# Patient Record
Sex: FEMALE | Race: WHITE | Employment: FULL TIME | URBAN - METROPOLITAN AREA
[De-identification: names, ages, dates, MRNs, and addresses within clinical notes are randomized per-mention and may not be internally consistent; named-entity substitution may affect disease eponyms.]

---

## 2017-01-27 ENCOUNTER — HOSPITAL ENCOUNTER (OUTPATIENT)
Dept: CARDIOLOGY | Facility: CLINIC | Age: 52
Discharge: HOME | End: 2017-01-27
Attending: PHYSICIAN ASSISTANT | Admitting: PHYSICIAN ASSISTANT
Payer: COMMERCIAL

## 2017-01-27 DIAGNOSIS — R07.9 CHEST PAIN, UNSPECIFIED TYPE: ICD-10-CM

## 2017-01-27 PROCEDURE — 93325 DOPPLER ECHO COLOR FLOW MAPG: CPT | Mod: 26 | Performed by: INTERNAL MEDICINE

## 2017-01-27 PROCEDURE — 93016 CV STRESS TEST SUPVJ ONLY: CPT | Performed by: INTERNAL MEDICINE

## 2017-01-27 PROCEDURE — 93321 DOPPLER ECHO F-UP/LMTD STD: CPT | Mod: 26 | Performed by: INTERNAL MEDICINE

## 2017-01-27 PROCEDURE — 40000264 ECHO STRESS TEST WITH LUMASON

## 2017-01-27 PROCEDURE — 25500064 ZZH RX 255 OP 636: Performed by: PHYSICIAN ASSISTANT

## 2017-01-27 PROCEDURE — 93350 STRESS TTE ONLY: CPT | Mod: 26 | Performed by: INTERNAL MEDICINE

## 2017-01-27 PROCEDURE — 93018 CV STRESS TEST I&R ONLY: CPT | Performed by: INTERNAL MEDICINE

## 2017-01-27 RX ADMIN — SULFUR HEXAFLUORIDE 5 ML: KIT at 09:45

## 2017-01-30 NOTE — PROGRESS NOTES
Quick Note:    Dear Mallory,    I received the report for your recent stress test, and it looks like it all came back normal, which is good news.    Please contact the clinic if you have additional questions. Thank you.    Sincerely,    Kylie Aleman PA-C    ______

## 2017-04-24 DIAGNOSIS — E78.5 HYPERLIPIDEMIA LDL GOAL <130: ICD-10-CM

## 2017-04-24 NOTE — TELEPHONE ENCOUNTER
Omeprazole 20 MG tablet      Last Written Prescription Date: 3-31-16  Last Fill Quantity: 90,  # refills: 3   [place] [facility] or [facility] prescribing provider: 12-27-16                                                  simvastatin (ZOCOR) 20 MG tablet    Last Written Prescription Date: 3-31-16  Last Fill Quantity: 90, # refills: 3  [place] [facility] or [facility] prescribing provider: 12-27-16       Lab Results   Component Value Date    CHOL 200 03/31/2016     Lab Results   Component Value Date    HDL 48 03/31/2016     Lab Results   Component Value Date     03/31/2016     Lab Results   Component Value Date    TRIG 134 03/31/2016     Lab Results   Component Value Date    CHOLHDLRATIO 3.8 02/27/2015

## 2017-04-25 DIAGNOSIS — G47.09 OTHER INSOMNIA: ICD-10-CM

## 2017-04-25 NOTE — TELEPHONE ENCOUNTER
Reason for Call:  Medication or medication refill:    Do you use a [facility]?  Name of the pharmacy and phone number for the current request:  [facility]    Name of the medication requested: zolpidem (AMBIEN) 5 MG tablet    Other request: pt has two other med refills    Can we leave a detailed message on this number? YES    Phone number patient can be reached at: Home number on file [phone] (home)    Best Time:     Call taken on 4/25/2017 at 9:49 AM by Merna Wilson

## 2017-04-25 NOTE — TELEPHONE ENCOUNTER
Zolpidem      Last Written Prescription Date: 6/8/2016  Last Fill Quantity: 30,  # refills: 5   Last Office Visit with [facility], [facility] or [facility] prescribing provider: 12/27/2017                                         Next 5 appointments (look out 90 days)     Apr 28, 2017 10:00 AM CDT   PHYSICAL with Kylie Aleman PA-C   [facility] [place] ([facility])    [address]  [place] [id number] [phone]

## 2017-04-26 RX ORDER — ZOLPIDEM TARTRATE 5 MG/1
5 TABLET ORAL
Qty: 30 TABLET | Refills: 5 | Status: SHIPPED | OUTPATIENT
Start: 2017-04-26 | End: 2017-11-24

## 2017-04-26 RX ORDER — SIMVASTATIN 20 MG
TABLET ORAL
Qty: 30 TABLET | Refills: 0 | Status: SHIPPED | OUTPATIENT
Start: 2017-04-26 | End: 2017-05-12

## 2017-04-26 NOTE — TELEPHONE ENCOUNTER
Pt calling to check on refills.  She is out of medication and will be in a lot of GI pain if she does not have her omeprazole.  Can we please expedite the refill for her?  She can be reached at [phone] with any questions or concerns.  Vanessa Galeano  Patient

## 2017-04-28 ENCOUNTER — OFFICE VISIT (OUTPATIENT)
Dept: FAMILY MEDICINE | Facility: CLINIC | Age: 52
End: 2017-04-28
Payer: COMMERCIAL

## 2017-04-28 VITALS
DIASTOLIC BLOOD PRESSURE: 70 MMHG | WEIGHT: 228 LBS | BODY MASS INDEX: 31.92 KG/M2 | HEART RATE: 74 BPM | OXYGEN SATURATION: 95 % | HEIGHT: 71 IN | SYSTOLIC BLOOD PRESSURE: 110 MMHG | TEMPERATURE: 97.9 F

## 2017-04-28 DIAGNOSIS — E78.5 HYPERLIPIDEMIA LDL GOAL <130: ICD-10-CM

## 2017-04-28 DIAGNOSIS — Z80.41 FAMILY HISTORY OF MALIGNANT NEOPLASM OF OVARY: ICD-10-CM

## 2017-04-28 DIAGNOSIS — M25.50 MULTIPLE JOINT PAIN: ICD-10-CM

## 2017-04-28 DIAGNOSIS — Z12.31 VISIT FOR SCREENING MAMMOGRAM: ICD-10-CM

## 2017-04-28 DIAGNOSIS — R53.83 OTHER FATIGUE: ICD-10-CM

## 2017-04-28 DIAGNOSIS — Z00.00 ROUTINE HISTORY AND PHYSICAL EXAMINATION OF ADULT: Primary | ICD-10-CM

## 2017-04-28 DIAGNOSIS — E66.9 NON MORBID OBESITY, UNSPECIFIED OBESITY TYPE: ICD-10-CM

## 2017-04-28 DIAGNOSIS — G47.00 INSOMNIA, UNSPECIFIED TYPE: ICD-10-CM

## 2017-04-28 PROBLEM — R87.610 PAPANICOLAOU SMEAR OF CERVIX WITH ATYPICAL SQUAMOUS CELLS OF UNDETERMINED SIGNIFICANCE (ASC-US): Status: ACTIVE | Noted: 2017-04-28

## 2017-04-28 LAB
CANCER AG125 SERPL-ACNC: 9 U/ML (ref 0–30)
CRP SERPL-MCNC: <2.9 MG/L (ref 0–8)
ERYTHROCYTE [DISTWIDTH] IN BLOOD BY AUTOMATED COUNT: 12.6 % (ref 10–15)
ERYTHROCYTE [SEDIMENTATION RATE] IN BLOOD BY WESTERGREN METHOD: 12 MM/H (ref 0–20)
HCT VFR BLD AUTO: 40.6 % (ref 35–47)
HGB BLD-MCNC: 13.6 G/DL (ref 11.7–15.7)
MCH RBC QN AUTO: 30.8 PG (ref 26.5–33)
MCHC RBC AUTO-ENTMCNC: 33.5 G/DL (ref 31.5–36.5)
MCV RBC AUTO: 92 FL (ref 78–100)
PLATELET # BLD AUTO: 181 10E9/L (ref 150–450)
RBC # BLD AUTO: 4.41 10E12/L (ref 3.8–5.2)
WBC # BLD AUTO: 6.1 10E9/L (ref 4–11)

## 2017-04-28 PROCEDURE — 80061 LIPID PANEL: CPT | Performed by: PHYSICIAN ASSISTANT

## 2017-04-28 PROCEDURE — 86200 CCP ANTIBODY: CPT | Performed by: PHYSICIAN ASSISTANT

## 2017-04-28 PROCEDURE — 86431 RHEUMATOID FACTOR QUANT: CPT | Performed by: PHYSICIAN ASSISTANT

## 2017-04-28 PROCEDURE — 85652 RBC SED RATE AUTOMATED: CPT | Performed by: PHYSICIAN ASSISTANT

## 2017-04-28 PROCEDURE — 85027 COMPLETE CBC AUTOMATED: CPT | Performed by: PHYSICIAN ASSISTANT

## 2017-04-28 PROCEDURE — 82550 ASSAY OF CK (CPK): CPT | Performed by: PHYSICIAN ASSISTANT

## 2017-04-28 PROCEDURE — 99396 PREV VISIT EST AGE 40-64: CPT | Performed by: PHYSICIAN ASSISTANT

## 2017-04-28 PROCEDURE — 99213 OFFICE O/P EST LOW 20 MIN: CPT | Mod: 25 | Performed by: PHYSICIAN ASSISTANT

## 2017-04-28 PROCEDURE — 86140 C-REACTIVE PROTEIN: CPT | Performed by: PHYSICIAN ASSISTANT

## 2017-04-28 PROCEDURE — 84443 ASSAY THYROID STIM HORMONE: CPT | Performed by: PHYSICIAN ASSISTANT

## 2017-04-28 PROCEDURE — 86304 IMMUNOASSAY TUMOR CA 125: CPT | Performed by: PHYSICIAN ASSISTANT

## 2017-04-28 PROCEDURE — 86038 ANTINUCLEAR ANTIBODIES: CPT | Performed by: PHYSICIAN ASSISTANT

## 2017-04-28 PROCEDURE — 36415 COLL VENOUS BLD VENIPUNCTURE: CPT | Performed by: PHYSICIAN ASSISTANT

## 2017-04-28 PROCEDURE — 82306 VITAMIN D 25 HYDROXY: CPT | Performed by: PHYSICIAN ASSISTANT

## 2017-04-28 PROCEDURE — 80053 COMPREHEN METABOLIC PANEL: CPT | Performed by: PHYSICIAN ASSISTANT

## 2017-04-28 RX ORDER — MULTIPLE VITAMINS W/ MINERALS TAB 9MG-400MCG
1 TAB ORAL DAILY
COMMUNITY
End: 2021-04-16 | Stop reason: ALTCHOICE

## 2017-04-28 RX ORDER — TRAZODONE HYDROCHLORIDE 50 MG/1
50 TABLET, FILM COATED ORAL
Qty: 30 TABLET | Refills: 1 | Status: SHIPPED | OUTPATIENT
Start: 2017-04-28 | End: 2017-11-22

## 2017-04-28 NOTE — PROGRESS NOTES
SUBJECTIVE:     CC: Mallory Sargent is an 51 year old woman who presents for preventive health visit.     Healthy Habits:    Do you get at least three servings of calcium containing foods daily (dairy, green leafy vegetables, etc.)? yes    Amount of exercise or daily activities, outside of work: 2-3 day(s) per week 45 minutes     Problems taking medications regularly No    Medication side effects: No    Have you had an eye exam in the past two years? yes    Do you see a dentist twice per year? yes    Do you have sleep apnea, excessive snoring or daytime drowsiness? yes - daytime drowsiness     Fatigue--energy level down  Has not scheduled sleep study as advised in the past. She does snore.  has not noticed gasping or apnea episodes.    Pain:  Stiffness when she gets up. Improves once she starts moving.  Hips bother her when she works. Low back pain.  Possible muscle pain  Feels good when she exercises  Getting worse recently over past 6 months  Unsure if she has OA or other type of arthritis. Would like RA labs today    Takes MVI, but no add'l vitamin D    Brother has ankylosing spondylitis  Mom has lupus    Today's PHQ-2 Score:   PHQ-2 ( 1999 [employer]) 4/28/2017 3/31/2016   Q1: Little interest or pleasure in doing things 0 0   Q2: Feeling down, depressed or hopeless 0 0   PHQ-2 Score 0 0       Abuse: Current or Past(Physical, Sexual or Emotional)- No  Do you feel safe in your environment - Yes    Social History   Substance Use Topics     Smoking status: Former Smoker     Quit date: 5/30/1993     Smokeless tobacco: Never Used     Alcohol use Yes      Comment: very rarely      1-2 times per year for alcohol use     Recent Labs   Lab Test  03/31/16   1015  02/27/15   0923  10/31/13   0927   CHOL  200*  195  180   HDL  48*  51  47*   LDL  125*  108  100   TRIG  134  178*  167*   CHOLHDLRATIO   --   3.8  3.9   NHDL  152*   --    --        Reviewed orders with patient.  Reviewed health maintenance and updated  orders accordingly - Yes    Mammo Decision Support:  Patient under age 50, mutual decision reflected in health maintenance.      Pertinent mammograms are reviewed under the imaging tab.  History of abnormal Pap smear: YES - updated in Problem List and Health Maintenance accordingly  ASCUS Pap with negative HPV testing in . Long history of normal Paps    Reviewed and updated as needed this visit by clinical staff         Reviewed and updated as needed this visit by Provider        Past Medical History:   Diagnosis Date     ASCUS favor benign 3/2015    neg HPV   Plan cotest in 3 yrs.     Depressive disorder, not elsewhere classified     lexapro = no effect and sleepy, celexa = slightly better, wellbutrin = severe restlessness.       Esophageal reflux 2006     FAMILY HX-OVARIAN MALIGNANCY 2005    maternal Hx Dx'd age 57,  age 64; pt would like to have her ovaries removed.     GERD (gastroesophageal reflux disease)      Other and unspecified hyperlipidemia 2007     RECURR Depressive disorder -SEVERE 2008      Past Surgical History:   Procedure Laterality Date     C LIGATE FALLOPIAN TUBE       C NONSPECIFIC PROCEDURE      vaginal repair after delivery     C/SECTION, LOW TRANSVERSE      , Low Transverse     CHOLECYSTECTOMY, LAPOROSCOPIC  2010    Cholecystectomy, Laparoscopic     COLONOSCOPY  2/15/2016    Dr. Marc SILVA     HERNIA REPAIR, INCISIONAL  2010    Laparoscopic       ROS:  C: NEGATIVE for fever, chills, change in weight  I: NEGATIVE for worrisome rashes, moles or lesions  E: NEGATIVE for vision changes or irritation  ENT: NEGATIVE for ear, mouth and throat problems  R: NEGATIVE for significant cough or SOB  B: NEGATIVE for masses, tenderness or discharge  CV: NEGATIVE for chest pain, palpitations or peripheral edema  GI: NEGATIVE for nausea, abdominal pain, heartburn, or change in bowel habits  : NEGATIVE for unusual urinary or vaginal symptoms.    MUSCULOSKELETAL:POSITIVE  for muscle/joint pains  N: NEGATIVE for weakness, dizziness or paresthesias  P: NEGATIVE for changes in mood or affect    Patient Active Problem List   Diagnosis     Family history of malignant neoplasm of ovary     Benign shuddering attack     Esophageal reflux     Acute reaction to stress     RECURR Depressive disorder - MOD     Hyperlipidemia LDL goal <130     Obesity     Papanicolaou smear of cervix with atypical squamous cells of undetermined significance (ASC-US)     Past Surgical History:   Procedure Laterality Date     C LIGATE FALLOPIAN TUBE       C NONSPECIFIC PROCEDURE      vaginal repair after delivery     C/SECTION, LOW TRANSVERSE      , Low Transverse     CHOLECYSTECTOMY, LAPOROSCOPIC  2010    Cholecystectomy, Laparoscopic     COLONOSCOPY  2/15/2016    Dr. Marc DIAZ     HERNIA REPAIR, INCISIONAL  2010    Laparoscopic       Social History   Substance Use Topics     Smoking status: Former Smoker     Quit date: 1993     Smokeless tobacco: Never Used     Alcohol use Yes      Comment: very rarely      Family History   Problem Relation Age of Onset     Hypertension Father      Lipids Father      GASTROINTESTINAL DISEASE Father      vazquez's esophagus fr. reflux      CANCER Mother      ovarian     Arthritis Mother      lupus      Depression Paternal Grandmother      problems with schizophrenia     Arthritis Brother      GASTROINTESTINAL DISEASE Brother      reflux      Colon Cancer Cousin          Current Outpatient Prescriptions   Medication Sig Dispense Refill     multivitamin, therapeutic with minerals (MULTI-VITAMIN) TABS tablet Take 1 tablet by mouth daily       traZODone (DESYREL) 50 MG tablet Take 1 tablet (50 mg) by mouth nightly as needed for sleep 30 tablet 1     simvastatin (ZOCOR) 20 MG tablet TAKE ONE TABLET BY MOUTH EVERY DAY IN THE EVENING - NEED FASTING LABS BEFORE ADDITIONAL REFILLS 30 tablet 0     omeprazole (PRILOSEC) 20 MG CR capsule  "TAKE ONE CAPSULE BY MOUTH EVERY DAY 30 TO 60 MINUTES BEFORE A MEAL 30 capsule 0     zolpidem (AMBIEN) 5 MG tablet Take 1 tablet (5 mg) by mouth nightly as needed for sleep 30 tablet 5     aspirin 81 MG tablet Take by mouth daily 30 tablet      No Known Allergies  OBJECTIVE:     /70  Pulse 74  Temp 97.9  F (36.6  C) (Oral)  Ht 5' 10.5\" (1.791 m)  Wt 228 lb (103.4 kg)  SpO2 95%  BMI 32.25 kg/m2  EXAM:  GENERAL: healthy, alert and no distress  EYES: Eyes grossly normal to inspection, PERRL and conjunctivae and sclerae normal  HENT: ear canals and TM's normal, nose and mouth without ulcers or lesions  NECK: no adenopathy, no asymmetry, masses, or scars and thyroid normal to palpation  RESP: lungs clear to auscultation - no rales, rhonchi or wheezes  BREAST: normal without masses, tenderness or nipple discharge and no palpable axillary masses or adenopathy  CV: regular rate and rhythm, normal S1 S2, no S3 or S4, no murmur, click or rub, no peripheral edema and peripheral pulses strong  ABDOMEN: soft, nontender, no hepatosplenomegaly, no masses and bowel sounds normal  MS: no gross musculoskeletal defects noted, no edema  SKIN: no suspicious lesions or rashes  NEURO: Normal strength and tone, mentation intact and speech normal  PSYCH: mentation appears normal, affect normal/bright    ASSESSMENT/PLAN:     1. Routine history and physical examination of adult  Fasting labs today. Had colonoscopy in 2016. Due for repeat in 2021.  - Lipid Profile with reflex to direct LDL  - CBC with platelets  - Comprehensive metabolic panel (BMP + Alb, Alk Phos, ALT, AST, Total. Bili, TP)    2. Visit for screening mammogram  Patient will schedule mammogram.  - MA SCREENING DIGITAL BILAT - Future  (s+30); Future    3. Hyperlipidemia LDL goal <130  Has been on simvastatin for many years. Ran out for two days, but has been back on it for 2-3 days.    4. Other fatigue  Will check additional labs due to fatigue. Discussed may need " "sleep study for further evaluation.  - TSH with free T4 reflex  - Vitamin D Deficiency    5. Multiple joint pain  Check labs today. Worsening in pain over the past 6 months. Encouraged exercise. Discussed that OA is more common than inflammatory arthritis, but does have positive FH of autoimmune disorders  - ESR: Erythrocyte sedimentation rate  - CRP, inflammation  - Rheumatoid factor  - Antinuclear antibody screen by EIA  - Cyclic Citrullinated Peptide Antibody IgG  - CK total    6. Family history of malignant neoplasm of ovary  Does yearly  testing due to FH of ovarian cancer.  -     7. Insomnia, unspecified type  Would like to try trazodone for sleep. Advised against taking every night.  - traZODone (DESYREL) 50 MG tablet; Take 1 tablet (50 mg) by mouth nightly as needed for sleep  Dispense: 30 tablet; Refill: 1    8. Non morbid obesity, unspecified obesity type  Discussed diet and exercise. Has had success in the past with logging food and steps. Encouraged her to restart this.    COUNSELING:   Reviewed preventive health counseling, as reflected in patient instructions       Regular exercise       Healthy diet/nutrition       Osteoporosis Prevention/Bone Health       Colon cancer screening         reports that she quit smoking about 23 years ago. She has never used smokeless tobacco.    Estimated body mass index is 30.7 kg/(m^2) as calculated from the following:    Height as of 3/31/16: 5' 10.5\" (1.791 m).    Weight as of 12/27/16: 217 lb (98.4 kg).   Weight management plan: Discussed healthy diet and exercise guidelines and patient will follow up in 12 months in clinic to re-evaluate.    Counseling Resources:  ATP IV Guidelines  Pooled Cohorts Equation Calculator  Breast Cancer Risk Calculator  FRAX Risk Assessment  ICSI Preventive Guidelines  Dietary Guidelines for Americans, 2010  USDA's MyPlate  ASA Prophylaxis  Lung CA Screening    Kylie Aleman PA-C  [facility] [place]  "

## 2017-04-28 NOTE — NURSING NOTE
"Chief Complaint   Patient presents with     Physical       Initial /70  Pulse 74  Temp 97.9  F (36.6  C) (Oral)  Ht 5' 10.5\" (1.791 m)  Wt 228 lb (103.4 kg)  SpO2 95%  BMI 32.25 kg/m2 Estimated body mass index is 32.25 kg/(m^2) as calculated from the following:    Height as of this encounter: 5' 10.5\" (1.791 m).    Weight as of this encounter: 228 lb (103.4 kg).  Medication Reconciliation: complete   Zoe Jasso Medical Assistant      "

## 2017-04-28 NOTE — MR AVS SNAPSHOT
After Visit Summary   4/28/2017    Mallory Sargent    MRN: [id number]           Patient Information     Date Of Birth          1965        Visit Information        Provider Department      4/28/2017 10:00 AM Kylie Aleman PA-C [facility] [place]        Today's Diagnoses     Routine general medical examination at a health care facility    -  1    Visit for screening mammogram        Hyperlipidemia LDL goal <130        Other fatigue        Multiple joint pain        Family history of malignant neoplasm of ovary        Insomnia, unspecified type          Care Instructions      Preventive Health Recommendations  Female Ages 40 to 49    Yearly exam:     See your health care provider every year in order to  1. Review health changes.   2. Discuss preventive care.    3. Review your medicines if your doctor prescribed any.      Get a Pap test every three years (unless you have an abnormal result and your provider advises testing more often).      If you get Pap tests with HPV test, you only need to test every 5 years, unless you have an abnormal result. You do not need a Pap test if your uterus was removed (hysterectomy) and you have not had cancer.      You should be tested each year for STDs (sexually transmitted diseases), if you're at risk.       Ask your doctor if you should have a mammogram.      Have a colonoscopy (test for colon cancer) if someone in your family has had colon cancer or polyps before age 50.       Have a cholesterol test every 5 years.       Have a diabetes test (fasting glucose) after age 45. If you are at risk for diabetes, you should have this test every 3 years.    Shots: Get a flu shot each year. Get a tetanus shot every 10 years.     Nutrition:     Eat at least 5 servings of fruits and vegetables each day.    Eat whole-grain bread, whole-wheat pasta and brown rice instead of white grains and rice.    Talk to your provider about Calcium and Vitamin D.      Lifestyle    Exercise at least 150 minutes a week (an average of 30 minutes a day, 5 days a week). This will help you control your weight and prevent disease.    Limit alcohol to one drink per day.    No smoking.     Wear sunscreen to prevent skin cancer.    See your dentist every six months for an exam and cleaning.        Follow-ups after your visit        Future tests that were ordered for you today     Open Future Orders        Priority Expected Expires Ordered    MA SCREENING DIGITAL BILAT - Future  (s+30) Routine  4/28/2018 4/28/2017            Who to contact     If you have questions or need follow up information about today's clinic visit or your schedule please contact [facility] directly at [phone].  Normal or non-critical lab and imaging results will be communicated to you by [place] [employer], letter or phone within 4 business days after the clinic has received the results. If you do not hear from us within 7 days, please contact the clinic through [employer] or phone. If you have a critical or abnormal lab result, we will notify you by phone as soon as possible.  Submit refill requests through [employer] or call your pharmacy and they will forward the refill request to us. Please allow 3 business days for your refill to be completed.          Additional Information About Your Visit        [employer] Information     [employer] gives you secure access to your electronic health record. If you see a primary care provider, you can also send messages to your care team and make appointments. If you have questions, please call your primary care clinic.  If you do not have a primary care provider, please call [phone] and they will assist you.        Care EveryWhere ID     This is your Care EveryWhere ID. This could be used by other organizations to access your [facility] medical records  [id number]        Your Vitals Were     Pulse Temperature Height Pulse Oximetry BMI (Body Mass Index)       74 97.9  F  "(36.6  C) (Oral) 5' 10.5\" (1.791 m) 95% 32.25 kg/m2        Blood Pressure from Last 3 Encounters:   04/28/17 110/70   12/27/16 110/64   03/31/16 128/78    Weight from Last 3 Encounters:   04/28/17 228 lb (103.4 kg)   12/27/16 217 lb (98.4 kg)   03/31/16 239 lb (108.4 kg)              We Performed the Following     Antinuclear antibody screen by EIA          CBC with platelets     CK total     Comprehensive metabolic panel (BMP + Alb, Alk Phos, ALT, AST, Total. Bili, TP)     CRP, inflammation     Cyclic Citrullinated Peptide Antibody IgG     ESR: Erythrocyte sedimentation rate     Lipid Profile with reflex to direct LDL     Rheumatoid factor     TSH with free T4 reflex     Vitamin D Deficiency          Today's Medication Changes          These changes are accurate as of: 4/28/17 10:47 AM.  If you have any questions, ask your nurse or doctor.               Start taking these medicines.        Dose/Directions    traZODone 50 MG tablet   Commonly known as:  DESYREL   Used for:  Insomnia, unspecified type   Started by:  Kylie Aleman PA-C        Dose:  50 mg   Take 1 tablet (50 mg) by mouth nightly as needed for sleep   Quantity:  30 tablet   Refills:  1            Where to get your medicines      These medications were sent to [facility] [place] [id number] [facility] [address]     Phone:  [phone]     traZODone 50 MG tablet                Primary Care Provider Office Phone # Fax #    Karen Weiler, [phone]       [facility] [address] [facility] [address]        Thank you!     Thank you for choosing [facility]  for your care. Our goal is always to provide you with excellent care. Hearing back from our patients is one way we can continue to improve our services. Please take a few minutes to complete the written survey that you may receive in the mail after your visit with us. Thank you!           "   Your Updated Medication List - Protect others around you: Learn how to safely use, store and throw away your medicines at www.disposemymeds.org.          This list is accurate as of: 4/28/17 10:47 AM.  Always use your most recent med list.                   Brand Name Dispense Instructions for use    aspirin 81 MG tablet     30 tablet    Take by mouth daily       Multi-vitamin Tabs tablet      Take 1 tablet by mouth daily       omeprazole 20 MG CR capsule    priLOSEC    30 capsule    TAKE ONE CAPSULE BY MOUTH EVERY DAY 30 TO 60 MINUTES BEFORE A MEAL       simvastatin 20 MG tablet    ZOCOR    30 tablet    TAKE ONE TABLET BY MOUTH EVERY DAY IN THE EVENING - NEED FASTING LABS BEFORE ADDITIONAL REFILLS       traZODone 50 MG tablet    DESYREL    30 tablet    Take 1 tablet (50 mg) by mouth nightly as needed for sleep       zolpidem 5 MG tablet    AMBIEN    30 tablet    Take 1 tablet (5 mg) by mouth nightly as needed for sleep

## 2017-04-29 LAB
ALBUMIN SERPL-MCNC: 4 G/DL (ref 3.4–5)
ALP SERPL-CCNC: 75 U/L (ref 40–150)
ALT SERPL W P-5'-P-CCNC: 37 U/L (ref 0–50)
ANION GAP SERPL CALCULATED.3IONS-SCNC: 12 MMOL/L (ref 3–14)
AST SERPL W P-5'-P-CCNC: 20 U/L (ref 0–45)
BILIRUB SERPL-MCNC: 0.4 MG/DL (ref 0.2–1.3)
BUN SERPL-MCNC: 14 MG/DL (ref 7–30)
CALCIUM SERPL-MCNC: 9.3 MG/DL (ref 8.5–10.1)
CHLORIDE SERPL-SCNC: 106 MMOL/L (ref 94–109)
CHOLEST SERPL-MCNC: 235 MG/DL
CK SERPL-CCNC: 139 U/L (ref 30–225)
CO2 SERPL-SCNC: 23 MMOL/L (ref 20–32)
CREAT SERPL-MCNC: 0.87 MG/DL (ref 0.52–1.04)
DEPRECATED CALCIDIOL+CALCIFEROL SERPL-MC: 27 UG/L (ref 20–75)
GFR SERPL CREATININE-BSD FRML MDRD: 69 ML/MIN/1.7M2
GLUCOSE SERPL-MCNC: 97 MG/DL (ref 70–99)
HDLC SERPL-MCNC: 55 MG/DL
LDLC SERPL CALC-MCNC: 143 MG/DL
NONHDLC SERPL-MCNC: 180 MG/DL
POTASSIUM SERPL-SCNC: 4.1 MMOL/L (ref 3.4–5.3)
PROT SERPL-MCNC: 7.5 G/DL (ref 6.8–8.8)
SODIUM SERPL-SCNC: 141 MMOL/L (ref 133–144)
TRIGL SERPL-MCNC: 186 MG/DL
TSH SERPL DL<=0.005 MIU/L-ACNC: 1.86 MU/L (ref 0.4–4)

## 2017-05-01 LAB — ANA SER QL IA: NORMAL

## 2017-05-02 LAB
CCP AB SER IA-ACNC: 1 U/ML
RHEUMATOID FACT SER NEPH-ACNC: <20 IU/ML (ref 0–20)

## 2017-05-12 DIAGNOSIS — E78.5 HYPERLIPIDEMIA LDL GOAL <130: ICD-10-CM

## 2017-05-12 RX ORDER — SIMVASTATIN 40 MG
40 TABLET ORAL AT BEDTIME
Qty: 90 TABLET | Refills: 1 | Status: SHIPPED | OUTPATIENT
Start: 2017-05-12 | End: 2017-11-24

## 2017-05-12 NOTE — PROGRESS NOTES
Deashelton Tejada,    -Liver and gallbladder tests are normal. (ALT,AST, Alk phos, bilirubin), kidney function is normal (Cr, GFR), Sodium is normal, Potassium is normal, Calcium is normal, Glucose is normal (diabetes screening test).   -Your LDL and total cholesterol came back elevated, and are a little higher than I would like them to be. I would like to increase your simvastatin dose to 40mg daily. I will send a new prescription to your pharmacy ([facility]). We should recheck your cholesterol in about 6 months to make sure the new dose is working well.  -TSH (thyroid stimulating hormone) level is normal which indicates normal thyroid function.  -Normal red blood cell (hgb) levels, normal white blood cell count and normal platelet levels.  -Vitamin D level is normal, 4196-5807 IU daily in diet or supplements is recommended.   -All of the rheumatoid/autoimmune testing came back normal    If you have further questions about the interpretation of your labs, labtestsonline.org is a good website to check out for further information.    Please contact the clinic if you have additional questions.  Thank you.    Sincerely,    Kylie Aleman PA-C

## 2017-05-22 DIAGNOSIS — E78.5 HYPERLIPIDEMIA LDL GOAL <130: ICD-10-CM

## 2017-05-22 NOTE — TELEPHONE ENCOUNTER
omeprazole (PRILOSEC) 20 MG CR capsule      Last Written Prescription Date: 4/26/2017  Last Fill Quantity: 30 capsule,  # refills: 0   Last Office Visit with [facility], [facility] or [facility] prescribing provider: 4/28/2017

## 2017-05-25 ENCOUNTER — TELEPHONE (OUTPATIENT)
Dept: FAMILY MEDICINE | Facility: CLINIC | Age: 52
End: 2017-05-25

## 2017-05-25 DIAGNOSIS — N64.4 BREAST PAIN: Primary | ICD-10-CM

## 2017-05-25 NOTE — TELEPHONE ENCOUNTER
Name of caller: Mallory  Relationship of Patient: Self    Reason for Call: Patient called because she wanted to schedule a mammo but is having a lot of tenderness in her breasts and was told that she needs to speak with a nurse or her Doctor first.     Best phone number to reach pt at is: [phone]  Ok to leave a message with medical info? Yes    Pharmacy preferred (if calling for a refill): [facility]-Patient [facility]

## 2017-05-25 NOTE — TELEPHONE ENCOUNTER
Called # below     Left a non detailed VM     Mallory Nugent RN, BSN  [facility]

## 2017-05-25 NOTE — TELEPHONE ENCOUNTER
Pt calling     Stated the tenderness started - about 3 weeks ago  On left breast   Denies: fevers chills sweats, no discharge, left arm pain, abdominal pain, redness, swelling, trauma, lumps    Please advise     Thank you     Mallory Nugent RN, BSN  [facility]

## 2017-05-26 NOTE — TELEPHONE ENCOUNTER
Please call patient and let her know, because she is having pain, we need to order a diagnostic mammogram. They may need to do additional views or an US. I placed the orders and the hospital should contact her to schedule it      Karen Weiler, MD

## 2017-05-30 NOTE — TELEPHONE ENCOUNTER
Called # below     Advised pt on the information below     Patient stated an understanding and agreed with plan.    Mallory Nugent RN, BSN  [facility]

## 2017-06-06 ENCOUNTER — HOSPITAL ENCOUNTER (OUTPATIENT)
Dept: MAMMOGRAPHY | Facility: CLINIC | Age: 52
Discharge: HOME | End: 2017-06-06
Attending: FAMILY MEDICINE | Admitting: FAMILY MEDICINE
Payer: COMMERCIAL

## 2017-06-06 DIAGNOSIS — N64.4 BREAST PAIN: ICD-10-CM

## 2017-06-06 PROCEDURE — G0204 DX MAMMO INCL CAD BI: HCPCS

## 2017-06-06 NOTE — LETTER
[facility]  [address], [address]  [facility] [address]                                                                                                            Mallory [place] [address]      June 6, 2017    Dear Mallory:    Thank you for your recent visit.  We are pleased to inform you that the results of your recent breast imaging show no evidence of malignancy (cancer).    If you are experiencing any breast problems such as a lump or localized pain we request that you discuss this with your health care provider if you haven t already done so, as additional testing may be necessary.    As you know, early detection of cancer is very important. Although mammography is the most accurate method for early detection, not all cancers are found through mammography. A thorough examination includes a combination of mammography, physical examination and breast self-examination. Currently the American College of Radiology, Society of Breast Imaging and American College of Obstetricians and Gynecologists recommend an annual mammogram for all women beginning at the age of 40.    A report of your breast imaging results was sent to: Karen Weiler    Your breast imaging will become part of your medical file here at [facility] for at least 10 years. You are responsible for informing any new health care provider or breast imaging facility of the date and location of this examination.    We appreciate the opportunity to participate in your health care.    Sincerely,    Kristofer Roman MD  Interpreting Radiologist  [facility]

## 2017-06-06 NOTE — PROGRESS NOTES
Dear Mallory,    Your recent mammogram was normal. Annual mammograms are recommended, so you will be due again in June 2018.  You may receive a separate result letter in the mail from the imaging center.    Please contact the clinic if you have additional questions.  Thank you.    Sincerely,    Kylie Aleman PA-C

## 2017-06-30 ENCOUNTER — OFFICE VISIT (OUTPATIENT)
Dept: FAMILY MEDICINE | Facility: CLINIC | Age: 52
End: 2017-06-30
Payer: COMMERCIAL

## 2017-06-30 VITALS
OXYGEN SATURATION: 98 % | HEART RATE: 71 BPM | DIASTOLIC BLOOD PRESSURE: 66 MMHG | TEMPERATURE: 98.5 F | BODY MASS INDEX: 31.92 KG/M2 | HEIGHT: 71 IN | SYSTOLIC BLOOD PRESSURE: 104 MMHG | WEIGHT: 228 LBS

## 2017-06-30 DIAGNOSIS — Q65.89 HIP DYSPLASIA: ICD-10-CM

## 2017-06-30 DIAGNOSIS — M79.652 PAIN IN BOTH THIGHS: ICD-10-CM

## 2017-06-30 DIAGNOSIS — R06.83 SNORING: Primary | ICD-10-CM

## 2017-06-30 DIAGNOSIS — M79.651 PAIN IN BOTH THIGHS: ICD-10-CM

## 2017-06-30 DIAGNOSIS — Z80.41 FAMILY HISTORY OF MALIGNANT NEOPLASM OF OVARY: ICD-10-CM

## 2017-06-30 PROCEDURE — 99213 OFFICE O/P EST LOW 20 MIN: CPT | Performed by: PHYSICIAN ASSISTANT

## 2017-06-30 ASSESSMENT — ANXIETY QUESTIONNAIRES
7. FEELING AFRAID AS IF SOMETHING AWFUL MIGHT HAPPEN: NOT AT ALL
2. NOT BEING ABLE TO STOP OR CONTROL WORRYING: NOT AT ALL
5. BEING SO RESTLESS THAT IT IS HARD TO SIT STILL: NOT AT ALL
1. FEELING NERVOUS, ANXIOUS, OR ON EDGE: NOT AT ALL
IF YOU CHECKED OFF ANY PROBLEMS ON THIS QUESTIONNAIRE, HOW DIFFICULT HAVE THESE PROBLEMS MADE IT FOR YOU TO DO YOUR WORK, TAKE CARE OF THINGS AT HOME, OR GET ALONG WITH OTHER PEOPLE: NOT DIFFICULT AT ALL
6. BECOMING EASILY ANNOYED OR IRRITABLE: NOT AT ALL
3. WORRYING TOO MUCH ABOUT DIFFERENT THINGS: NOT AT ALL
GAD7 TOTAL SCORE: 0

## 2017-06-30 ASSESSMENT — PATIENT HEALTH QUESTIONNAIRE - PHQ9: 5. POOR APPETITE OR OVEREATING: NOT AT ALL

## 2017-06-30 NOTE — NURSING NOTE
"Chief Complaint   Patient presents with     Sleep Problem       Initial /66  Pulse 71  Temp 98.5  F (36.9  C) (Oral)  Ht 5' 10.5\" (1.791 m)  Wt 228 lb (103.4 kg)  LMP 06/09/2015  SpO2 98%  BMI 32.25 kg/m2 Estimated body mass index is 32.25 kg/(m^2) as calculated from the following:    Height as of this encounter: 5' 10.5\" (1.791 m).    Weight as of this encounter: 228 lb (103.4 kg).  Medication Reconciliation: complete   Zoe Jasso Certified Medical Assistant      "

## 2017-06-30 NOTE — PROGRESS NOTES
SUBJECTIVE:                                                    Mallory Sargent is a 51 year old female who presents to clinic today for the following health issues:    Specialty referrals:    Patient would like a new referral for a sleep study.  Was given a referral in the past, but it has .  We discussed her sleep issues at her recent physical. Does snore, has daytime drowsiness, and has trouble falling asleep. Takes trazodone for sleep. States she will wake up 4 hours after falling asleep and has a hard time going back to sleep.  Is concerned about sleep apnea  Does wake herself up at night from snoring.    Genetic testing:  Patient has positive FH of ovarian cancer  Has been interested in genetic testing for the past few years  Mother  of ovarian cancer. Was diagnosed at age 57 and  at age 62  Maternal great-aunt also had ovarian cancer  Denies breast cancer history in the family    Positive FH of colon cancer in her mom's cousin. Patient keeps up to date with her colonoscopies    Also states she is probably due for a total body skin check. Wants to make sure she doesn't need a referral for that    Reports bilateral hip and thigh pain. Had rheumatologic labs done at her physical in April, which were normal.  Does report history of bilateral hip dysplasia. Was told in the past that she will eventually need hip replacements.  Wondering if the thigh pain is related to her hips.  Has seen ortho in the past.    Problem list and histories reviewed & adjusted, as indicated.  Additional history: as documented    Patient Active Problem List   Diagnosis     Family history of malignant neoplasm of ovary     Benign shuddering attack     Esophageal reflux     Acute reaction to stress     RECURR Depressive disorder - MOD     Hyperlipidemia LDL goal <130     Obesity     Papanicolaou smear of cervix with atypical squamous cells of undetermined significance (ASC-US)     Past Surgical History:   Procedure  Laterality Date     C LIGATE FALLOPIAN TUBE       C NONSPECIFIC PROCEDURE      vaginal repair after delivery     C/SECTION, LOW TRANSVERSE      , Low Transverse     CHOLECYSTECTOMY, LAPOROSCOPIC  2010    Cholecystectomy, Laparoscopic     COLONOSCOPY  2/15/2016    Dr. Marc SILVA     HERNIA REPAIR, INCISIONAL  2010    Laparoscopic       Social History   Substance Use Topics     Smoking status: Former Smoker     Quit date: 1993     Smokeless tobacco: Never Used     Alcohol use Yes      Comment: very rarely      Family History   Problem Relation Age of Onset     Hypertension Father      Lipids Father      GASTROINTESTINAL DISEASE Father      vazquez's esophagus fr. reflux      CANCER Mother      ovarian     Arthritis Mother      lupus      Depression Paternal Grandmother      problems with schizophrenia     Arthritis Brother      GASTROINTESTINAL DISEASE Brother      reflux      Colon Cancer Cousin      Ovarian Cancer Other      Maternal great aunt         Current Outpatient Prescriptions   Medication Sig Dispense Refill     omeprazole (PRILOSEC) 20 MG CR capsule TAKE 1 CAPSULE BY MOUTH DAILY, 30 TO 60 MINUTES BEFORE A MEAL. 90 capsule 3     simvastatin (ZOCOR) 40 MG tablet Take 1 tablet (40 mg) by mouth At Bedtime 90 tablet 1     multivitamin, therapeutic with minerals (MULTI-VITAMIN) TABS tablet Take 1 tablet by mouth daily       traZODone (DESYREL) 50 MG tablet Take 1 tablet (50 mg) by mouth nightly as needed for sleep 30 tablet 1     zolpidem (AMBIEN) 5 MG tablet Take 1 tablet (5 mg) by mouth nightly as needed for sleep 30 tablet 5     aspirin 81 MG tablet Take by mouth daily 30 tablet      No Known Allergies    Reviewed and updated as needed this visit by clinical staff       Reviewed and updated as needed this visit by Provider         ROS:  Constitutional, HEENT, cardiovascular, pulmonary, gi and gu systems are negative, except as otherwise noted.    OBJECTIVE:     /66  Pulse 71   "Temp 98.5  F (36.9  C) (Oral)  Ht 5' 10.5\" (1.791 m)  Wt 228 lb (103.4 kg)  LMP 06/09/2015  SpO2 98%  BMI 32.25 kg/m2  Body mass index is 32.25 kg/(m^2).  GENERAL: healthy, alert and no distress  RESP: lungs clear to auscultation - no rales, rhonchi or wheezes  CV: regular rate and rhythm, normal S1 S2, no S3 or S4, no murmur, click or rub, no peripheral edema and peripheral pulses strong  MS: no gross musculoskeletal defects noted, no edema  PSYCH: mentation appears normal, affect normal/bright    Diagnostic Test Results:  No results found for this or any previous visit (from the past 24 hour(s)).    ASSESSMENT/PLAN:     1. Snoring  Patient would like to have a sleep study to evaluate for sleep apnea. May have additional sleep disorder, as she has a very hard time falling asleep  - SLEEP EVALUATION & MANAGEMENT REFERRAL - ADULT; Future    2. Family history of malignant neoplasm of ovary  Patient interested in BRCA testing due to positive FH of ovarian cancer.   - CANCER RISK MGMT/CANCER GENETIC COUNSELING REFERRAL    3. Pain in both thighs  Discussed that thigh pain can be related to hip issues. With known history of bilateral hip dysplasia and resulting OA, recommend recheck with orthopedics. Has not had injections in 2+ years. May also require reimaging. Rheumatologic labs WNL in April.    4. Hip dysplasia  See above.    See Patient Instructions    I performed a history and physical examination in addition to that performed by the student. The documentation above reflects my personal history and physical findings.    Kylie Aleman PA-C  [facility]  "

## 2017-06-30 NOTE — MR AVS SNAPSHOT
After Visit Summary   6/30/2017    Mallory Sargent    MRN: [id number]           Patient Information     Date Of Birth          1965        Visit Information        Provider Department      6/30/2017 10:40 AM Kylie Aleman PA-C [facility]        Today's Diagnoses     Snoring    -  1    Family history of malignant neoplasm of ovary        Need for prophylactic vaccination with tetanus-diphtheria (TD)           Follow-ups after your visit        Additional Services     CANCER RISK MGMT/CANCER GENETIC COUNSELING REFERRAL       Your provider has referred you to the Cancer Risk Management Program - Cancer Genetic Counseling.    Reason for Referral: Family history of ovarian cancer    We have a sent a notice to a staff member of the Cancer Risk Management Program to give you a call to assist with scheduling your appointment.  You may also call  [phone] ([phone]) to initiate scheduling.    Please be aware that coverage of these services is subject to the terms and limitations of your health insurance plan.  Call member services at your health plan with any benefit or coverage questions.      Please bring the completed family history sheet to your appointment in addition to any available outside medical records documenting your cancer diagnosis.            SLEEP EVALUATION & MANAGEMENT REFERRAL - ADULT       Please be aware that coverage of these services is subject to the terms and limitations of your health insurance plan.  Call member services at your health plan with any benefit or coverage questions.      Please bring the following to your appointment:    >>   List of current medications   >>   This referral request   >>   Any documents/labs given to you for this referral    [facility] [phone] (Age 18 and up)                  Future tests that were ordered for you today     Open Future Orders        Priority Expected Expires  "Ordered    SLEEP EVALUATION & MANAGEMENT REFERRAL - ADULT Routine  6/30/2018 6/30/2017            Who to contact     If you have questions or need follow up information about today's clinic visit or your schedule please contact [facility] directly at [phone].  Normal or non-critical lab and imaging results will be communicated to you by MyChart, letter or phone within 4 business days after the clinic has received the results. If you do not hear from us within 7 days, please contact the clinic through enosiXhart or phone. If you have a critical or abnormal lab result, we will notify you by phone as soon as possible.  Submit refill requests through [employer] or call your pharmacy and they will forward the refill request to us. Please allow 3 business days for your refill to be completed.          Additional Information About Your Visit        enosiXhart Information     [employer] gives you secure access to your electronic health record. If you see a primary care provider, you can also send messages to your care team and make appointments. If you have questions, please call your primary care clinic.  If you do not have a primary care provider, please call [phone] and they will assist you.        Care EveryWhere ID     This is your Care EveryWhere ID. This could be used by other organizations to access your [place] medical records  [id number]        Your Vitals Were     Pulse Temperature Height Last Period Pulse Oximetry BMI (Body Mass Index)    71 98.5  F (36.9  C) (Oral) 5' 10.5\" (1.791 m) 06/09/2015 98% 32.25 kg/m2       Blood Pressure from Last 3 Encounters:   06/30/17 104/66   04/28/17 110/70   12/27/16 110/64    Weight from Last 3 Encounters:   06/30/17 228 lb (103.4 kg)   04/28/17 228 lb (103.4 kg)   12/27/16 217 lb (98.4 kg)              We Performed the Following     CANCER RISK MGMT/CANCER GENETIC COUNSELING REFERRAL        Primary Care Provider Office Phone # Fax #    Karen Weiler, MD " [phone]       [facility] [address]  [facility] [address]        Equal Access to Services     BRIANNE SALAS : Hadgarcia nidia leal leah Somicki, warussda luqadaha, qacharlieta kahubertda micheline, pascual navarretebirdie becka. So [facility] [phone].    ATENCIÓN: Si habla español, tiene a suresh disposición servicios gratuitos de asistencia lingüística. Llame al [phone].    We comply with applicable federal civil rights laws and [place] laws. We do not discriminate on the basis of race, color, national origin, age, disability sex, sexual orientation or gender identity.            Thank you!     Thank you for choosing [facility]  for your care. Our goal is always to provide you with excellent care. Hearing back from our patients is one way we can continue to improve our services. Please take a few minutes to complete the written survey that you may receive in the mail after your visit with us. Thank you!             Your Updated Medication List - Protect others around you: Learn how to safely use, store and throw away your medicines at www.disposemymeds.org.          This list is accurate as of: 6/30/17 11:16 AM.  Always use your most recent med list.                   Brand Name Dispense Instructions for use Diagnosis    aspirin 81 MG tablet     30 tablet    Take by mouth daily        Multi-vitamin Tabs tablet      Take 1 tablet by mouth daily        omeprazole 20 MG CR capsule    priLOSEC    90 capsule    TAKE 1 CAPSULE BY MOUTH DAILY, 30 TO 60 MINUTES BEFORE A MEAL.    Hyperlipidemia LDL goal <130       simvastatin 40 MG tablet    ZOCOR    90 tablet    Take 1 tablet (40 mg) by mouth At Bedtime    Hyperlipidemia LDL goal <130       traZODone 50 MG tablet    DESYREL    30 tablet    Take 1 tablet (50 mg) by mouth nightly as needed for sleep    Insomnia, unspecified type       zolpidem 5 MG tablet    AMBIEN    30 tablet    Take 1 tablet (5 mg) by mouth nightly as needed  for sleep    Other insomnia

## 2017-07-01 ASSESSMENT — ANXIETY QUESTIONNAIRES: GAD7 TOTAL SCORE: 0

## 2017-07-01 ASSESSMENT — PATIENT HEALTH QUESTIONNAIRE - PHQ9: SUM OF ALL RESPONSES TO PHQ QUESTIONS 1-9: 5

## 2017-08-04 ENCOUNTER — OFFICE VISIT (OUTPATIENT)
Dept: SLEEP MEDICINE | Facility: CLINIC | Age: 52
End: 2017-08-04
Attending: PHYSICIAN ASSISTANT
Payer: COMMERCIAL

## 2017-08-04 VITALS
DIASTOLIC BLOOD PRESSURE: 72 MMHG | RESPIRATION RATE: 12 BRPM | OXYGEN SATURATION: 97 % | HEART RATE: 63 BPM | BODY MASS INDEX: 30.38 KG/M2 | WEIGHT: 217 LBS | SYSTOLIC BLOOD PRESSURE: 109 MMHG | HEIGHT: 71 IN

## 2017-08-04 DIAGNOSIS — F51.04 PSYCHOPHYSIOLOGICAL INSOMNIA: ICD-10-CM

## 2017-08-04 DIAGNOSIS — E66.09 NON MORBID OBESITY DUE TO EXCESS CALORIES: ICD-10-CM

## 2017-08-04 DIAGNOSIS — R53.83 OTHER FATIGUE: ICD-10-CM

## 2017-08-04 DIAGNOSIS — R06.83 SNORING: ICD-10-CM

## 2017-08-04 DIAGNOSIS — G47.19 EXCESSIVE DAYTIME SLEEPINESS: Primary | ICD-10-CM

## 2017-08-04 PROCEDURE — 99205 OFFICE O/P NEW HI 60 MIN: CPT | Performed by: FAMILY MEDICINE

## 2017-08-04 RX ORDER — ZOLPIDEM TARTRATE 5 MG/1
TABLET ORAL
Qty: 1 TABLET | Refills: 0 | Status: SHIPPED | OUTPATIENT
Start: 2017-08-04 | End: 2017-11-22

## 2017-08-04 NOTE — NURSING NOTE
"Chief Complaint   Patient presents with     Consult     snoring, tired every day, no trouble falling asleep, does have problem staying asleep, and can be up for hours, if she doesn't take medication,        Initial /72  Pulse 63  Resp 12  Ht 1.791 m (5' 10.51\")  Wt 98.4 kg (217 lb)  LMP 06/09/2015  SpO2 97%  BMI 30.69 kg/m2 Estimated body mass index is 30.69 kg/(m^2) as calculated from the following:    Height as of this encounter: 1.791 m (5' 10.51\").    Weight as of this encounter: 98.4 kg (217 lb).  Medication Reconciliation: complete     Neck circumference 37 cm, 14.5 inches  ESS 7    Martine Myers CNA, Sleep Clinic-Specialist  "

## 2017-08-04 NOTE — PROGRESS NOTES
[facility]  Outpatient Sleep Medicine Consultation  August 4, 2017        Name: Mallory Sargent MRN# [id number]   Age: 51 year old YOB: 1965       Date of Consultation: August 4, 2017  Consultation is requested by: Kylie Aleman PA-C  [facility]  [address] [place] [address]  Primary care provider: Weiler, Karen    Patient is accompanied by: Patient presents alone today       Reason for Sleep Consult:     Mallory Sargent is a 51 year old female patient that presents here for an initial evaluation of snoring and tiredness.         Assessment and Plan:     Summary Sleep Diagnoses:    (1) EDS  (2) Snoring  (3) Fatigue  (4) Obesity  (5) Insomnia    Summary Recommendations:    (1) EDS; (2) Snoring; (3) Fatigue; and (4) Obesity: This patient has sxs, hx, and physical findings that suggest the diagnosis of a sleep disordered breathing. However, she has a low pre-test probability of JESSIE. Given that she has a low pre-test probability and she also experiences comorbid insomnia, this patient is not a good candidate for a portable HST sleep study. As such, this patient will undergo an in-lab split-night PSG sleep study. Although this patient reports some morning cephalgia, based on the patient's history and physical examination, I still have a low suspicion of hypoventilation and, therefore, no TCM monitoring or ABGs would be necessary at this point (if sustained hypoxemia is noted, this would be further evaluated). Today, the nature and pathophysiology of JESSIE were discussed. The different treatment options for JESSIE were also reviewed and explained today. The patient was given zolpidem 5 mg to use only during the night of the study and only if needed (medication side effects and possible complications discussed). Lifestyle recommendations including healthy dietary and exercising habits were discussed. Pt will follow up with me after completing the in-lab PSG sleep  study.    (5) Insomnia: This patient is using multiple medications for sleep consolidation. She describes an insomnia that it is consistent with psychophysiologic insomnia. At this point, we discussed some concepts of CBTI with sleep hygiene, relaxation technique, sleep consolidation, and stimulus control. The patient will start implementing this recommendations. If during the follow up visit after completing the PSG sleep study the patient continues to experience sxs, sleep diary and sleep psychology referral may be considered. Today, we also discussed the role of pharmacological agents in insomnia. Possible complications and side effects were discussed as well.    Coding:  (G47.19) Excessive daytime sleepiness  (primary encounter diagnosis)  (R06.83) Snoring  (R53.83) Other fatigue  (E66.09) Non morbid obesity due to excess calories  (F51.04) Psychophysiological insomnia    Counseling included a comprehensive review of diagnostic and therapeutic strategies as well as risks of inadequate therapy.    Educational materials provided in instructions. The patient was instructed to avoid driving or operating any heavy machinery when experiencing drowsiness.    All questions and concerns were addressed today. Pt agrees and understands the assessment and plan.         History of Present Illness:     Mallory Sargent is a 51 year old  RHD female pt with history of MDD, dyslipidemia, GERD, and obesity presents for an initial evaluation today due EDS and loud snoring. Pt explains that she snores nightly. Pt reports unrestorative sleep with some mild sleep inertia. She also reports some morning cephalgia (this happens 4 to 6 times a month). The headaches are localized in the frontal area and described as an achy discomfort. The headaches resolve within one hour from waking up. Some drowsiness when driving reported (no near accidents reported). Pt endorses xerostomia. Pt denies any inadvertent naps. Pt denies any  gasping / choking for air as well as any witnessed. Pt denies any nocturia, GERD sxs, CP, SOB, positional dyspnea, peripheral edema, N/V, fever, cough, or any other sxs or concerns with negative ROS.    Pt uses zolpidem, melatonin, trazodone, and diphenhydramine to help her sleep through the night. She does not use these medications together during the course of one night, but the alternates the medications throughout the week. The patient explains that when she takes a medication, she does not wake up throughout the night and she sleeps well. If she does not take any medication, she wakes throughout the night to urinate and then she is unable to fall back asleep. She cannot fall asleep due to racing thoughts. Pt explains that she thinks about work and other responsibilities. Again, this does not happen when patient uses any pharmacological agent.     PREVIOUS IN- LAB or HOME SLEEP STUDIES: None Reported    SLEEP-WAKE SCHEDULE:     Mallory Sargent      -Describes herself as a night person; prefers to go to sleep at 11:00 PM and wakes up at 7:00 AM.      -Naps 1-2 times per week for 10-20 minutes, feels refreshed after naps; takes no inadvertent naps.      -ON WEEKDAYS, goes to sleep at 8:00 PM during the week; awakens 7:00 AM with an alarm; falls asleep in 5 minutes; denies difficulty falling asleep.      -ON WEEKENDS, goes to sleep at 9:00 PM and wakes up at 8:00 AM with an alarm; falls asleep in 5 minutes.        -Awakens 2 - 3 times a night for 10 minutes before falling back to sleep; awakens to go to the bathroom.      BEDTIME ACTIVITIES AND SHIFT WORK:    Mallory Sargent     -Bedtime Activities and Other Sleeping Information: Pt lives with , daughter, and son. Sleeps with  on gretchen size bed. The bedroom is dark and cool. Pt reads and eats in bed. She also watches TV in bed and uses her cell phone / computer in bed. Pt sleeps on stomach and sides.     -Occupation: RN. Pt works day  shifts.    -Working Hours: 9 AM to 530 PM     SCALES        SLEEP APNEA: [employer]ang score: 2 / 8   INSOMNIA:  Insomnia severity score: 11        SLEEPINESS: [place] sleepiness scale (ESS):  7 / 24    Drowsy driving / near accidents: Denies any near accidents, but drowsiness reported when driving.    Consequences: Some drowsiness and EDS    SLEEP COMPLAINTS:  Cardio-respiratory     -Snoring: Significant snoring reported    -Dyspnea: Pt denies having any witnessed apneas or dyspnea   -Morning headaches or confusion: Some morning cephalgia reported   -Coexisting Lung disease: Denies diagnosed or known lung disease at this time     -Coexisting Heart disease: Denies diagnosed or known cardiovascular disease at this time     -Does patient have a bed partner: Patient sleeps with spouse   -Has bed partner been sleeping separately because of snoring:  No            RLS Screen:      -When you try to relax in the evening or sleep at night, do you ever have unpleasant, restless feelings in your legs that can be relieved by walking or movement? None Reported     -Periodic limb movement: None Reported    Narcolepsy:     - Denies sudden urges of sleep attacks   - Denies cataplexy   - Denies sleep paralysis    - Denies hallucinations     Sleep Behaviors:   - Denies leg symptoms/movements   - Denies motor restlessness   - Denies night terrors   - Admits bruxism (not using a dental guard)   - Denies automatic behaviors    Other Subjective Complaints:   - Denies anxiety or rumination    - Denies pain and discomfort at  night   - Denies waking up with heart pounding or racing   - Denies GERD or aspiration         Parasomnia:    -NREM - Denies recurrent persistent confusional arousal, night eating, sleep walking or sleep terrors.      -REM  - Denies dream enactment or injuries.     -Driving Accident or Near Accidents: Some drowsiness reported, but no near accidents         Medications:     Current Outpatient Prescriptions   Medication Sig      omeprazole (PRILOSEC) 20 MG CR capsule TAKE 1 CAPSULE BY MOUTH DAILY, 30 TO 60 MINUTES BEFORE A MEAL.     simvastatin (ZOCOR) 40 MG tablet Take 1 tablet (40 mg) by mouth At Bedtime     multivitamin, therapeutic with minerals (MULTI-VITAMIN) TABS tablet Take 1 tablet by mouth daily     traZODone (DESYREL) 50 MG tablet Take 1 tablet (50 mg) by mouth nightly as needed for sleep     zolpidem (AMBIEN) 5 MG tablet Take 1 tablet (5 mg) by mouth nightly as needed for sleep     aspirin 81 MG tablet Take by mouth daily     No current facility-administered medications for this visit.       No Known Allergies         Past Medical History:     Denies needing any 02 supplement at night.    Past Medical History:   Diagnosis Date     ASCUS favor benign 3/2015    neg HPV   Plan cotest in 3 yrs.     Depressive disorder, not elsewhere classified     lexapro = no effect and sleepy, celexa = slightly better, wellbutrin = severe restlessness.       Esophageal reflux 2006     FAMILY HX-OVARIAN MALIGNANCY 2005    maternal Hx Dx'd age 57,  age 64; pt would like to have her ovaries removed.     GERD (gastroesophageal reflux disease)      Other and unspecified hyperlipidemia 2007     RECURR Depressive disorder -SEVERE 2008           Past Surgical History:    Denies previous upper airway surgery.     Past Surgical History:   Procedure Laterality Date     C LIGATE FALLOPIAN TUBE       C NONSPECIFIC PROCEDURE      vaginal repair after delivery     C/SECTION, LOW TRANSVERSE      , Low Transverse     CHOLECYSTECTOMY, LAPOROSCOPIC  2010    Cholecystectomy, Laparoscopic     COLONOSCOPY  2/15/2016    Dr. Marc SILVA     HERNIA REPAIR, INCISIONAL  2010    Laparoscopic            Social History:     Social History   Substance Use Topics     Smoking status: Former Smoker     Quit date: 1993     Smokeless tobacco: Never Used     Alcohol use Yes      Comment: very rarely      Chemical History:      Tobacco: Never smoked     Uses 1 cups/day of coffee. Last caffeine intake is usually before 8 AM.    Supplements for wakefulness: Patient does not use any supplements to stay awake    EtOH: 1 to 2 drinks a week  Recreational Drugs: Patient denies using any recreational drugs     Psych Hx:   PHQ2: Negative   -Little Interest or pleasure in doing things? 0 - not at all   -Feeling down, depressed, or hopeless? 0 - not at all    Current dangers to self or others: No. Pt denies any SI / HI, hallucinations, or delusions         Family History:     Family History   Problem Relation Age of Onset     Hypertension Father      Lipids Father      GASTROINTESTINAL DISEASE Father      vaqzuez's esophagus fr. reflux      CANCER Mother      ovarian     Arthritis Mother      lupus      Depression Paternal Grandmother      problems with schizophrenia     Arthritis Brother      GASTROINTESTINAL DISEASE Brother      reflux      Colon Cancer Cousin      Ovarian Cancer Other      Maternal great aunt        Sleep Family Hx:       RLS- Aunt   JESSIE - None reported  Insomnia - None reported  Parasomnia - None reported         Review of Systems:     A complete 10 point review of systems was negative other than HPI or as commented below:     CONSTITUTIONAL: NEGATIVE for weight gain/loss, fever, chills, sweats or night sweats, drug allergies.  EYES: NEGATIVE for changes in vision, blind spots, double vision.  ENT: NEGATIVE for ear pain, sore throat, sinus pain, post-nasal drip, runny nose, bloody nose  CARDIAC: NEGATIVE for fast heartbeats or fluttering in chest, chest pain or pressure, breathlessness when lying flat, swollen legs or swollen feet.  NEUROLOGIC: NEGATIVE headaches, weakness or numbness in the arms or legs.  DERMATOLOGIC: NEGATIVE for rashes, new moles or change in mole(s)  PULMONARY: NEGATIVE SOB at rest, SOB with activity, dry cough, productive cough, coughing up blood, wheezing or whistling when breathing.    GASTROINTESTINAL:  "NEGATIVE for nausea or vomitting, loose or watery stools, fat or grease in stools, constipation, abdominal pain, bowel movements black in color or blood noted.  GENITOURINARY: NEGATIVE for pain during urination, blood in urine, urinating more frequently than usual, irregular menstrual periods.  MUSCULOSKELETAL: NEGATIVE for muscle pain, bone or joint pain, swollen joints.  ENDOCRINE: NEGATIVE for increased thirst or urination, diabetes.  LYMPHATIC: NEGATIVE for swollen lymph nodes, lumps or bumps in the breasts or nipple discharge.         Physical Examination:   /72  Pulse 63  Resp 12  Ht 1.791 m (5' 10.51\")  Wt 98.4 kg (217 lb)  LMP 06/09/2015  SpO2 97%  BMI 30.69 kg/m2     VS: Reviewed and normal.  General: Alert, oriented, not in distress. Dressed casually; Good eye contact; Comfortably sitting in a chair; in no apparent distress  HEENT: Normocephalic and atraumatic; NL TM x 2; pupils are isocoric and equally responsive to the light. PERRLA. EOMI. Normal fundoscopic examination; Nasal turbinates are normal with a normal septal alignment;  Mallampati score: Grade I; Tonsillar hypertrophy: 1  hidden by pillars; Pharynx with no erythema or exudates.  NECK: neck supple; symmetrical; no lymphadenopathy; no thyromegaly, bruit, JVD noted. Neck circumference of 14.5 inches (37 cm).  Lungs: both hemithoraces are symmetrical, normal to palpation, no dullness to percussion, auscultation of lungs revealed normal breath sounds with no expirium prolongation, wheezing, rhonci and crackles.  CVS: Normal S1 and S2 heart sounds with no extra heart sounds. No murmur, rubs, or clicks. Normal peripheral pulses throughout with no obvious peripheral edema.  Abdomen: Bowel sounds present. Abdomen is soft, non-tender, and non-distended. No organomegaly, ascitis, or obvious masses noted. Negative CVA tenderness.  Extremities/musculoskeletal: no peripheral edema, deformity, cyanosis, clubbing  Neurology: awake, alert, and " oriented x 3. No obvious gross motor / sensorial deficits with normal strength in all extremities at 5/5 and normal sensation throughout. Cranial nerves are grossly intact with normal II to XII CN functions. Negative Romberg's test with normal gait. DTR are symmetric and normal at 2+/4.  Integumentary: no obvious skin rash.  Psychiatry: Mood and affect are appropriate. Euthymic with affect congruent with full range and intensity. No SI/HI with adequate insight and judgement.          Data: All pertinent previous laboratory data reviewed     No results found for: PH, PHARTERIAL, PO2, [id number], SAT, PCO2, HCO3, BASEEXCESS, BENNIE, BEB  Lab Results   Component Value Date    TSH 1.86 04/28/2017    TSH 1.69 03/31/2016     Lab Results   Component Value Date    GLC 97 04/28/2017     (H) 03/31/2016     Lab Results   Component Value Date    HGB 13.6 04/28/2017    HGB 13.7 03/31/2016     Lab Results   Component Value Date    BUN 14 04/28/2017    BUN 14 03/31/2016    CR 0.87 04/28/2017    CR 0.84 03/31/2016     Echocardiology:    -Stress echocardiogram obtained on 01/27/2017 showed normal stress echocardiogram with no evidence of stress-induced ischemia. The baseline showed a normal left ventricular function with a visual EF estimated at 55 - 60%. No obvious significant valvular abnormalities noted.    Chest x-ray:    -Chest x-ray films obtained on 02/16/2015 showed normal findings.     -Chest x-ray films obtained on 02/27/2014 showed normal findings.    PFT: None Available    Laboratory Studies:   Component Value Flag Ref Range Units Status Collected Lab   WBC 6.1  4.0 - 11.0 10e9/L Final 04/28/2017 10:50 AM SV   RBC Count 4.41  3.8 - 5.2 10e12/L Final 04/28/2017 10:50 AM SV   Hemoglobin 13.6  11.7 - 15.7 g/dL Final 04/28/2017 10:50 AM SV   Hematocrit 40.6  35.0 - 47.0 % Final 04/28/2017 10:50 AM SV   MCV 92  78 - 100 fl Final 04/28/2017 10:50 AM SV   MCH 30.8  26.5 - 33.0 pg Final 04/28/2017 10:50 AM SV   MCHC 33.5   31.5 - 36.5 g/dL Final 04/28/2017 10:50 AM SV   RDW 12.6  10.0 - 15.0 % Final 04/28/2017 10:50 AM SV   Platelet Count 181  150 - 450 10e9/L Final 04/28/2017 10:50 AM SV     Component Value Flag Ref Range Units Status Collected Lab   Sodium 141  133 - 144 mmol/L Final 04/28/2017 10:50 AM 65   Potassium 4.1  3.4 - 5.3 mmol/L Final 04/28/2017 10:50 AM 65   Chloride 106  94 - 109 mmol/L Final 04/28/2017 10:50 AM 65   Carbon Dioxide 23  20 - 32 mmol/L Final 04/28/2017 10:50 AM 65   Anion Gap 12  3 - 14 mmol/L Final 04/28/2017 10:50 AM 65   Glucose 97  70 - 99 mg/dL Final 04/28/2017 10:50 AM 65   Comment:   Fasting specimen   Urea Nitrogen 14  7 - 30 mg/dL Final 04/28/2017 10:50 AM 65   Creatinine 0.87  0.52 - 1.04 mg/dL Final 04/28/2017 10:50 AM 65   GFR Estimate 69  >60 mL/min/1.7m2 Final 04/28/2017 10:50 AM 65   Comment:   Non  GFR Calc   GFR Estimate If Black 83  >60 mL/min/1.7m2 Final 04/28/2017 10:50 AM 65   Comment:   African American GFR Calc   Calcium 9.3  8.5 - 10.1 mg/dL Final 04/28/2017 10:50 AM 65   Bilirubin Total 0.4  0.2 - 1.3 mg/dL Final 04/28/2017 10:50 AM 65   Albumin 4.0  3.4 - 5.0 g/dL Final 04/28/2017 10:50 AM 65   Protein Total 7.5  6.8 - 8.8 g/dL Final 04/28/2017 10:50 AM 65   Alkaline Phosphatase 75  40 - 150 U/L Final 04/28/2017 10:50 AM 65   ALT 37  0 - 50 U/L Final 04/28/2017 10:50 AM 65   AST 20  0 - 45 U/L Final 04/28/2017 10:50 AM 65     Component Value Flag Ref Range Units Status Collected Lab   TSH 1.86  0.40 - 4.00 mU/L Final 04/28/2017 10:50 AM 65     Component Value Flag Ref Range Units Status Collected Lab   Vitamin D Deficiency screening 27  20 - 75 ug/L Final 04/28/2017 10:50 AM 51     Component Value Flag Ref Range Units Status Collected Lab    9  0 - 30 U/mL Final 04/28/2017 10:50 AM 51     Component Value Flag Ref Range Units Status Collected Lab   Sed Rate 12  0 - 20 mm/h Final 04/28/2017 10:50 AM SV     Component Value Flag Ref Range Units Status Collected  Lab   CRP Inflammation <2.9  0.0 - 8.0 mg/L Final 04/28/2017 10:50 AM 51     Component Value Flag Ref Range Units Status Collected Lab   Rheumatoid Factor <20  <20 IU/mL Final 04/28/2017 10:50 AM 51     Component Value Flag Ref Range Units Status Collected Lab   Cyclic Citrullinated Peptide Antibody, IgG 1  <7 U/mL Final 04/28/2017 10:50 AM 51     Component Value Flag Ref Range Units Status Collected Lab   CK Total 139  30 - 225 U/L Final 04/28/2017 10:50 AM 65     Jorge Amaro MD, MPH  Clinical Sleep Medicine    [facility] [address], [place] [address]   [phone] Clinic    Total time spent with patient: 60 min. Over >50% of the time was spent for face to face counseling, education, and evaluation.

## 2017-08-04 NOTE — PATIENT INSTRUCTIONS
Your blood pressure was checked while you were in clinic today.  Please read the guidelines below about what these numbers mean and what you should do about them.  Your systolic blood pressure is the top number.  This is the pressure when the heart is pumping.  Your diastolic blood pressure is the bottom number.  This is the pressure in between beats.  If your systolic blood pressure is less than 120 and your diastolic blood pressure is less than 80, then your blood pressure is normal. There is nothing more that you need to do about it  If your systolic blood pressure is 120-139 or your diastolic blood pressure is 80-89, your blood pressure may be higher than it should be.  You should have your blood pressure re-checked within a year by a primary care provider.  If your systolic blood pressure is 140 or greater or your diastolic blood pressure is 90 or greater, you may have high blood pressure.  High blood pressure is treatable, but if left untreated over time it can put you at risk for heart attack, stroke, or kidney failure.  You should have your blood pressure re-checked by a primary care provider within the next four weeks.  Your BMI is Body mass index is 30.69 kg/(m^2).  Weight management is a personal decision.  If you are interested in exploring weight loss strategies, the following discussion covers the approaches that may be successful. Body mass index (BMI) is one way to tell whether you are at a healthy weight, overweight, or obese. It measures your weight in relation to your height.  A BMI of 18.5 to 24.9 is in the healthy range. A person with a BMI of 25 to 29.9 is considered overweight, and someone with a BMI of 30 or greater is considered obese. More than two-thirds of American adults are considered overweight or obese.  Being overweight or obese increases the risk for further weight gain. Excess weight may lead to heart disease and diabetes.  Creating and following plans for healthy eating and  physical activity may help you improve your health.  Weight control is part of healthy lifestyle and includes exercise, emotional health, and healthy eating habits. Careful eating habits lifelong are the mainstay of weight control. Though there are significant health benefits from weight loss, long-term weight loss with diet alone may be very difficult to achieve- studies show long-term success with dietary management in less than 10% of people. Attaining a healthy weight may be especially difficult to achieve in those with severe obesity. In some cases, medications, devices and surgical management might be considered.  What can you do?  If you are overweight or obese and are interested in methods for weight loss, you should discuss this with your provider.     Consider reducing daily calorie intake by 500 calories.     Keep a food journal.     Avoiding skipping meals, consider cutting portions instead.    Diet combined with exercise helps maintain muscle while optimizing fat loss. Strength training is particularly important for building and maintaining muscle mass. Exercise helps reduce stress, increase energy, and improves fitness. Increasing exercise without diet control, however, may not burn enough calories to loose weight.       Start walking three days a week 10-20 minutes at a time    Work towards walking thirty minutes five days a week     Eventually, increase the speed of your walking for 1-2 minutes at time    In addition, we recommend that you review healthy lifestyles and methods for weight loss available through the National Institutes of Health patient information sites:  http://win.niddk.nih.gov/publications/index.htm    And look into health and wellness programs that may be available through your health insurance provider, employer, local community center, or blayne club.        MY TREATMENT INFORMATION FOR SLEEP APNEA -  Mallory Sargent    DOCTOR: Jorge Amaro MD, MPH  SLEEP CENTER : [place]  "[facility]         If I haven't had a sleep study yet, what can I expect?  A personal story from Octavio  https://www.youtube.com/watch?v=AxPLmlRpnCs      Am I having a home sleep study?  Here is a video in case you get home and want to make sure you have done it correctly  https://www.[employer].com/watch?v=EFQ8Z3iXpk8&feature=youtu.be      Frequently asked questions:  1. What is Obstructive Sleep Apnea (JESSIE)? JESSIE is the most common type of sleep apnea. Apnea literally means, \"without breath.\" It is characterized by repetitive pauses in breathing, despite continued effort to breathe, and is usually associated with a reduction in blood oxygen saturation. Apneas can last 10 to over 60 seconds. It is caused by narrowing or collapse of the upper airway as muscles relax during sleep. Severity of sleep apnea is determined by frequency of breathing events and their effect on your sleep and oxygen levels determined during sleep testing.     2. What are the consequences of JESSIE? Symptoms include: daytime sleepiness- possibly increasing the risk of falling asleep while driving, unrefreshing/restless sleep, snoring, insomnia, waking frequently to urinate, waking with heartburn or reflux, reduced concentration and memory, and morning headaches. Other health consequences may include development of high blood pressure and other cardiovascular disease in persons who are susceptible. Untreated JESSIE  can contribute to heart disease, stroke and diabetes.     3. What are the treatment options? In most situations, sleep apnea is a lifelong disease that must be managed with daily therapy. Medications are not effective for sleep apnea and surgery is generally not performed until other therapies have been tried. Therapy is usually tailored to the individual patient based on many factors including your wishes as well as severity of sleep apnea and severity of obesity. Continuous Positive Airway (CPAP) is the most reliable treatment. " An oral device to hold your jaw forward is usually the next most reliable option. Other options include postioning devices (to keep you off your back), weight loss, and surgery including a tongue pacing device. There is more detail about some of these options below.            1. CPAP-  WHAT DOES IT DO AND HOW CAN I LEARN TO WEAR IT?                               BEFORE I START, CAN I WATCH A MOVIE TO GET A PLAN ON HOW TO USE CPAP?  https://www.[employer].com/watch?w=m8T85iy215Y      Continuous positive airway pressure, or CPAP, is the most effective treatment for obstructive sleep apnea. It works by blowing room air, through a mask, to hold your throat open. A decision to use CPAP is a major step forward in the pursuit of a healthier life. The successful use of CPAP will help you breathe easier, sleep better and live healthier. You can choose CPAP equipment from any durable medical equipment provider that meets your needs.  Using CPAP can be a positive experience if you keep these ching points in mind:  1. Commitment  CPAP is not a quick fix for your problem. It involves a long-term commitment to improve your sleep and your health.    2. Communication  Stay in close communication with both your sleep doctor and your CPAP supplier. Ask lots of questions and seek help when you need it.    3. Consistency  Use CPAP all night, every night and for every nap. You will receive the maximum health benefits from CPAP when you use it every time that you sleep. This will also make it easier for your body to adjust to the treatment.    4. Correction  The first machine and mask that you try may not be the best ones for you. Work with your sleep doctor and your CPAP supplier to make corrections to your equipment selection. Ask about trying a different type of machine or mask if you have ongoing problems. Make sure that your mask is a good fit and learn to use your equipment properly.    5. Challenge  Tell a family member or close  "friend to ask you each morning if you used your CPAP the previous night. Have someone to challenge you to give it your best effort.    6. Connection   Your adjustment to CPAP will be easier if you are able to connect with others who use the same treatment. Ask your sleep doctor if there is a support group in your area for people who have sleep apnea, or look for one on the Internet.  7. Comfort   Increase your level of comfort by using a saline spray, decongestant or heated humidifier if CPAP irritates your nose, mouth or throat. Use your unit's \"ramp\" setting to slowly get used to the air pressure level. There may be soft pads you can buy that will fit over your mask straps. Look on www.CPAP.com for accessories that can help make CPAP use more comfortable.  8. Cleaning   Clean your mask, tubing and headgear on a regular basis. Put this time in your schedule so that you don't forget to do it. Check and replace the filters for your CPAP unit and humidifier.    9. Completion   Although you are never finished with CPAP therapy, you should reward yourself by celebrating the completion of your first month of treatment. Expect this first month to be your hardest period of adjustment. It will involve some trial and error as you find the machine, mask and pressure settings that are right for you.    10. Continuation  After your first month of treatment, continue to make a daily commitment to use your CPAP all night, every night and for every nap.    CPAP-Tips to starting with success:  Begin using your CPAP for short periods of time during the day while you watch TV or read.    Use CPAP every night and for every nap. Using it less often reduces the health benefits and makes it harder for your body to get used to it.    Make small adjustments to your mask, tubing, straps and headgear until you get the right fit. Tightening the mask may actually worsen the leak.  If it leaves significant marks on your face or irritates the " bridge of your nose, it may not be the best mask for you.  Speak with the person who supplied the mask and consider trying other masks. Insurances will allow you to try different masks during the first month of starting CPAP.  Insurance also covers a new mask, hose and filter about every 6 months.    Use a saline nasal spray to ease mild nasal congestion. Neti-Pot or saline nasal rinses may also help. Nasal gel sprays can help reduce nasal dryness.  Biotene mouthwash can be helpful to protect your teeth if you experience frequent dry mouth.  Dry mouth may be a sign of air escaping out of your mouth or out of the mask in the case of a full face mask.  Speak with your provider if you expect that is the case.     Take a nasal decongestant to relieve more severe nasal or sinus congestion.  Do not use Afrin (oxymetazoline) nasal spray more than 3 days in a row.  Speak with your sleep doctor if your nasal congestion is chronic.    Use a heated humidifier that fits your CPAP model to enhance your breathing comfort. Adjust the heat setting up if you get a dry nose or throat, down if you get condensation in the hose or mask.  Position the CPAP lower than you so that any condensation in the hose drains back into the machine rather than towards the mask.    Try a system that uses nasal pillows if traditional masks give you problems.    Clean your mask, tubing and headgear once a week. Make sure the equipment dries fully.    Regularly check and replace the filters for your CPAP unit and humidifier.    Work closely with your sleep provider and your CPAP supplier to make sure that you have the machine, mask and air pressure setting that works best for you. It is better to stop using it and call your provider to solve problems than to lay awake all night frustrated with the device.      BESIDES CPAP, WHAT OTHER THERAPIES ARE THERE?        Positioning Device  Positioning devices are generally used when sleep apnea is mild and only  occurs on your back.This example shows a pillow that straps around the waist. It may be appropriate for those whose sleep study shows milder sleep apnea that occurs primarily when lying flat on one's back. Preliminary studies have shown benefit but effectiveness at home may need to be verified by a home sleep test. These devices are generally not covered by medical insurance.                        Oral Appliance  What is oral appliance therapy?  An oral appliance is a small acrylic device that fits over the upper and lower teeth or tongue (similar to an orthodontic retainer or a mouth guard). This device slightly advances the lower jaw or tongue, which moves the base of the tongue forward, opens the airway, improves breathing and can effectively treat snoring and obstructive sleep apnea sleep apnea. The appliance is fabricated and customized by a qualified dentist with experience in treating snoring and sleep apnea. Oral appliances are usually well tolerated and have relatively high compliance by patients1, 2, 3.  When is an oral appliance indicated?  Oral appliance therapy is recommended as a first-line treatment for patients with primary snoring, mild sleep apnea, and for patients with moderate sleep apnea who prefer appliance therapy to use of CPAP4, 5. Severity of sleep apnea is determined by sleep testing and is based on the number of respiratory events per hour of sleep.   How successful is oral appliance therapy?  The success rate of oral appliance therapy in patients with mild sleep apnea is 75-80% while in patients with moderate sleep apnea it is 50-70%. The chance of success in patients with severe sleep apnea is 40-50%. The research also shows that oral appliances have a beneficial effect on the cardiovascular health of JESSIE patients at the same magnitude as CPAP therapy7.  Oral appliances should be a second-line treatment in cases of severe sleep apnea, but if not completely successful then a combination  therapy utilizing CPAP plus oral appliance therapy may be effective. Oral appliances tend to be effective in a broad range of patients although studies show that the patients who have the highest success are females, younger patients, those with milder disease, and less severe obesity. 3, 6.   The chances of success are lower in patients who have more severe EJSSIE, are older, and those who are morbidly obese.     Example of an oral appliance   Finding a dentist that practices dental sleep medicine  Specific training is available through the American Academy of Dental Sleep Medicine for dentists interested in working in the field of sleep. To find a dentist who is educated in the field of sleep and the use of oral appliances, near you, visit the Web site of the American Academy of Dental Sleep Medicine; also see   http://www.accpstorage.org/newOrganization/patients/oralAppliances.pdf  To search for a dentist certified in these practices:  Http://aadsm.org/FindADentist.aspx?1  1. Milana, et al. Objectively measured vs self-reported compliance during oral appliance therapy for sleep-disordered breathing. Chest 2013; 144(5): 4221-0864.  2. Gerri, et al. Objective measurement of compliance during oral appliance therapy for sleep-disordered breathing. Thorax 2013; 68(1): 91-96.  3. Ivon et al. Mandibular advancement devices in 620 men and women with JESSIE and snoring: tolerability and predictors of treatment success. Chest 2004; 125: 3711-7045.  4. Summers, et al. Oral appliances for snoring and JESSIE: a review. Sleep 2006; 29: 244-262.  5. Bentley et al. Oral appliance treatment for JESSIE: an update. J Clin Sleep Med 2014; 10(2): 215-227.  6. Oswaldekema, et al. Predictors of OSAH treatment outcome. J Dent Res 2007; 86: 7769-9592.      Weight Loss:    Weight management is a personal decision.  If you are interested in exploring weight loss strategies, the following discussion covers the impact on weight loss on  sleep apnea and the approaches that may be successful.    Weight loss decreases severity of sleep apnea in most people with obesity. For those with mild obesity who have developed snoring with weight gain, even 15-30 pound weight loss can improve and occasionally eliminate sleep apnea.  Structured and life-long dietary and health habits are necessary to lose weight and keep healthier weight levels.     Though there may be significant health benefits from weight loss, long-term weight loss is very difficult to achieve- studies show success with dietary management in less than 10% of people. In addition, substantial weight loss may require years of dietary control and may be difficult if patients have severe obesity. In these cases, surgical management may be considered.  Finally, older individuals who have tolerated obesity without health complications may be less likely to benefit from weight loss strategies.    Your BMI is Body mass index is 30.69 kg/(m^2).  Body mass index (BMI) is one way to tell whether you are at a healthy weight, overweight, or obese. It measures your weight in relation to your height.  A BMI of 18.5 to 24.9 is in the healthy range. A person with a BMI of 25 to 29.9 is considered overweight, and someone with a BMI of 30 or greater is considered obese. More than two-thirds of American adults are considered overweight or obese.  Being overweight or obese increases the risk for further weight gain. Excess weight may lead to heart disease and diabetes.  Creating and following plans for healthy eating and physical activity may help you improve your health.  Weight control is part of healthy lifestyle and includes exercise, emotional health, and healthy eating habits. Careful eating habits lifelong are the mainstay of weight control. Though there are significant health benefits from weight loss, long-term weight loss with diet alone may be very difficult to achieve- studies show long-term success  with dietary management in less than 10% of people. Attaining a healthy weight may be especially difficult to achieve in those with severe obesity. In some cases, medications, devices and surgical management might be considered.  What can you do?  If you are overweight or obese and are interested in methods for weight loss, you should discuss this with your provider.     Consider reducing daily calorie intake by 500 calories.     Keep a food journal.     Avoiding skipping meals, consider cutting portions instead.    Diet combined with exercise helps maintain muscle while optimizing fat loss. Strength training is particularly important for building and maintaining muscle mass. Exercise helps reduce stress, increase energy, and improves fitness. Increasing exercise without diet control, however, may not burn enough calories to loose weight.       Start walking three days a week 10-20 minutes at a time    Work towards walking thirty minutes five days a week     Eventually, increase the speed of your walking for 1-2 minutes at time    In addition, we recommend that you review healthy lifestyles and methods for weight loss available through the National Institutes of Health patient information sites:  http://win.niddk.nih.gov/publications/index.htm    And look into health and wellness programs that may be available through your health insurance provider, employer, local community center, or blayne club.    Surgery:    Upper Airway Surgery for JESSIE  Surgery for JESSIE is a second-line treatment option in the management of sleep apnea.  Surgery should be considered for patients who are having a difficult time tolerating CPAP.    Surgery for JESSIE is directed at areas that are responsible for narrowing or complete obstruction of the airway during sleep.  There are a wide range of procedures available to enlarge and/or stabilize the airway to prevent blockage of breathing in the three major areas where it can occur: the palate,  tongue, and nasal regions.  Successful surgical treatment depends on the accurate identification of the factors responsible for obstructive sleep apnea in each person.  A personalized approach is required because there is no single treatment that works well for everyone.  Because of anatomic variation, consultation with an examination by a sleep surgeon is a critical first step in determining what surgical options are best for each patient.  In some cases, examination during sedation may be recommended in order to guide the selection of procedures.  Patients will be counseled about risks and benefits as well as the typical recovery course after surgery. Surgery is typically not a cure for a person s JESSIE.  However, surgery will often significantly improve one s JESSIE severity (termed  success rate ).  Even in the absence of a cure, surgery will decrease the cardiovascular risk associated with OSA7; improve overall quality of life8 (sleepiness, functionality, sleep quality, etc).          Palate Procedures:  Patients with JESSIE often have narrowing of their airway in the region of their tonsils and uvula.  The goals of palate procedures are to widen the airway in this region as well as to help the tissues resist collapse.  Modern palate procedure techniques focus on tissue conservation and soft tissue rearrangement, rather than tissue removal.  Often the uvula is preserved in this procedure. Residual sleep apnea is common in patient after pharyngoplasty with an average reduction in sleep apnea events of 33%2.      Tongue Procedures:  While patients are awake, the muscles that surround the throat are active and keep this region open for breathing. These muscles relax during sleep, allowing the tongue and other structures to collapse and block breathing.  There are several different tongue procedures available.  Selection of a tongue base procedure depends on characteristics seen on physical exam.  Generally, procedures are  aimed at removing bulky tissues in this area or preventing the back of the tongue from falling back during sleep.  Success rates for tongue surgery range from 50-62%3.    Hypoglossal Nerve Stimulation:  Hypoglossal nerve stimulation has recently received approval from the [place] Food and Drug Administration for the treatment of obstructive sleep apnea.  This is based on research showing that the system was safe and effective in treating sleep apnea6.  Results showed that the median AHI score decreased 68%, from 29.3 to 9.0. This therapy uses an implant system that senses breathing patterns and delivers mild stimulation to airway muscles, which keeps the airway open during sleep.  The system consists of three fully implanted components: a small generator (similar in size to a pacemaker), a breathing sensor, and a stimulation lead.  Using a small handheld remote, a patient turns the therapy on before bed and off upon awakening.    Candidates for this device must be greater than 22 years of age, have moderate to severe JESSIE (AHI between 20-65), BMI less than 32, have tried CPAP/oral appliance without success, and have appropriate upper airway anatomy (determined by a sleep endoscopy performed by Dr. Aguilera).    Hypoglossal Nerve Stimulation Pathway:    The sleep surgeon s office will work with the patient through the insurance prior-authorization process (including communications and appeals).    Nasal Procedures:  Nasal obstruction can interfere with nasal breathing during the day and night.  Studies have shown that relief of nasal obstruction can improve the ability of some patients to tolerate positive airway pressure therapy for obstructive sleep apnea1.  Treatment options include medications such as nasal saline, topical corticosteroid and antihistamine sprays, and oral medications such as antihistamines or decongestants. Non-surgical treatments can include external nasal dilators for selected patients. If  these are not successful by themselves, surgery can improve the nasal airway either alone or in combination with these other options.    Combination Procedures:  Combination of surgical procedures and other treatments may be recommended, particularly if patients have more than one area of narrowing or persistent positional disease.  The success rate of combination surgery ranges from 66-80%2,3.      1. Prisca PITTS. The Role of the Nose in Snoring and Obstructive Sleep Apnoea: An Update.  Eur Arch Otorhinolaryngol. 2011; 268: 1365-73.  2.  John SM; Nils JA; Melinda JR; Pallanch JF; Hope MB; Soham SG; Veto GONZALEZ. Surgical modifications of the upper airway for obstructive sleep apnea in adults: a systematic review and meta-analysis. SLEEP 2010;33(10):5301-4501. Gerardo KESSLER. Hypopharyngeal surgery in obstructive sleep apnea: an evidence-based medicine review.  Arch Otolaryngol Head Neck Surg. 2006 Feb;132(2):206-13.  3. Bam YH1, Britt Y, Michael SHLOMO. The efficacy of anatomically based multilevel surgery for obstructive sleep apnea. Otolaryngol Head Neck Surg. 2003 Oct;129(4):327-35.  4. Gerardo KESSLER, Goldberg A. Hypopharyngeal Surgery in Obstructive Sleep Apnea: An Evidence-Based Medicine Review. Arch Otolaryngol Head Neck Surg. 2006 Feb;132(2):206-13.  5. Day PJ et al. Upper-Airway Stimulation for Obstructive Sleep Apnea.  N Engl J Med. 2014 Jan 9;370(2):139-49.  6. Kamla Y et al. Increased Incidence of Cardiovascular Disease in Middle-aged Men with Obstructive Sleep Apnea. Am J Respir Crit Care Med; 2002 166: 159-165  7. Ahumada EM et al. Studying Life Effects and Effectiveness of Palatopharyngoplasty (SLEEP) study: Subjective Outcomes of Isolated Uvulopalatopharyngoplasty. Otolaryngol Head Neck Surg. 2011; 144: 623-631.  [facility]    Good sleeping habits are a key part of treatment. If needed, and only in very select cases, some medications may help you sleep better at first. Making healthy  lifestyle changes and learning to relax can improve your overall sleep in the long run. As many long term changes and treatments, treating insomnia takes commitment and hard work, but trust that your efforts will pay off.  We are recommending treatment of insomnia based on your history of sleep problems and/or use of medications for sleeping.  There are many treatment options available and we want to work with you to find the right treatment for you.  The following recommendations can be helpful for some people.        Sleep Hygiene     Sometimes insomnia can be made worse by our own habits or situation.  You should consider making some of the following changes to help improve your sleep:       If there is excessive noise in your house / neighborhood use a fan or similar device to make a quiet background noise that can drown out other noises.    If your room is too bright early in the morning, hang a blanket or extra curtain over the windows to keep the light out or use a sleeping mask to cover your eyes.    If your room is too warm during the night, set the temperature in the house down a few degrees for the night.    Spend a little time before bedtime trying to relax.  Do not work right up until bedtime.  Take 30-60 minutes to relax and unwind before bedtime with a book or watching TV.    Do not drink anything with alcohol or caffeine in them for at least 4 to 5 hours before bedtime.    Do not smoke right before bedtime or during the night.    No strenuous exercise for 2-3 hours before bedtime.      In addition to the abovementioned recommendations, cognitive behavioral treatment for insomnia (CBTI) is a very effective technique that involves changing your behaviors and thoughts associated with sleep. In fact, CBTI is the most effective treatment for long-term insomnia. This treatment modality tries to address the underlying causes of your sleep problems, including your habits and how you think about  sleep.    People that are motivated to make changes in their sleep pattern are likely to benefit from internet based cognitive behavioral treatment for insomnia. Some internet CBTI programs include but are not limited to www.[employer] or www.[employer].  There is a fee for using these programs that is similar to actually seeing an insomnia expert. By entering the code  Concrete  it will allow us to know if you find these services useful.        Online Programs for CBTI       www.[employer] (pronounced shut eye) - There is a fee for this program.    www.sleepIO.com (pronounced sleep ee oh) - There is a fee for this program. Enter the code  Concrete  if you decide to enroll in this program.       In addition to the internet programs provided, self-help strategies for treatment of insomnia can be found also in books.  Several treatment books for insomnia are listed on our patient treatment resources.  Some of these include the following books:       Suggested Resources - Insomnia Treatment Books       Overcoming Insomnia  by Tapan Humphries and Samra Devlin (2008)     No More Sleepless Nights  by Kristofer Kim and Valencia Alcantar (1996)     Say Lupe to Insomnia  by Darnell Mcdonnell (2009)     The Insomnia Workbook  by Mallory Stark and Jacoby Flynn (2009)     The Insomnia Answer  by Gucci Brewer and Estevan Delong (2006)     These recommendations are a first step in treating insomnia.  For many people these recommendations can be helpful while for others they are just the start. There are many treatments available for insomnia and our goal is to keep re-evaluating your progress and try other options if these first steps are not successful.  It has been demonstrated that, in the long run, CBTI modalities are more effective than any medication for insomnia. As such, if the above the recommendations do not help, you may benefit from scheduling an appointment with an insomnia expert (we can assess this during  follow up visits).      Healthy Lifestyle  As it was mentioned, your lifestyle directly affects your health and your sleep patterns. Here are some healthy habits that you should consider:    Keep a regular sleep schedule, always going to bed and getting up at the same time each day.    Exercise regularly. It may help you reduce stress. However, remember to avoid strenuous exercise for two to four hours before bedtime.    Limit, or if possible, avoid all together naps.    Use your bed only for sleep and sex. This means, no reading, watching TV, using tablets or phones, or doing any other activities in bed.    Do not spend too much time in bed trying to fall asleep. If you cannot fall asleep within 20 to 30 minutes, get up and do something relaxing until you become tired and drowsy again. Do not expose yourself to bright lights or perform complex activities (e.g., no house cleaning, watching TV, or working on homework).    Avoid or limit caffeine and nicotine. They can keep you awake at night. Also avoid alcohol. It may help you fall asleep at first, but your sleep will not be restful.      Before Bedtime  In addition to keeping some healthy habits, it is important to know what do to before going to sleep. As such, to also sleep better every night, try these tips:    Have a bedtime routine to let your body and mind know when it is time to sleep.    Going to bed should be relaxing so try to do only relaxing things around bedtime. Sleep will come sooner.    If your worries do not let you sleep, write them down in a diary. Then close it and go to bed.    As previously mentioned, make sure the room is not too hot or too cold. If it is not dark enough, an eye mask may help. If it is noisy, try using earplugs.      Learn to Relax  In addition to poor habits, stress, anxiety, and body tension may play a large role in your inability to sleep through the night. In fact, these factors may keep you awake at night. To unwind  before bedtime, try reading a book, meditation, or even yoga. Also, try the following:    Deep breathing: Sit or lie back in a chair. Take a slow, deep breath. Hold it for 5 counts. Then breathe out slowly through your mouth. Keep doing this until you feel relaxed.    Imagery: Think of the last fun trip you took. In your mind, walk through the trip from start to finish. Put as much detail into the memory as you can remember. It will also help you relax.      Suggested Resources    Insomnia Treatment Books      Overcoming Insomnia  by Tapan Humphries and Samra eDvlin (2008)     No More Sleepless Nights  by Kristofer Kim and Valencia Alcantar (1996)     Say Lupe to Insomnia  by Darnell Mcdonnell (2009)     The Insomnia Workbook  by Mallory Stark and Jacoby Flynn (2009)     The Insomnia Answer  by Gucci Brewer and Estevan Delong (2006)    Stress Management and Relaxation Books    The Relaxation and Stress Reduction Workbook by Annie Zaman, Odette Edmonds and Tahir De Los Santos (2008)    Stress Management Workbook: Techniques and Self-Assessment Procedures by Kylie Andrews and Oren Romano (1997)    A Mindfulness-Based Stress Reduction Workbook by Enoch Barajas and Michelle Galarza (2010)    The Complete Stress Management Workbook by Jordan Jimenes, Jose Benoit and Simeon Valderrama (1996)    Assert Yourself by Ivelisse Cadena and Onofre Cadena (1977)    Relaxation Resources for Computer Download   These websites offer resources to help you relax. This list is for information only. [facility] is not responsible for the quality of services or the actions of any person or organization.    Progressive Muscle Relaxation (PMR):     http://www.[employer].com/progressive-muscle-relaxation-exercise.html     http://studentsupport.[facility]/counseling/resources/self-help/relaxation-and-stress-management/     Deep Breathing  Exercises:    http://www.[employer].Waps.cn/breathing-awareness.html     Meditation:     www.[employer]Waps.cn    www.[employer]guided[employer] Onlinemeditation-site.com (you may have to pay for some of these resources)    Guided Imagery:    http://www.[employer].Waps.cn/guided-imagery-scripts.html     http://[employer]/library/miebsyctgl-ikqqmp-istlxfo/     Counseling / Behavioral Health    [facility]    Visit [email or web address] or call [phone] to find a clinic close to you.      This is not a prescription and these resources are optional. You must pay for any costs when using these resources. Please ask your insurance carrier if you can be reimbursed for these resources. If so, you are responsible for sending the needed details to your insurance carrier. These resources may also be tax deductible as medical expenses. Check with your .     These programs and publications are not affiliated in any way with [facility] [place].    Stress, tension, anxiety and depression can be big problems that contribute to insomnia.  If you can t relax your mind and body, then you won t be able to sleep.  You seem to have a high level of stress in your life and would likely benefit from professional stress reduction / anxiety management strategies and should contact [facility] at [phone] to schedule an appointment.       If you are experiencing extreme anxiety or distress due to insomnia symptoms and feel that you need immediate assistance, please contact the [facility] at [phone].    Please, meet with the nurse and schedule sleep study and follow up visit. Bring sleeping aid for the night of the study and use it only if necessary.    Thank you so much!    Sincerely,    Jorge Amaro MD, MPH  Clinical Sleep Medicine

## 2017-08-04 NOTE — MR AVS SNAPSHOT
After Visit Summary   8/4/2017    Mallory Sargent    MRN: [id number]           Patient Information     Date Of Birth          1965        Visit Information        Provider Department      8/4/2017 10:00 AM Jorge Amaro MD [facility]        Today's Diagnoses     Excessive daytime sleepiness    -  1    Snoring        Other fatigue        Non morbid obesity due to excess calories        Psychophysiological insomnia          Care Instructions    Your blood pressure was checked while you were in clinic today.  Please read the guidelines below about what these numbers mean and what you should do about them.  Your systolic blood pressure is the top number.  This is the pressure when the heart is pumping.  Your diastolic blood pressure is the bottom number.  This is the pressure in between beats.  If your systolic blood pressure is less than 120 and your diastolic blood pressure is less than 80, then your blood pressure is normal. There is nothing more that you need to do about it  If your systolic blood pressure is 120-139 or your diastolic blood pressure is 80-89, your blood pressure may be higher than it should be.  You should have your blood pressure re-checked within a year by a primary care provider.  If your systolic blood pressure is 140 or greater or your diastolic blood pressure is 90 or greater, you may have high blood pressure.  High blood pressure is treatable, but if left untreated over time it can put you at risk for heart attack, stroke, or kidney failure.  You should have your blood pressure re-checked by a primary care provider within the next four weeks.  Your BMI is Body mass index is 30.69 kg/(m^2).  Weight management is a personal decision.  If you are interested in exploring weight loss strategies, the following discussion covers the approaches that may be successful. Body mass index (BMI) is one way to tell whether you are at a healthy weight,  overweight, or obese. It measures your weight in relation to your height.  A BMI of 18.5 to 24.9 is in the healthy range. A person with a BMI of 25 to 29.9 is considered overweight, and someone with a BMI of 30 or greater is considered obese. More than two-thirds of American adults are considered overweight or obese.  Being overweight or obese increases the risk for further weight gain. Excess weight may lead to heart disease and diabetes.  Creating and following plans for healthy eating and physical activity may help you improve your health.  Weight control is part of healthy lifestyle and includes exercise, emotional health, and healthy eating habits. Careful eating habits lifelong are the mainstay of weight control. Though there are significant health benefits from weight loss, long-term weight loss with diet alone may be very difficult to achieve- studies show long-term success with dietary management in less than 10% of people. Attaining a healthy weight may be especially difficult to achieve in those with severe obesity. In some cases, medications, devices and surgical management might be considered.  What can you do?  If you are overweight or obese and are interested in methods for weight loss, you should discuss this with your provider.     Consider reducing daily calorie intake by 500 calories.     Keep a food journal.     Avoiding skipping meals, consider cutting portions instead.    Diet combined with exercise helps maintain muscle while optimizing fat loss. Strength training is particularly important for building and maintaining muscle mass. Exercise helps reduce stress, increase energy, and improves fitness. Increasing exercise without diet control, however, may not burn enough calories to loose weight.       Start walking three days a week 10-20 minutes at a time    Work towards walking thirty minutes five days a week     Eventually, increase the speed of your walking for 1-2 minutes at time    In  "addition, we recommend that you review healthy lifestyles and methods for weight loss available through the National Institutes of Health patient information sites:  http://win.niddk.nih.gov/publications/index.htm    And look into health and wellness programs that may be available through your health insurance provider, employer, local community center, or [employer] club.        MY TREATMENT INFORMATION FOR SLEEP APNEA -  Mallory Sargent    DOCTOR: Jorge Amaro MD, MPH  SLEEP CENTER : [facility]         If I haven't had a sleep study yet, what can I expect?  A personal story from Octavio  https://www.FRS.com/watch?v=AxPLmlRpnCs      Am I having a home sleep study?  Here is a video in case you get home and want to make sure you have done it correctly  https://www.FRS.com/watch?v=SOL8B1gNfk4&feature=youtu.be      Frequently asked questions:  1. What is Obstructive Sleep Apnea (JESSIE)? JESSIE is the most common type of sleep apnea. Apnea literally means, \"without breath.\" It is characterized by repetitive pauses in breathing, despite continued effort to breathe, and is usually associated with a reduction in blood oxygen saturation. Apneas can last 10 to over 60 seconds. It is caused by narrowing or collapse of the upper airway as muscles relax during sleep. Severity of sleep apnea is determined by frequency of breathing events and their effect on your sleep and oxygen levels determined during sleep testing.     2. What are the consequences of JESSIE? Symptoms include: daytime sleepiness- possibly increasing the risk of falling asleep while driving, unrefreshing/restless sleep, snoring, insomnia, waking frequently to urinate, waking with heartburn or reflux, reduced concentration and memory, and morning headaches. Other health consequences may include development of high blood pressure and other cardiovascular disease in persons who are susceptible. Untreated JESSIE  can contribute to heart disease, " stroke and diabetes.     3. What are the treatment options? In most situations, sleep apnea is a lifelong disease that must be managed with daily therapy. Medications are not effective for sleep apnea and surgery is generally not performed until other therapies have been tried. Therapy is usually tailored to the individual patient based on many factors including your wishes as well as severity of sleep apnea and severity of obesity. Continuous Positive Airway (CPAP) is the most reliable treatment. An oral device to hold your jaw forward is usually the next most reliable option. Other options include postioning devices (to keep you off your back), weight loss, and surgery including a tongue pacing device. There is more detail about some of these options below.            1. CPAP-  WHAT DOES IT DO AND HOW CAN I LEARN TO WEAR IT?                               BEFORE I START, CAN I WATCH A MOVIE TO GET A PLAN ON HOW TO USE CPAP?  https://www.[employer].com/watch?x=n0Q26io529S      Continuous positive airway pressure, or CPAP, is the most effective treatment for obstructive sleep apnea. It works by blowing room air, through a mask, to hold your throat open. A decision to use CPAP is a major step forward in the pursuit of a healthier life. The successful use of CPAP will help you breathe easier, sleep better and live healthier. You can choose CPAP equipment from any durable medical equipment provider that meets your needs.  Using CPAP can be a positive experience if you keep these ching points in mind:  1. Commitment  CPAP is not a quick fix for your problem. It involves a long-term commitment to improve your sleep and your health.    2. Communication  Stay in close communication with both your sleep doctor and your CPAP supplier. Ask lots of questions and seek help when you need it.    3. Consistency  Use CPAP all night, every night and for every nap. You will receive the maximum health benefits from CPAP when you use it  "every time that you sleep. This will also make it easier for your body to adjust to the treatment.    4. Correction  The first machine and mask that you try may not be the best ones for you. Work with your sleep doctor and your CPAP supplier to make corrections to your equipment selection. Ask about trying a different type of machine or mask if you have ongoing problems. Make sure that your mask is a good fit and learn to use your equipment properly.    5. Challenge  Tell a family member or close friend to ask you each morning if you used your CPAP the previous night. Have someone to challenge you to give it your best effort.    6. Connection   Your adjustment to CPAP will be easier if you are able to connect with others who use the same treatment. Ask your sleep doctor if there is a support group in your area for people who have sleep apnea, or look for one on the Internet.  7. Comfort   Increase your level of comfort by using a saline spray, decongestant or heated humidifier if CPAP irritates your nose, mouth or throat. Use your unit's \"ramp\" setting to slowly get used to the air pressure level. There may be soft pads you can buy that will fit over your mask straps. Look on www.CPAP.com for accessories that can help make CPAP use more comfortable.  8. Cleaning   Clean your mask, tubing and headgear on a regular basis. Put this time in your schedule so that you don't forget to do it. Check and replace the filters for your CPAP unit and humidifier.    9. Completion   Although you are never finished with CPAP therapy, you should reward yourself by celebrating the completion of your first month of treatment. Expect this first month to be your hardest period of adjustment. It will involve some trial and error as you find the machine, mask and pressure settings that are right for you.    10. Continuation  After your first month of treatment, continue to make a daily commitment to use your CPAP all night, every night and " for every nap.    CPAP-Tips to starting with success:  Begin using your CPAP for short periods of time during the day while you watch TV or read.    Use CPAP every night and for every nap. Using it less often reduces the health benefits and makes it harder for your body to get used to it.    Make small adjustments to your mask, tubing, straps and headgear until you get the right fit. Tightening the mask may actually worsen the leak.  If it leaves significant marks on your face or irritates the bridge of your nose, it may not be the best mask for you.  Speak with the person who supplied the mask and consider trying other masks. Insurances will allow you to try different masks during the first month of starting CPAP.  Insurance also covers a new mask, hose and filter about every 6 months.    Use a saline nasal spray to ease mild nasal congestion. Neti-Pot or saline nasal rinses may also help. Nasal gel sprays can help reduce nasal dryness.  Biotene mouthwash can be helpful to protect your teeth if you experience frequent dry mouth.  Dry mouth may be a sign of air escaping out of your mouth or out of the mask in the case of a full face mask.  Speak with your provider if you expect that is the case.     Take a nasal decongestant to relieve more severe nasal or sinus congestion.  Do not use Afrin (oxymetazoline) nasal spray more than 3 days in a row.  Speak with your sleep doctor if your nasal congestion is chronic.    Use a heated humidifier that fits your CPAP model to enhance your breathing comfort. Adjust the heat setting up if you get a dry nose or throat, down if you get condensation in the hose or mask.  Position the CPAP lower than you so that any condensation in the hose drains back into the machine rather than towards the mask.    Try a system that uses nasal pillows if traditional masks give you problems.    Clean your mask, tubing and headgear once a week. Make sure the equipment dries fully.    Regularly  check and replace the filters for your CPAP unit and humidifier.    Work closely with your sleep provider and your CPAP supplier to make sure that you have the machine, mask and air pressure setting that works best for you. It is better to stop using it and call your provider to solve problems than to lay awake all night frustrated with the device.      BESIDES CPAP, WHAT OTHER THERAPIES ARE THERE?        Positioning Device  Positioning devices are generally used when sleep apnea is mild and only occurs on your back.This example shows a pillow that straps around the waist. It may be appropriate for those whose sleep study shows milder sleep apnea that occurs primarily when lying flat on one's back. Preliminary studies have shown benefit but effectiveness at home may need to be verified by a home sleep test. These devices are generally not covered by medical insurance.                        Oral Appliance  What is oral appliance therapy?  An oral appliance is a small acrylic device that fits over the upper and lower teeth or tongue (similar to an orthodontic retainer or a mouth guard). This device slightly advances the lower jaw or tongue, which moves the base of the tongue forward, opens the airway, improves breathing and can effectively treat snoring and obstructive sleep apnea sleep apnea. The appliance is fabricated and customized by a qualified dentist with experience in treating snoring and sleep apnea. Oral appliances are usually well tolerated and have relatively high compliance by patients1, 2, 3.  When is an oral appliance indicated?  Oral appliance therapy is recommended as a first-line treatment for patients with primary snoring, mild sleep apnea, and for patients with moderate sleep apnea who prefer appliance therapy to use of CPAP4, 5. Severity of sleep apnea is determined by sleep testing and is based on the number of respiratory events per hour of sleep.   How successful is oral appliance  therapy?  The success rate of oral appliance therapy in patients with mild sleep apnea is 75-80% while in patients with moderate sleep apnea it is 50-70%. The chance of success in patients with severe sleep apnea is 40-50%. The research also shows that oral appliances have a beneficial effect on the cardiovascular health of JESSIE patients at the same magnitude as CPAP therapy7.  Oral appliances should be a second-line treatment in cases of severe sleep apnea, but if not completely successful then a combination therapy utilizing CPAP plus oral appliance therapy may be effective. Oral appliances tend to be effective in a broad range of patients although studies show that the patients who have the highest success are females, younger patients, those with milder disease, and less severe obesity. 3, 6.   The chances of success are lower in patients who have more severe JESSIE, are older, and those who are morbidly obese.     Example of an oral appliance   Finding a dentist that practices dental sleep medicine  Specific training is available through the American Academy of Dental Sleep Medicine for dentists interested in working in the field of sleep. To find a dentist who is educated in the field of sleep and the use of oral appliances, near you, visit the Web site of the American Academy of Dental Sleep Medicine; also see   http://www.accpstorage.org/newOrganization/patients/oralAppliances.pdf  To search for a dentist certified in these practices:  Http://aadsm.org/FindADentist.aspx?1  1. Milana, et al. Objectively measured vs self-reported compliance during oral appliance therapy for sleep-disordered breathing. Chest 2013; 144(5): 7681-8662.  2. Gerri et al. Objective measurement of compliance during oral appliance therapy for sleep-disordered breathing. Thorax 2013; 68(1): 91-96.  3. Ivon et al. Mandibular advancement devices in 620 men and women with JESSIE and snoring: tolerability and predictors of  treatment success. Chest 2004; 125: 2761-6033.  4. Kristopher et al. Oral appliances for snoring and JESSIE: a review. Sleep 2006; 29: 244-262.  5. Bentley et al. Oral appliance treatment for JESSIE: an update. J Clin Sleep Med 2014; 10(2): 215-227.  6. Jo et al. Predictors of OSAH treatment outcome. J Dent Res 2007; 86: 0314-6657.      Weight Loss:    Weight management is a personal decision.  If you are interested in exploring weight loss strategies, the following discussion covers the impact on weight loss on sleep apnea and the approaches that may be successful.    Weight loss decreases severity of sleep apnea in most people with obesity. For those with mild obesity who have developed snoring with weight gain, even 15-30 pound weight loss can improve and occasionally eliminate sleep apnea.  Structured and life-long dietary and health habits are necessary to lose weight and keep healthier weight levels.     Though there may be significant health benefits from weight loss, long-term weight loss is very difficult to achieve- studies show success with dietary management in less than 10% of people. In addition, substantial weight loss may require years of dietary control and may be difficult if patients have severe obesity. In these cases, surgical management may be considered.  Finally, older individuals who have tolerated obesity without health complications may be less likely to benefit from weight loss strategies.    Your BMI is Body mass index is 30.69 kg/(m^2).  Body mass index (BMI) is one way to tell whether you are at a healthy weight, overweight, or obese. It measures your weight in relation to your height.  A BMI of 18.5 to 24.9 is in the healthy range. A person with a BMI of 25 to 29.9 is considered overweight, and someone with a BMI of 30 or greater is considered obese. More than two-thirds of American adults are considered overweight or obese.  Being overweight or obese increases the risk for further  weight gain. Excess weight may lead to heart disease and diabetes.  Creating and following plans for healthy eating and physical activity may help you improve your health.  Weight control is part of healthy lifestyle and includes exercise, emotional health, and healthy eating habits. Careful eating habits lifelong are the mainstay of weight control. Though there are significant health benefits from weight loss, long-term weight loss with diet alone may be very difficult to achieve- studies show long-term success with dietary management in less than 10% of people. Attaining a healthy weight may be especially difficult to achieve in those with severe obesity. In some cases, medications, devices and surgical management might be considered.  What can you do?  If you are overweight or obese and are interested in methods for weight loss, you should discuss this with your provider.     Consider reducing daily calorie intake by 500 calories.     Keep a food journal.     Avoiding skipping meals, consider cutting portions instead.    Diet combined with exercise helps maintain muscle while optimizing fat loss. Strength training is particularly important for building and maintaining muscle mass. Exercise helps reduce stress, increase energy, and improves fitness. Increasing exercise without diet control, however, may not burn enough calories to loose weight.       Start walking three days a week 10-20 minutes at a time    Work towards walking thirty minutes five days a week     Eventually, increase the speed of your walking for 1-2 minutes at time    In addition, we recommend that you review healthy lifestyles and methods for weight loss available through the National Institutes of Health patient information sites:  http://win.niddk.nih.gov/publications/index.htm    And look into health and wellness programs that may be available through your health insurance provider, employer, local community center, or [place]  club.    Surgery:    Upper Airway Surgery for JESSIE  Surgery for JESSIE is a second-line treatment option in the management of sleep apnea.  Surgery should be considered for patients who are having a difficult time tolerating CPAP.    Surgery for JESSIE is directed at areas that are responsible for narrowing or complete obstruction of the airway during sleep.  There are a wide range of procedures available to enlarge and/or stabilize the airway to prevent blockage of breathing in the three major areas where it can occur: the palate, tongue, and nasal regions.  Successful surgical treatment depends on the accurate identification of the factors responsible for obstructive sleep apnea in each person.  A personalized approach is required because there is no single treatment that works well for everyone.  Because of anatomic variation, consultation with an examination by a sleep surgeon is a critical first step in determining what surgical options are best for each patient.  In some cases, examination during sedation may be recommended in order to guide the selection of procedures.  Patients will be counseled about risks and benefits as well as the typical recovery course after surgery. Surgery is typically not a cure for a person s JESSIE.  However, surgery will often significantly improve one s JESSIE severity (termed  success rate ).  Even in the absence of a cure, surgery will decrease the cardiovascular risk associated with OSA7; improve overall quality of life8 (sleepiness, functionality, sleep quality, etc).          Palate Procedures:  Patients with JESSIE often have narrowing of their airway in the region of their tonsils and uvula.  The goals of palate procedures are to widen the airway in this region as well as to help the tissues resist collapse.  Modern palate procedure techniques focus on tissue conservation and soft tissue rearrangement, rather than tissue removal.  Often the uvula is preserved in this procedure. Residual  sleep apnea is common in patient after pharyngoplasty with an average reduction in sleep apnea events of 33%2.      Tongue Procedures:  While patients are awake, the muscles that surround the throat are active and keep this region open for breathing. These muscles relax during sleep, allowing the tongue and other structures to collapse and block breathing.  There are several different tongue procedures available.  Selection of a tongue base procedure depends on characteristics seen on physical exam.  Generally, procedures are aimed at removing bulky tissues in this area or preventing the back of the tongue from falling back during sleep.  Success rates for tongue surgery range from 50-62%3.    Hypoglossal Nerve Stimulation:  Hypoglossal nerve stimulation has recently received approval from the [place] Food and Drug Administration for the treatment of obstructive sleep apnea.  This is based on research showing that the system was safe and effective in treating sleep apnea6.  Results showed that the median AHI score decreased 68%, from 29.3 to 9.0. This therapy uses an implant system that senses breathing patterns and delivers mild stimulation to airway muscles, which keeps the airway open during sleep.  The system consists of three fully implanted components: a small generator (similar in size to a pacemaker), a breathing sensor, and a stimulation lead.  Using a small handheld remote, a patient turns the therapy on before bed and off upon awakening.    Candidates for this device must be greater than 22 years of age, have moderate to severe JESSIE (AHI between 20-65), BMI less than 32, have tried CPAP/oral appliance without success, and have appropriate upper airway anatomy (determined by a sleep endoscopy performed by Dr. Aguilera).    Hypoglossal Nerve Stimulation Pathway:    The sleep surgeon s office will work with the patient through the insurance prior-authorization process (including communications and appeals).     Nasal Procedures:  Nasal obstruction can interfere with nasal breathing during the day and night.  Studies have shown that relief of nasal obstruction can improve the ability of some patients to tolerate positive airway pressure therapy for obstructive sleep apnea1.  Treatment options include medications such as nasal saline, topical corticosteroid and antihistamine sprays, and oral medications such as antihistamines or decongestants. Non-surgical treatments can include external nasal dilators for selected patients. If these are not successful by themselves, surgery can improve the nasal airway either alone or in combination with these other options.    Combination Procedures:  Combination of surgical procedures and other treatments may be recommended, particularly if patients have more than one area of narrowing or persistent positional disease.  The success rate of combination surgery ranges from 66-80%2,3.      1. Prisca PITTS. The Role of the Nose in Snoring and Obstructive Sleep Apnoea: An Update.  Eur Arch Otorhinolaryngol. 2011; 268: 1365-73.  2.  John SM; Nils JA; Melinda JR; Pallanch JF; Hope MB; Soham SG; Veto GONZALEZ. Surgical modifications of the upper airway for obstructive sleep apnea in adults: a systematic review and meta-analysis. SLEEP 2010;33(10):1207-8962. Jacquelinerichi KESSLER. Hypopharyngeal surgery in obstructive sleep apnea: an evidence-based medicine review.  Arch Otolaryngol Head Neck Surg. 2006 Feb;132(2):206-13.  3. Bam YH1, Britt Y, Michael SHLOMO. The efficacy of anatomically based multilevel surgery for obstructive sleep apnea. Otolaryngol Head Neck Surg. 2003 Oct;129(4):327-35.  4. Gerardo KESSLER, Goldberg A. Hypopharyngeal Surgery in Obstructive Sleep Apnea: An Evidence-Based Medicine Review. Arch Otolaryngol Head Neck Surg. 2006 Feb;132(2):206-13.  5. Day BULLOCK et al. Upper-Airway Stimulation for Obstructive Sleep Apnea.  N Engl J Med. 2014 Jan 9;370(2):139-49.  6. Kamla ADAMS et al. Increased  Incidence of Cardiovascular Disease in Middle-aged Men with Obstructive Sleep Apnea. Am J Respir Crit Care Med; 2002 166: 159-165  7. Zita RIDDLE et al. Studying Life Effects and Effectiveness of Palatopharyngoplasty (SLEEP) study: Subjective Outcomes of Isolated Uvulopalatopharyngoplasty. Otolaryngol Head Neck Surg. 2011; 144: 623-631.  [place] [facility]    Good sleeping habits are a key part of treatment. If needed, and only in very select cases, some medications may help you sleep better at first. Making healthy lifestyle changes and learning to relax can improve your overall sleep in the long run. As many long term changes and treatments, treating insomnia takes commitment and hard work, but trust that your efforts will pay off.  We are recommending treatment of insomnia based on your history of sleep problems and/or use of medications for sleeping.  There are many treatment options available and we want to work with you to find the right treatment for you.  The following recommendations can be helpful for some people.        Sleep Hygiene     Sometimes insomnia can be made worse by our own habits or situation.  You should consider making some of the following changes to help improve your sleep:       If there is excessive noise in your house / neighborhood use a fan or similar device to make a quiet background noise that can drown out other noises.    If your room is too bright early in the morning, hang a blanket or extra curtain over the windows to keep the light out or use a sleeping mask to cover your eyes.    If your room is too warm during the night, set the temperature in the house down a few degrees for the night.    Spend a little time before bedtime trying to relax.  Do not work right up until bedtime.  Take 30-60 minutes to relax and unwind before bedtime with a book or watching TV.    Do not drink anything with alcohol or caffeine in them for at least 4 to 5 hours before bedtime.    Do not  smoke right before bedtime or during the night.    No strenuous exercise for 2-3 hours before bedtime.      In addition to the abovementioned recommendations, cognitive behavioral treatment for insomnia (CBTI) is a very effective technique that involves changing your behaviors and thoughts associated with sleep. In fact, CBTI is the most effective treatment for long-term insomnia. This treatment modality tries to address the underlying causes of your sleep problems, including your habits and how you think about sleep.    People that are motivated to make changes in their sleep pattern are likely to benefit from internet based cognitive behavioral treatment for insomnia. Some internet CBTI programs include but are not limited to www[employer] Technologies or www.ViajaNet.  There is a fee for using these programs that is similar to actually seeing an insomnia expert. By entering the code  [place]  it will allow us to know if you find these services useful.        Online Programs for CBTI       wwwPol[employer] (pronounced shut eye) - There is a fee for this program.    www.sleepIO.com (pronounced sleep ee oh) - There is a fee for this program. Enter the code  [place]  if you decide to enroll in this program.       In addition to the internet programs provided, self-help strategies for treatment of insomnia can be found also in books.  Several treatment books for insomnia are listed on our patient treatment resources.  Some of these include the following books:       Suggested Resources - Insomnia Treatment Books       Overcoming Insomnia  by Tapan Humphries and Samra Devlin (2008)     No More Sleepless Nights  by Kristofer Kim and Valencia Alcantar (1996)     Say Lupe to Insomnia  by Darnell Mcdonnell (2009)     The Insomnia Workbook  by Mallory Stark and Jacoby Flynn (2009)     The Insomnia Answer  by Gucci Brewer and Estevan Delong (2006)     These recommendations are a first step in treating insomnia.  For many people  these recommendations can be helpful while for others they are just the start. There are many treatments available for insomnia and our goal is to keep re-evaluating your progress and try other options if these first steps are not successful.  It has been demonstrated that, in the long run, CBTI modalities are more effective than any medication for insomnia. As such, if the above the recommendations do not help, you may benefit from scheduling an appointment with an insomnia expert (we can assess this during follow up visits).      Healthy Lifestyle  As it was mentioned, your lifestyle directly affects your health and your sleep patterns. Here are some healthy habits that you should consider:    Keep a regular sleep schedule, always going to bed and getting up at the same time each day.    Exercise regularly. It may help you reduce stress. However, remember to avoid strenuous exercise for two to four hours before bedtime.    Limit, or if possible, avoid all together naps.    Use your bed only for sleep and sex. This means, no reading, watching TV, using tablets or phones, or doing any other activities in bed.    Do not spend too much time in bed trying to fall asleep. If you cannot fall asleep within 20 to 30 minutes, get up and do something relaxing until you become tired and drowsy again. Do not expose yourself to bright lights or perform complex activities (e.g., no house cleaning, watching TV, or working on homework).    Avoid or limit caffeine and nicotine. They can keep you awake at night. Also avoid alcohol. It may help you fall asleep at first, but your sleep will not be restful.      Before Bedtime  In addition to keeping some healthy habits, it is important to know what do to before going to sleep. As such, to also sleep better every night, try these tips:    Have a bedtime routine to let your body and mind know when it is time to sleep.    Going to bed should be relaxing so try to do only relaxing things  around bedtime. Sleep will come sooner.    If your worries do not let you sleep, write them down in a diary. Then close it and go to bed.    As previously mentioned, make sure the room is not too hot or too cold. If it is not dark enough, an eye mask may help. If it is noisy, try using earplugs.      Learn to Relax  In addition to poor habits, stress, anxiety, and body tension may play a large role in your inability to sleep through the night. In fact, these factors may keep you awake at night. To unwind before bedtime, try reading a book, meditation, or even yoga. Also, try the following:    Deep breathing: Sit or lie back in a chair. Take a slow, deep breath. Hold it for 5 counts. Then breathe out slowly through your mouth. Keep doing this until you feel relaxed.    Imagery: Think of the last fun trip you took. In your mind, walk through the trip from start to finish. Put as much detail into the memory as you can remember. It will also help you relax.      Suggested Resources    Insomnia Treatment Books      Overcoming Insomnia  by Tapan Humphries and Samra Devlin (2008)     No More Sleepless Nights  by Kristofer Kim and Valencia Alcantar (1996)     Say Lupe to Insomnia  by Darnell Mcdonnell (2009)     The Insomnia Workbook  by Mallory Stark and Jacoby Flynn (2009)     The Insomnia Answer  by Gucci Brewer and Estevan Delong (2006)    Stress Management and Relaxation Books    The Relaxation and Stress Reduction Workbook by Annie Zaman, Odetet Edmonds and Tahir De Los Santos (2008)    Stress Management Workbook: Techniques and Self-Assessment Procedures by Kylie Andrews and Oren Romano (1997)    A Mindfulness-Based Stress Reduction Workbook by Enoch Barajas and Michelle Galarza (2010)    The Complete Stress Management Workbook by Jordan Jimenes, Jose Benoit and Simeon Valderrama (1996)    Assert Yourself by Ivelisse Cadena and Onofre Cadena (1977)    Relaxation Resources for [employer] Beat  Download   These websites offer resources to help you relax. This list is for information only. [place] is not responsible for the quality of services or the actions of any person or organization.    Progressive Muscle Relaxation (PMR):     http://www.Bondsy/progressive-muscle-relaxation-exercise.html     http://studentsupport.[facility]/counseling/resources/self-help/relaxation-and-stress-management/     Deep Breathing Exercises:    http://www.Bondsy/breathing-awareness.html     Meditation:     wwwOn The Spot Systems    www.AppSpotrguidedBabyListmeditation-site.[employer] (you may have to pay for some of these resources)    Guided Imagery:    http://www.Bondsy/guided-imagery-scripts.html     http://F[place]abee/library/eomjcaougn-mujylq-lcsuann/     Counseling / Behavioral Health    [facility]    Visit [email or web address] or call [phone] to find a clinic close to you.      This is not a prescription and these resources are optional. You must pay for any costs when using these resources. Please ask your insurance carrier if you can be reimbursed for these resources. If so, you are responsible for sending the needed details to your insurance carrier. These resources may also be tax deductible as medical expenses. Check with your .     These programs and publications are not affiliated in any way with [place].    Stress, tension, anxiety and depression can be big problems that contribute to insomnia.  If you can t relax your mind and body, then you won t be able to sleep.  You seem to have a high level of stress in your life and would likely benefit from professional stress reduction / anxiety management strategies and should contact [facility] at [phone] to schedule an appointment.       If you are experiencing extreme anxiety or distress due to insomnia symptoms and feel that you need immediate assistance,  please contact the [facility] at [phone].    Please, meet with the nurse and schedule sleep study and follow up visit. Bring sleeping aid for the night of the study and use it only if necessary.    Thank you so much!    Sincerely,    Jorge Amaro MD, MPH  Clinical Sleep Medicine              Follow-ups after your visit        Your next 10 appointments already scheduled     Sep 14, 2017 11:15 AM CDT   New Visit with Taryn Echeverria GC   Cancer Risk Management Program ([facility])    [facility]  [address]  [facility] [id number] [phone]              Future tests that were ordered for you today     Open Future Orders        Priority Expected Expires Ordered    Comprehensive Sleep Study Routine  1/31/2018 8/4/2017            Who to contact     If you have questions or need follow up information about today's clinic visit or your schedule please contact [facility] directly at [phone].  Normal or non-critical lab and imaging results will be communicated to you by [employer], letter or phone within 4 business days after the clinic has received the results. If you do not hear from us within 7 days, please contact the clinic through [employer] or phone. If you have a critical or abnormal lab result, we will notify you by phone as soon as possible.  Submit refill requests through [employer] or call your pharmacy and they will forward the refill request to us. Please allow 3 business days for your refill to be completed.          Additional Information About Your Visit        [employer] Information     [employer] gives you secure access to your electronic health record. If you see a primary care provider, you can also send messages to your care team and make appointments. If you have questions, please call your primary care clinic.  If you do not have a primary care provider, please call [phone] and they will assist  "you.        Care EveryWhere ID     This is your Care EveryWhere ID. This could be used by other organizations to access your [employer] medical records  [id number]        Your Vitals Were     Pulse Respirations Height Last Period Pulse Oximetry BMI (Body Mass Index)    63 12 1.791 m (5' 10.51\") 06/09/2015 97% 30.69 kg/m2       Blood Pressure from Last 3 Encounters:   08/04/17 109/72   06/30/17 104/66   04/28/17 110/70    Weight from Last 3 Encounters:   08/04/17 98.4 kg (217 lb)   06/30/17 103.4 kg (228 lb)   04/28/17 103.4 kg (228 lb)              We Performed the Following     SLEEP EVALUATION & MANAGEMENT REFERRAL - ADULT          Today's Medication Changes          These changes are accurate as of: 8/4/17 10:57 AM.  If you have any questions, ask your nurse or doctor.               These medicines have changed or have updated prescriptions.        Dose/Directions    * zolpidem 5 MG tablet   Commonly known as:  AMBIEN   This may have changed:  Another medication with the same name was added. Make sure you understand how and when to take each.   Used for:  Other insomnia        Dose:  5 mg   Take 1 tablet (5 mg) by mouth nightly as needed for sleep   Quantity:  30 tablet   Refills:  5       * zolpidem 5 MG tablet   Commonly known as:  AMBIEN   This may have changed:  You were already taking a medication with the same name, and this prescription was added. Make sure you understand how and when to take each.   Used for:  Snoring, Psychophysiological insomnia, Non morbid obesity due to excess calories, Other fatigue, Excessive daytime sleepiness        Take tablet by mouth 15 minutes prior to sleep, for Sleep Study   Quantity:  1 tablet   Refills:  0       * Notice:  This list has 2 medication(s) that are the same as other medications prescribed for you. Read the directions carefully, and ask your doctor or other care provider to review them with you.         Where to get your medicines      Some of these will need a " paper prescription and others can be bought over the counter.  Ask your nurse if you have questions.     Bring a paper prescription for each of these medications     zolpidem 5 MG tablet                Primary Care Provider Office Phone # Fax #    Karen Weiler, [phone]       [facility] [address]  [facility] [address]        Equal Access to Services     [facility] MARITZA : Hadii aad ku hadasho Soomaali, waaxda luqadaha, qaybta kaalmada adeegyada, waxay idiin hayaan adeeg kharash la'aan . So [facility] [phone].    ATENCIÓN: Si habla español, tiene a suresh disposición servicios gratuitos de asistencia lingüística. Llame al [phone].    We comply with applicable federal civil rights laws and [place] laws. We do not discriminate on the basis of race, color, national origin, age, disability sex, sexual orientation or gender identity.            Thank you!     Thank you for choosing [facility]  for your care. Our goal is always to provide you with excellent care. Hearing back from our patients is one way we can continue to improve our services. Please take a few minutes to complete the written survey that you may receive in the mail after your visit with us. Thank you!             Your Updated Medication List - Protect others around you: Learn how to safely use, store and throw away your medicines at www.disposemymeds.org.          This list is accurate as of: 8/4/17 10:57 AM.  Always use your most recent med list.                   Brand Name Dispense Instructions for use Diagnosis    aspirin 81 MG tablet     30 tablet    Take by mouth daily        Multi-vitamin Tabs tablet      Take 1 tablet by mouth daily        omeprazole 20 MG CR capsule    priLOSEC    90 capsule    TAKE 1 CAPSULE BY MOUTH DAILY, 30 TO 60 MINUTES BEFORE A MEAL.    Hyperlipidemia LDL goal <130       simvastatin 40 MG tablet    ZOCOR    90 tablet    Take 1 tablet (40 mg) by mouth At Bedtime     Hyperlipidemia LDL goal <130       traZODone 50 MG tablet    DESYREL    30 tablet    Take 1 tablet (50 mg) by mouth nightly as needed for sleep    Insomnia, unspecified type       * zolpidem 5 MG tablet    AMBIEN    30 tablet    Take 1 tablet (5 mg) by mouth nightly as needed for sleep    Other insomnia       * zolpidem 5 MG tablet    AMBIEN    1 tablet    Take tablet by mouth 15 minutes prior to sleep, for Sleep Study    Snoring, Psychophysiological insomnia, Non morbid obesity due to excess calories, Other fatigue, Excessive daytime sleepiness       * Notice:  This list has 2 medication(s) that are the same as other medications prescribed for you. Read the directions carefully, and ask your doctor or other care provider to review them with you.

## 2017-09-14 ENCOUNTER — ONCOLOGY VISIT (OUTPATIENT)
Dept: ONCOLOGY | Facility: CLINIC | Age: 52
End: 2017-09-14
Attending: GENETIC COUNSELOR, MS
Payer: COMMERCIAL

## 2017-09-14 DIAGNOSIS — Z80.41 FAMILY HISTORY OF MALIGNANT NEOPLASM OF OVARY: Primary | ICD-10-CM

## 2017-09-14 LAB — MISCELLANEOUS TEST: NORMAL

## 2017-09-14 PROCEDURE — 96040 ZZH GENETIC COUNSELING, EACH 30 MINUTES: CPT | Performed by: GENETIC COUNSELOR, MS

## 2017-09-14 NOTE — PROGRESS NOTES
Medical Assistant Note:  Mallory Sargent presents today for labs.    Patient seen by provider today: Yes   present during visit today: Not Applicable.    Concerns: No Concerns.    Procedure:  Lab draw site: RAC, Needle type: BF, Gauge: 21.    Post Assessment:  Labs drawn without difficulty: Yes.    Discharge Plan:  Departure Mode: Ambulatory.    Face to Face Time: 5 minutes.    Ingrid Wooten MA

## 2017-09-14 NOTE — PATIENT INSTRUCTIONS
Assessing Cancer Risk  Only about 5-10% of cancers are thought to be due to an inherited cancer susceptibility gene.    These families often have:    Several people with the same or related types of cancer    Cancers diagnosed at a young age (before age 50)    Individuals with more than one primary cancer    Multiple generations of the family affected with cancer    Some people may be candidates for genetic testing of more than one gene.  For these families, genetic testing using a cancer panel may be offered.  These panels can test many genes at once known to increase the risk for gynecologic (and other) cancers:  BRCA1, BRCA2, BRIP1 MLH1, MSH2, MSH6, PMS2, EPCAM, PTEN, PALB2, RAD51C, RAD51D, and TP53. The purpose of this handout is to serve as a brief summary of the gynecologic cancer risk genes that have published clinical management guidelines for individuals who are found to carry a mutation.    ______________________________________________________________________________  Hereditary Breast and Ovarian Cancer Syndrome   (BRCA1 and BRCA2)  A single mutation in one of the copies of BRCA1 or BRCA2 increases the risk for breast and ovarian cancer, among others.  The risk for pancreatic cancer and melanoma may also be slightly increased in some families.  The chart below shows the chance that someone with a BRCA mutation would develop cancer in his or her lifetime1,2,3,4.        A person s ethnic background is also important to consider, as individuals of Ashkenazi Hindu ancestry have a higher chance of having a BRCA gene mutation.  There are three BRCA mutations that occur more frequently in this population.    Bruleson Syndrome   (MLH1, MSH2, MSH6, PMS2, and EPCAM)  Currently five genes are known to cause Burleson Syndrome: MLH1, MSH2, MSH6, PMS2, and EPCAM.  A single mutation in one of the Burleson Syndrome genes increases the risk for colon, endometrial, ovarian, and stomach cancers.  Other cancers that occur  less commonly in Burleson Syndrome include urinary tract, skin, and brain cancers.  The chart below shows the chance that a person with Burleson syndrome would develop cancer in his or her lifetime7.      *Cancer risk varies depending on Burleson syndrome gene found    Cowden Syndrome   (PTEN)  Cowden syndrome is a hereditary condition that increases the risk for breast, thyroid, endometrial, and kidney cancer.  Cowden syndrome is caused by a mutation in the PTEN gene.  A single mutation in one of the copies of PTEN causes Cowden syndrome and increases cancer risk.  The chart below shows the chance that someone with a PTEN mutation would develop cancer in their lifetime5,6.  Other benign features seen in some individuals with Cowden syndrome include benign skin lesions (facial papules, keratoses, lipomas), learning disability, autism, thyroid nodules, colon polyps, and larger head size.      *One recent study found breast cancer risk to be increased to 85%  Li-Fraumeni Syndrome   (TP53)  Li-Fraumeni Syndrome (LFS) is a cancer predisposition syndrome caused by a mutation in the TP53 gene. A single mutation in one of the copies of TP53 increases the risk for multiple cancers. Individuals with LFS are at an increased risk for developing cancer at a young age. The general lifetime risk for development of cancer is 50% by age 30 and 90% by age 60.     Core Cancers: Sarcomas, Breast, Brain, Lung, Leukemias/Lymphomas, Adrenocortical carcinomas  Other Cancers: Gastrointestinal, Thyroid, Skin, Genitourinary    Additional Genes  PALB2  Mutations in PALB2 have been shown to increase the risk of breast cancer up to 33-58% in some families; where individuals fall within this risk range is dependent upon family history. PALB2 mutations have also been associated with increased risk for pancreatic cancer, although this risk has not been quantified yet.  Individuals who inherit two PALB2 mutations--one from their mother and one from their  father--have a condition called Fanconi Anemia.  This rare autosomal recessive condition is associated with short stature, developmental delay, bone marrow failure, and increased risk for childhood cancers.    BRIP1, RAD51C and RAD51D  Mutations in BRIP1, RAD51C, and RAD51D have been shown to increase the risk of ovarian cancer as well as female breast cancer.    ______________________________________________________________________________    Inheritance  All of the cancer syndromes reviewed above are inherited in an autosomal dominant pattern.  This means that if a parent has a mutation, each of his or her children will have a 50% chance of inheriting that same mutation.  Therefore, each child--male or female--would have a 50% chance of being at increased risk for developing cancer.      Image obtained from Genetics Home Reference, 2013     Mutations in some genes can occur de floyd, which means that a person s mutation occurred for the first time in them and was not inherited from a parent.  Now that they have the mutation, however, it can be passed on to future generations.    Genetic Testing  Genetic testing involves a blood test and will look for any harmful mutations that are associated with increased cancer risk.  If possible, it is recommended that the person(s) who has had cancer be tested before other family members.  That person will give us the most useful information about whether or not a specific gene is associated with the cancer in the family.    Results  There are three possible results of genetic testing:    Positive--a harmful mutation was identified in one or more of the genes    Negative--no mutation was identified in any of the 9 genes on this panel    Variant of unknown significance--a variation in one of the genes was identified, but it is unclear how this impacts cancer risk in the family    Advantages and Disadvantages   There are advantages and disadvantages to genetic  testing.  Advantages    May clarify your cancer risk    Can help you make medical decisions    May explain the cancers in your family    May give useful information to your family members (if you share your results)    Disadvantages    Possible negative emotional impact of learning about inherited cancer risk    Uncertainty in interpreting a negative test result in some situations    Possible genetic discrimination concerns (see below)    Genetic Information Nondiscrimination Act (VINCENZO)  [place] is a federal law that protects individuals from health insurance or employment discrimination based on a genetic test result alone.  Although rare, there are currently no legal discrimination protections in terms of life insurance, long term care, or disability insurances.  Visit the [employer] [place] website to learn more.    Reducing Cancer Risk  Each of the genes listed within this handout have nationally recognized cancer screening guidelines that would be recommended for individuals who test positive.  In addition to increased cancer screening, surgeries may be offered or recommended to reduce cancer risk.  Recommendations are based upon an individual s genetic test result as well as their personal and family history of cancer.    Questions to Think About Regarding Genetic Testing:    What effect will the test result have on me and my relationship with my family members if I have an inherited gene mutation?  If I don t have a gene mutation?    Should I share my test results, and how will my family react to this news, which may also affect them?    Are my children ready to learn new information that may one day affect their own health?        Hereditary Cancer Resources    FORCE: Facing Our Risk of Cancer Empowered facingourrisk.org   Bright Pink bebrightpink.org   Li-Fraumeni Syndrome Association lfsassociation.org   PTEN World PTENworld.com   Collaborative Group of the [place] on Inherited  Colorectal Cancer (CGA) [facility].com http://[email or web address]   Cancer Care cancercare.org   American Cancer Society (ACS) cancer.org   National Cancer [place] (NCI) cancer.gov       Cancer Risk Management Program [phone]-CANCER ([phone])  ? Gina Akhtar, MS, [facility]  [phone]  ? Candice Young, MS, [facility] [place]  [phone]  ? Marlin Barrett, MS, [facility] [place]  [phone]  ? Taryn Echeverria, MS, [facility] [place]  [phone]   ? Conner Rodriguez, MS, [facility] [place]  [phone]    References  1. Elza A, Linh PDP, Juan Carlos S, Tatiana MALIK, Johann JE, Sreekanth JL, Clint N, Steph H, Qamar O, Obed A, Amber B, Lynnette P, Mansachi S, Jhoan DM, Royal N, Swati E, Veronica H, Carlo E, Pura J, Kiara J, Lashanda B, Taylor H, Thorlacius S, Eerola H, Nevkatelinna H, Dana K, Brian OP. Average risks of breast and ovarian cancer associated with BRCA1 or BRCA2 mutations detected in case series unselected for family history: a combined analysis of 222 studies. Am J Hum [place]. 2003;72:1117-30.  2. Jay N, Sybil M, Hamm G.  BRCA1 and BRCA2 Hereditary Breast and Ovarian Cancer. Gene Reviews online. 2013.  3. Frank YC, Karolina S, Tyler G, Wong S. Breast cancer risk among male BRCA1 and BRCA2 mutation carriers. J Natl Cancer Inst. 2007;99:1811-4.  4. Russel DG, Mamadou I, Shawn J, Rema E, Falguni ER, Bernice F. Risk of breast cancer in male BRCA2 carriers. J Med [place]. 2010;47:710-1.  5. Bart MH, Jadyn J, Kate J, Buddy LA, Ruth MS, Eng C. Lifetime cancer risks in individuals with germline PTEN mutations. Clin Cancer Res. 2012;18:400-7.  6. Jo-Ann ALVARES. Cowden Syndrome: A Critical Review of the Clinical Literature. J [place] . 2009:18:13-27.  7. National Comprehensive Cancer Network. Clinical practice guidelines in oncology, colorectal cancer screening. Available online (registration required). 2015.  8. Elza DAWN et al. Breast-Cancer Risk in Families with Mutations in PALB2. NEJM. 2014; 371(6):497-506.

## 2017-09-14 NOTE — MR AVS SNAPSHOT
After Visit Summary   9/14/2017    Mallory Sargent    MRN: [id number]           Patient Information     Date Of Birth          1965        Visit Information        Provider Department      9/14/2017 11:15 AM [facility]aryn Eche[facility] Cancer Risk Management Program        Today's Diagnoses     Family history of malignant neoplasm of ovary    -  1      Care Instructions          Assessing Cancer Risk  Only about 5-10% of cancers are thought to be due to an inherited cancer susceptibility gene.    These families often have:    Several people with the same or related types of cancer    Cancers diagnosed at a young age (before age 50)    Individuals with more than one primary cancer    Multiple generations of the family affected with cancer    Some people may be candidates for genetic testing of more than one gene.  For these families, genetic testing using a cancer panel may be offered.  These panels can test many genes at once known to increase the risk for gynecologic (and other) cancers:  BRCA1, BRCA2, BRIP1 MLH1, MSH2, MSH6, PMS2, EPCAM, PTEN, PALB2, RAD51C, RAD51D, and TP53. The purpose of this handout is to serve as a brief summary of the gynecologic cancer risk genes that have published clinical management guidelines for individuals who are found to carry a mutation.    ______________________________________________________________________________  Hereditary Breast and Ovarian Cancer Syndrome   (BRCA1 and BRCA2)  A single mutation in one of the copies of BRCA1 or BRCA2 increases the risk for breast and ovarian cancer, among others.  The risk for pancreatic cancer and melanoma may also be slightly increased in some families.  The chart below shows the chance that someone with a BRCA mutation would develop cancer in his or her lifetime1,2,3,4.        A person s ethnic background is also important to consider, as individuals of Ashkenazi Uats[place] ancestry have a higher chance of having a BRCA gene  mutation.  There are three BRCA mutations that occur more frequently in this population.    Burleson Syndrome   (MLH1, MSH2, MSH6, PMS2, and EPCAM)  Currently five genes are known to cause Burleson Syndrome: MLH1, MSH2, MSH6, PMS2, and EPCAM.  A single mutation in one of the Burleson Syndrome genes increases the risk for colon, endometrial, ovarian, and stomach cancers.  Other cancers that occur less commonly in Burleson Syndrome include urinary tract, skin, and brain cancers.  The chart below shows the chance that a person with Ubrleson syndrome would develop cancer in his or her lifetime7.      *Cancer risk varies depending on Burleson syndrome gene found    Cowden Syndrome   (PTEN)  Cowden syndrome is a hereditary condition that increases the risk for breast, thyroid, endometrial, and kidney cancer.  Cowden syndrome is caused by a mutation in the PTEN gene.  A single mutation in one of the copies of PTEN causes Cowden syndrome and increases cancer risk.  The chart below shows the chance that someone with a PTEN mutation would develop cancer in their lifetime5,6.  Other benign features seen in some individuals with Cowden syndrome include benign skin lesions (facial papules, keratoses, lipomas), learning disability, autism, thyroid nodules, colon polyps, and larger head size.      *One recent study found breast cancer risk to be increased to 85%  Li-Fraumeni Syndrome   (TP53)  Li-Fraumeni Syndrome (LFS) is a cancer predisposition syndrome caused by a mutation in the TP53 gene. A single mutation in one of the copies of TP53 increases the risk for multiple cancers. Individuals with LFS are at an increased risk for developing cancer at a young age. The general lifetime risk for development of cancer is 50% by age 30 and 90% by age 60.     Core Cancers: Sarcomas, Breast, Brain, Lung, Leukemias/Lymphomas, Adrenocortical carcinomas  Other Cancers: Gastrointestinal, Thyroid, Skin, Genitourinary    Additional Genes  PALB2  Mutations in  PALB2 have been shown to increase the risk of breast cancer up to 33-58% in some families; where individuals fall within this risk range is dependent upon family history. PALB2 mutations have also been associated with increased risk for pancreatic cancer, although this risk has not been quantified yet.  Individuals who inherit two PALB2 mutations--one from their mother and one from their father--have a condition called Fanconi Anemia.  This rare autosomal recessive condition is associated with short stature, developmental delay, bone marrow failure, and increased risk for childhood cancers.    BRIP1, RAD51C and RAD51D  Mutations in BRIP1, RAD51C, and RAD51D have been shown to increase the risk of ovarian cancer as well as female breast cancer.    ______________________________________________________________________________    Inheritance  All of the cancer syndromes reviewed above are inherited in an autosomal dominant pattern.  This means that if a parent has a mutation, each of his or her children will have a 50% chance of inheriting that same mutation.  Therefore, each child--male or female--would have a 50% chance of being at increased risk for developing cancer.      Image obtained from Genetics Home Reference, 2013     Mutations in some genes can occur de floyd, which means that a person s mutation occurred for the first time in them and was not inherited from a parent.  Now that they have the mutation, however, it can be passed on to future generations.    Genetic Testing  Genetic testing involves a blood test and will look for any harmful mutations that are associated with increased cancer risk.  If possible, it is recommended that the person(s) who has had cancer be tested before other family members.  That person will give us the most useful information about whether or not a specific gene is associated with the cancer in the family.    Results  There are three possible results of genetic  testing:    Positive--a harmful mutation was identified in one or more of the genes    Negative--no mutation was identified in any of the 9 genes on this panel    Variant of unknown significance--a variation in one of the genes was identified, but it is unclear how this impacts cancer risk in the family    Advantages and Disadvantages   There are advantages and disadvantages to genetic testing.  Advantages    May clarify your cancer risk    Can help you make medical decisions    May explain the cancers in your family    May give useful information to your family members (if you share your results)    Disadvantages    Possible negative emotional impact of learning about inherited cancer risk    Uncertainty in interpreting a negative test result in some situations    Possible genetic discrimination concerns (see below)    Genetic Information Nondiscrimination Act (VINCENZO)  [place] is a federal law that protects individuals from health insurance or employment discrimination based on a genetic test result alone.  Although rare, there are currently no legal discrimination protections in terms of life insurance, long term care, or disability insurances.  Visit the [employer] [place] website to learn more.    Reducing Cancer Risk  Each of the genes listed within this handout have nationally recognized cancer screening guidelines that would be recommended for individuals who test positive.  In addition to increased cancer screening, surgeries may be offered or recommended to reduce cancer risk.  Recommendations are based upon an individual s genetic test result as well as their personal and family history of cancer.    Questions to Think About Regarding Genetic Testing:    What effect will the test result have on me and my relationship with my family members if I have an inherited gene mutation?  If I don t have a gene mutation?    Should I share my test results, and how will my family react to this news,  which may also affect them?    Are my children ready to learn new information that may one day affect their own health?        Hereditary Cancer Resources    FORCE: Facing Our Risk of Cancer Empowered facingourrisk.org   Bright Pink bebrightpink.org   Li-Fraumeni Syndrome Association lfsassociation.org   PTEN World PTENworld.com   Collaborative Group of the Americas on Inherited Colorectal Cancer (CGA) cgaicc.MiArch http://www.facingourrisk.org/   Cancer Care cancercare.org   American Cancer Society (ACS) cancer.org   National Cancer [place] (NCI) cancer.gov       Cancer Risk Management Program [phone] ([phone])  ? Gina Akhtar, MS, [facility]  [phone]  ? Candice Hannah, MS, [facility]  [phone]  ? Marlin Barrett, MS, [facility]  [phone]  ? Taryn Ecehverria, MS, [facility]  [phone]   ? Conner Rodriguez, MS, [facility]  [phone]    References  1. Elza AVILA, Linh PDP, Juan Carlos S, Tatiana MALIK, Johann JE, Sreekanth JL, Clint N, Steph H, Qamar O, Obed A, Fazalini B, Lynnette P, Mansachi S, Jhoan DM, Royal N, Swati E, Veronica H, Carlo E, Pura J, Gronharper J, Lashanda B, Tulinius H, Thorlacius S, Eerola H, Erikalinna H, Dana K, Brian OP. Average risks of breast and ovarian cancer associated with BRCA1 or BRCA2 mutations detected in case series unselected for family history: a combined analysis of 222 studies. Am J Hum Farzana. 2003;72:1117-30.  2. Jay N, Sybil M, Noris G.  BRCA1 and BRCA2 Hereditary Breast and Ovarian Cancer. Gene Reviews online. 2013.  3. Frank YC, Karolina S, Tyler G, Wong S. Breast cancer risk among male BRCA1 and BRCA2 mutation carriers. J Natl Cancer Inst. 2007;99:1811-4.  4. Russel BERGMAN, Mamadou I, Shawn J, Rema E, Falguni QUINN, Bernice F. Risk of breast cancer in male BRCA2 carriers. J Med Farzana. 2010;47:710-1.  5. Bart NELSON, Jadyn J, Kate J, Buddy LA, Ruth ERICKSON, Gary C. Lifetime cancer risks in individuals with germline PTEN mutations. Clin Cancer Res. 2012;18:400-7.  6. Jo-Ann ALVARES.  Cowden Syndrome: A Critical Review of the Clinical Literature. J Farzana . 2009:18:13-27.  7. National Comprehensive Cancer Network. Clinical practice guidelines in oncology, colorectal cancer screening. Available online (registration required). 2015.  8. Elza DAWN et al. Breast-Cancer Risk in Families with Mutations in PALB2. NEJM. 2014; 371(6):497-506.                Follow-ups after your visit        Your next 10 appointments already scheduled     Oct 05, 2017  8:30 PM CDT   PSG Split with BED 1 SH SLEEP   [facility])    [address] [address] [phone]              Who to contact     If you have questions or need follow up information about today's clinic visit or your schedule please contact CANCER RISK MANAGEMENT PROGRAM directly at [phone].  Normal or non-critical lab and imaging results will be communicated to you by [employer], letter or phone within 4 business days after the clinic has received the results. If you do not hear from us within 7 days, please contact the clinic through [employer] or phone. If you have a critical or abnormal lab result, we will notify you by phone as soon as possible.  Submit refill requests through [facility] or call your pharmacy and they will forward the refill request to us. Please allow 3 business days for your refill to be completed.          Additional Information About Your Visit        [employer] Information     [facility] gives you secure access to your electronic health record. If you see a primary care provider, you can also send messages to your care team and make appointments. If you have questions, please call your primary care clinic.  If you do not have a primary care provider, please call [phone] and they will assist you.        Care EveryWhere ID     This is your Care EveryWhere ID. This could be used by other organizations to access your [facility]  records  [id number]        Your Vitals Were     Last Period                   06/09/2015            Blood Pressure from Last 3 Encounters:   08/04/17 109/72   06/30/17 104/66   04/28/17 110/70    Weight from Last 3 Encounters:   08/04/17 98.4 kg (217 lb)   06/30/17 103.4 kg (228 lb)   04/28/17 103.4 kg (228 lb)              We Performed the Following     [facility], [facility]: Laboratory Miscellaneous Order        Primary Care Provider Office Phone # Fax #    Karen Weiler, [phone] [address] [facility] [address]        Equal Access to Services     [facility] : Hadii nidia Young, waaxda luwilliamadaha, qaybta kaalmada ademadelynda, pascual jovel. So [facility] [phone].    ATENCIÓN: Si habla español, tiene a suresh disposición servicios gratuitos de asistencia lingüística. Llame al [phone].    We comply with applicable federal civil rights laws and [place] laws. We do not discriminate on the basis of race, color, national origin, age, disability sex, sexual orientation or gender identity.            Thank you!     Thank you for choosing CANCER RISK MANAGEMENT PROGRAM  for your care. Our goal is always to provide you with excellent care. Hearing back from our patients is one way we can continue to improve our services. Please take a few minutes to complete the written survey that you may receive in the mail after your visit with us. Thank you!             Your Updated Medication List - Protect others around you: Learn how to safely use, store and throw away your medicines at www.disposemymeds.org.          This list is accurate as of: 9/14/17 12:15 PM.  Always use your most recent med list.                   Brand Name Dispense Instructions for use Diagnosis    aspirin 81 MG tablet     30 tablet    Take by mouth daily        Multi-vitamin Tabs tablet      Take 1 tablet by mouth daily        omeprazole 20 MG CR capsule    priLOSEC    90 capsule    TAKE  1 CAPSULE BY MOUTH DAILY, 30 TO 60 MINUTES BEFORE A MEAL.    Hyperlipidemia LDL goal <130       simvastatin 40 MG tablet    ZOCOR    90 tablet    Take 1 tablet (40 mg) by mouth At Bedtime    Hyperlipidemia LDL goal <130       traZODone 50 MG tablet    DESYREL    30 tablet    Take 1 tablet (50 mg) by mouth nightly as needed for sleep    Insomnia, unspecified type       * zolpidem 5 MG tablet    AMBIEN    30 tablet    Take 1 tablet (5 mg) by mouth nightly as needed for sleep    Other insomnia       * zolpidem 5 MG tablet    AMBIEN    1 tablet    Take tablet by mouth 15 minutes prior to sleep, for Sleep Study    Snoring, Psychophysiological insomnia, Non morbid obesity due to excess calories, Other fatigue, Excessive daytime sleepiness       * Notice:  This list has 2 medication(s) that are the same as other medications prescribed for you. Read the directions carefully, and ask your doctor or other care provider to review them with you.

## 2017-09-14 NOTE — LETTER
Cancer Risk Management  Program Locations    [facility]  Mailing Address  Cancer Risk Management Program  [facility]  [address]  [place] [address]    New patient appointments  [phone]  September 15, 2017    Mallory Sargent  [address] [address]      Dear Mallory,    It was a pleasure meeting with you at the [facility] on 9/14/2017.  Here is a copy of the progress note from your recent genetic counseling visit to the Cancer Risk Management Program.  If you have any additional questions, please feel free to call.    Referring Provider: Kylie Aleman PA-C    Presenting Information:   I met with Mallory Sargent today for genetic counseling at the Cancer Risk Management Program at the [facility] to discuss her family history of ovarian cancer.  She is here today to review this history, cancer screening recommendations, and available genetic testing options.    Personal History:  Mallory is a 51 year old female.  She does not have any personal history of cancer.      She had her first menstrual period at age 14, her first child at age 32, and went through menopause at age 47.  Mallory has her ovaries, fallopian tubes and uterus in place, and she has had annual CA-125 screening and previous transvaginal ultrasounds for ovarian cancer screening.  She reports a history of low dose birth control for two years.      Her most recent OB-GYN exam and Pap smear proximately two years ago were normal.  She has annual clinical breast exams and mammograms, and her most recent mammogram in June of 2017 was normal.  Her most recent colonoscopy in February of 2016 identified one 5mm sessile serrated adenoma of the ascending colon and one 8mm sessile serrated adenoma of the  transverse colon; follow up was recommended in 5 years.  She plans to follow up with a dermatologist annually, and does not have a history of skin biopsies.  She does not regularly do any other cancer screening at this time.  Mallory reported a brief past history of tobacco use, and reported no concerns about alcohol use or environmental exposures.    Family History: (Please see scanned pedigree for detailed family history information)    Mallory's mother was diagnosed with ovarian cancer at age 57 and passed away at 62.  She did not complete genetic testing,    One maternal great aunt was diagnosed with ovarian cancer in her 70's and has passed away (her maternal grandmother's sister).      Mallory's maternal aunts underwent prophylactic bilateral salpingo-oophorectomy in their 50's due to their family history of ovarian cancer.      Mallory's father has a history of several colon polyps and has colonoscopy screening every 5 years.  He is 75, and does not have a history of cancer.     Mallory's paternal family history is not significant for any reported cancer.     Her maternal ethnicity is [place]. Her paternal ethnicity is [place] Island[place].  There is no known Ashkenazi Mosque ancestry on either side of her family.     Discussion:    Mallory's family history of ovarian cancer is suggestive of a possible cancer susceptibility gene in the family. Given her mother's diagnosis of ovarian cancer, we discussed Burleson syndrome and Hereditary Breast and Ovarian Cancer syndrome as the two most likely hereditary explanations.    We reviewed the features of sporadic, familial, and hereditary cancers. Based on her family history, Mallory meets current National Comprehensive Cancer Network (NCCN) criteria for genetic testing of BRCA1 and BRCA2.    We discussed the natural history and genetics of Hereditary Breast and Ovarian cancer syndrome due to BRCA1 and BRCA2 gene mutations. We also discussed the  natural history and genetics of Burleson syndrome due to mismatch repair gene mutations (MLH1, MSH2, MSH6, PMS2, EPCAM). We discussed that there are additional genes that could cause increased risk of ovarian cancer.  As many of these genes present with overlapping features in a family, it would be reasonable for Mallory to consider panel genetic testing to analyze multiple genes at once.     A detailed handout regarding hereditary gynecologic cancer risk genes and the information we discussed was provided to Mallory at the end of our appointment today and can be found in the after visit summary.  Topics included: inheritance pattern, cancer risks, cancer screening recommendations, and also risks, benefits and limitations of testing.    We reviewed genetic testing options for hereditary gynecologic cancers: actionable high/moderate risk panel testing (GYNplus, 13 genes) and expanded high and moderate risk panel testing (OvaNext, 25 genes).  Mallory expressed an interest in learning as much information as possible from the testing. She opted for the OvaNext panel.  Genetic testing is available for 25 genes associated with hereditary gynecologic cancers: OvaNext (MIYA, BARD1, BRCA1, BRCA2, BRIP1, CDH1, CHEK2, DICER1, EPCAM, MLH1, MRE11A, MSH2, MSH6, MUTYH, NBN, NF1, PALB2, PMS2, PTEN, RAD50, RAD51C, RAD51D, SMARCA4, STK11, and TP53).  We discussed that some of the genes in the OvaNext panel are associated with specific hereditary cancer syndromes and have published management guidelines: Hereditary Breast and Ovarian Cancer syndrome (BRCA1, BRCA2), Burleson syndrome (MLH1, MSH2, MSH6, PMS2, EPCAM), Hereditary Diffuse Gastric Cancer (CDH1), Cowden syndrome (PTEN), Li Fraumeni syndrome (TP53), Peutz-Jeghers syndrome (STK11), MUTYH Associated Polyposis syndrome (MUTYH), and Neurofibromatosis type 1 (NF1).    Risk-reducing salpingo-oophorectomy can be considered in women with mutations in BRIP1, RAD51C, or RAD51D.  Breast  cancer risk management guidelines are available for MIYA, CHEK2, PALB2, NF1, and NBN.  The remaining genes (BARD1, DICER1, MRE11A, RAD50, and SMARCA4) are associated with increased cancer risk and may allow us to make medical recommendations when mutations are identified.    Consent was obtained and genetic testing for OvaNext was sent to [facility]. Turn around time: approximately 4-5 weeks.    Mallory was provided with an Ovanext handout, which lists the included genes and associated cancer risks, from [facility].    Medical Management: The information from genetic testing may determine additional cancer screening recommendations (i.e. mammogram and breast MRI, more frequent colonoscopies, annual dermatologic exams, etc.) as well as options for risk reducing surgeries (i.e. bilateral mastectomy, surgery to remove the ovaries and/or uterus, etc.) for Mallory.  These recommendations will be discussed in detail once genetic testing is completed.     Plan:  1) Today Mallory elected to proceed with the OvaNext panel offered through [facility].  2) This information should be available in 4-5 weeks.  3) Mallory will return to clinic to discuss the results    Taryn Echeverria MS, [facility]  Certified Genetic Counselor  [phone]

## 2017-09-14 NOTE — PROGRESS NOTES
9/14/2017    Referring Provider: Kylie Aleman PA-C    Presenting Information:   I met with Mallory Sargent today for genetic counseling at the Cancer Risk Management Program at the [facility] to discuss her family history of ovarian cancer.  She is here today to review this history, cancer screening recommendations, and available genetic testing options.    Personal History:  Mallory is a 51 year old female.  She does not have any personal history of cancer.      She had her first menstrual period at age 14, her first child at age 32, and went through menopause at age 47.  Mallory has her ovaries, fallopian tubes and uterus in place, and she has had annual CA-125 screening and previous transvaginal ultrasounds for ovarian cancer screening.  She reports a history of low dose birth control for two years.      Her most recent OB-GYN exam and Pap smear proximately two years ago were normal.  She has annual clinical breast exams and mammograms, and her most recent mammogram in June of 2017 was normal.  Her most recent colonoscopy in February of 2016 identified one 5mm sessile serrated adenoma of the ascending colon and one 8mm sessile serrated adenoma of the transverse colon; follow up was recommended in 5 years.  She plans to follow up with a dermatologist annually, and does not have a history of skin biopsies.  She does not regularly do any other cancer screening at this time.  Mallory reported a brief past history of tobacco use, and reported no concerns about alcohol use or environmental exposures.    Family History: (Please see scanned pedigree for detailed family history information)    Mallory's mother was diagnosed with ovarian cancer at age 57 and passed away at 62.  She did not complete genetic testing,    One maternal great aunt was diagnosed with ovarian cancer in her 70's and has passed away (her maternal grandmother's sister).      Mallory's maternal aunts underwent bilateral  salpingo-oophorectomy in their 50's due to their family history of ovarian cancer.      Mallroy's father has a history of several colon polyps and has colonoscopy screening every 5 years.  He is 75, and does not have a history of cancer.     Mallory's paternal family history is not significant for any reported cancer.     Her maternal ethnicity is [place]. Her paternal ethnicity is [place].  There is no known Ashkenazi Pentecostal ancestry on either side of her family.     Discussion:    Mallory's family history of ovarian cancer is suggestive of a possible cancer susceptibility gene in the family. Given her mother's diagnosis of ovarian cancer, we discussed Burleson syndrome and Hereditary Breast and Ovarian Cancer syndrome as the two most likely hereditary explanations.    We reviewed the features of sporadic, familial, and hereditary cancers. Based on her family history, Mallory meets current National Comprehensive Cancer Network (NCCN) criteria for genetic testing of BRCA1 and BRCA2.    We discussed the natural history and genetics of Hereditary Breast and Ovarian cancer syndrome due to BRCA1 and BRCA2 gene mutations. We also discussed the natural history and genetics of Burleson syndrome due to mismatch repair gene mutations (MLH1, MSH2, MSH6, PMS2, EPCAM). We discussed that there are additional genes that could cause increased risk of ovarian cancer.  As many of these genes present with overlapping features in a family, it would be reasonable for Mallory to consider panel genetic testing to analyze multiple genes at once.     A detailed handout regarding hereditary gynecologic cancer risk genes and the information we discussed was provided to Mallory at the end of our appointment today and can be found in the after visit summary.  Topics included: inheritance pattern, cancer risks, cancer screening recommendations, and also risks, benefits and limitations of testing.    We reviewed genetic  testing options for hereditary gynecologic cancers: actionable high/moderate risk panel testing (GYNplus, 13 genes) and expanded high and moderate risk panel testing (OvaNext, 25 genes).  Mallory expressed an interest in learning as much information as possible from the testing. She opted for the OvaNext panel.  Genetic testing is available for 25 genes associated with hereditary gynecologic cancers: OvaNext (MIYA, BARD1, BRCA1, BRCA2, BRIP1, CDH1, CHEK2, DICER1, EPCAM, MLH1, MRE11A, MSH2, MSH6, MUTYH, NBN, NF1, PALB2, PMS2, PTEN, RAD50, RAD51C, RAD51D, SMARCA4, STK11, and TP53).  We discussed that some of the genes in the OvaNext panel are associated with specific hereditary cancer syndromes and have published management guidelines: Hereditary Breast and Ovarian Cancer syndrome (BRCA1, BRCA2), Burleson syndrome (MLH1, MSH2, MSH6, PMS2, EPCAM), Hereditary Diffuse Gastric Cancer (CDH1), Cowden syndrome (PTEN), Li Fraumeni syndrome (TP53), Peutz-Jeghers syndrome (STK11), MUTYH Associated Polyposis syndrome (MUTYH), and Neurofibromatosis type 1 (NF1).    Risk-reducing salpingo-oophorectomy can be considered in women with mutations in BRIP1, RAD51C, or RAD51D.  Breast cancer risk management guidelines are available for MIYA, CHEK2, PALB2, NF1, and NBN.  The remaining genes (BARD1, DICER1, MRE11A, RAD50, and SMARCA4) are associated with increased cancer risk and may allow us to make medical recommendations when mutations are identified.    Consent was obtained and genetic testing for OvaNext was sent to [facility]. Turn around time: approximately 4-5 weeks.    Mallory was provided with an Ovanext handout, which lists the included genes and associated cancer risks, from [facility].    Medical Management: The information from genetic testing may determine additional cancer screening recommendations (i.e. mammogram and breast MRI, more frequent colonoscopies, annual dermatologic exams, etc.) as well as  options for risk reducing surgeries (i.e. bilateral mastectomy, surgery to remove the ovaries and/or uterus, etc.) for Mallory.  These recommendations will be discussed in detail once genetic testing is completed.     Plan:  1) Today Mallory elected to proceed with the OvaNext panel offered through [facility].  2) This information should be available in 4-5 weeks.  3) Mallory will return to clinic to discuss the results    Face to face time: 45 minutes    Taryn Echeverria MS, [facility]  Certified Genetic Counselor  [phone]

## 2017-09-28 LAB — LAB SCANNED RESULT: NORMAL

## 2017-10-05 ENCOUNTER — THERAPY VISIT (OUTPATIENT)
Dept: SLEEP MEDICINE | Facility: CLINIC | Age: 52
End: 2017-10-05
Payer: COMMERCIAL

## 2017-10-05 DIAGNOSIS — R06.83 SNORING: ICD-10-CM

## 2017-10-05 DIAGNOSIS — E66.09 NON MORBID OBESITY DUE TO EXCESS CALORIES: ICD-10-CM

## 2017-10-05 DIAGNOSIS — G47.19 EXCESSIVE DAYTIME SLEEPINESS: ICD-10-CM

## 2017-10-05 DIAGNOSIS — F51.04 PSYCHOPHYSIOLOGICAL INSOMNIA: ICD-10-CM

## 2017-10-05 DIAGNOSIS — R53.83 OTHER FATIGUE: ICD-10-CM

## 2017-10-05 PROCEDURE — 95810 POLYSOM 6/> YRS 4/> PARAM: CPT | Performed by: FAMILY MEDICINE

## 2017-10-05 NOTE — MR AVS SNAPSHOT
After Visit Summary   10/5/2017    Mallory Sargent    MRN: [id number]           Patient Information     Date Of Birth          1965        Visit Information        Provider Department      10/5/2017 8:30 PM BED 1 SH [facility]        Today's Diagnoses     Snoring        Psychophysiological insomnia        Non morbid obesity due to excess calories        Other fatigue        Excessive daytime sleepiness           Follow-ups after your visit        Your next 10 appointments already scheduled     Oct 26, 2017 10:15 AM CDT   Return Visit with Taryn Echeverria GC   Cancer Risk Management Program ([facility])    [facility]  [address]  [facility] [address] [phone]              Who to contact     If you have questions or need follow up information about today's clinic visit or your schedule please contact [facility] directly at [phone].  Normal or non-critical lab and imaging results will be communicated to you by TrelliSofthart, letter or phone within 4 business days after the clinic has received the results. If you do not hear from us within 7 days, please contact the clinic through TrelliSofthart or phone. If you have a critical or abnormal lab result, we will notify you by phone as soon as possible.  Submit refill requests through [employer] or call your pharmacy and they will forward the refill request to us. Please allow 3 business days for your refill to be completed.          Additional Information About Your Visit        MyChart Information     [employer] gives you secure access to your electronic health record. If you see a primary care provider, you can also send messages to your care team and make appointments. If you have questions, please call your primary care clinic.  If you do not have a primary care provider, please call [phone] and they will assist you.        Care EveryWhere ID     This is your Care  EveryWhere ID. This could be used by other organizations to access your [facility] medical records  [id number]        Your Vitals Were     Last Period                   06/09/2015            Blood Pressure from Last 3 Encounters:   08/04/17 109/72   06/30/17 104/66   04/28/17 110/70    Weight from Last 3 Encounters:   08/04/17 98.4 kg (217 lb)   06/30/17 103.4 kg (228 lb)   04/28/17 103.4 kg (228 lb)              We Performed the Following     Comprehensive Sleep Study        Primary Care Provider Office Phone # Fax #    Karen Weiler, [phone] [address]  [facility] [address]        Equal Access to Services     BRIANNE SALAS : Hadgarcia Young, warussda aldo, qaybta kaalmada robiyalino, pascual garcia . So [facility] [phone].    ATENCIÓN: Si habla español, tiene a suresh disposición servicios gratuitos de asistencia lingüística. Llame al [phone].    We comply with applicable federal civil rights laws and [place] laws. We do not discriminate on the basis of race, color, national origin, age, disability, sex, sexual orientation, or gender identity.            Thank you!     Thank you for choosing [facility]  for your care. Our goal is always to provide you with excellent care. Hearing back from our patients is one way we can continue to improve our services. Please take a few minutes to complete the written survey that you may receive in the mail after your visit with us. Thank you!             Your Updated Medication List - Protect others around you: Learn how to safely use, store and throw away your medicines at www.disposemymeds.org.          This list is accurate as of: 10/5/17 11:59 PM.  Always use your most recent med list.                   Brand Name Dispense Instructions for use Diagnosis    aspirin 81 MG tablet     30 tablet    Take by mouth daily        Multi-vitamin Tabs tablet      Take 1 tablet by mouth daily         omeprazole 20 MG CR capsule    priLOSEC    90 capsule    TAKE 1 CAPSULE BY MOUTH DAILY, 30 TO 60 MINUTES BEFORE A MEAL.    Hyperlipidemia LDL goal <130       simvastatin 40 MG tablet    ZOCOR    90 tablet    Take 1 tablet (40 mg) by mouth At Bedtime    Hyperlipidemia LDL goal <130       traZODone 50 MG tablet    DESYREL    30 tablet    Take 1 tablet (50 mg) by mouth nightly as needed for sleep    Insomnia, unspecified type       * zolpidem 5 MG tablet    AMBIEN    30 tablet    Take 1 tablet (5 mg) by mouth nightly as needed for sleep    Other insomnia       * zolpidem 5 MG tablet    AMBIEN    1 tablet    Take tablet by mouth 15 minutes prior to sleep, for Sleep Study    Snoring, Psychophysiological insomnia, Non morbid obesity due to excess calories, Other fatigue, Excessive daytime sleepiness       * Notice:  This list has 2 medication(s) that are the same as other medications prescribed for you. Read the directions carefully, and ask your doctor or other care provider to review them with you.

## 2017-10-08 NOTE — PROCEDURES
" SLEEP STUDY INTERPRETATION  POLYSOMNOGRAPHY REPORT      Patient: Mallory Sargent  YOB: 1965  Study Date: 10/5/2017  MRN: [id number]  Referring Provider: TOMA Aleman Kate  Ordering Provider: Jorge Amaro MD, MPH    Indications for Polysomnography: The patient is a 51 year old female who is 5' 10\" and weighs 217.0 lbs. Her BMI is 30.7, [place] sleepiness scale 7.0 and neck size is 37.0. Relevant medical history includes depressive disorder, dyslipidemia, gastroesophageal reflux disease, and obesity. A diagnostic polysomnogram (PSG) was performed to evaluate for obstructive sleep apnea and/or sleep associated hypoventilation/hypoxemia.    Polysomnogram Data: A full night polysomnogram (PSG) recorded the standard physiologic parameters including EEG, EOG, EMG, ECG, nasal and oral airflow. Respiratory parameters of chest and abdominal movements were recorded with respiratory inductance plethysmography. Oxygen saturation was recorded by pulse oximetry.      Sleep Architecture: Somewhat preserved sleep architecture with some sleep fragmentation and mildly decreased sleep efficiency. REM sleep, including REM supine, captured.  The total recording time of the polysomnogram was 418.3 minutes. The total sleep time was 355.0 minutes. Sleep latency was normal at 7.2 minutes with the use of a sleep aid (zolpidem 10 mg). REM latency was 94.0 minutes.  Arousal index was increased at 31.1 arousals per hour. Sleep efficiency was mildly decreased at 84.9%. Wake after sleep onset was 53.0 minutes. The patient spent 2.7% of total sleep time in Stage N1, 80.7% in Stage N2, 1.5% in Stages N3, and 15.1% in REM. Time in REM supine was 17.0 minutes.    Respiration: Mild EJSSIE without sleep associated hypoxemia / hypoventilation. Mild to moderate, intermittent snoring noted.    Events - The polysomnogram revealed a presence of 10 obstructive, 0 central, and 0 mixed apneas resulting in an apnea index of 1.7 events per " hour. There were 54 hypopneas resulting in a hypopnea index of 9.1 events per hour. The combined apnea/hypopnea index was 10.8 events per hour. The REM AHI was 15.7 events per hour. The supine AHI was 23.1 events per hour. The RERA index was 4.1 events per hour. The RDI was 14.9 events per hour.    Snoring - was reported as intermittent and mild to moderate.    Respiratory rate and pattern - was notable for normal respiratory rate and pattern.    Sustained Sleep Associated Hypoventilation - Transcutaneous carbon dioxide monitoring was not used, however significant hypoventilation was not suggested by oximetry.    Sleep Associated Hypoxemia - (Greater than 5 minutes O2 sat below 89%) was not present. Baseline oxygen saturation was 94.2%. Lowest oxygen saturation was 87.6%.  Time spent less than or equal to 88% was 0.1 minutes. Time spent less than or equal to 89% was 1.0 minutes.    Movement Activity: No abnormal periodic limb movements, REM EMG activity, nocturnal behaviors, or bruxism noted.    Periodic Limb Activity - There were 4 PLMs during the entire study. The PLM index was 0.7 movements per hour. The PLM Arousal Index was 0.5 per hour.    REM EMG Activity - Excessive transient / sustained muscle activity was not present.    Nocturnal Behavior - Abnormal sleep related behaviors were not noted during / arising out of NREM / REM sleep.    Bruxism - None apparent.    Cardiac Summary: Normal sinus rhythm with some PVCs.  The average pulse rate was 59.0 bpm.  The minimum pulse rate was 43.9 bpm while the maximum pulse rate was 95.1 bpm. The rhythm is normal sinus. Arrhythmias were not noted. Some premature wide QRS complex beats suggestive of premature ventricular contractions (PVCs) were noted.    Assessment:     The PSG sleep study demonstrated mild JESSIE without sleep associated hypoxemia / hypoventilation noted. Mild to moderate, intermittent snoring was present throughout the sleep study.    The sleep study  showed somewhat preserved sleep architecture with some sleep fragmentation and mildly decreased sleep efficiency. REM sleep, including REM supine, was captured during the study.    There were no abnormal periodic limb movements, REM EMG activity, nocturnal behaviors, or bruxism noted during the PSG.    Lastly, normal sinus rhythm with some PVCs was observed throughout the sleep study.    Recommendations:    Based on the presence of mild obstructive sleep apnea and excessive daytime sleepiness, treatment could be empirically initiated with an auto-titrating PAP therapy with a range of 5 - 15 cmH2O. Recommend clinical follow up with sleep management team.    Patient may be a candidate for dental appliance through referral to Sleep Dentistry for the treatment of mild obstructive sleep apnea and/or socially disruptive snoring.    If devices are not acceptable or effective, patient may benefit from evaluation of possible surgical options.  If she is interested, would recommend referral to specialized ENT-Sleep provider.    Advise regarding the risks of drowsy driving.    Suggest optimizing sleep schedule and avoiding sleep deprivation.    Weight management (if BMI > 30).    Cardiac Comments: Normal Sinus Rhythm  Diagnostic Codes:     Obstructive Sleep Apnea G47.33     Diagnostic Study  Sleep Study 10/5/2017 - (217.0 lbs) AHI 10.8, RDI 14.9, Supine AHI 23.1, REM AHI 15.7, Low O2 87.6%, Time Spent ?88% 0.1 minutes / Time Spent ?89% 1.0 minutes.        _____________________________________   Jorge Amaro MD, MPH 10/08/2017             Range(%) Time in range (min) Time in range (%) Time in or below range (min) Time in or below range (%)   0.0 - 89.0 1.0 0.2% 1.0 0.2%   0.0 - 88.0 0.1 0.0% 0.1 0.0%      10.8 23.1 15.7

## 2017-10-26 ENCOUNTER — ONCOLOGY VISIT (OUTPATIENT)
Dept: ONCOLOGY | Facility: CLINIC | Age: 52
End: 2017-10-26
Attending: PHYSICIAN ASSISTANT
Payer: COMMERCIAL

## 2017-10-26 DIAGNOSIS — Z80.41 FAMILY HISTORY OF MALIGNANT NEOPLASM OF OVARY: Primary | ICD-10-CM

## 2017-10-26 PROCEDURE — 40000072 ZZH STATISTIC GENETIC COUNSELING, < 16 MIN: Performed by: GENETIC COUNSELOR, MS

## 2017-10-26 NOTE — LETTER
"    10/26/2017         RE: Mallory Sargent  [address] [facility] [address]        Dear Colleague,    Thank you for referring your patient, Mallory Sargent, to the CANCER RISK MANAGEMENT PROGRAM. Please see a copy of my visit note below.    10/26/2017    Referring Provider: Kylie Aleman PA-C     Presenting Information:  Mallory Sargent returned to the Cancer Risk Management Program at the [facility] to discuss her genetic testing results. Her blood was drawn on 9/14/2017.  OvaNext testing was ordered from [facility]. This testing was done because of her family history of ovarian cancer.    Genetic Testing Result: NEGATIVE  Mallory is negative for mutations in the  MIYA, BARD1, BRCA1, BRCA2, BRIP1, CDH1, CHEK2, DICER1, EPCAM, MLH1, MRE11A, MSH2, MSH6, MUTYH, NBN, NF1, PALB2, PMS2, PTEN, RAD50, RAD51C, RAD51D, SMARCA4, STK11, and TP53  genes.    No mutations were found in any of the 25 genes analyzed.  This test involved sequencing and deletion/duplication analysis of all genes with the exception of EPCAM (deletions/duplications only).    Testing did not detect an identifiable mutation associated with  Hereditary Breast and Ovarian Cancer syndrome (BRCA1, BRCA2), Burleson syndrome (MLH1, MSH2, MSH6, PMS2, EPCAM), Hereditary Diffuse Gastric Cancer (CDH1), Cowden syndrome (PTEN), Li Fraumeni syndrome (TP53), Peutz-Jeghers syndrome (STK11), MUTYH Associated Polyposis (MAP), or Neurofibromatosis type 1 (NF1).      A copy of the test report can be found in the Laboratory tab, dated 9/14/2017, and named \"SEND OUTS MISC TEST\". The report is scanned in as a linked document.    Interpretation:  We discussed several different interpretations of this negative test result.    1. One explanation may be that there is a different gene or combination of genes and environment that are associated with the family history of ovarian cancer.    2. It is possible that her mother did have a mutation in one of these " genes and Mallory did not inherit it.  3. There is also a small possibility that there is a mutation in one of these genes, and we could not find it with our current testing methods.       Screening:  Based on this negative test result, it is important for Mallory and her relatives to refer back to the family history for appropriate cancer screening.      Mallory and other close female relatives remain at slightly increased risk for ovarian cancer due to her mother's history.  We discussed available ovarian cancer screening (pelvic exams, CA-125 blood tests, and transvaginal ultrasounds) as well as the significant limitations of this screening.  As such, this screening is not typically recommended.  That being said, women in her mother's family should discuss this screening and the signs and symptoms of ovarian cancer with their primary OB/GYN provider, as they may have individualized recommendations.    Other population cancer screening, such as recommendations by the American Cancer Society, are appropriate for Mallory and her family at this time.  These screening recommendations may change if there are changes to Mallory's personal and/or family history.  Final screening recommendations should be made by each individual's managing physician.      Inheritance:  We reviewed autosomal dominant inheritance and the fact that mutations in these genes do not skip generations. We discussed that Mallory did not pass on an identifiable mutation in these genes to her children based on this test result.       Summary:  We do not have an explanation for Mallory's family history of ovarian cancer.  Because of that, it is important that she continue with cancer screening based on her personal and family history as discussed above.    Genetic testing is rapidly advancing, and new cancer susceptibility genes will most likely be identified in the future.  Therefore, I encouraged Mallory to contact me annually or if  there are changes in her personal or family history.  This may change how we assess her cancer risk, screening, and the testing we would offer.    Plan:  1.  I provided Mallory with a copy of her test results today, and a handout summarizing negative genetic test results (see after visit summary).  2. She plans to follow-up with her care provider.  3. She should contact me annually, or sooner if her family history changes.    If Mallory has any further questions, I encouraged her to contact me at [phone].    Time spent face to face: 15 minutes.    Taryn Echeverria MS, [facility]  Certified Genetic Counselor  [phone]                Again, thank you for allowing me to participate in the care of your patient.        Sincerely,        Taryn Echeverria, GC

## 2017-10-26 NOTE — LETTER
Cancer Risk Management  Program Locations    [facility] Cancer [facility] Cancer [facility] Cancer [facility]  Mailing Address  Cancer Risk Management Program  [facility]  [address]  [place] [address]    New patient appointments  [phone]  October 31, 2017    Mallory Sargent  [address] [address]      Dear Mallory,    It was a pleasure meeting with you again at the [facility].  Here is a copy of the progress note from your recent genetic counseling visit to the Cancer Risk Management Program on 10/26/2017.  If you have any additional questions, please feel free to call.    Referring Provider: Kylie Aleman PA-C     Presenting Information:  Mallory Sargent returned to the Cancer Risk Management Program at the [facility] to discuss her genetic testing results. Her blood was drawn on 9/14/2017.  OvaNext testing was ordered from [facility]. This testing was done because of her family history of ovarian cancer.    Genetic Testing Result: NEGATIVE  Mallory is negative for mutations in the  MIYA, BARD1, BRCA1, BRCA2, BRIP1, CDH1, CHEK2, DICER1, EPCAM, MLH1, MRE11A, MSH2, MSH6, MUTYH, NBN, NF1, PALB2, PMS2, PTEN, RAD50, RAD51C, RAD51D, SMARCA4, STK11, and TP53  genes.    No mutations were found in any of the 25 genes analyzed.  This test involved sequencing and deletion/duplication analysis of all genes with the exception of EPCAM (deletions/duplications only).    Testing did not detect an identifiable mutation associated with  Hereditary Breast and Ovarian Cancer syndrome (BRCA1, BRCA2), Burleson syndrome (MLH1, MSH2, MSH6, PMS2, EPCAM), Hereditary Diffuse Gastric Cancer (CDH1), Cowden syndrome (PTEN), Li Fraumeni syndrome (TP53), Peutz-Jeghers syndrome (STK11), MUTYH Associated Polyposis (MAP), or Neurofibromatosis  type 1 (NF1).        Interpretation:  We discussed several different interpretations of this negative test result.    1. One explanation may be that there is a different gene or combination of genes and environment that are associated with the family history of ovarian cancer.    2. It is possible that her mother did have a mutation in one of these genes and Mallory did not inherit it.  3. There is also a small possibility that there is a mutation in one of these genes, and we could not find it with our current testing methods.       Screening:  Based on this negative test result, it is important for Mallory and her relatives to refer back to the family history for appropriate cancer screening.      Mallory and other close female relatives remain at slightly increased risk for ovarian cancer due to her mother's history.  We discussed available ovarian cancer screening (pelvic exams, CA-125 blood tests, and transvaginal ultrasounds) as well as the significant limitations of this screening.  As such, this screening is not typically recommended.  That being said, women in her mother's family should discuss this screening and the signs and symptoms of ovarian cancer with their primary OB/GYN provider, as they may have individualized recommendations.    Other population cancer screening, such as recommendations by the American Cancer Society, are appropriate for Mallory and her family at this time.  These screening recommendations may change if there are changes to Mallory's personal and/or family history.  Final screening recommendations should be made by each individual's managing physician.      Inheritance:  We reviewed autosomal dominant inheritance and the fact that mutations in these genes do not skip generations. We discussed that Mallory did not pass on an identifiable mutation in these genes to her children based on this test result.       Summary:  We do not have an explanation for Mallory's  family history of ovarian cancer.  Because of that, it is important that she continue with cancer screening based on her personal and family history as discussed above.    Genetic testing is rapidly advancing, and new cancer susceptibility genes will most likely be identified in the future.  Therefore, I encouraged Mallory to contact me annually or if there are changes in her personal or family history.  This may change how we assess her cancer risk, screening, and the testing we would offer.    Plan:  1.  I provided Mallory with a copy of her test results today, and a handout summarizing negative genetic test results.  2. She plans to follow-up with her care provider.  3. She should contact me annually, or sooner if her family history changes.    If Mallory has any further questions, I encouraged her to contact me at [phone].    Taryn Echeverria MS, [facility]  Certified Genetic Counselor  [phone]

## 2017-10-26 NOTE — PATIENT INSTRUCTIONS
Negative Genetic Test Result    Genetic Testing  You had a blood test that looked at the genetic information in one or more genes associated with increased cancer risk.  The testing looked for any harmful changes that would stop this particular gene from working like it should. If an individual does not have any harmful changes or variants of unknown significance found from their blood test, their genetic test result is reported as negative.       Results  The genetic test did not identify any pathogenic (harmful) changes in the genes that were tested. There are several possible explanations for a negative test result. Without knowing the gene mutation in your family, the cause of the cancer in you or your relatives is still unknown. Your genetic counselor can help interpret the result for you and your relatives. In this case, there are several reasons that may explain the negative test result:    There may be a gene mutation in the family that you did not inherit.     You may have a gene mutation in a different gene that was not included in the test, or has not yet been discovered.     The cancers in you or your family may be due to a combination of genetic factors and environment (multifactorial/familial).    The cancers in you or your family may be sporadic/random cancers.    There is very small chance that a mutation was not found by current testing methods.  As testing technology evolves over time, it may still be possible to identify a mutation in a gene that was not found on this test.    It is important to note which genes were included in your test. A list of these genes can be found on your test result.    Screening Recommendations  Due to this negative test result, cancer screening recommendations should be based on your personal and family history. This may include increased cancer screening for you and/or your family members. Your genetic counselor and health care provider can help make  appropriate recommendations.      Please call us if you have any questions or concerns.     Cancer Risk Management Program  [phone] ([phone])  ? Gina Akhtar, MS, [facility]  [phone]  ? Candice Young, MS, [facility]  [phone]  ? Marlin Barrett, MS, [facility]  [phone]  ? Taryn Echeverria, MS, [facility]  [phone]  ? Conner Rodriguez, MS, [facility]  [phone]

## 2017-10-26 NOTE — PROGRESS NOTES
"10/26/2017    Referring Provider: Kylie Aleman PA-C     Presenting Information:  Mallory Sargent returned to the Cancer Risk Management Program at the [facility] to discuss her genetic testing results. Her blood was drawn on 9/14/2017.  OvaNext testing was ordered from [facility]. This testing was done because of her family history of ovarian cancer.    Genetic Testing Result: NEGATIVE  Mallory is negative for mutations in the  MIYA, BARD1, BRCA1, BRCA2, BRIP1, CDH1, CHEK2, DICER1, EPCAM, MLH1, MRE11A, MSH2, MSH6, MUTYH, NBN, NF1, PALB2, PMS2, PTEN, RAD50, RAD51C, RAD51D, SMARCA4, STK11, and TP53  genes.    No mutations were found in any of the 25 genes analyzed.  This test involved sequencing and deletion/duplication analysis of all genes with the exception of EPCAM (deletions/duplications only).    Testing did not detect an identifiable mutation associated with  Hereditary Breast and Ovarian Cancer syndrome (BRCA1, BRCA2), Burleson syndrome (MLH1, MSH2, MSH6, PMS2, EPCAM), Hereditary Diffuse Gastric Cancer (CDH1), Cowden syndrome (PTEN), Li Fraumeni syndrome (TP53), Peutz-Jeghers syndrome (STK11), MUTYH Associated Polyposis (MAP), or Neurofibromatosis type 1 (NF1).      A copy of the test report can be found in the Laboratory tab, dated 9/14/2017, and named \"[facility]\". The report is scanned in as a linked document.    Interpretation:  We discussed several different interpretations of this negative test result.    1. One explanation may be that there is a different gene or combination of genes and environment that are associated with the family history of ovarian cancer.    2. It is possible that her mother did have a mutation in one of these genes and Mallory did not inherit it.  3. There is also a small possibility that there is a mutation in one of these genes, and we could not find it with our current testing methods.       Screening:  Based on this negative test result, it is important " for Mallory and her relatives to refer back to the family history for appropriate cancer screening.      Mallory and other close female relatives remain at slightly increased risk for ovarian cancer due to her mother's history.  We discussed available ovarian cancer screening (pelvic exams, CA-125 blood tests, and transvaginal ultrasounds) as well as the significant limitations of this screening.  As such, this screening is not typically recommended.  That being said, women in her mother's family should discuss this screening and the signs and symptoms of ovarian cancer with their primary OB/GYN provider, as they may have individualized recommendations.    Other population cancer screening, such as recommendations by the American Cancer Society, are appropriate for Mallory and her family at this time.  These screening recommendations may change if there are changes to Mallory's personal and/or family history.  Final screening recommendations should be made by each individual's managing physician.      Inheritance:  We reviewed autosomal dominant inheritance and the fact that mutations in these genes do not skip generations. We discussed that Mallory did not pass on an identifiable mutation in these genes to her children based on this test result.       Summary:  We do not have an explanation for Mallory's family history of ovarian cancer.  Because of that, it is important that she continue with cancer screening based on her personal and family history as discussed above.    Genetic testing is rapidly advancing, and new cancer susceptibility genes will most likely be identified in the future.  Therefore, I encouraged Mallory to contact me annually or if there are changes in her personal or family history.  This may change how we assess her cancer risk, screening, and the testing we would offer.    Plan:  1.  I provided Mallory with a copy of her test results today, and a handout summarizing negative  genetic test results (see after visit summary).  2. She plans to follow-up with her care provider.  3. She should contact me annually, or sooner if her family history changes.    If Mallory has any further questions, I encouraged her to contact me at [phone].    Time spent face to face: 15 minutes.    Taryn Echeverria MS, [facility]  Certified Genetic Counselor  [phone]

## 2017-10-31 ENCOUNTER — OFFICE VISIT (OUTPATIENT)
Dept: SLEEP MEDICINE | Facility: CLINIC | Age: 52
End: 2017-10-31
Payer: COMMERCIAL

## 2017-10-31 VITALS
RESPIRATION RATE: 12 BRPM | SYSTOLIC BLOOD PRESSURE: 102 MMHG | HEART RATE: 68 BPM | OXYGEN SATURATION: 97 % | HEIGHT: 71 IN | BODY MASS INDEX: 30.38 KG/M2 | DIASTOLIC BLOOD PRESSURE: 79 MMHG | WEIGHT: 217 LBS

## 2017-10-31 DIAGNOSIS — E66.09 CLASS 1 OBESITY DUE TO EXCESS CALORIES WITHOUT SERIOUS COMORBIDITY WITH BODY MASS INDEX (BMI) OF 30.0 TO 30.9 IN ADULT: ICD-10-CM

## 2017-10-31 DIAGNOSIS — R53.83 OTHER FATIGUE: ICD-10-CM

## 2017-10-31 DIAGNOSIS — G47.33 OSA (OBSTRUCTIVE SLEEP APNEA): Primary | ICD-10-CM

## 2017-10-31 DIAGNOSIS — G47.19 EXCESSIVE DAYTIME SLEEPINESS: ICD-10-CM

## 2017-10-31 DIAGNOSIS — R06.83 SNORING: ICD-10-CM

## 2017-10-31 DIAGNOSIS — F51.04 PSYCHOPHYSIOLOGICAL INSOMNIA: ICD-10-CM

## 2017-10-31 DIAGNOSIS — E66.811 CLASS 1 OBESITY DUE TO EXCESS CALORIES WITHOUT SERIOUS COMORBIDITY WITH BODY MASS INDEX (BMI) OF 30.0 TO 30.9 IN ADULT: ICD-10-CM

## 2017-10-31 PROCEDURE — 99215 OFFICE O/P EST HI 40 MIN: CPT | Performed by: FAMILY MEDICINE

## 2017-10-31 NOTE — MR AVS SNAPSHOT
After Visit Summary   10/31/2017    Mallory Sargent    MRN: [id number]           Patient Information     Date Of Birth          1965        Visit Information        Provider Department      10/31/2017 9:00 AM Jorge Amaro MD [facility]        Today's Diagnoses     JESSIE (obstructive sleep apnea)    -  1    Excessive daytime sleepiness        Snoring        Other fatigue        Psychophysiological insomnia        Class 1 obesity due to excess calories without serious comorbidity with body mass index (BMI) of 30.0 to 30.9 in adult          Care Instructions              Your BMI is Body mass index is 30.7 kg/(m^2).  Weight management is a personal decision.  If you are interested in exploring weight loss strategies, the following discussion covers the approaches that may be successful. Body mass index (BMI) is one way to tell whether you are at a healthy weight, overweight, or obese. It measures your weight in relation to your height.  A BMI of 18.5 to 24.9 is in the healthy range. A person with a BMI of 25 to 29.9 is considered overweight, and someone with a BMI of 30 or greater is considered obese. More than two-thirds of American adults are considered overweight or obese.  Being overweight or obese increases the risk for further weight gain. Excess weight may lead to heart disease and diabetes.  Creating and following plans for healthy eating and physical activity may help you improve your health.  Weight control is part of healthy lifestyle and includes exercise, emotional health, and healthy eating habits. Careful eating habits lifelong are the mainstay of weight control. Though there are significant health benefits from weight loss, long-term weight loss with diet alone may be very difficult to achieve- studies show long-term success with dietary management in less than 10% of people. Attaining a healthy weight may be especially difficult to achieve in those with  severe obesity. In some cases, medications, devices and surgical management might be considered.  What can you do?  If you are overweight or obese and are interested in methods for weight loss, you should discuss this with your provider.     Consider reducing daily calorie intake by 500 calories.     Keep a food journal.     Avoiding skipping meals, consider cutting portions instead.    Diet combined with exercise helps maintain muscle while optimizing fat loss. Strength training is particularly important for building and maintaining muscle mass. Exercise helps reduce stress, increase energy, and improves fitness. Increasing exercise without diet control, however, may not burn enough calories to loose weight.       Start walking three days a week 10-20 minutes at a time    Work towards walking thirty minutes five days a week     Eventually, increase the speed of your walking for 1-2 minutes at time    In addition, we recommend that you review healthy lifestyles and methods for weight loss available through the National Institutes of Health patient information sites:  http://win.niddk.nih.gov/publications/index.htm    And look into health and wellness programs that may be available through your health insurance provider, employer, local community center, or blayne club.    Weight management plan: Patient was referred to their PCP to discuss a diet and exercise plan.           Your blood pressure was checked while you were in clinic today.  Please read the guidelines below about what these numbers mean and what you should do about them.  Your systolic blood pressure is the top number.  This is the pressure when the heart is pumping.  Your diastolic blood pressure is the bottom number.  This is the pressure in between beats.  If your systolic blood pressure is less than 120 and your diastolic blood pressure is less than 80, then your blood pressure is normal. There is nothing more that you need to do about it  If your  "systolic blood pressure is 120-139 or your diastolic blood pressure is 80-89, your blood pressure may be higher than it should be.  You should have your blood pressure re-checked within a year by a primary care provider.  If your systolic blood pressure is 140 or greater or your diastolic blood pressure is 90 or greater, you may have high blood pressure.  High blood pressure is treatable, but if left untreated over time it can put you at risk for heart attack, stroke, or kidney failure.  You should have your blood pressure re-checked by a primary care provider within the next four weeks.      MY TREATMENT INFORMATION FOR SLEEP APNEA -  Mallory Sargent    DOCTOR: Jorge Amaro MD, MPH  SLEEP CENTER : [facility]         If I haven't had a sleep study yet, what can I expect?  A personal story from Octavio  https://www.Adbrain.com/watch?v=AxPLmlRpnCs      Am I having a home sleep study?  Here is a video in case you get home and want to make sure you have done it correctly  https://www.Adbrain.com/watch?v=ZKT2C9yKrc3&feature=youtu.be      Frequently asked questions:  1. What is Obstructive Sleep Apnea (JESSIE)? JESSIE is the most common type of sleep apnea. Apnea literally means, \"without breath.\" It is characterized by repetitive pauses in breathing, despite continued effort to breathe, and is usually associated with a reduction in blood oxygen saturation. Apneas can last 10 to over 60 seconds. It is caused by narrowing or collapse of the upper airway as muscles relax during sleep. Severity of sleep apnea is determined by frequency of breathing events and their effect on your sleep and oxygen levels determined during sleep testing.     2. What are the consequences of JESSIE? Symptoms include: daytime sleepiness- possibly increasing the risk of falling asleep while driving, unrefreshing/restless sleep, snoring, insomnia, waking frequently to urinate, waking with heartburn or reflux, reduced concentration " and memory, and morning headaches. Other health consequences may include development of high blood pressure and other cardiovascular disease in persons who are susceptible. Untreated JESSIE  can contribute to heart disease, stroke and diabetes.     3. What are the treatment options? In most situations, sleep apnea is a lifelong disease that must be managed with daily therapy. Medications are not effective for sleep apnea and surgery is generally not performed until other therapies have been tried. Therapy is usually tailored to the individual patient based on many factors including your wishes as well as severity of sleep apnea and severity of obesity. Continuous Positive Airway (CPAP) is the most reliable treatment. An oral device to hold your jaw forward is usually the next most reliable option. Other options include postioning devices (to keep you off your back), weight loss, and surgery including a tongue pacing device. There is more detail about some of these options below.            1. CPAP-  WHAT DOES IT DO AND HOW CAN I LEARN TO WEAR IT?                               BEFORE I START, CAN I WATCH A MOVIE TO GET A PLAN ON HOW TO USE CPAP?  https://www.Scour Prevention.com/watch?w=j1D49nk511I      Continuous positive airway pressure, or CPAP, is the most effective treatment for obstructive sleep apnea. It works by blowing room air, through a mask, to hold your throat open. A decision to use CPAP is a major step forward in the pursuit of a healthier life. The successful use of CPAP will help you breathe easier, sleep better and live healthier. You can choose CPAP equipment from any durable medical equipment provider that meets your needs.  Using CPAP can be a positive experience if you keep these ching points in mind:  1. Commitment  CPAP is not a quick fix for your problem. It involves a long-term commitment to improve your sleep and your health.    2. Communication  Stay in close communication with both your sleep doctor  "and your CPAP supplier. Ask lots of questions and seek help when you need it.    3. Consistency  Use CPAP all night, every night and for every nap. You will receive the maximum health benefits from CPAP when you use it every time that you sleep. This will also make it easier for your body to adjust to the treatment.    4. Correction  The first machine and mask that you try may not be the best ones for you. Work with your sleep doctor and your CPAP supplier to make corrections to your equipment selection. Ask about trying a different type of machine or mask if you have ongoing problems. Make sure that your mask is a good fit and learn to use your equipment properly.    5. Challenge  Tell a family member or close friend to ask you each morning if you used your CPAP the previous night. Have someone to challenge you to give it your best effort.    6. Connection   Your adjustment to CPAP will be easier if you are able to connect with others who use the same treatment. Ask your sleep doctor if there is a support group in your area for people who have sleep apnea, or look for one on the Internet.  7. Comfort   Increase your level of comfort by using a saline spray, decongestant or heated humidifier if CPAP irritates your nose, mouth or throat. Use your unit's \"ramp\" setting to slowly get used to the air pressure level. There may be soft pads you can buy that will fit over your mask straps. Look on www.CPAP.com for accessories that can help make CPAP use more comfortable.  8. Cleaning   Clean your mask, tubing and headgear on a regular basis. Put this time in your schedule so that you don't forget to do it. Check and replace the filters for your CPAP unit and humidifier.    9. Completion   Although you are never finished with CPAP therapy, you should reward yourself by celebrating the completion of your first month of treatment. Expect this first month to be your hardest period of adjustment. It will involve some trial and " error as you find the machine, mask and pressure settings that are right for you.    10. Continuation  After your first month of treatment, continue to make a daily commitment to use your CPAP all night, every night and for every nap.    CPAP-Tips to starting with success:  Begin using your CPAP for short periods of time during the day while you watch TV or read.    Use CPAP every night and for every nap. Using it less often reduces the health benefits and makes it harder for your body to get used to it.    Make small adjustments to your mask, tubing, straps and headgear until you get the right fit. Tightening the mask may actually worsen the leak.  If it leaves significant marks on your face or irritates the bridge of your nose, it may not be the best mask for you.  Speak with the person who supplied the mask and consider trying other masks. Insurances will allow you to try different masks during the first month of starting CPAP.  Insurance also covers a new mask, hose and filter about every 6 months.    Use a saline nasal spray to ease mild nasal congestion. Neti-Pot or saline nasal rinses may also help. Nasal gel sprays can help reduce nasal dryness.  Biotene mouthwash can be helpful to protect your teeth if you experience frequent dry mouth.  Dry mouth may be a sign of air escaping out of your mouth or out of the mask in the case of a full face mask.  Speak with your provider if you expect that is the case.     Take a nasal decongestant to relieve more severe nasal or sinus congestion.  Do not use Afrin (oxymetazoline) nasal spray more than 3 days in a row.  Speak with your sleep doctor if your nasal congestion is chronic.    Use a heated humidifier that fits your CPAP model to enhance your breathing comfort. Adjust the heat setting up if you get a dry nose or throat, down if you get condensation in the hose or mask.  Position the CPAP lower than you so that any condensation in the hose drains back into the  machine rather than towards the mask.    Try a system that uses nasal pillows if traditional masks give you problems.    Clean your mask, tubing and headgear once a week. Make sure the equipment dries fully.    Regularly check and replace the filters for your CPAP unit and humidifier.    Work closely with your sleep provider and your CPAP supplier to make sure that you have the machine, mask and air pressure setting that works best for you. It is better to stop using it and call your provider to solve problems than to lay awake all night frustrated with the device.      BESIDES CPAP, WHAT OTHER THERAPIES ARE THERE?        Positioning Device  Positioning devices are generally used when sleep apnea is mild and only occurs on your back.This example shows a pillow that straps around the waist. It may be appropriate for those whose sleep study shows milder sleep apnea that occurs primarily when lying flat on one's back. Preliminary studies have shown benefit but effectiveness at home may need to be verified by a home sleep test. These devices are generally not covered by medical insurance.                        Oral Appliance  What is oral appliance therapy?  An oral appliance is a small acrylic device that fits over the upper and lower teeth or tongue (similar to an orthodontic retainer or a mouth guard). This device slightly advances the lower jaw or tongue, which moves the base of the tongue forward, opens the airway, improves breathing and can effectively treat snoring and obstructive sleep apnea sleep apnea. The appliance is fabricated and customized by a qualified dentist with experience in treating snoring and sleep apnea. Oral appliances are usually well tolerated and have relatively high compliance by patients1, 2, 3.  When is an oral appliance indicated?  Oral appliance therapy is recommended as a first-line treatment for patients with primary snoring, mild sleep apnea, and for patients with moderate sleep  apnea who prefer appliance therapy to use of CPAP4, 5. Severity of sleep apnea is determined by sleep testing and is based on the number of respiratory events per hour of sleep.   How successful is oral appliance therapy?  The success rate of oral appliance therapy in patients with mild sleep apnea is 75-80% while in patients with moderate sleep apnea it is 50-70%. The chance of success in patients with severe sleep apnea is 40-50%. The research also shows that oral appliances have a beneficial effect on the cardiovascular health of JESSIE patients at the same magnitude as CPAP therapy7.  Oral appliances should be a second-line treatment in cases of severe sleep apnea, but if not completely successful then a combination therapy utilizing CPAP plus oral appliance therapy may be effective. Oral appliances tend to be effective in a broad range of patients although studies show that the patients who have the highest success are females, younger patients, those with milder disease, and less severe obesity. 3, 6.   The chances of success are lower in patients who have more severe JESSIE, are older, and those who are morbidly obese.     Example of an oral appliance   Finding a dentist that practices dental sleep medicine  Specific training is available through the American Academy of Dental Sleep Medicine for dentists interested in working in the field of sleep. To find a dentist who is educated in the field of sleep and the use of oral appliances, near you, visit the Web site of the American Academy of Dental Sleep Medicine; also see   http://www.accpstorage.org/newOrganization/patients/oralAppliances.pdf  To search for a dentist certified in these practices:  Http://aadsm.org/FindADentist.aspx?1  1. Milana et al. Objectively measured vs self-reported compliance during oral appliance therapy for sleep-disordered breathing. Chest 2013; 144(5): 1234-7666.  2. Gerri et al. Objective measurement of compliance during  oral appliance therapy for sleep-disordered breathing. Thorax 2013; 68(1): 91-96.  3. Ivon, et al. Mandibular advancement devices in 620 men and women with JESSIE and snoring: tolerability and predictors of treatment success. Chest 2004; 125: 8009-6934.  4. Kristopher et al. Oral appliances for snoring and JESSIE: a review. Sleep 2006; 29: 244-262.  5. Bentley et al. Oral appliance treatment for JESSIE: an update. J Clin Sleep Med 2014; 10(2): 215-227.  6. Jo et al. Predictors of OSAH treatment outcome. J Dent Res 2007; 86: 7317-5374.      Weight Loss:    Weight management is a personal decision.  If you are interested in exploring weight loss strategies, the following discussion covers the impact on weight loss on sleep apnea and the approaches that may be successful.    Weight loss decreases severity of sleep apnea in most people with obesity. For those with mild obesity who have developed snoring with weight gain, even 15-30 pound weight loss can improve and occasionally eliminate sleep apnea.  Structured and life-long dietary and health habits are necessary to lose weight and keep healthier weight levels.     Though there may be significant health benefits from weight loss, long-term weight loss is very difficult to achieve- studies show success with dietary management in less than 10% of people. In addition, substantial weight loss may require years of dietary control and may be difficult if patients have severe obesity. In these cases, surgical management may be considered.  Finally, older individuals who have tolerated obesity without health complications may be less likely to benefit from weight loss strategies.    Your BMI is Body mass index is 30.7 kg/(m^2).  Body mass index (BMI) is one way to tell whether you are at a healthy weight, overweight, or obese. It measures your weight in relation to your height.  A BMI of 18.5 to 24.9 is in the healthy range. A person with a BMI of 25 to 29.9 is considered  overweight, and someone with a BMI of 30 or greater is considered obese. More than two-thirds of American adults are considered overweight or obese.  Being overweight or obese increases the risk for further weight gain. Excess weight may lead to heart disease and diabetes.  Creating and following plans for healthy eating and physical activity may help you improve your health.  Weight control is part of healthy lifestyle and includes exercise, emotional health, and healthy eating habits. Careful eating habits lifelong are the mainstay of weight control. Though there are significant health benefits from weight loss, long-term weight loss with diet alone may be very difficult to achieve- studies show long-term success with dietary management in less than 10% of people. Attaining a healthy weight may be especially difficult to achieve in those with severe obesity. In some cases, medications, devices and surgical management might be considered.  What can you do?  If you are overweight or obese and are interested in methods for weight loss, you should discuss this with your provider.     Consider reducing daily calorie intake by 500 calories.     Keep a food journal.     Avoiding skipping meals, consider cutting portions instead.    Diet combined with exercise helps maintain muscle while optimizing fat loss. Strength training is particularly important for building and maintaining muscle mass. Exercise helps reduce stress, increase energy, and improves fitness. Increasing exercise without diet control, however, may not burn enough calories to loose weight.       Start walking three days a week 10-20 minutes at a time    Work towards walking thirty minutes five days a week     Eventually, increase the speed of your walking for 1-2 minutes at time    In addition, we recommend that you review healthy lifestyles and methods for weight loss available through the National Institutes of Health patient information  sites:  http://win.niddk.nih.gov/publications/index.htm    And look into health and wellness programs that may be available through your health insurance provider, employer, local community center, or blayne club.    Surgery:    Upper Airway Surgery for JESSIE  Surgery for JESSIE is a second-line treatment option in the management of sleep apnea.  Surgery should be considered for patients who are having a difficult time tolerating CPAP.    Surgery for JESSIE is directed at areas that are responsible for narrowing or complete obstruction of the airway during sleep.  There are a wide range of procedures available to enlarge and/or stabilize the airway to prevent blockage of breathing in the three major areas where it can occur: the palate, tongue, and nasal regions.  Successful surgical treatment depends on the accurate identification of the factors responsible for obstructive sleep apnea in each person.  A personalized approach is required because there is no single treatment that works well for everyone.  Because of anatomic variation, consultation with an examination by a sleep surgeon is a critical first step in determining what surgical options are best for each patient.  In some cases, examination during sedation may be recommended in order to guide the selection of procedures.  Patients will be counseled about risks and benefits as well as the typical recovery course after surgery. Surgery is typically not a cure for a person s JESSIE.  However, surgery will often significantly improve one s JESSIE severity (termed  success rate ).  Even in the absence of a cure, surgery will decrease the cardiovascular risk associated with OSA7; improve overall quality of life8 (sleepiness, functionality, sleep quality, etc).          Palate Procedures:  Patients with JESSIE often have narrowing of their airway in the region of their tonsils and uvula.  The goals of palate procedures are to widen the airway in this region as well as to help the  tissues resist collapse.  Modern palate procedure techniques focus on tissue conservation and soft tissue rearrangement, rather than tissue removal.  Often the uvula is preserved in this procedure. Residual sleep apnea is common in patient after pharyngoplasty with an average reduction in sleep apnea events of 33%2.      Tongue Procedures:  While patients are awake, the muscles that surround the throat are active and keep this region open for breathing. These muscles relax during sleep, allowing the tongue and other structures to collapse and block breathing.  There are several different tongue procedures available.  Selection of a tongue base procedure depends on characteristics seen on physical exam.  Generally, procedures are aimed at removing bulky tissues in this area or preventing the back of the tongue from falling back during sleep.  Success rates for tongue surgery range from 50-62%3.    Hypoglossal Nerve Stimulation:  Hypoglossal nerve stimulation has recently received approval from the [place] Food and Drug Administration for the treatment of obstructive sleep apnea.  This is based on research showing that the system was safe and effective in treating sleep apnea6.  Results showed that the median AHI score decreased 68%, from 29.3 to 9.0. This therapy uses an implant system that senses breathing patterns and delivers mild stimulation to airway muscles, which keeps the airway open during sleep.  The system consists of three fully implanted components: a small generator (similar in size to a pacemaker), a breathing sensor, and a stimulation lead.  Using a small handheld remote, a patient turns the therapy on before bed and off upon awakening.    Candidates for this device must be greater than 22 years of age, have moderate to severe JESSIE (AHI between 20-65), BMI less than 32, have tried CPAP/oral appliance without success, and have appropriate upper airway anatomy (determined by a sleep endoscopy  performed by Dr. Aguilera).    Hypoglossal Nerve Stimulation Pathway:    The sleep surgeon s office will work with the patient through the insurance prior-authorization process (including communications and appeals).    Nasal Procedures:  Nasal obstruction can interfere with nasal breathing during the day and night.  Studies have shown that relief of nasal obstruction can improve the ability of some patients to tolerate positive airway pressure therapy for obstructive sleep apnea1.  Treatment options include medications such as nasal saline, topical corticosteroid and antihistamine sprays, and oral medications such as antihistamines or decongestants. Non-surgical treatments can include external nasal dilators for selected patients. If these are not successful by themselves, surgery can improve the nasal airway either alone or in combination with these other options.    Combination Procedures:  Combination of surgical procedures and other treatments may be recommended, particularly if patients have more than one area of narrowing or persistent positional disease.  The success rate of combination surgery ranges from 66-80%2,3.      1. Prisca PITTS. The Role of the Nose in Snoring and Obstructive Sleep Apnoea: An Update.  Eur Arch Otorhinolaryngol. 2011; 268: 1365-73.  2.  John SM; Nils JA; Melinda JR; Pallanch JF; Hope MB; Soham SG; Veto GONZALEZ. Surgical modifications of the upper airway for obstructive sleep apnea in adults: a systematic review and meta-analysis. SLEEP 2010;33(10):5891-6967. Gerardo KESSLER. Hypopharyngeal surgery in obstructive sleep apnea: an evidence-based medicine review.  Arch Otolaryngol Head Neck Surg. 2006 Feb;132(2):206-13.  3. Bam YH1, Britt Y, Michael SHLOMO. The efficacy of anatomically based multilevel surgery for obstructive sleep apnea. Otolaryngol Head Neck Surg. 2003 Oct;129(4):327-35.  4. Gerardo KESSLER, Goldberg A. Hypopharyngeal Surgery in Obstructive Sleep Apnea: An Evidence-Based Medicine  Review. Arch Otolaryngol Head Neck Surg. 2006 Feb;132(2):206-13.  5. Day PJ et al. Upper-Airway Stimulation for Obstructive Sleep Apnea.  N Engl J Med. 2014 Jan 9;370(2):139-49.  6. Kamla Y et al. Increased Incidence of Cardiovascular Disease in Middle-aged Men with Obstructive Sleep Apnea. Am J Respir Crit Care Med; 2002 166: 159-165  7. Ahumada EM et al. Studying Life Effects and Effectiveness of Palatopharyngoplasty (SLEEP) study: Subjective Outcomes of Isolated Uvulopalatopharyngoplasty. Otolaryngol Head Neck Surg. 2011; 144: 623-631.  [place] [facility]    Good sleeping habits are a key part of treatment. If needed, and only in very select cases, some medications may help you sleep better at first. Making healthy lifestyle changes and learning to relax can improve your overall sleep in the long run. As many long term changes and treatments, treating insomnia takes commitment and hard work, but trust that your efforts will pay off.  We are recommending treatment of insomnia based on your history of sleep problems and/or use of medications for sleeping.  There are many treatment options available and we want to work with you to find the right treatment for you.  The following recommendations can be helpful for some people.        Sleep Hygiene     Sometimes insomnia can be made worse by our own habits or situation.  You should consider making some of the following changes to help improve your sleep:       If there is excessive noise in your house / neighborhood use a fan or similar device to make a quiet background noise that can drown out other noises.    If your room is too bright early in the morning, hang a blanket or extra curtain over the windows to keep the light out or use a sleeping mask to cover your eyes.    If your room is too warm during the night, set the temperature in the house down a few degrees for the night.    Spend a little time before bedtime trying to relax.  Do not work right up  until bedtime.  Take 30-60 minutes to relax and unwind before bedtime with a book or watching TV.    Do not drink anything with alcohol or caffeine in them for at least 4 to 5 hours before bedtime.    Do not smoke right before bedtime or during the night.    No strenuous exercise for 2-3 hours before bedtime.      In addition to the abovementioned recommendations, cognitive behavioral treatment for insomnia (CBTI) is a very effective technique that involves changing your behaviors and thoughts associated with sleep. In fact, CBTI is the most effective treatment for long-term insomnia. This treatment modality tries to address the underlying causes of your sleep problems, including your habits and how you think about sleep.    People that are motivated to make changes in their sleep pattern are likely to benefit from internet based cognitive behavioral treatment for insomnia. Some internet CBTI programs include but are not limited to www.NCLC or www.[facility].M[employer].  There is a fee for using these programs that is similar to actually seeing an insomnia expert. By entering the code  [place]  it will allow us to know if you find these services useful.        Online Programs for CBTI       www.NCLC (pronounced shut eye) - There is a fee for this program.    www.sleepIO.com (pronounced sleep ee oh) - There is a fee for this program. Enter the code  Galt  if you decide to enroll in this program.       In addition to the internet programs provided, self-help strategies for treatment of insomnia can be found also in books.  Several treatment books for insomnia are listed on our patient treatment resources.  Some of these include the following books:       Suggested Resources - Insomnia Treatment Books       Overcoming Insomnia  by Tapan Humphries and Samra Devlin (2008)     No More Sleepless Nights  by Kristofer Kim and Valencia Alcantar (1996)     Say Lupe to Insomnia  by Darnell Mcdonnell (2009)     The Insomnia  Workbook  by Mallory Stark and Jacoby Flynn (2009)     The Insomnia Answer  by Gucci Brewer and Estevan Delong (2006)     These recommendations are a first step in treating insomnia.  For many people these recommendations can be helpful while for others they are just the start. There are many treatments available for insomnia and our goal is to keep re-evaluating your progress and try other options if these first steps are not successful.  It has been demonstrated that, in the long run, CBTI modalities are more effective than any medication for insomnia. As such, if the above the recommendations do not help, you may benefit from scheduling an appointment with an insomnia expert (we can assess this during follow up visits).      Healthy Lifestyle  As it was mentioned, your lifestyle directly affects your health and your sleep patterns. Here are some healthy habits that you should consider:    Keep a regular sleep schedule, always going to bed and getting up at the same time each day.    Exercise regularly. It may help you reduce stress. However, remember to avoid strenuous exercise for two to four hours before bedtime.    Limit, or if possible, avoid all together naps.    Use your bed only for sleep and sex. This means, no reading, watching TV, using tablets or phones, or doing any other activities in bed.    Do not spend too much time in bed trying to fall asleep. If you cannot fall asleep within 20 to 30 minutes, get up and do something relaxing until you become tired and drowsy again. Do not expose yourself to bright lights or perform complex activities (e.g., no house cleaning, watching TV, or working on homework).    Avoid or limit caffeine and nicotine. They can keep you awake at night. Also avoid alcohol. It may help you fall asleep at first, but your sleep will not be restful.      Before Bedtime  In addition to keeping some healthy habits, it is important to know what do to before going to sleep. As  such, to also sleep better every night, try these tips:    Have a bedtime routine to let your body and mind know when it is time to sleep.    Going to bed should be relaxing so try to do only relaxing things around bedtime. Sleep will come sooner.    If your worries do not let you sleep, write them down in a diary. Then close it and go to bed.    As previously mentioned, make sure the room is not too hot or too cold. If it is not dark enough, an eye mask may help. If it is noisy, try using earplugs.      Learn to Relax  In addition to poor habits, stress, anxiety, and body tension may play a large role in your inability to sleep through the night. In fact, these factors may keep you awake at night. To unwind before bedtime, try reading a book, meditation, or even yoga. Also, try the following:    Deep breathing: Sit or lie back in a chair. Take a slow, deep breath. Hold it for 5 counts. Then breathe out slowly through your mouth. Keep doing this until you feel relaxed.    Imagery: Think of the last fun trip you took. In your mind, walk through the trip from start to finish. Put as much detail into the memory as you can remember. It will also help you relax.      Suggested Resources    Insomnia Treatment Books      Overcoming Insomnia  by Tapan Humphries and Samra Devlin (2008)     No More Sleepless Nights  by Kristofer Kim and Valencia Alcantar (1996)     Say Lupe to Insomnia  by Darnell Mcdonnell (2009)     The Insomnia Workbook  by Mallory Stark and Jacoby Flynn (2009)     The Insomnia Answer  by Gucci Brewer and Estevan Delong (2006)    Stress Management and Relaxation Books    The Relaxation and Stress Reduction Workbook by Annie Zaman, Odette Edmonds and Tahir De Los Santos (2008)    Stress Management Workbook: Techniques and Self-Assessment Procedures by Kylie Andrews and Oren Romano (1997)    A Mindfulness-Based Stress Reduction Workbook by Enoch Barajas and Michelle Galarza  (2010)    The Complete Stress Management Workbook by Jordan Jimenes, Jose Benoit and Simeon Valderrama (1996)    Assert Yourself by Ivelisse Cadena and Onofre Cadena (1977)    Relaxation Resources for Computer Download   These websites offer resources to help you relax. This list is for information only. [facility] is not responsible for the quality of services or the actions of any person or organization.    Progressive Muscle Relaxation (PMR):     http://www.Applifier/progressive-muscle-relaxation-exercise.html     http://studentsupport.[facility]/counseling/resources/self-help/relaxation-and-stress-management/     Deep Breathing Exercises:    http://www.Applifier/breathing-awareness.html     Meditation:     wwwRenovar    www.DinglepharbguidedJasper WirelessmeditationJasper Wirelesssite.[facility] (you may have to pay for some of these resources)    Guided Imagery:    http://www.Applifier/guided-imagery-scripts.html     http://[place]att/library/dxmyhbhhkr-nncemr-idjgwgd/     Counseling / Behavioral Health    [facility]    Visit www.[place].org or call [phone] to find a clinic close to you.      This is not a prescription and these resources are optional. You must pay for any costs when using these resources. Please ask your insurance carrier if you can be reimbursed for these resources. If so, you are responsible for sending the needed details to your insurance carrier. These resources may also be tax deductible as medical expenses. Check with your .     These programs and publications are not affiliated in any way with [facility].    Stress, tension, anxiety and depression can be big problems that contribute to insomnia.  If you can t relax your mind and body, then you won t be able to sleep.  You seem to have a high level of stress in your life and would likely benefit from professional stress reduction / anxiety management strategies and  should contact [facility] at [phone] to schedule an appointment.       If you are experiencing extreme anxiety or distress due to insomnia symptoms and feel that you need immediate assistance, please contact the [facility] at [phone].    Please, call sleep psychologist for appointment. Call sleep dentist for also an appointment. Give us a call when you are ready for a assessment test with dental device in place. Let us know how your insomnia is doing so we can determine the best test option for you.    Thank you so much!    Sincerely,    Jorge Amaro MD, MPH  Clinical Sleep Medicine              Follow-ups after your visit        Additional Services     SLEEP DENTAL REFERRAL       Dental appliance resources recommended by [facility]     Below is a list of dental appliance resources recommended by [facility]   If you wish to choose your own dental sleep dentist, you may identify a provider close to you: [email or web address]    [facility]   Sleep Medicine [facility]   Lane Adhikari DDS, [address]  [place] [address]   Appointments: [phone]  Fax: [phone]   Email: [email or web address]   Dental and Oral Surgery Clinic   Reji Pederson, INDIRA Joel DDS   [address]   [place] [address]   Appointments: [phone]   Website: [facility].org/clinics/oms/[facility]_[email or web address]_193    Snoring and Sleep Apnea   Dental Treatment Center   WENDY Valles DDS  [address]   [place] [address]   Appointments: [phone]   Fax: [phone]   Email: [email or web address]  Website: [employer]     [place] [facility]   Office 1   Jorge Anderson DDS - [[facility]]  [address]   [facility]  [place] [address]  Appointments: [phone]  Fax: [phone]  Website: [employer]    [facility]   Office 2  Jorge Anderson DDS - [DME Medicare]  [address]  [place] [address]  Appointments: [phone]  Fax: [phone]  Website: [employer]    [facility]  Earnest Mahmood DDS  [address] [place] [address]  Appointments: [phone]    [facility]   [place] Office   Al Joel DDS   [facility]   [address]   [place] [address]   Appointments: [phone]   Fax: [phone]   Website: [employer]     [facility]   [place] Office   Kai Muhammad DDS, MS - [DME Medicare]  [facility]   [address]   [place] [address]   Appointments: [phone]   Fax: [phone]   Website: [employer]     [facility]  Albert Guillen DMD, MSD - [DME Medicare]  [address]  [place] [address]  Appointments [phone]  Fax: [phone]  Website: [facility]    [facility]   [place] Office   Marjorie Jo DDS, PhD, MS   [facility]   [address]   [place] [address]   Appointments: [phone]   Fax: [phone]   Website: [employer]     [facility]   [place] Office   Marjorie Jo DDS, PhD, MS   [facility]   [address]   [place] [address]   Appointments: [phone]   Fax: [phone]   Website: [facility]     [facility]  Georgia Jaeger DDS  [facility]  [address] [place] [address]  Appointments: [phone]   Fax: [phone]    If you wish to choose your own dental sleep dentist, you may identify a provider close to you:  [email or web address]      AHI: 10.8 PSG DATE: 10/05/2017     Return to clinic in 5 months for Home Sleep Test to confirm adequacy of Treatment.    Other information regarding this referral: Mandibular repositioning appliance for JESSIE. If your insurance asks for a CPT code, it is .    Please be aware that coverage of these services is subject to the terms and limitations of your health insurance plan.  Call member services at your health plan with any benefit or coverage questions.      Please bring the following to your appointment:    >>   List of current medications   >>   This referral request   >>   Any documents/labs given to you for this referral            SLEEP PSYCHOLOGY REFERRAL       Referral Urgency: Next available    Dr. Davidson Carrero is at the following clinics on the following days:  [address]        Thursday and Friday (PLEASE CALL [phone] to schedule an appointment).  [facility] [address] [place], [place]       Wednesdays   (PLEASE CALL [phone] to schedule an appointment).     Please be aware that coverage of these services is subject to the terms and limitations of your health insurance plan. Call member services at your health plan with any benefit or coverage questions.     Please bring the following to your appointment:  >> List of current medications   >> This referral request   >> Any documents/labs given to you for this referral                  Who to contact     If you have questions or need follow up information about today's clinic visit or your schedule please contact [facility] directly at [phone].  Normal or non-critical lab and imaging results will be communicated to you by MyChart, letter or phone within 4 business days after the clinic has received the results. If you do not hear from us within 7 days, please contact the clinic through MyChart or phone. If  "you have a critical or abnormal lab result, we will notify you by phone as soon as possible.  Submit refill requests through [facility] or call your pharmacy and they will forward the refill request to us. Please allow 3 business days for your refill to be completed.          Additional Information About Your Visit        "[employer]"hart Information     [employer] gives you secure access to your electronic health record. If you see a primary care provider, you can also send messages to your care team and make appointments. If you have questions, please call your primary care clinic.  If you do not have a primary care provider, please call [phone] and they will assist you.        Care EveryWhere ID     This is your Care EveryWhere ID. This could be used by other organizations to access your [facility] medical records  [id number]        Your Vitals Were     Pulse Respirations Height Last Period Pulse Oximetry BMI (Body Mass Index)    68 12 1.791 m (5' 10.5\") 06/09/2015 97% 30.7 kg/m2       Blood Pressure from Last 3 Encounters:   10/31/17 102/79   08/04/17 109/72   06/30/17 104/66    Weight from Last 3 Encounters:   10/31/17 98.4 kg (217 lb)   08/04/17 98.4 kg (217 lb)   06/30/17 103.4 kg (228 lb)              We Performed the Following     SLEEP DENTAL REFERRAL     SLEEP PSYCHOLOGY REFERRAL        Primary Care Provider Office Phone # Fax #    Karen Weiler, [phone] [address]  [place] [address]        Equal Access to Services     [facility] Center[facility] AH: Hadii aad ku hadasho Soomaali, waaxda luqadaha, qaybta kaalmada kimegyada, pascual garcia . So [facility] [phone].    ATENCIÓN: Si habla español, tiene a suresh disposición servicios gratuitos de asistencia lingüística. Llame al [phone].    We comply with applicable federal civil rights laws and [place] laws. We do not discriminate on the basis of race, color, national origin, age, disability, sex, sexual orientation, or gender " identity.            Thank you!     Thank you for choosing [facility]  for your care. Our goal is always to provide you with excellent care. Hearing back from our patients is one way we can continue to improve our services. Please take a few minutes to complete the written survey that you may receive in the mail after your visit with us. Thank you!             Your Updated Medication List - Protect others around you: Learn how to safely use, store and throw away your medicines at www.disposemymeds.org.          This list is accurate as of: 10/31/17  9:46 AM.  Always use your most recent med list.                   Brand Name Dispense Instructions for use Diagnosis    aspirin 81 MG tablet     30 tablet    Take by mouth daily        Multi-vitamin Tabs tablet      Take 1 tablet by mouth daily        omeprazole 20 MG CR capsule    priLOSEC    90 capsule    TAKE 1 CAPSULE BY MOUTH DAILY, 30 TO 60 MINUTES BEFORE A MEAL.    Hyperlipidemia LDL goal <130       simvastatin 40 MG tablet    ZOCOR    90 tablet    Take 1 tablet (40 mg) by mouth At Bedtime    Hyperlipidemia LDL goal <130       traZODone 50 MG tablet    DESYREL    30 tablet    Take 1 tablet (50 mg) by mouth nightly as needed for sleep    Insomnia, unspecified type       * zolpidem 5 MG tablet    AMBIEN    30 tablet    Take 1 tablet (5 mg) by mouth nightly as needed for sleep    Other insomnia       * zolpidem 5 MG tablet    AMBIEN    1 tablet    Take tablet by mouth 15 minutes prior to sleep, for Sleep Study    Snoring, Psychophysiological insomnia, Non morbid obesity due to excess calories, Other fatigue, Excessive daytime sleepiness       * Notice:  This list has 2 medication(s) that are the same as other medications prescribed for you. Read the directions carefully, and ask your doctor or other care provider to review them with you.

## 2017-10-31 NOTE — PROGRESS NOTES
[facility]  Outpatient Sleep Medicine Follow Up Visit  October 31, 2017     Name: Mallory Sargent MRN# [id number]   Age: 51 year old YOB: 1965       Date of Follow Up Visit: October 31, 2017  Consultation is requested by: Kylie Aleman PA-C  [facility]  [address] [place] [address]  Primary care provider: Weiler, Karen     Patient is accompanied by: Patient presents alone today.        Reason for Sleep Consult:      Mallory Sargent is a 51 year old female patient that presents here for a follow up evaluation after completing a PSG sleep study.          Assessment and Plan:      Summary Sleep Diagnoses:     (1) Mild JESSIE  (2) EDS  (3) Snoring  (4) Fatigue  (5) Obesity  (6) Insomnia     Summary Recommendations:     (1) Mild JESSIE; (2) EDS; (3) Snoring; (4) Fatigue; and (5) Obesity: This patient had sxs, hx, and physical findings that suggested the diagnosis of a sleep disordered breathing. However, she had a low pre-test probability of JESSIE. Given that she had a low pre-test probability and she also experienced comorbid insomnia, this patient was not a good candidate for a portable HST sleep study. As such, this patient underwent an in-lab split-night PSG sleep study. Although this patient reported some morning cephalgia, based on the patient's history and physical examination, I still had a low suspicion of hypoventilation and, therefore, no TCM monitoring or ABGs was necessary at this point. The PSG sleep study showed mild JESSIE without any sleep associated hypoxemia. No other abnormalities were noted during the PSG sleep study. Today, the study results were explained and discussed. The nature and pathophysiology of JESSIE were discussed. The different treatment options for JESSIE were also reviewed and explained today. After much discussion, the patient opted to start treatment with a MAD device for the mild JESSIE (although the mild to non-association with negative health  outcomes was explained). Lifestyle recommendations including healthy dietary and exercising habits were discussed. The patient will call us after obtaining the MAD device and a portable HST or in-lab PSG sleep study will be obtained with the MAD device in place (if insomnia is better, portable HST may be done instead of PSG). Pt will follow up with me after completing the in-lab PSG or portable HST sleep study with MAD device in place.     (6) Insomnia: This patient is using multiple medications for sleep consolidation. She describes an insomnia that it is consistent with psychophysiologic insomnia. Again today, we discussed some concepts of CBTI with sleep hygiene, relaxation technique, sleep consolidation, and stimulus control. The patient continue implementing these recommendations. Once again today, we also discussed the role of pharmacological agents in insomnia. Possible complications and side effects were discussed as well. The patient was referred today to sleep psychologist for CBTI. The patient was instructed to complete sleep logs and provide these to the sleep psychologist.     Coding:  (G47.33) JESSIE (obstructive sleep apnea)  (primary encounter diagnosis)  (G47.19) Excessive daytime sleepiness  (R06.83) Snoring  (R53.83) Other fatigue  (F51.04) Psychophysiological insomnia  (E66.09,  Z68.30) Class 1 obesity due to excess calories without serious comorbidity with body mass index (BMI) of 30.0      Counseling included a comprehensive review of diagnostic and therapeutic strategies as well as risks of inadequate therapy.     Educational materials provided in instructions. The patient was instructed to avoid driving or operating any heavy machinery when experiencing drowsiness.     All questions and concerns were addressed today. Pt agrees and understands the assessment and plan.          History of Present Illness:      Mallory Sargent is a 51 year old  RHD female pt with history of MDD,  dyslipidemia, GERD, and obesity presents for a follow up evaluation today after completing an in-lab PSG sleep study due EDS and loud snoring. During the initial visit, the patient explained that she snores nightly. Pt reported non-restorative sleep with some mild sleep inertia. She also endorsed some morning cephalgia (this happens 4 to 6 times a month). The headaches are localized in the frontal area and described as an achy discomfort. The headaches resolve within one hour from waking up. Some drowsiness when driving reported (no near accidents reported). Pt endorsed xerostomia. Pt denied any inadvertent naps. Pt denied any gasping / choking for air as well as any witnessed. Pt denies any nocturia, GERD sxs, CP, SOB, positional dyspnea, peripheral edema, N/V, fever, cough, or any other sxs or concerns with negative ROS.     During this first visit, the patient explained also that she used zolpidem, melatonin, trazodone, and diphenhydramine to help her sleep through the night. Pt stated that she does not use these medications together during the course of one night, but she alternates the medications throughout the week. The patient explained that when she takes a medication, she does not wake up throughout the night and she sleeps well. If she does not take any medication, she wakes throughout the night to urinate and then she is unable to fall back asleep. She cannot fall asleep due to racing and intrusive thoughts. Pt explained that she thinks about work and other responsibilities. Again, this does not happen when patient uses any pharmacological agent.    The in-lab PSG sleep study demonstrated mild JESSIE without sleep associated hypoxemia. The patient used zolpidem 10 mg during the night of the study with a total sleep time of almost 6 hours. The sleep architecture with somewhat preserved. The study showed no abnormal PLMs, movements, or behaviors. No other abnormalities were noted during the study.    Today,  the patient reports that since the last visit, she has been trying to stop using the sleeping aid. However, she keeps waking up throughout the nights and she is unable to fall back asleep due to intrusive and racing thoughts. The patient explains that she would like to treat this and, ideally, sleep without the use of any sleeping aid.      PREVIOUS IN- LAB or HOME SLEEP STUDIES: None Reported     SLEEP-WAKE SCHEDULE:      Mallory Sargent                           -Describes herself as a night person; prefers to go to sleep at 11:00 PM and wakes up at 7:00 AM.                           -Naps 1-2 times per week for 10-20 minutes, feels refreshed after naps; takes no inadvertent naps.                           -ON WEEKDAYS, goes to sleep at 8:00 PM during the week; awakens 7:00 AM with an alarm; falls asleep in 5 minutes; denies difficulty falling asleep.                           -ON WEEKENDS, goes to sleep at 9:00 PM and wakes up at 8:00 AM with an alarm; falls asleep in 5 minutes.                             -Awakens 2 - 3 times a night for 10 minutes before falling back to sleep; awakens to go to the bathroom.       BEDTIME ACTIVITIES AND SHIFT WORK:     Mallory Sargent                          -Bedtime Activities and Other Sleeping Information: Pt lives with , daughter, and son. Sleeps with  on gretchen size bed. The bedroom is dark and cool. Pt reads and eats in bed. She also watches TV in bed and uses her cell phone / computer in bed. Pt sleeps on stomach and sides.                          -Occupation: RN. Pt works day shifts.                                            -Working Hours: 9 AM to 530 PM      SCALES                       SLEEP APNEA: Stopbang score: 2 / 8                       INSOMNIA:  Insomnia severity score: 11                       SLEEPINESS: Peridot sleepiness scale (ESS):  7 / 24 (initial score)                                            Drowsy driving / near accidents: Denies  any near accidents, but drowsiness reported when driving.                                            Consequences: Some drowsiness and EDS     SLEEP COMPLAINTS:  Cardio-respiratory                         -Snoring: Significant snoring reported                        -Dyspnea: Pt denies having any witnessed apneas or dyspnea                       -Morning headaches or confusion: Some morning cephalgia reported                       -Coexisting Lung disease: Denies diagnosed or known lung disease at this time                                                         -Coexisting Heart disease: Denies diagnosed or known cardiovascular disease at this time                                                       -Does patient have a bed partner: Patient sleeps with spouse                       -Has bed partner been sleeping separately because of snoring:  No      RLS Screen:                           -When you try to relax in the evening or sleep at night, do you ever have unpleasant, restless feelings in your legs that can be relieved by walking or movement? None Reported                          -Periodic limb movement: None Reported     Narcolepsy:                    - Denies sudden urges of sleep attacks                  - Denies cataplexy                  - Denies sleep paralysis                   - Denies hallucinations      Sleep Behaviors:                  - Denies leg symptoms/movements                  - Denies motor restlessness                  - Denies night terrors                  - Admits bruxism (not using a dental guard)                  - Denies automatic behaviors     Other Subjective Complaints:                  - Denies anxiety or rumination                   - Denies pain and discomfort at night                  - Denies waking up with heart pounding or racing                  - Denies GERD or aspiration      Parasomnia:                        -NREM - Denies recurrent persistent confusional arousal,  night eating, sleep walking or sleep terrors.                           -REM  - Denies dream enactment or injuries.      -Driving Accident or Near Accidents: Some drowsiness reported, but no near accidents          Medications:      Current Outpatient Prescriptions   Medication     zolpidem (AMBIEN) 5 MG tablet     omeprazole (PRILOSEC) 20 MG CR capsule     simvastatin (ZOCOR) 40 MG tablet     multivitamin, therapeutic with minerals (MULTI-VITAMIN) TABS tablet     traZODone (DESYREL) 50 MG tablet     zolpidem (AMBIEN) 5 MG tablet     aspirin 81 MG tablet     No current facility-administered medications for this visit.      No Known Allergies          Past Medical History:      Denies needing any 02 supplement at night.      Past Medical History         Past Medical History:   Diagnosis Date     ASCUS favor benign 3/2015     neg HPV   Plan cotest in 3 yrs.     Depressive disorder, not elsewhere classified       lexapro = no effect and sleepy, celexa = slightly better, wellbutrin = severe restlessness.       Esophageal reflux 2006     FAMILY HX-OVARIAN MALIGNANCY 2005     maternal Hx Dx'd age 57,  age 64; pt would like to have her ovaries removed.     GERD (gastroesophageal reflux disease)       Other and unspecified hyperlipidemia 2007     RECURR Depressive disorder -SEVERE 2008              Past Surgical History:    Denies previous upper airway surgery.       Past Surgical History          Past Surgical History:   Procedure Laterality Date     C LIGATE FALLOPIAN TUBE         C NONSPECIFIC PROCEDURE         vaginal repair after delivery     C/SECTION, LOW TRANSVERSE         , Low Transverse     CHOLECYSTECTOMY, LAPOROSCOPIC   2010     Cholecystectomy, Laparoscopic     COLONOSCOPY   2/15/2016     Dr. Marc SILVA     HERNIA REPAIR, INCISIONAL   2010     Laparoscopic                 Social History:              Social History   Substance Use Topics     Smoking status:  Former Smoker       Quit date: 5/30/1993     Smokeless tobacco: Never Used     Alcohol use Yes          Comment: very rarely       Chemical History:              Tobacco: Never smoked              Uses 1 cups/day of coffee. Last caffeine intake is usually before 8 AM.              Supplements for wakefulness: Patient does not use any supplements to stay awake              EtOH: 1 to 2 drinks a week  Recreational Drugs: Patient denies using any recreational drugs      Psych Hx:   PHQ2: Negative                       -Little Interest or pleasure in doing things? 0 - not at all                       -Feeling down, depressed, or hopeless? 0 - not at all     Current dangers to self or others: No. Pt denies any SI / HI, hallucinations, or delusions          Family History:       Family History           Family History   Problem Relation Age of Onset     Hypertension Father       Lipids Father       GASTROINTESTINAL DISEASE Father         vazquez's esophagus fr. reflux      CANCER Mother         ovarian     Arthritis Mother         lupus      Depression Paternal Grandmother         problems with schizophrenia     Arthritis Brother       GASTROINTESTINAL DISEASE Brother         reflux      Colon Cancer Cousin       Ovarian Cancer Other         Maternal great aunt            Sleep Family Hx:        RLS - Aunt                       JESSIE - None reported  Insomnia - None reported  Parasomnia - None reported          Review of Systems:      A complete 10 point review of systems was negative other than HPI or as commented below:      CONSTITUTIONAL: NEGATIVE for weight gain/loss, fever, chills, sweats or night sweats, drug allergies.  EYES: NEGATIVE for changes in vision, blind spots, double vision.  ENT: NEGATIVE for ear pain, sore throat, sinus pain, post-nasal drip, runny nose, bloody nose  CARDIAC: NEGATIVE for fast heartbeats or fluttering in chest, chest pain or pressure, breathlessness when lying flat, swollen legs or  "swollen feet.  NEUROLOGIC: NEGATIVE headaches, weakness or numbness in the arms or legs.  DERMATOLOGIC: NEGATIVE for rashes, new moles or change in mole(s)  PULMONARY: NEGATIVE SOB at rest, SOB with activity, dry cough, productive cough, coughing up blood, wheezing or whistling when breathing.    GASTROINTESTINAL: NEGATIVE for nausea or vomitting, loose or watery stools, fat or grease in stools, constipation, abdominal pain, bowel movements black in color or blood noted.  GENITOURINARY: NEGATIVE for pain during urination, blood in urine, urinating more frequently than usual, irregular menstrual periods.  MUSCULOSKELETAL: NEGATIVE for muscle pain, bone or joint pain, swollen joints.  ENDOCRINE: NEGATIVE for increased thirst or urination, diabetes.  LYMPHATIC: NEGATIVE for swollen lymph nodes, lumps or bumps in the breasts or nipple discharge.          Physical Examination:   /79  Pulse 68  Resp 12  Ht 1.791 m (5' 10.5\")  Wt 98.4 kg (217 lb)  LMP 06/09/2015  SpO2 97%  BMI 30.7 kg/m2    VS: Reviewed and normal.  General: Alert, oriented, not in distress. Dressed casually; Good eye contact; Comfortably sitting in a chair; in no apparent distress  Lungs: both hemithoraces are symmetrical, normal to palpation, no dullness to percussion, auscultation of lungs revealed normal breath sounds with no expirium prolongation, wheezing, rhonci and crackles.  CVS: Normal S1 and S2 heart sounds with no extra heart sounds. No murmur, rubs, or clicks. Normal peripheral pulses throughout with no obvious peripheral edema.  Psychiatry: Mood and affect are appropriate. Euthymic with affect congruent with full range and intensity. No SI/HI with adequate insight and judgement.           Data: All pertinent previous laboratory data reviewed      No results found for: PH, PHARTERIAL, PO2, [id number], SAT, PCO2, HCO3, BASEEXCESS, BENNIE, BEB        Lab Results   Component Value Date     TSH 1.86 04/28/2017     TSH 1.69 03/31/2016    "         Lab Results   Component Value Date     GLC 97 04/28/2017      (H) 03/31/2016            Lab Results   Component Value Date     HGB 13.6 04/28/2017     HGB 13.7 03/31/2016      Lab Results   Component Value Date     BUN 14 04/28/2017     BUN 14 03/31/2016     CR 0.87 04/28/2017     CR 0.84 03/31/2016      Echocardiology:                        -Stress echocardiogram obtained on 01/27/2017 showed normal stress echocardiogram with no evidence of stress-induced ischemia. The baseline showed a normal left ventricular function with a visual EF estimated at 55 - 60%. No obvious significant valvular abnormalities noted.     Chest x-ray:                        -Chest x-ray films obtained on 02/16/2015 showed normal findings.                          -Chest x-ray films obtained on 02/27/2014 showed normal findings.     PFT: None Available     Laboratory Studies:   Component Value Flag Ref Range Units Status Collected Lab   WBC 6.1   4.0 - 11.0 10e9/L Final 04/28/2017 10:50 AM SV   RBC Count 4.41   3.8 - 5.2 10e12/L Final 04/28/2017 10:50 AM SV   Hemoglobin 13.6   11.7 - 15.7 g/dL Final 04/28/2017 10:50 AM SV   Hematocrit 40.6   35.0 - 47.0 % Final 04/28/2017 10:50 AM SV   MCV 92   78 - 100 fl Final 04/28/2017 10:50 AM SV   MCH 30.8   26.5 - 33.0 pg Final 04/28/2017 10:50 AM SV   MCHC 33.5   31.5 - 36.5 g/dL Final 04/28/2017 10:50 AM SV   RDW 12.6   10.0 - 15.0 % Final 04/28/2017 10:50 AM SV   Platelet Count 181   150 - 450 10e9/L Final 04/28/2017 10:50 AM SV      Component Value Flag Ref Range Units Status Collected Lab   Sodium 141   133 - 144 mmol/L Final 04/28/2017 10:50 AM 65   Potassium 4.1   3.4 - 5.3 mmol/L Final 04/28/2017 10:50 AM 65   Chloride 106   94 - 109 mmol/L Final 04/28/2017 10:50 AM 65   Carbon Dioxide 23   20 - 32 mmol/L Final 04/28/2017 10:50 AM 65   Anion Gap 12   3 - 14 mmol/L Final 04/28/2017 10:50 AM 65   Glucose 97   70 - 99 mg/dL Final 04/28/2017 10:50 AM 65   Comment:   Fasting  specimen   Urea Nitrogen 14   7 - 30 mg/dL Final 04/28/2017 10:50 AM 65   Creatinine 0.87   0.52 - 1.04 mg/dL Final 04/28/2017 10:50 AM 65   GFR Estimate 69   >60 mL/min/1.7m2 Final 04/28/2017 10:50 AM 65   Comment:   Non  GFR Calc   GFR Estimate If Black 83   >60 mL/min/1.7m2 Final 04/28/2017 10:50 AM 65   Comment:   African American GFR Calc   Calcium 9.3   8.5 - 10.1 mg/dL Final 04/28/2017 10:50 AM 65   Bilirubin Total 0.4   0.2 - 1.3 mg/dL Final 04/28/2017 10:50 AM 65   Albumin 4.0   3.4 - 5.0 g/dL Final 04/28/2017 10:50 AM 65   Protein Total 7.5   6.8 - 8.8 g/dL Final 04/28/2017 10:50 AM 65   Alkaline Phosphatase 75   40 - 150 U/L Final 04/28/2017 10:50 AM 65   ALT 37   0 - 50 U/L Final 04/28/2017 10:50 AM 65   AST 20   0 - 45 U/L Final 04/28/2017 10:50 AM 65      Component Value Flag Ref Range Units Status Collected Lab   TSH 1.86   0.40 - 4.00 mU/L Final 04/28/2017 10:50 AM 65      Component Value Flag Ref Range Units Status Collected Lab   Vitamin D Deficiency screening 27   20 - 75 ug/L Final 04/28/2017 10:50 AM 51      Component Value Flag Ref Range Units Status Collected Lab    9   0 - 30 U/mL Final 04/28/2017 10:50 AM 51      Component Value Flag Ref Range Units Status Collected Lab   Sed Rate 12   0 - 20 mm/h Final 04/28/2017 10:50 AM SV      Component Value Flag Ref Range Units Status Collected Lab   CRP Inflammation <2.9   0.0 - 8.0 mg/L Final 04/28/2017 10:50 AM 51      Component Value Flag Ref Range Units Status Collected Lab   Rheumatoid Factor <20   <20 IU/mL Final 04/28/2017 10:50 AM 51      Component Value Flag Ref Range Units Status Collected Lab   Cyclic Citrullinated Peptide Antibody, IgG 1   <7 U/mL Final 04/28/2017 10:50 AM 51      Component Value Flag Ref Range Units Status Collected Lab   CK Total 139   30 - 225 U/L Final 04/28/2017 10:50 AM 65      Jorge Amaro MD, MPH  Clinical Sleep Medicine     [facility] [address],  [place] [address]   [phone] Clinic     Total time spent with patient: 40 min. Over >50% of the time was spent for face to face counseling, education, and evaluation.

## 2017-10-31 NOTE — NURSING NOTE
"Chief Complaint   Patient presents with     RECHECK     f/u Pst 10/6       Initial /79  Pulse 68  Resp 12  Ht 1.791 m (5' 10.5\")  Wt 98.4 kg (217 lb)  LMP 06/09/2015  SpO2 97%  BMI 30.7 kg/m2 Estimated body mass index is 30.7 kg/(m^2) as calculated from the following:    Height as of this encounter: 1.791 m (5' 10.5\").    Weight as of this encounter: 98.4 kg (217 lb).  Medication Reconciliation: complete         Lien Crenshaw LPN/NONA  "

## 2017-10-31 NOTE — PATIENT INSTRUCTIONS
Your BMI is Body mass index is 30.7 kg/(m^2).  Weight management is a personal decision.  If you are interested in exploring weight loss strategies, the following discussion covers the approaches that may be successful. Body mass index (BMI) is one way to tell whether you are at a healthy weight, overweight, or obese. It measures your weight in relation to your height.  A BMI of 18.5 to 24.9 is in the healthy range. A person with a BMI of 25 to 29.9 is considered overweight, and someone with a BMI of 30 or greater is considered obese. More than two-thirds of [place] adults are considered overweight or obese.  Being overweight or obese increases the risk for further weight gain. Excess weight may lead to heart disease and diabetes.  Creating and following plans for healthy eating and physical activity may help you improve your health.  Weight control is part of healthy lifestyle and includes exercise, emotional health, and healthy eating habits. Careful eating habits lifelong are the mainstay of weight control. Though there are significant health benefits from weight loss, long-term weight loss with diet alone may be very difficult to achieve- studies show long-term success with dietary management in less than 10% of people. Attaining a healthy weight may be especially difficult to achieve in those with severe obesity. In some cases, medications, devices and surgical management might be considered.  What can you do?  If you are overweight or obese and are interested in methods for weight loss, you should discuss this with your provider.     Consider reducing daily calorie intake by 500 calories.     Keep a food journal.     Avoiding skipping meals, consider cutting portions instead.    Diet combined with exercise helps maintain muscle while optimizing fat loss. Strength training is particularly important for building and maintaining muscle mass. Exercise helps reduce stress, increase energy, and improves  fitness. Increasing exercise without diet control, however, may not burn enough calories to loose weight.       Start walking three days a week 10-20 minutes at a time    Work towards walking thirty minutes five days a week     Eventually, increase the speed of your walking for 1-2 minutes at time    In addition, we recommend that you review healthy lifestyles and methods for weight loss available through the National Institutes of Health patient information sites:  http://win.niddk.nih.gov/publications/index.htm    And look into health and wellness programs that may be available through your health insurance provider, employer, local community center, or blayne club.    Weight management plan: Patient was referred to their PCP to discuss a diet and exercise plan.           Your blood pressure was checked while you were in clinic today.  Please read the guidelines below about what these numbers mean and what you should do about them.  Your systolic blood pressure is the top number.  This is the pressure when the heart is pumping.  Your diastolic blood pressure is the bottom number.  This is the pressure in between beats.  If your systolic blood pressure is less than 120 and your diastolic blood pressure is less than 80, then your blood pressure is normal. There is nothing more that you need to do about it  If your systolic blood pressure is 120-139 or your diastolic blood pressure is 80-89, your blood pressure may be higher than it should be.  You should have your blood pressure re-checked within a year by a primary care provider.  If your systolic blood pressure is 140 or greater or your diastolic blood pressure is 90 or greater, you may have high blood pressure.  High blood pressure is treatable, but if left untreated over time it can put you at risk for heart attack, stroke, or kidney failure.  You should have your blood pressure re-checked by a primary care provider within the next four weeks.      MY TREATMENT  "INFORMATION FOR SLEEP APNEA -  Mallory Sargent    DOCTOR: Jorge Amaro MD, MPH  SLEEP CENTER : [facility]         If I haven't had a sleep study yet, what can I expect?  A personal story from Octavio  https://www.XL Video.com/watch?v=AxPLmlRpnCs      Am I having a home sleep study?  Here is a video in case you get home and want to make sure you have done it correctly  https://www.GirlsAskGuys.comube.com/watch?v=AOO9A6nLlb5&feature=youtu.be      Frequently asked questions:  1. What is Obstructive Sleep Apnea (JESSIE)? JESSIE is the most common type of sleep apnea. Apnea literally means, \"without breath.\" It is characterized by repetitive pauses in breathing, despite continued effort to breathe, and is usually associated with a reduction in blood oxygen saturation. Apneas can last 10 to over 60 seconds. It is caused by narrowing or collapse of the upper airway as muscles relax during sleep. Severity of sleep apnea is determined by frequency of breathing events and their effect on your sleep and oxygen levels determined during sleep testing.     2. What are the consequences of JESSIE? Symptoms include: daytime sleepiness- possibly increasing the risk of falling asleep while driving, unrefreshing/restless sleep, snoring, insomnia, waking frequently to urinate, waking with heartburn or reflux, reduced concentration and memory, and morning headaches. Other health consequences may include development of high blood pressure and other cardiovascular disease in persons who are susceptible. Untreated JESSIE  can contribute to heart disease, stroke and diabetes.     3. What are the treatment options? In most situations, sleep apnea is a lifelong disease that must be managed with daily therapy. Medications are not effective for sleep apnea and surgery is generally not performed until other therapies have been tried. Therapy is usually tailored to the individual patient based on many factors including your wishes as well as " severity of sleep apnea and severity of obesity. Continuous Positive Airway (CPAP) is the most reliable treatment. An oral device to hold your jaw forward is usually the next most reliable option. Other options include postioning devices (to keep you off your back), weight loss, and surgery including a tongue pacing device. There is more detail about some of these options below.            1. CPAP-  WHAT DOES IT DO AND HOW CAN I LEARN TO WEAR IT?                               BEFORE I START, CAN I WATCH A MOVIE TO GET A PLAN ON HOW TO USE CPAP?  https://www.Arctrieval.com/watch?h=c7Y37wp741Q      Continuous positive airway pressure, or CPAP, is the most effective treatment for obstructive sleep apnea. It works by blowing room air, through a mask, to hold your throat open. A decision to use CPAP is a major step forward in the pursuit of a healthier life. The successful use of CPAP will help you breathe easier, sleep better and live healthier. You can choose CPAP equipment from any durable medical equipment provider that meets your needs.  Using CPAP can be a positive experience if you keep these ching points in mind:  1. Commitment  CPAP is not a quick fix for your problem. It involves a long-term commitment to improve your sleep and your health.    2. Communication  Stay in close communication with both your sleep doctor and your CPAP supplier. Ask lots of questions and seek help when you need it.    3. Consistency  Use CPAP all night, every night and for every nap. You will receive the maximum health benefits from CPAP when you use it every time that you sleep. This will also make it easier for your body to adjust to the treatment.    4. Correction  The first machine and mask that you try may not be the best ones for you. Work with your sleep doctor and your CPAP supplier to make corrections to your equipment selection. Ask about trying a different type of machine or mask if you have ongoing problems. Make sure that  "your mask is a good fit and learn to use your equipment properly.    5. Challenge  Tell a family member or close friend to ask you each morning if you used your CPAP the previous night. Have someone to challenge you to give it your best effort.    6. Connection   Your adjustment to CPAP will be easier if you are able to connect with others who use the same treatment. Ask your sleep doctor if there is a support group in your area for people who have sleep apnea, or look for one on the Internet.  7. Comfort   Increase your level of comfort by using a saline spray, decongestant or heated humidifier if CPAP irritates your nose, mouth or throat. Use your unit's \"ramp\" setting to slowly get used to the air pressure level. There may be soft pads you can buy that will fit over your mask straps. Look on www.CPAP.com for accessories that can help make CPAP use more comfortable.  8. Cleaning   Clean your mask, tubing and headgear on a regular basis. Put this time in your schedule so that you don't forget to do it. Check and replace the filters for your CPAP unit and humidifier.    9. Completion   Although you are never finished with CPAP therapy, you should reward yourself by celebrating the completion of your first month of treatment. Expect this first month to be your hardest period of adjustment. It will involve some trial and error as you find the machine, mask and pressure settings that are right for you.    10. Continuation  After your first month of treatment, continue to make a daily commitment to use your CPAP all night, every night and for every nap.    CPAP-Tips to starting with success:  Begin using your CPAP for short periods of time during the day while you watch TV or read.    Use CPAP every night and for every nap. Using it less often reduces the health benefits and makes it harder for your body to get used to it.    Make small adjustments to your mask, tubing, straps and headgear until you get the right fit. " Tightening the mask may actually worsen the leak.  If it leaves significant marks on your face or irritates the bridge of your nose, it may not be the best mask for you.  Speak with the person who supplied the mask and consider trying other masks. Insurances will allow you to try different masks during the first month of starting CPAP.  Insurance also covers a new mask, hose and filter about every 6 months.    Use a saline nasal spray to ease mild nasal congestion. Neti-Pot or saline nasal rinses may also help. Nasal gel sprays can help reduce nasal dryness.  Biotene mouthwash can be helpful to protect your teeth if you experience frequent dry mouth.  Dry mouth may be a sign of air escaping out of your mouth or out of the mask in the case of a full face mask.  Speak with your provider if you expect that is the case.     Take a nasal decongestant to relieve more severe nasal or sinus congestion.  Do not use Afrin (oxymetazoline) nasal spray more than 3 days in a row.  Speak with your sleep doctor if your nasal congestion is chronic.    Use a heated humidifier that fits your CPAP model to enhance your breathing comfort. Adjust the heat setting up if you get a dry nose or throat, down if you get condensation in the hose or mask.  Position the CPAP lower than you so that any condensation in the hose drains back into the machine rather than towards the mask.    Try a system that uses nasal pillows if traditional masks give you problems.    Clean your mask, tubing and headgear once a week. Make sure the equipment dries fully.    Regularly check and replace the filters for your CPAP unit and humidifier.    Work closely with your sleep provider and your CPAP supplier to make sure that you have the machine, mask and air pressure setting that works best for you. It is better to stop using it and call your provider to solve problems than to lay awake all night frustrated with the device.      BESIDES CPAP, WHAT OTHER THERAPIES  ARE THERE?        Positioning Device  Positioning devices are generally used when sleep apnea is mild and only occurs on your back.This example shows a pillow that straps around the waist. It may be appropriate for those whose sleep study shows milder sleep apnea that occurs primarily when lying flat on one's back. Preliminary studies have shown benefit but effectiveness at home may need to be verified by a home sleep test. These devices are generally not covered by medical insurance.                        Oral Appliance  What is oral appliance therapy?  An oral appliance is a small acrylic device that fits over the upper and lower teeth or tongue (similar to an orthodontic retainer or a mouth guard). This device slightly advances the lower jaw or tongue, which moves the base of the tongue forward, opens the airway, improves breathing and can effectively treat snoring and obstructive sleep apnea sleep apnea. The appliance is fabricated and customized by a qualified dentist with experience in treating snoring and sleep apnea. Oral appliances are usually well tolerated and have relatively high compliance by patients1, 2, 3.  When is an oral appliance indicated?  Oral appliance therapy is recommended as a first-line treatment for patients with primary snoring, mild sleep apnea, and for patients with moderate sleep apnea who prefer appliance therapy to use of CPAP4, 5. Severity of sleep apnea is determined by sleep testing and is based on the number of respiratory events per hour of sleep.   How successful is oral appliance therapy?  The success rate of oral appliance therapy in patients with mild sleep apnea is 75-80% while in patients with moderate sleep apnea it is 50-70%. The chance of success in patients with severe sleep apnea is 40-50%. The research also shows that oral appliances have a beneficial effect on the cardiovascular health of JESSIE patients at the same magnitude as CPAP therapy7.  Oral appliances should  be a second-line treatment in cases of severe sleep apnea, but if not completely successful then a combination therapy utilizing CPAP plus oral appliance therapy may be effective. Oral appliances tend to be effective in a broad range of patients although studies show that the patients who have the highest success are females, younger patients, those with milder disease, and less severe obesity. 3, 6.   The chances of success are lower in patients who have more severe JESSIE, are older, and those who are morbidly obese.     Example of an oral appliance   Finding a dentist that practices dental sleep medicine  Specific training is available through the American Academy of Dental Sleep Medicine for dentists interested in working in the field of sleep. To find a dentist who is educated in the field of sleep and the use of oral appliances, near you, visit the Web site of the American Academy of Dental Sleep Medicine; also see   http://www.accpstorage.org/newOrganization/patients/oralAppliances.pdf  To search for a dentist certified in these practices:  Http://aadsm.org/FindADentist.aspx?1  1. Milana et al. Objectively measured vs self-reported compliance during oral appliance therapy for sleep-disordered breathing. Chest 2013; 144(5): 4780-0336.  2. Gerri, et al. Objective measurement of compliance during oral appliance therapy for sleep-disordered breathing. Thorax 2013; 68(1): 91-96.  3. Ivon et al. Mandibular advancement devices in 620 men and women with JESSIE and snoring: tolerability and predictors of treatment success. Chest 2004; 125: 3482-6622.  4. Kristopher, et al. Oral appliances for snoring and JESSIE: a review. Sleep 2006; 29: 244-262.  5. Bentley et al. Oral appliance treatment for JESSIE: an update. J Clin Sleep Med 2014; 10(2): 215-227.  6. Jo et al. Predictors of OSAH treatment outcome. J Dent Res 2007; 86: 5901-2932.      Weight Loss:    Weight management is a personal decision.  If you are  interested in exploring weight loss strategies, the following discussion covers the impact on weight loss on sleep apnea and the approaches that may be successful.    Weight loss decreases severity of sleep apnea in most people with obesity. For those with mild obesity who have developed snoring with weight gain, even 15-30 pound weight loss can improve and occasionally eliminate sleep apnea.  Structured and life-long dietary and health habits are necessary to lose weight and keep healthier weight levels.     Though there may be significant health benefits from weight loss, long-term weight loss is very difficult to achieve- studies show success with dietary management in less than 10% of people. In addition, substantial weight loss may require years of dietary control and may be difficult if patients have severe obesity. In these cases, surgical management may be considered.  Finally, older individuals who have tolerated obesity without health complications may be less likely to benefit from weight loss strategies.    Your BMI is Body mass index is 30.7 kg/(m^2).  Body mass index (BMI) is one way to tell whether you are at a healthy weight, overweight, or obese. It measures your weight in relation to your height.  A BMI of 18.5 to 24.9 is in the healthy range. A person with a BMI of 25 to 29.9 is considered overweight, and someone with a BMI of 30 or greater is considered obese. More than two-thirds of American adults are considered overweight or obese.  Being overweight or obese increases the risk for further weight gain. Excess weight may lead to heart disease and diabetes.  Creating and following plans for healthy eating and physical activity may help you improve your health.  Weight control is part of healthy lifestyle and includes exercise, emotional health, and healthy eating habits. Careful eating habits lifelong are the mainstay of weight control. Though there are significant health benefits from weight  loss, long-term weight loss with diet alone may be very difficult to achieve- studies show long-term success with dietary management in less than 10% of people. Attaining a healthy weight may be especially difficult to achieve in those with severe obesity. In some cases, medications, devices and surgical management might be considered.  What can you do?  If you are overweight or obese and are interested in methods for weight loss, you should discuss this with your provider.     Consider reducing daily calorie intake by 500 calories.     Keep a food journal.     Avoiding skipping meals, consider cutting portions instead.    Diet combined with exercise helps maintain muscle while optimizing fat loss. Strength training is particularly important for building and maintaining muscle mass. Exercise helps reduce stress, increase energy, and improves fitness. Increasing exercise without diet control, however, may not burn enough calories to loose weight.       Start walking three days a week 10-20 minutes at a time    Work towards walking thirty minutes five days a week     Eventually, increase the speed of your walking for 1-2 minutes at time    In addition, we recommend that you review healthy lifestyles and methods for weight loss available through the National Institutes of Health patient information sites:  http://win.niddk.nih.gov/publications/index.htm    And look into health and wellness programs that may be available through your health insurance provider, employer, local community center, or bl[employer]ne club.    Surgery:    Upper Airway Surgery for JESSIE  Surgery for JESSIE is a second-line treatment option in the management of sleep apnea.  Surgery should be considered for patients who are having a difficult time tolerating CPAP.    Surgery for JESSIE is directed at areas that are responsible for narrowing or complete obstruction of the airway during sleep.  There are a wide range of procedures available to enlarge and/or  stabilize the airway to prevent blockage of breathing in the three major areas where it can occur: the palate, tongue, and nasal regions.  Successful surgical treatment depends on the accurate identification of the factors responsible for obstructive sleep apnea in each person.  A personalized approach is required because there is no single treatment that works well for everyone.  Because of anatomic variation, consultation with an examination by a sleep surgeon is a critical first step in determining what surgical options are best for each patient.  In some cases, examination during sedation may be recommended in order to guide the selection of procedures.  Patients will be counseled about risks and benefits as well as the typical recovery course after surgery. Surgery is typically not a cure for a person s JESSIE.  However, surgery will often significantly improve one s JESSIE severity (termed  success rate ).  Even in the absence of a cure, surgery will decrease the cardiovascular risk associated with OSA7; improve overall quality of life8 (sleepiness, functionality, sleep quality, etc).          Palate Procedures:  Patients with JESSIE often have narrowing of their airway in the region of their tonsils and uvula.  The goals of palate procedures are to widen the airway in this region as well as to help the tissues resist collapse.  Modern palate procedure techniques focus on tissue conservation and soft tissue rearrangement, rather than tissue removal.  Often the uvula is preserved in this procedure. Residual sleep apnea is common in patient after pharyngoplasty with an average reduction in sleep apnea events of 33%2.      Tongue Procedures:  While patients are awake, the muscles that surround the throat are active and keep this region open for breathing. These muscles relax during sleep, allowing the tongue and other structures to collapse and block breathing.  There are several different tongue procedures available.   Selection of a tongue base procedure depends on characteristics seen on physical exam.  Generally, procedures are aimed at removing bulky tissues in this area or preventing the back of the tongue from falling back during sleep.  Success rates for tongue surgery range from 50-62%3.    Hypoglossal Nerve Stimulation:  Hypoglossal nerve stimulation has recently received approval from the [place] Food and Drug Administration for the treatment of obstructive sleep apnea.  This is based on research showing that the system was safe and effective in treating sleep apnea6.  Results showed that the median AHI score decreased 68%, from 29.3 to 9.0. This therapy uses an implant system that senses breathing patterns and delivers mild stimulation to airway muscles, which keeps the airway open during sleep.  The system consists of three fully implanted components: a small generator (similar in size to a pacemaker), a breathing sensor, and a stimulation lead.  Using a small handheld remote, a patient turns the therapy on before bed and off upon awakening.    Candidates for this device must be greater than 22 years of age, have moderate to severe JESSIE (AHI between 20-65), BMI less than 32, have tried CPAP/oral appliance without success, and have appropriate upper airway anatomy (determined by a sleep endoscopy performed by Dr. Aguilera).    Hypoglossal Nerve Stimulation Pathway:    The sleep surgeon s office will work with the patient through the insurance prior-authorization process (including communications and appeals).    Nasal Procedures:  Nasal obstruction can interfere with nasal breathing during the day and night.  Studies have shown that relief of nasal obstruction can improve the ability of some patients to tolerate positive airway pressure therapy for obstructive sleep apnea1.  Treatment options include medications such as nasal saline, topical corticosteroid and antihistamine sprays, and oral medications such as  antihistamines or decongestants. Non-surgical treatments can include external nasal dilators for selected patients. If these are not successful by themselves, surgery can improve the nasal airway either alone or in combination with these other options.    Combination Procedures:  Combination of surgical procedures and other treatments may be recommended, particularly if patients have more than one area of narrowing or persistent positional disease.  The success rate of combination surgery ranges from 66-80%2,3.      1. Prisca PITTS. The Role of the Nose in Snoring and Obstructive Sleep Apnoea: An Update.  Eur Arch Otorhinolaryngol. 2011; 268: 1365-73.  2.  John SM; Nils JA; Melinda JR; Pallanch JF; Hope MB; Soham SG; Veto GONZALEZ. Surgical modifications of the upper airway for obstructive sleep apnea in adults: a systematic review and meta-analysis. SLEEP 2010;33(10):5017-0170. Gerardo KESSLER. Hypopharyngeal surgery in obstructive sleep apnea: an evidence-based medicine review.  Arch Otolaryngol Head Neck Surg. 2006 Feb;132(2):206-13.  3. Bam ADAMSH1, Britt Y, Michael SHLOMO. The efficacy of anatomically based multilevel surgery for obstructive sleep apnea. Otolaryngol Head Neck Surg. 2003 Oct;129(4):327-35.  4. Gerardo KESSLER, Goldberg A. Hypopharyngeal Surgery in Obstructive Sleep Apnea: An Evidence-Based Medicine Review. Arch Otolaryngol Head Neck Surg. 2006 Feb;132(2):206-13.  5. Day BULLOCK et al. Upper-Airway Stimulation for Obstructive Sleep Apnea.  N Engl J Med. 2014 Jan 9;370(2):139-49.  6. Kamla Y et al. Increased Incidence of Cardiovascular Disease in Middle-aged Men with Obstructive Sleep Apnea. Am J Respir Crit Care Med; 2002 166: 159-165  7. Zita RIDDLE et al. Studying Life Effects and Effectiveness of Palatopharyngoplasty (SLEEP) study: Subjective Outcomes of Isolated Uvulopalatopharyngoplasty. Otolaryngol Head Neck Surg. 2011; 144: 623-631.  [place] [facility]    Good sleeping habits are a ching part of  treatment. If needed, and only in very select cases, some medications may help you sleep better at first. Making healthy lifestyle changes and learning to relax can improve your overall sleep in the long run. As many long term changes and treatments, treating insomnia takes commitment and hard work, but trust that your efforts will pay off.  We are recommending treatment of insomnia based on your history of sleep problems and/or use of medications for sleeping.  There are many treatment options available and we want to work with you to find the right treatment for you.  The following recommendations can be helpful for some people.        Sleep Hygiene     Sometimes insomnia can be made worse by our own habits or situation.  You should consider making some of the following changes to help improve your sleep:       If there is excessive noise in your house / neighborhood use a fan or similar device to make a quiet background noise that can drown out other noises.    If your room is too bright early in the morning, hang a blanket or extra curtain over the windows to keep the light out or use a sleeping mask to cover your eyes.    If your room is too warm during the night, set the temperature in the house down a few degrees for the night.    Spend a little time before bedtime trying to relax.  Do not work right up until bedtime.  Take 30-60 minutes to relax and unwind before bedtime with a book or watching TV.    Do not drink anything with alcohol or caffeine in them for at least 4 to 5 hours before bedtime.    Do not smoke right before bedtime or during the night.    No strenuous exercise for 2-3 hours before bedtime.      In addition to the abovementioned recommendations, cognitive behavioral treatment for insomnia (CBTI) is a very effective technique that involves changing your behaviors and thoughts associated with sleep. In fact, CBTI is the most effective treatment for long-term insomnia. This treatment modality  tries to address the underlying causes of your sleep problems, including your habits and how you think about sleep.    People that are motivated to make changes in their sleep pattern are likely to benefit from internet based cognitive behavioral treatment for insomnia. Some internet CBTI programs include but are not limited to www.charity: water or wwwLegalReach.  There is a fee for using these programs that is similar to actually seeing an insomnia expert. By entering the code  [employer]  it will allow us to know if you find these services useful.        Online Programs for CBTI       www.charity: water (pronounced shut eye) - There is a fee for this program.    www.sleepIO.com (pronounced sleep ee oh) - There is a fee for this program. Enter the code  [employer]  if you decide to enroll in this program.       In addition to the internet programs provided, self-help strategies for treatment of insomnia can be found also in books.  Several treatment books for insomnia are listed on our patient treatment resources.  Some of these include the following books:       Suggested Resources - Insomnia Treatment Books       Overcoming Insomnia  by Tapan Humphries and Samra Devlin (2008)     No More Sleepless Nights  by Kristofer Kim and Valencia Alcantar (1996)     Say Lupe to Insomnia  by Darnell Mcdonnell (2009)     The Insomnia Workbook  by Mallory Stark and Jacoby Flynn (2009)     The Insomnia Answer  by Gucci Brewer and Estevan Delong (2006)     These recommendations are a first step in treating insomnia.  For many people these recommendations can be helpful while for others they are just the start. There are many treatments available for insomnia and our goal is to keep re-evaluating your progress and try other options if these first steps are not successful.  It has been demonstrated that, in the long run, CBTI modalities are more effective than any medication for insomnia. As such, if the above the recommendations do  not help, you may benefit from scheduling an appointment with an insomnia expert (we can assess this during follow up visits).      Healthy Lifestyle  As it was mentioned, your lifestyle directly affects your health and your sleep patterns. Here are some healthy habits that you should consider:    Keep a regular sleep schedule, always going to bed and getting up at the same time each day.    Exercise regularly. It may help you reduce stress. However, remember to avoid strenuous exercise for two to four hours before bedtime.    Limit, or if possible, avoid all together naps.    Use your bed only for sleep and sex. This means, no reading, watching TV, using tablets or phones, or doing any other activities in bed.    Do not spend too much time in bed trying to fall asleep. If you cannot fall asleep within 20 to 30 minutes, get up and do something relaxing until you become tired and drowsy again. Do not expose yourself to bright lights or perform complex activities (e.g., no house cleaning, watching TV, or working on homework).    Avoid or limit caffeine and nicotine. They can keep you awake at night. Also avoid alcohol. It may help you fall asleep at first, but your sleep will not be restful.      Before Bedtime  In addition to keeping some healthy habits, it is important to know what do to before going to sleep. As such, to also sleep better every night, try these tips:    Have a bedtime routine to let your body and mind know when it is time to sleep.    Going to bed should be relaxing so try to do only relaxing things around bedtime. Sleep will come sooner.    If your worries do not let you sleep, write them down in a diary. Then close it and go to bed.    As previously mentioned, make sure the room is not too hot or too cold. If it is not dark enough, an eye mask may help. If it is noisy, try using earplugs.      Learn to Relax  In addition to poor habits, stress, anxiety, and body tension may play a large role in  your inability to sleep through the night. In fact, these factors may keep you awake at night. To unwind before bedtime, try reading a book, meditation, or even yoga. Also, try the following:    Deep breathing: Sit or lie back in a chair. Take a slow, deep breath. Hold it for 5 counts. Then breathe out slowly through your mouth. Keep doing this until you feel relaxed.    Imagery: Think of the last fun trip you took. In your mind, walk through the trip from start to finish. Put as much detail into the memory as you can remember. It will also help you relax.      Suggested Resources    Insomnia Treatment Books      Overcoming Insomnia  by Tapan Humphries and Samra Devlin (2008)     No More Sleepless Nights  by Kristofer Kim and Valencia Alcantar (1996)     Say Lupe to Insomnia  by Darnell Mcdonnell (2009)     The Insomnia Workbook  by Mallory Stark and Jacoby Flynn (2009)     The Insomnia Answer  by Gucci Brewer and Estevan Delong (2006)    Stress Management and Relaxation Books    The Relaxation and Stress Reduction Workbook by Annie Zaman, Odette Edmonds and Tahir De Los Santos (2008)    Stress Management Workbook: Techniques and Self-Assessment Procedures by Kylie Andrews and Oren Romano (1997)    A Mindfulness-Based Stress Reduction Workbook by Enoch Barajas and Michelle Galarza (2010)    The Complete Stress Management Workbook by Jose De La Torre and Simeon Valderrama (1996)    Assert Yourself by Ivelisse Cadena and Onofre Cadena (1977)    Relaxation Resources for Computer Download   These websites offer resources to help you relax. This list is for information only. [facility] is not responsible for the quality of services or the actions of any person or organization.    Progressive Muscle Relaxation (PMR):     http://www.innerSovio.com/progressive-muscle-relaxation-exercise.html      http://studentsupport.[facility]/counseling/resources/self-help/relaxation-and-stress-management/     Deep Breathing Exercises:    http://www.Sift/breathing-awareness.html     Meditation:     www.fragrantheart.[employer]OVE [employer]    www.Domain SurgicalguidedUnited Travel TechnologiesmeditationUnited Travel Technologiessite.ABOVE Solutions (you may have to pay for some of these resources)    Guided Imagery:    http://www.Sift/guided-imagery-scripts.html     http://Habitissimo/library/ipnvzmpsii-wbzrzt-wjoamsm/     Counseling / Behavioral Health    [facility]    Visit [email or web address] or call [phone] to find a clinic close to you.      This is not a prescription and these resources are optional. You must pay for any costs when using these resources. Please ask your insurance carrier if you can be reimbursed for these resources. If so, you are responsible for sending the needed details to your insurance carrier. These resources may also be tax deductible as medical expenses. Check with your .     These programs and publications are not affiliated in any way with [facility].    Stress, tension, anxiety and depression can be big problems that contribute to insomnia.  If you can t relax your mind and body, then you won t be able to sleep.  You seem to have a high level of stress in your life and would likely benefit from professional stress reduction / anxiety management strategies and should contact [facility] at [phone] to schedule an appointment.       If you are experiencing extreme anxiety or distress due to insomnia symptoms and feel that you need immediate assistance, please contact the [facility] at [phone].    Please, call sleep psychologist for appointment. Call sleep dentist for also an appointment. Give us a call when you are ready for a assessment test with dental device in place. Let us know how your insomnia is doing so we can determine the best  test option for you.    Thank you so much!    Sincerely,    Jorge Amaro MD, MPH  Clinical Sleep Medicine

## 2017-11-22 ENCOUNTER — OFFICE VISIT (OUTPATIENT)
Dept: FAMILY MEDICINE | Facility: CLINIC | Age: 52
End: 2017-11-22
Payer: COMMERCIAL

## 2017-11-22 DIAGNOSIS — L21.9 SEBORRHEIC DERMATITIS: ICD-10-CM

## 2017-11-22 DIAGNOSIS — Z80.8 FAMILY HISTORY OF BASAL CELL CARCINOMA: ICD-10-CM

## 2017-11-22 DIAGNOSIS — L81.2 EPHELIDES: ICD-10-CM

## 2017-11-22 DIAGNOSIS — Z12.83 SKIN CANCER SCREENING: Primary | ICD-10-CM

## 2017-11-22 PROCEDURE — 99214 OFFICE O/P EST MOD 30 MIN: CPT | Performed by: FAMILY MEDICINE

## 2017-11-22 RX ORDER — BETAMETHASONE DIPROPIONATE 0.5 MG/ML
LOTION, AUGMENTED TOPICAL 2 TIMES DAILY
Qty: 60 ML | Refills: 1 | Status: SHIPPED | OUTPATIENT
Start: 2017-11-22 | End: 2019-01-02

## 2017-11-22 NOTE — LETTER
11/22/2017         RE: Mallory Sargent  [address]  [place] [address]        Dear Colleague,    Thank you for referring your patient, Mallory Sargent, to the [facility] - PRIMARY CARE SKIN. Please see a copy of my visit note below.    [facility]IMARY [facility]ARE [facility]    CC : skin cancer screening (full-body)  SUBJECTIVE:                                                    Mallory Sargent is a 52 year old female who presents to clinic today for a full-body skin exam because of her family history of skin cancer. No bothersome lesions noticed by the patient or other skin concerns.    Issue Two : Scalp irritation has persisted. She has been prescribed betamethasone dipropionate 0.05% lotion and ketoconazole 2% shampoo previously for control of symptoms. Symptoms have recurred since she ran out of medication. She has been using OTC T/Sal shampoo, but symptoms are not controlled at this time.    Issue Three : She also requests consultation of facial skin treatments for skin aging.    Personal history of skin cancer : NO.  Family history of skin cancer : YES - basal cell carcinoma in maternal grandmother.    Sun Exposure History  Previous history of significant sun exposure:  Blistering sunburns : YES - when younger  Tanning beds : YES.  Sunscreen Use : YES.    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    Refer to electronic medical record (EMR) for past medical history and medications.    INTEGUMENTARY/SKIN: NEGATIVE for worrisome rashes, moles or lesions  ROS : 14 point review of systems was negative except the symptoms listed above in the HPI.    This document serves as a record of the services and decisions personally performed and made by Yuridia Gray MD. It was created on her behalf by Jose Dumas, a trained medical scribe.  The creation of this document is based on the scribe's personal observations and the provider's statements to the medical scribe.  Jose Dumas,  November 22, 2017 8:32 AM      OBJECTIVE:                                                    GENERAL: healthy, alert and no distress  SKIN: Lloyd Skin Type - I.  This patient was examined from the top of the head to the bottom of the feet  including scalp, face, neck, back, chest, breasts, buttocks, both arms, both legs, both hands, both feet, all 10 fingers and all 10 toes. The dermatoscope was used to help evaluate pigmented lesions.  Skin Pertinent Findings:  Arms, upper chest, upper back : multiple small, brown macule(s), consistent with ephelides.    Arms, legs, back : scattered 1 mm - 2 mm in size, brown macules most consistent with benign (melanocytic nevi).    Significant Findings:  Right parietal frontal scalp : scaly erythematous plaque with minimal scaling.    Elbows, knees : no eruption.    Diagnostic Test Results:  none           ASSESSMENT:                                                      Encounter Diagnoses   Name Primary?     Skin cancer screening Yes     Family history of basal cell carcinoma      Seborrheic dermatitis      Ephelides          PLAN:                                                    Patient Instructions   FUTURE APPOINTMENTS  Follow up in 1 year(s) for a full-body skin cancer screening.    SHAMPOO INSTRUCTIONS  Consider alternating use of shampoos with different active ingredients every 4 week(s).   Coal Tar shampoos : Neutrogena T/Gel, Denorex, DHS Tar, Ionil-T, Tegrin, X-Seb T, Zetar  Zinc Pyrithione shampoos : Head & Shoulders, Denorex Advance Formula, DHS Zinc, Zincon  Salicylic Acid shampoos : Neutrogena T/Sal, DHS Sal, Ionil, P&S, Sebulex, X-Seb  Selenium Sulfide shampoos : Selsun Blue shampoo  Sulfur shampoos : Sebulex    TOPICAL STEROID INSTRUCTIONS  Augmented betamethasone dipropionate 0.05% lotion.  1. Apply a few drops and rub into the affected areas on the scalp, two times per day for 1 week  2. After 1 week, decrease to once daily application for 1 more  "week.  3. After the 2 weeks, decrease to use only as needed.    Not to be used on face or groin.    After initial treatment, topical steroid may be used as needed for flare-ups.    Topical steroid use is for short-term treatment only. If after initial treatment, you are using this for prolonged periods of time, return to clinic for re-evaluation of treatment.    SUN PROTECTION INSTRUCTIONS  Sun damage can lead to skin cancer and premature aging of the skin.      The best way to protect from sun damage to your skin is to avoid the sun during peak hours (10 am - 2 pm) even on overcast days.      Use UPF sun-protective clothing, which while more expensive initially provides longer lasting coverage without having to worry about remembering to re-apply.  1. Wear a wide-brimmed hat and sunglasses.   2. Wear sun-protective clothing.  [employer] and other [employer] make sun protective clothing that are stylish, comfortable and cool. [employer] and other [employer] make UV arm sleeves suitable for golfing, gardening and other activities.      Sunscreen instructions:  1. Use sunscreens with Zinc Oxide, Titanium Dioxide or Avobenzone to protect from UVA rays.  2. Use SPF 30-50+ to protect from UVB rays.  3. Re-apply every 2 hours even if water resistant.  4. Apply on your face every day even when cloudy and even in the winter. UVA \"aging rays\" penetrate window glass and are just as strong in the winter as in the summer.    Product Recommendations:    Good examples include: Blue Lizard, EltaMD, Solbar    Good daily moisturizers with SPF: Vanicream, CeraVe.    For sensitive skin, consider : SkinMedica Essential Defense Mineral Shield Broad Spectrum SPF 35      Never use tanning beds. Tanning beds are associated with much higher risks of skin cancer.    All tanning damages the skin. Aim for ivory skin year round and you will have less trouble with your skin in years to come. There is no merit in getting \"a base tan\" " before a warm weather vacation, as any tanning indicates your body's response to sun damage.    Stop smoking. Smokers have higher rates of skin cancer and also have premature skin wrinkling.    FYI  You should use about 3 tablespoons of sunscreen to protect your whole body. Thus a typical eight ounce bottle of sunscreen should last 4 applications. Remember, that the SPF rating is compromised if you don t apply enough. Most people only apply 1/2 - 1/3 of the amount they need. Also don t forget areas such as your ears, feet, upper back and harder to reach places. Keep in mind that these amounts should be increased for larger body sizes.    Sunscreens with titanium dioxide and/or zinc oxide in the active ingredients are physical blockers as opposed to chemical blockers. Chemical-free sunscreens should not irritate the skin.    Spray-on sunscreens may be used for touch-up application only, not as a base layer. Also, use with caution around small children due to inhalation risk.    Avoid retinyl palmitate products.    Avoid combination products that include both sunscreen and insect repellant, as sunscreen should be applied every 2 hours, but insect repellant should not be applied as frequently.    SPF means sun protection factor, which is just the degree to which the sunscreen can protect against UVB rays. There is no rating system for UVA rays. SPF is calculated as the time skin will burn when sunscreen is applied vs. skin without sunscreen.    Water resistant sunscreens should be re-applied every 1-2 hours.    For more information:  http://www.skincancer.org/prevention/sun-protection/sunscreen/sunscreens-safe-and-effective      The patient was counseled about sunscreens and sun avoidance. The patient was counseled to check the skin regularly and report any lesion that is new, changing, itching, scabbing, bleeding or otherwise bothersome. The patient was discharged ambulatory and in stable condition.      PROCEDURES:                                                     None.    TT : 25 minutes.  CT : 15 minutes.      The information in this document, created by the medical scribe for me, accurately reflects the services I personally performed and the decisions made by me. I have reviewed and approved this document for accuracy prior to leaving the patient care area.  Yuridia Gray MD November 22, 2017 8:32 AM  [facility] - PRIMARY CARE SKIN    Again, thank you for allowing me to participate in the care of your patient.        Sincerely,        Reanna Gray MD

## 2017-11-22 NOTE — PATIENT INSTRUCTIONS
FUTURE APPOINTMENTS  Follow up in 1 year(s) for a full-body skin cancer screening.  Follow up at your convenience for cosmetic BOTOX or dermal filler injection.    SHAMPOO INSTRUCTIONS  Consider alternating use of shampoos with different active ingredients every 4 week(s).   Coal Tar shampoos : Neutrogena T/Gel, Denorex, DHS Tar, Ionil-T, Tegrin, X-Seb T, Zetar  Zinc Pyrithione shampoos : Head & Shoulders, Denorex Advance Formula, DHS Zinc, Zincon  Salicylic Acid shampoos : Neutrogena T/Sal, DHS Sal, Ionil, P&S, Sebulex, X-Seb  Selenium Sulfide shampoos : Selsun Blue shampoo  Sulfur shampoos : Sebulex    TOPICAL STEROID INSTRUCTIONS  Augmented betamethasone dipropionate 0.05% lotion.  1. Apply a few drops and rub into the affected areas on the scalp, two times per day for 1 week  2. After 1 week, decrease to once daily application for 1 more week.  3. After the 2 weeks, decrease to use only as needed.    Not to be used on face or groin.    After initial treatment, topical steroid may be used as needed for flare-ups.    Topical steroid use is for short-term treatment only. If after initial treatment, you are using this for prolonged periods of time, return to clinic for re-evaluation of treatment.    SUN PROTECTION INSTRUCTIONS  Sun damage can lead to skin cancer and premature aging of the skin.      The best way to protect from sun damage to your skin is to avoid the sun during peak hours (10 am - 2 pm) even on overcast days.      Use UPF sun-protective clothing, which while more expensive initially provides longer lasting coverage without having to worry about remembering to re-apply.  1. Wear a wide-brimmed hat and sunglasses.   2. Wear sun-protective clothing.  [employer] and other [employer] make sun protective clothing that are stylish, comfortable and cool. [employer] and other [employer] make UV arm sleeves suitable for golfing, gardening and other activities.      Sunscreen instructions:  1. Use  "sunscreens with Zinc Oxide, Titanium Dioxide or Avobenzone to protect from UVA rays.  2. Use SPF 30-50+ to protect from UVB rays.  3. Re-apply every 2 hours even if water resistant.  4. Apply on your face every day even when cloudy and even in the winter. UVA \"aging rays\" penetrate window glass and are just as strong in the winter as in the summer.    Product Recommendations:    Good examples include: Blue Lizard, EltaMD, Solbar    Good daily moisturizers with SPF: Vanicream, CeraVe.    For sensitive skin, consider : SkinMedica Essential Defense Mineral Shield Broad Spectrum SPF 35      Never use tanning beds. Tanning beds are associated with much higher risks of skin cancer.    All tanning damages the skin. Aim for ivory skin year round and you will have less trouble with your skin in years to come. There is no merit in getting \"a base tan\" before a warm weather vacation, as any tanning indicates your body's response to sun damage.    Stop smoking. Smokers have higher rates of skin cancer and also have premature skin wrinkling.    FYI  You should use about 3 tablespoons of sunscreen to protect your whole body. Thus a typical eight ounce bottle of sunscreen should last 4 applications. Remember, that the SPF rating is compromised if you don t apply enough. Most people only apply 1/2 - 1/3 of the amount they need. Also don t forget areas such as your ears, feet, upper back and harder to reach places. Keep in mind that these amounts should be increased for larger body sizes.    Sunscreens with titanium dioxide and/or zinc oxide in the active ingredients are physical blockers as opposed to chemical blockers. Chemical-free sunscreens should not irritate the skin.    Spray-on sunscreens may be used for touch-up application only, not as a base layer. Also, use with caution around small children due to inhalation risk.    Avoid retinyl palmitate products.    Avoid combination products that include both sunscreen and insect " repellant, as sunscreen should be applied every 2 hours, but insect repellant should not be applied as frequently.    SPF means sun protection factor, which is just the degree to which the sunscreen can protect against UVB rays. There is no rating system for UVA rays. SPF is calculated as the time skin will burn when sunscreen is applied vs. skin without sunscreen.    Water resistant sunscreens should be re-applied every 1-2 hours.    For more information:  http://www.skincancer.org/prevention/sun-protection/sunscreen/sunscreens-safe-and-effective

## 2017-11-22 NOTE — PROGRESS NOTES
[facility] - PRIMARY CARE SKIN    CC : skin cancer screening (full-body)  SUBJECTIVE:                                                    Mallory Sargent is a 52 year old female who presents to clinic today for a full-body skin exam because of her family history of skin cancer. No bothersome lesions noticed by the patient or other skin concerns.    Issue Two : Scalp irritation has persisted. She has been prescribed betamethasone dipropionate 0.05% lotion and ketoconazole 2% shampoo previously for control of symptoms. Symptoms have recurred since she ran out of medication. She has been using OTC T/Sal shampoo, but symptoms are not controlled at this time.    Issue Three : She also requests consultation of facial skin treatments for skin aging.    Personal history of skin cancer : NO.  Family history of skin cancer : YES - basal cell carcinoma in maternal grandmother.    Sun Exposure History  Previous history of significant sun exposure:  Blistering sunburns : YES - when younger  Tanning beds : YES.  Sunscreen Use : YES.    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    Refer to electronic medical record (EMR) for past medical history and medications.    INTEGUMENTARY/SKIN: NEGATIVE for worrisome rashes, moles or lesions  ROS : 14 point review of systems was negative except the symptoms listed above in the HPI.    This document serves as a record of the services and decisions personally performed and made by Yuridia Gray MD. It was created on her behalf by Jose Dumas, a trained medical scribe.  The creation of this document is based on the scribe's personal observations and the provider's statements to the medical scribe.  Jose Dumas, November 22, 2017 8:32 AM      OBJECTIVE:                                                    GENERAL: healthy, alert and no distress  SKIN: Lloyd Skin Type - I.  This patient was examined from the top of the head to the bottom of the feet  including scalp,  face, neck, back, chest, breasts, buttocks, both arms, both legs, both hands, both feet, all 10 fingers and all 10 toes. The dermatoscope was used to help evaluate pigmented lesions.  Skin Pertinent Findings:  Arms, upper chest, upper back : multiple small, brown macule(s), consistent with ephelides.    Arms, legs, back : scattered 1 mm - 2 mm in size, brown macules most consistent with benign (melanocytic nevi).    Significant Findings:  Right parietal frontal scalp : scaly erythematous plaque with minimal scaling.    Elbows, knees : no eruption.    Diagnostic Test Results:  none           ASSESSMENT:                                                      Encounter Diagnoses   Name Primary?     Skin cancer screening Yes     Family history of basal cell carcinoma      Seborrheic dermatitis      Ephelides          PLAN:                                                    Patient Instructions   FUTURE APPOINTMENTS  Follow up in 1 year(s) for a full-body skin cancer screening.    SHAMPOO INSTRUCTIONS  Consider alternating use of shampoos with different active ingredients every 4 week(s).   Coal Tar shampoos : Neutrogena T/Gel, Denorex, DHS Tar, Ionil-T, Tegrin, X-Seb T, Zetar  Zinc Pyrithione shampoos : Head & Shoulders, Denorex Advance Formula, DHS Zinc, Zincon  Salicylic Acid shampoos : Neutrogena T/Sal, DHS Sal, Ionil, P&S, Sebulex, X-Seb  Selenium Sulfide shampoos : Selsun Blue shampoo  Sulfur shampoos : Sebulex    TOPICAL STEROID INSTRUCTIONS  Augmented betamethasone dipropionate 0.05% lotion.  1. Apply a few drops and rub into the affected areas on the scalp, two times per day for 1 week  2. After 1 week, decrease to once daily application for 1 more week.  3. After the 2 weeks, decrease to use only as needed.    Not to be used on face or groin.    After initial treatment, topical steroid may be used as needed for flare-ups.    Topical steroid use is for short-term treatment only. If after initial treatment, you are  "using this for prolonged periods of time, return to clinic for re-evaluation of treatment.    SUN PROTECTION INSTRUCTIONS  Sun damage can lead to skin cancer and premature aging of the skin.      The best way to protect from sun damage to your skin is to avoid the sun during peak hours (10 am - 2 pm) even on overcast days.      Use UPF sun-protective clothing, which while more expensive initially provides longer lasting coverage without having to worry about remembering to re-apply.  1. Wear a wide-brimmed hat and sunglasses.   2. Wear sun-protective clothing.  [employer] and other [employer] make sun protective clothing that are stylish, comfortable and cool. [employer] and other [employer] make UV arm sleeves suitable for golfing, gardening and other activities.      Sunscreen instructions:  1. Use sunscreens with Zinc Oxide, Titanium Dioxide or Avobenzone to protect from UVA rays.  2. Use SPF 30-50+ to protect from UVB rays.  3. Re-apply every 2 hours even if water resistant.  4. Apply on your face every day even when cloudy and even in the winter. UVA \"aging rays\" penetrate window glass and are just as strong in the winter as in the summer.    Product Recommendations:    Good examples include: Blue Lizard, EltaMD, Solbar    Good daily moisturizers with SPF: Vanicream, CeraVe.    For sensitive skin, consider : SkinMedica Essential Defense Mineral Shield Broad Spectrum SPF 35      Never use tanning beds. Tanning beds are associated with much higher risks of skin cancer.    All tanning damages the skin. Aim for ivory skin year round and you will have less trouble with your skin in years to come. There is no merit in getting \"a base tan\" before a warm weather vacation, as any tanning indicates your body's response to sun damage.    Stop smoking. Smokers have higher rates of skin cancer and also have premature skin wrinkling.    FYI  You should use about 3 tablespoons of sunscreen to protect your whole " body. Thus a typical eight ounce bottle of sunscreen should last 4 applications. Remember, that the SPF rating is compromised if you don t apply enough. Most people only apply 1/2 - 1/3 of the amount they need. Also don t forget areas such as your ears, feet, upper back and harder to reach places. Keep in mind that these amounts should be increased for larger body sizes.    Sunscreens with titanium dioxide and/or zinc oxide in the active ingredients are physical blockers as opposed to chemical blockers. Chemical-free sunscreens should not irritate the skin.    Spray-on sunscreens may be used for touch-up application only, not as a base layer. Also, use with caution around small children due to inhalation risk.    Avoid retinyl palmitate products.    Avoid combination products that include both sunscreen and insect repellant, as sunscreen should be applied every 2 hours, but insect repellant should not be applied as frequently.    SPF means sun protection factor, which is just the degree to which the sunscreen can protect against UVB rays. There is no rating system for UVA rays. SPF is calculated as the time skin will burn when sunscreen is applied vs. skin without sunscreen.    Water resistant sunscreens should be re-applied every 1-2 hours.    For more information:  http://www.skincancer.org/prevention/sun-protection/sunscreen/sunscreens-safe-and-effective      The patient was counseled about sunscreens and sun avoidance. The patient was counseled to check the skin regularly and report any lesion that is new, changing, itching, scabbing, bleeding or otherwise bothersome. The patient was discharged ambulatory and in stable condition.      PROCEDURES:                                                    None.    TT : 25 minutes.  CT : 15 minutes.      The information in this document, created by the medical scribe for me, accurately reflects the services I personally performed and the decisions made by me. I have  reviewed and approved this document for accuracy prior to leaving the patient care area.  Yuridia Gray MD November 22, 2017 8:32 AM  [facility] - PRIMARY CARE SKIN

## 2017-11-24 DIAGNOSIS — G47.09 OTHER INSOMNIA: ICD-10-CM

## 2017-11-24 DIAGNOSIS — E78.5 HYPERLIPIDEMIA LDL GOAL <130: ICD-10-CM

## 2017-11-24 NOTE — TELEPHONE ENCOUNTER
zolpidem (AMBIEN) 5 MG tablet      Last Written Prescription Date:  4/26/2017  Last Fill Quantity: 30 tablet,   # refills: 5  Last Office Visit: 6/30/2017  Future Office visit:    Next 5 appointments (look out 90 days)     Jan 03, 2018 10:00 AM CST   Office Visit with Reanna Gray MD   [facility] - Primary Care Skin ([facility] Care Skin )    [address] [place] [id number]   [phone]                   Routing refill request to provider for review/approval because:  Drug not on the [facility], [facility] or  Health refill protocol or controlled substance

## 2017-11-24 NOTE — TELEPHONE ENCOUNTER
Requested Prescriptions   Pending Prescriptions Disp Refills     simvastatin (ZOCOR) 40 MG tablet  Last Written Prescription Date:  5/12/2017  Last Fill Quantity: 90 tablet,  # refills: 1   Last Office Visit with [facility], P or [facility] prescribing provider:  4/28/2017   Future Office Visit:    Next 5 appointments (look out 90 days)     Jan 03, 2018 10:00 AM CST   Office Visit with Reanna Gray MD   [facility] - Primary Care Skin ([facility] Care Skin )    [address] [place] [address]  [address] [place] [id number]   [phone]                  90 tablet 1     Sig: Take 1 tablet (40 mg) by mouth At Bedtime    Statins Protocol Passed    11/24/2017 10:07 AM       Passed - LDL on file in past 12 months    Recent Labs   Lab Test  04/28/17   1050   LDL  143*            Passed - No abnormal creatine kinase in past 12 months    No lab results found.         Passed - Recent or future visit with authorizing provider    Patient had office visit in the last year or has a visit in the next 30 days with authorizing provider.  See chart review.              Passed - Patient is age 18 or older       Passed - No active pregnancy on record       Passed - No positive pregnancy test in past 12 months

## 2017-11-27 RX ORDER — ZOLPIDEM TARTRATE 5 MG/1
5 TABLET ORAL
Qty: 30 TABLET | Refills: 0 | Status: SHIPPED | OUTPATIENT
Start: 2017-11-27 | End: 2017-11-30

## 2017-11-27 NOTE — TELEPHONE ENCOUNTER
The requested prescription(s) has/have been approved and has/have been printed and signed. This was forwarded to the patient care pool to fax to the patient's preferred pharmacy.  Please call patient to establish new primary care to discuss her chronic insomnia.  I believe there are better ways to manage this besides taking Ambien as frequently as Mallory appears to be taking it.     [facility] [place], [place] - [address]  [id number] [place] [facility] [place], [place] - [address] [facility]  [id number] [place] [facility] [place], [place] - [address] [place] [address] PHARMACY [place], [place] - [address] [place], [place] - [address] [facility]    Oren Doherty Jr

## 2017-11-28 DIAGNOSIS — G47.09 OTHER INSOMNIA: ICD-10-CM

## 2017-11-28 RX ORDER — SIMVASTATIN 40 MG
40 TABLET ORAL AT BEDTIME
Qty: 30 TABLET | Refills: 0 | Status: SHIPPED | OUTPATIENT
Start: 2017-11-28 | End: 2018-12-05

## 2017-11-28 RX ORDER — ZOLPIDEM TARTRATE 5 MG/1
5 TABLET ORAL
Qty: 30 TABLET | Refills: 0 | Status: CANCELLED | OUTPATIENT
Start: 2017-11-28

## 2017-11-28 NOTE — TELEPHONE ENCOUNTER
Medication is being filled for 1 time refill only due to:  Patient needs labs due for repeat lipid panel.   Nevaeh Springer RN, BSN  [facility]

## 2017-11-30 RX ORDER — ZOLPIDEM TARTRATE 5 MG/1
5 TABLET ORAL
Qty: 30 TABLET | Refills: 0 | Status: SHIPPED | OUTPATIENT
Start: 2017-11-30 | End: 2020-09-22

## 2017-11-30 NOTE — TELEPHONE ENCOUNTER
Rx was refilled on 11/27/17.  Nevaeh Springer, RN, BSN  [facility]

## 2017-11-30 NOTE — TELEPHONE ENCOUNTER
Rx faxed to  in [place].  Called pt at [phone] and left non detailed message to call back.  See below.  Needs to est care.  Vanessa Galeano

## 2017-11-30 NOTE — TELEPHONE ENCOUNTER
Called [facility] pharmacy as I do not have hard copy rx.  They do not have an rx on file for this.  Dr. Zhu, can you print rx?  I will call pt once done to make an appt.  Vanessa Galeano

## 2018-01-03 ENCOUNTER — OFFICE VISIT (OUTPATIENT)
Dept: FAMILY MEDICINE | Facility: CLINIC | Age: 53
End: 2018-01-03
Payer: COMMERCIAL

## 2018-01-03 VITALS
OXYGEN SATURATION: 95 % | DIASTOLIC BLOOD PRESSURE: 68 MMHG | SYSTOLIC BLOOD PRESSURE: 110 MMHG | WEIGHT: 223 LBS | HEIGHT: 71 IN | TEMPERATURE: 98.5 F | BODY MASS INDEX: 31.22 KG/M2 | HEART RATE: 101 BPM

## 2018-01-03 DIAGNOSIS — M79.10 MUSCLE ACHE: Primary | ICD-10-CM

## 2018-01-03 DIAGNOSIS — E78.5 HYPERLIPIDEMIA LDL GOAL <130: ICD-10-CM

## 2018-01-03 PROCEDURE — 99214 OFFICE O/P EST MOD 30 MIN: CPT | Performed by: PHYSICIAN ASSISTANT

## 2018-01-03 NOTE — MR AVS SNAPSHOT
After Visit Summary   1/3/2018    Mallory Sargent    MRN: [id number]           Patient Information     Date Of Birth          1965        Visit Information        Provider Department      1/3/2018 11:40 AM Blanka Padilla PA-C [facility] [place]        Today's Diagnoses     Muscle ache - bilateral thighs and lower legs    -  1    Hyperlipidemia LDL goal <130          Care Instructions    Unclear if muscle aches are from statin.  Given you report worsening since dose adjustment ok to stop temporarily and see if this resolves.  Will check CK in the meantime.  Follow-up for routine health physical - come fasting and can repeat baseline lipid level and consider resuming statin therapy if indicated at that time.    Electronically Signed By: Blanka Padilla PA-C            Follow-ups after your visit        Who to contact     If you have questions or need follow up information about today's clinic visit or your schedule please contact [facility] [place] directly at [phone].  Normal or non-critical lab and imaging results will be communicated to you by ZenPayrollhart, letter or phone within 4 business days after the clinic has received the results. If you do not hear from us within 7 days, please contact the clinic through [employer] or phone. If you have a critical or abnormal lab result, we will notify you by phone as soon as possible.  Submit refill requests through [employer] or call your pharmacy and they will forward the refill request to us. Please allow 3 business days for your refill to be completed.          Additional Information About Your Visit        ZenPayrollhart Information     [employer] gives you secure access to your electronic health record. If you see a primary care provider, you can also send messages to your care team and make appointments. If you have questions, please call your primary care clinic.  If you do not have a primary care provider, please call [phone]  "and they will assist you.        Care EveryWhere ID     This is your Care EveryWhere ID. This could be used by other organizations to access your [facility] medical records  [id number]        Your Vitals Were     Pulse Temperature Height Last Period Pulse Oximetry BMI (Body Mass Index)    101 98.5  F (36.9  C) (Oral) 5' 10.5\" (1.791 m) 06/09/2015 95% 31.54 kg/m2       Blood Pressure from Last 3 Encounters:   01/03/18 110/68   10/31/17 102/79   08/04/17 109/72    Weight from Last 3 Encounters:   01/03/18 223 lb (101.2 kg)   10/31/17 217 lb (98.4 kg)   08/04/17 217 lb (98.4 kg)              We Performed the Following     CK total     Comprehensive metabolic panel     CRP, inflammation     ESR: Erythrocyte sedimentation rate        Primary Care Provider Office Phone # Fax #    Blanka Padilla PA-C [phone]       [address]  [place] [address]        Equal Access to Services     [facility]: Hadii aad ku hadasho Soomaali, waaxda luqadaha, qaybta kaalmada adeegyada, waxay idiin hayshazia garcia . So [facility] [phone].    ATENCIÓN: Si habla español, tiene a suresh disposición servicios gratuitos de asistencia lingüística. Llame al [phone].    We comply with applicable federal civil rights laws and [place] laws. We do not discriminate on the basis of race, color, national origin, age, disability, sex, sexual orientation, or gender identity.            Thank you!     Thank you for choosing [facility]  for your care. Our goal is always to provide you with excellent care. Hearing back from our patients is one way we can continue to improve our services. Please take a few minutes to complete the written survey that you may receive in the mail after your visit with us. Thank you!             Your Updated Medication List - Protect others around you: Learn how to safely use, store and throw away your medicines at www.disposemymeds.org.          This list is accurate as " of: 1/3/18 12:39 PM.  Always use your most recent med list.                   Brand Name Dispense Instructions for use Diagnosis    aspirin 81 MG tablet     30 tablet    Take by mouth daily        betamethasone (augmented) 0.05 % lotion    DIPROLENE    60 mL    Apply topically 2 times daily    Seborrheic dermatitis       Multi-vitamin Tabs tablet      Take 1 tablet by mouth daily        omeprazole 20 MG CR capsule    priLOSEC    90 capsule    TAKE 1 CAPSULE BY MOUTH DAILY, 30 TO 60 MINUTES BEFORE A MEAL.    Hyperlipidemia LDL goal <130       simvastatin 40 MG tablet    ZOCOR    30 tablet    Take 1 tablet (40 mg) by mouth At Bedtime    Hyperlipidemia LDL goal <130       zolpidem 5 MG tablet    AMBIEN    30 tablet    Take 1 tablet (5 mg) by mouth nightly as needed for sleep    Other insomnia

## 2018-01-03 NOTE — PROGRESS NOTES
"  SUBJECTIVE:   Mallory Sargent is a 52 year old female who presents to clinic today for the following health issues:      New Patient/Transfer of Care    Hyperlipidemia Follow-Up; tolerates simvastatin well. Did increase dose in April.  Does mention that she has noticed increased pain in bilateral thighs.  Does have bad hips and hx of congenital hip dysplasia so could be due to this.  Has seen orthopedic surgeon and was told hips appear fine without degeneration so just \"deals with it\" and usually attributes pain to this.  Feels this pain is different though in that it's more an \"achying type pain\" and rubs her anterior thighs.  Has been taking more tylenol or ibuprofen and doesn't want to be taking more of this than she has to. Takes this 4x per week even if she isn't active.  EPIC review does show work-up back in April for joint pains, but pt feels this is different type of pain.  Believes this symptom was more stiffness like has to take a few steps in order to be able to walk, but this is just very achy thighs and anterior shins.  Worse at night - rates as 7/10.  No urine discoloration or issues. Has some urinary frequency, but this is unchanged from her baseline.    The 10-year ASCVD risk score (Eboni DC Jr, et al., 2013) is: 1.1%    Values used to calculate the score:      Age: 52 years      Sex: Female      Is Non- : No      Diabetic: No      Tobacco smoker: No      Systolic Blood Pressure: 110 mmHg      Is BP treated: No      HDL Cholesterol: 55 mg/dL      Total Cholesterol: 235 mg/dL    Component      Latest Ref Rng & Units 4/28/2017   Sodium      133 - 144 mmol/L 141   Potassium      3.4 - 5.3 mmol/L 4.1   Chloride      94 - 109 mmol/L 106   Carbon Dioxide      20 - 32 mmol/L 23   Anion Gap      3 - 14 mmol/L 12   Glucose      70 - 99 mg/dL 97   Urea Nitrogen      7 - 30 mg/dL 14   Creatinine      0.52 - 1.04 mg/dL 0.87   GFR Estimate      >60 mL/min/1.7m2 69   GFR Estimate If " Black      >60 mL/min/1.7m2 83   Calcium      8.5 - 10.1 mg/dL 9.3   Bilirubin Total      0.2 - 1.3 mg/dL 0.4   Albumin      3.4 - 5.0 g/dL 4.0   Protein Total      6.8 - 8.8 g/dL 7.5   Alkaline Phosphatase      40 - 150 U/L 75   ALT      0 - 50 U/L 37   AST      0 - 45 U/L 20   WBC      4.0 - 11.0 10e9/L 6.1   RBC Count      3.8 - 5.2 10e12/L 4.41   Hemoglobin      11.7 - 15.7 g/dL 13.6   Hematocrit      35.0 - 47.0 % 40.6   MCV      78 - 100 fl 92   MCH      26.5 - 33.0 pg 30.8   MCHC      31.5 - 36.5 g/dL 33.5   RDW      10.0 - 15.0 % 12.6   Platelet Count      150 - 450 10e9/L 181   Cholesterol      <200 mg/dL 235 (H)   Triglycerides      <150 mg/dL 186 (H)   HDL Cholesterol      >49 mg/dL 55   LDL Cholesterol Calculated      <100 mg/dL 143 (H)   Non HDL Cholesterol      <130 mg/dL 180 (H)   TSH      0.40 - 4.00 mU/L 1.86   Vitamin D Deficiency screening      20 - 75 ug/L 27         0 - 30 U/mL 9   Sed Rate      0 - 20 mm/h 12   CRP Inflammation      0.0 - 8.0 mg/L <2.9   Rheumatoid Factor      <20 IU/mL <20   GINGER Screen by EIA      <1.0 <1.0 . . .   Cyclic Citrullinated Peptide Antibody, IgG      <7 U/mL 1   CK Total      30 - 225 U/L 139     Non-smoker.  Fhx: paternal and maternal grandfather's  of heart disease, but no one else.  No hx of HTN.      Rate your low fat/cholesterol diet?: good    Taking statin?  Yes, no muscle aches from statin    Other lipid medications/supplements?:  none      Problem list and histories reviewed & adjusted, as indicated.  Additional history: as documented    Patient Active Problem List   Diagnosis     Family history of malignant neoplasm of ovary     Benign shuddering attack     Esophageal reflux     Acute reaction to stress     RECURR Depressive disorder - MOD     Hyperlipidemia LDL goal <130     Obesity     Papanicolaou smear of cervix with atypical squamous cells of undetermined significance (ASC-US)     Hip dysplasia     Family history of basal cell carcinoma      Past Surgical History:   Procedure Laterality Date     C LIGATE FALLOPIAN TUBE       C NONSPECIFIC PROCEDURE      vaginal repair after delivery     C/SECTION, LOW TRANSVERSE      , Low Transverse     CHOLECYSTECTOMY, LAPOROSCOPIC  2010    Cholecystectomy, Laparoscopic     COLONOSCOPY  2/15/2016    Dr. Marc SILVA     HERNIA REPAIR, INCISIONAL  2010    Laparoscopic       Social History   Substance Use Topics     Smoking status: Former Smoker     Quit date: 1993     Smokeless tobacco: Never Used     Alcohol use Yes      Comment: very rarely      Family History   Problem Relation Age of Onset     Hypertension Father      Lipids Father      GASTROINTESTINAL DISEASE Father      vazquez's esophagus fr. reflux      CANCER Mother      ovarian     Arthritis Mother      lupus      Depression Paternal Grandmother      problems with schizophrenia     Arthritis Brother      GASTROINTESTINAL DISEASE Brother      reflux      Colon Cancer Cousin      Ovarian Cancer Other      Maternal great aunt         Current Outpatient Prescriptions   Medication Sig Dispense Refill     zolpidem (AMBIEN) 5 MG tablet Take 1 tablet (5 mg) by mouth nightly as needed for sleep 30 tablet 0     simvastatin (ZOCOR) 40 MG tablet Take 1 tablet (40 mg) by mouth At Bedtime 30 tablet 0     betamethasone, augmented, (DIPROLENE) 0.05 % lotion Apply topically 2 times daily 60 mL 1     omeprazole (PRILOSEC) 20 MG CR capsule TAKE 1 CAPSULE BY MOUTH DAILY, 30 TO 60 MINUTES BEFORE A MEAL. 90 capsule 3     multivitamin, therapeutic with minerals (MULTI-VITAMIN) TABS tablet Take 1 tablet by mouth daily       aspirin 81 MG tablet Take by mouth daily 30 tablet      No Known Allergies      Reviewed and updated as needed this visit by clinical staff     Reviewed and updated as needed this visit by Provider         ROS:  Constitutional, HEENT, cardiovascular, pulmonary, gi and gu, MSK systems are negative, except as otherwise noted.      OBJECTIVE:  "  /68  Pulse 101  Temp 98.5  F (36.9  C) (Oral)  Ht 5' 10.5\" (1.791 m)  Wt 223 lb (101.2 kg)  LMP 06/09/2015  SpO2 95%  BMI 31.54 kg/m2  Body mass index is 31.54 kg/(m^2).  GENERAL: healthy, alert and no distress  RESP: lungs clear to auscultation - no rales, rhonchi or wheezes  CV: regular rate and rhythm, normal S1 S2, no S3 or S4, no murmur, click or rub, no peripheral edema and peripheral pulses strong  MS: pt reports aching in her bilateral anterior thighs and lower extremities.    Diagnostic Test Results:  Prior work-up reviewed as noted above in HPI    ASSESSMENT/PLAN:       ICD-10-CM    1. Muscle ache - bilateral thighs and lower legs M79.1 Comprehensive metabolic panel     CK total     Erythrocyte sedimentation rate auto     CRP inflammation     CANCELED: CK total     CANCELED: Comprehensive metabolic panel     CANCELED: ESR: Erythrocyte sedimentation rate     CANCELED: CRP, inflammation   2. Hyperlipidemia LDL goal <130 E78.5 CK total     CANCELED: CK total     CANCELED: Comprehensive metabolic panel   Worsening muscle aches primarily in bilateral thighs and LE since dose adjustment with statin back in April.  Had prior extensive work-up by TOMA Aleman for joint issues, but feel this is different.  Thus, will repeat CK and CMP to ensure no changes.   Will also do trial off of medication to see if any improvement given 10-yr CV risk does not recommend statin therapy at this time as is only 1.1 %.  Pt is practicing whole 30 diet and life-style changes and can re-assess baseline cholesterol at physical.  Pt in agreement with plan.  See Patient Instructions  Patient Instructions   Unclear if muscle aches are from statin.  Given you report worsening since dose adjustment ok to stop temporarily and see if this resolves.  Will check CK in the meantime.  Follow-up for routine health physical - come fasting and can repeat baseline lipid level and consider resuming statin therapy if indicated at that " time.    Electronically Signed By: Blanka Padilla PA-C

## 2018-01-03 NOTE — NURSING NOTE
"Chief Complaint   Patient presents with     Establish Care       Initial /68  Pulse 101  Temp 98.5  F (36.9  C) (Oral)  Ht 5' 10.5\" (1.791 m)  Wt 223 lb (101.2 kg)  LMP 06/09/2015  SpO2 95%  BMI 31.54 kg/m2 Estimated body mass index is 31.54 kg/(m^2) as calculated from the following:    Height as of this encounter: 5' 10.5\" (1.791 m).    Weight as of this encounter: 223 lb (101.2 kg).  Medication Reconciliation: complete    "

## 2018-01-03 NOTE — PATIENT INSTRUCTIONS
Unclear if muscle aches are from statin.  Given you report worsening since dose adjustment ok to stop temporarily and see if this resolves.  Will check CK in the meantime.  Follow-up for routine health physical - come fasting and can repeat baseline lipid level and consider resuming statin therapy if indicated at that time.    Electronically Signed By: Blanka Padilla PA-C

## 2018-01-05 ENCOUNTER — APPOINTMENT (OUTPATIENT)
Dept: LAB | Facility: CLINIC | Age: 53
End: 2018-01-05
Payer: COMMERCIAL

## 2018-01-05 DIAGNOSIS — E78.5 HYPERLIPIDEMIA LDL GOAL <130: ICD-10-CM

## 2018-01-05 DIAGNOSIS — M79.10 MUSCLE ACHE: ICD-10-CM

## 2018-01-05 LAB
CRP SERPL-MCNC: <2.9 MG/L (ref 0–8)
ERYTHROCYTE [SEDIMENTATION RATE] IN BLOOD BY WESTERGREN METHOD: 8 MM/H (ref 0–30)

## 2018-01-05 PROCEDURE — 82550 ASSAY OF CK (CPK): CPT | Performed by: PHYSICIAN ASSISTANT

## 2018-01-05 PROCEDURE — 80053 COMPREHEN METABOLIC PANEL: CPT | Performed by: PHYSICIAN ASSISTANT

## 2018-01-05 PROCEDURE — 85652 RBC SED RATE AUTOMATED: CPT | Performed by: PHYSICIAN ASSISTANT

## 2018-01-05 PROCEDURE — 36415 COLL VENOUS BLD VENIPUNCTURE: CPT | Performed by: PHYSICIAN ASSISTANT

## 2018-01-05 PROCEDURE — 86140 C-REACTIVE PROTEIN: CPT | Performed by: PHYSICIAN ASSISTANT

## 2018-01-06 LAB
ALBUMIN SERPL-MCNC: 3.9 G/DL (ref 3.4–5)
ALP SERPL-CCNC: 70 U/L (ref 40–150)
ALT SERPL W P-5'-P-CCNC: 36 U/L (ref 0–50)
ANION GAP SERPL CALCULATED.3IONS-SCNC: 7 MMOL/L (ref 3–14)
AST SERPL W P-5'-P-CCNC: 22 U/L (ref 0–45)
BILIRUB SERPL-MCNC: 0.3 MG/DL (ref 0.2–1.3)
BUN SERPL-MCNC: 20 MG/DL (ref 7–30)
CALCIUM SERPL-MCNC: 9.1 MG/DL (ref 8.5–10.1)
CHLORIDE SERPL-SCNC: 106 MMOL/L (ref 94–109)
CK SERPL-CCNC: 128 U/L (ref 30–225)
CO2 SERPL-SCNC: 27 MMOL/L (ref 20–32)
CREAT SERPL-MCNC: 0.78 MG/DL (ref 0.52–1.04)
GFR SERPL CREATININE-BSD FRML MDRD: 78 ML/MIN/1.7M2
GLUCOSE SERPL-MCNC: 67 MG/DL (ref 70–99)
POTASSIUM SERPL-SCNC: 4.3 MMOL/L (ref 3.4–5.3)
PROT SERPL-MCNC: 7.2 G/DL (ref 6.8–8.8)
SODIUM SERPL-SCNC: 140 MMOL/L (ref 133–144)

## 2018-01-07 NOTE — PROGRESS NOTES
Please call or write patient with the following results:    -Liver and gallbladder tests are normal. (ALT,AST, Alk phos, bilirubin), kidney function is normal (Cr, GFR), Sodium is normal, Potassium is normal, Calcium is normal, Glucose is normal (diabetes screening test).   -Inflammatory markers (CRP, ESR) and muscle enzyme for breakdown (CK) were normal.  Follow-up as previously discussed in clinic.    Electronically Signed By: Blanka Padilla PA-C

## 2018-03-20 DIAGNOSIS — G47.09 OTHER INSOMNIA: ICD-10-CM

## 2018-03-20 RX ORDER — ZOLPIDEM TARTRATE 5 MG/1
5 TABLET ORAL
Qty: 30 TABLET | Refills: 0 | OUTPATIENT
Start: 2018-03-20

## 2018-03-20 NOTE — TELEPHONE ENCOUNTER
Zolpidem      Last Written Prescription Date:  11/30/17  Last Fill Quantity: 30,   # refills: 0  Last Office Visit: 1/3/18  Future Office visit:       Routing refill request to provider for review/approval because:  Drug not on the [facility], [facility] or [facility] refill protocol or controlled substance  Nevaeh Springer RN, BSN  [facility]

## 2018-03-27 DIAGNOSIS — G47.09 OTHER INSOMNIA: ICD-10-CM

## 2018-03-27 NOTE — TELEPHONE ENCOUNTER
zolpidem (AMBIEN) 5 MG tablet      Last Written Prescription Date:  11/30/2017  Last Fill Quantity: 30 tablet,   # refills: 0  Last Office Visit: 1/3/2018  Valerie  Future Office visit:       Routing refill request to provider for review/approval because:  Drug not on the [facility], [facility] or [facility] refill protocol or controlled substance

## 2018-03-27 NOTE — LETTER
[facility]  [address]  [facility] [id number] [phone]  April 11, 2018    Mallory Sargent  [address] [facility] [facility] [address]    Dear Mallory,    We have attempted to contact you regarding a prescription refill request we have on file.  At this time we are not able to fill this medication until we see you in the office for a medication follow up.  Once an appointment is scheduled we can refill your medication for a 30 day supply. Please call us at [phone] to schedule an appointment or with any questions or concerns.    Thank you for choosing [facility]

## 2018-03-28 RX ORDER — ZOLPIDEM TARTRATE 5 MG/1
5 TABLET ORAL
Qty: 30 TABLET | Refills: 0 | OUTPATIENT
Start: 2018-03-28

## 2018-04-04 RX ORDER — ZOLPIDEM TARTRATE 5 MG/1
5 TABLET ORAL
Qty: 30 TABLET | Refills: 0 | OUTPATIENT
Start: 2018-04-04

## 2018-04-04 NOTE — TELEPHONE ENCOUNTER
Pt needs appt to address as I have not prescribed for her previously.  Electronically Signed By: Blanka Padilla PA-C

## 2018-04-05 NOTE — TELEPHONE ENCOUNTER
Called [phone] and left non detailed message for pt to call back.  See below.  Needs to be seen to discuss medication.  [facility]

## 2018-04-28 ENCOUNTER — HEALTH MAINTENANCE LETTER (OUTPATIENT)
Age: 53
End: 2018-04-28

## 2018-12-05 ENCOUNTER — OFFICE VISIT (OUTPATIENT)
Dept: FAMILY MEDICINE | Facility: CLINIC | Age: 53
End: 2018-12-05
Payer: COMMERCIAL

## 2018-12-05 VITALS
WEIGHT: 240 LBS | SYSTOLIC BLOOD PRESSURE: 114 MMHG | HEIGHT: 71 IN | HEART RATE: 108 BPM | DIASTOLIC BLOOD PRESSURE: 80 MMHG | BODY MASS INDEX: 33.6 KG/M2 | OXYGEN SATURATION: 95 % | TEMPERATURE: 97.6 F

## 2018-12-05 DIAGNOSIS — N64.4 BREAST PAIN, LEFT: ICD-10-CM

## 2018-12-05 DIAGNOSIS — Z80.41 FAMILY HISTORY OF MALIGNANT NEOPLASM OF OVARY: ICD-10-CM

## 2018-12-05 DIAGNOSIS — R00.2 PALPITATIONS: ICD-10-CM

## 2018-12-05 DIAGNOSIS — E66.09 CLASS 1 OBESITY DUE TO EXCESS CALORIES WITHOUT SERIOUS COMORBIDITY WITH BODY MASS INDEX (BMI) OF 33.0 TO 33.9 IN ADULT: ICD-10-CM

## 2018-12-05 DIAGNOSIS — Z23 NEED FOR PROPHYLACTIC VACCINATION WITH TETANUS-DIPHTHERIA (TD): ICD-10-CM

## 2018-12-05 DIAGNOSIS — Z00.01 ENCOUNTER FOR ROUTINE ADULT MEDICAL EXAM WITH ABNORMAL FINDINGS: Primary | ICD-10-CM

## 2018-12-05 DIAGNOSIS — Z11.4 SCREENING FOR HIV (HUMAN IMMUNODEFICIENCY VIRUS): ICD-10-CM

## 2018-12-05 DIAGNOSIS — E66.811 CLASS 1 OBESITY DUE TO EXCESS CALORIES WITHOUT SERIOUS COMORBIDITY WITH BODY MASS INDEX (BMI) OF 33.0 TO 33.9 IN ADULT: ICD-10-CM

## 2018-12-05 DIAGNOSIS — F32.5 MAJOR DEPRESSION IN COMPLETE REMISSION (H): ICD-10-CM

## 2018-12-05 DIAGNOSIS — Z12.31 ENCOUNTER FOR SCREENING MAMMOGRAM FOR BREAST CANCER: ICD-10-CM

## 2018-12-05 DIAGNOSIS — Z12.4 SCREENING FOR MALIGNANT NEOPLASM OF CERVIX: ICD-10-CM

## 2018-12-05 DIAGNOSIS — Z13.1 SCREENING FOR DIABETES MELLITUS: ICD-10-CM

## 2018-12-05 PROCEDURE — 90714 TD VACC NO PRESV 7 YRS+ IM: CPT | Performed by: PHYSICIAN ASSISTANT

## 2018-12-05 PROCEDURE — 87624 HPV HI-RISK TYP POOLED RSLT: CPT | Performed by: PHYSICIAN ASSISTANT

## 2018-12-05 PROCEDURE — 99396 PREV VISIT EST AGE 40-64: CPT | Mod: 25 | Performed by: PHYSICIAN ASSISTANT

## 2018-12-05 PROCEDURE — 99213 OFFICE O/P EST LOW 20 MIN: CPT | Mod: 25 | Performed by: PHYSICIAN ASSISTANT

## 2018-12-05 PROCEDURE — G0145 SCR C/V CYTO,THINLAYER,RESCR: HCPCS | Performed by: PHYSICIAN ASSISTANT

## 2018-12-05 PROCEDURE — 90471 IMMUNIZATION ADMIN: CPT | Performed by: PHYSICIAN ASSISTANT

## 2018-12-05 PROCEDURE — 93000 ELECTROCARDIOGRAM COMPLETE: CPT | Performed by: PHYSICIAN ASSISTANT

## 2018-12-05 RX ORDER — ZOLPIDEM TARTRATE 5 MG/1
5 TABLET ORAL
Qty: 30 TABLET | Refills: 0 | Status: CANCELLED | OUTPATIENT
Start: 2018-12-05

## 2018-12-05 ASSESSMENT — ANXIETY QUESTIONNAIRES
7. FEELING AFRAID AS IF SOMETHING AWFUL MIGHT HAPPEN: NOT AT ALL
2. NOT BEING ABLE TO STOP OR CONTROL WORRYING: NOT AT ALL
3. WORRYING TOO MUCH ABOUT DIFFERENT THINGS: NOT AT ALL
GAD7 TOTAL SCORE: 0
6. BECOMING EASILY ANNOYED OR IRRITABLE: NOT AT ALL
IF YOU CHECKED OFF ANY PROBLEMS ON THIS QUESTIONNAIRE, HOW DIFFICULT HAVE THESE PROBLEMS MADE IT FOR YOU TO DO YOUR WORK, TAKE CARE OF THINGS AT HOME, OR GET ALONG WITH OTHER PEOPLE: NOT DIFFICULT AT ALL
5. BEING SO RESTLESS THAT IT IS HARD TO SIT STILL: NOT AT ALL
1. FEELING NERVOUS, ANXIOUS, OR ON EDGE: NOT AT ALL

## 2018-12-05 ASSESSMENT — PATIENT HEALTH QUESTIONNAIRE - PHQ9
SUM OF ALL RESPONSES TO PHQ QUESTIONS 1-9: 3
5. POOR APPETITE OR OVEREATING: NOT AT ALL

## 2018-12-05 NOTE — PROGRESS NOTES
SUBJECTIVE:   CC: Mallory Sargent is an 53 year old woman who presents for preventive health visit.     Healthy Habits:    Do you get at least three servings of calcium containing foods daily (dairy, green leafy vegetables, etc.)? yes    Amount of exercise or daily activities, outside of work: 0 day(s) per week - admits weight is a struggle for her.     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 Has never done this before. Doing a job where she sits more so this could be the cause. Would be amenable to seeing nutritionist.      Problems taking medications regularly No    Medication side effects: No    Have you had an eye exam in the past two years? no    Do you see a dentist twice per year? yes    Do you have sleep apnea, excessive snoring or daytime drowsiness?no    1) Re-check statin. Had discontinued in January and pt was supposed to return sooner to see if stopping helped. Has noticed.     2) Had a bout of chest pressure and palpitations. Occurred 1 month ago. Checked her BP and this was 145/95. This was abnormal for her. Checked this for the next few days and was 130's/90, but then hasn't had any issues since.   Typically runs in 1-teens over 70's. Palpitations has recurred a couple times since, but not with CP/SOB/dizziness.   Last episode occurred at rest while driving 1.5 weeks ago. Estimates occurs 2-3x per week. Self-limiting.  Mentions she had a stress echo in the past for sharp chest pain on the L (that wound up being shingles before developing shingles rash) and this was normal.     3)Requesting to have CA-125 checked. Has checked this in the past and always been normal. Mom  of ovarian cancer. Had genetic testing which was negative.    4) Breast symptom - see ROS below.      Today's PHQ-2 Score:   PHQ-2 (  [employer]) 2017   Q1: Little interest or pleasure in doing things 0 0   Q2: Feeling down, depressed or hopeless 0 0   PHQ-2  Score 0 0   See PHQ/CHRISTEN scores.    Abuse: Current or Past(Physical, Sexual or Emotional)- No  Do you feel safe in your environment? Yes    Social History   Substance Use Topics     Smoking status: Former Smoker     Quit date: 1993     Smokeless tobacco: Never Used     Alcohol use Yes      Comment: very rarely      If you drink alcohol do you typically have >3 drinks per day or >7 drinks per week? Not Applicable                     Reviewed orders with patient.  Reviewed health maintenance and updated orders accordingly - Yes  BP Readings from Last 3 Encounters:   18 114/80   18 110/68   10/31/17 102/79    Wt Readings from Last 3 Encounters:   18 240 lb (108.9 kg)   18 223 lb (101.2 kg)   10/31/17 217 lb (98.4 kg)                  Patient Active Problem List   Diagnosis     Family history of malignant neoplasm of ovary - maternal Hx Dx'd age 57,  age 62; pt consider ovary removal.     Benign shuddering attack     Esophageal reflux     Acute reaction to stress     Hyperlipidemia LDL goal <130     Papanicolaou smear of cervix with atypical squamous cells of undetermined significance (ASC-US)     Hip dysplasia     Family history of basal cell carcinoma     Class 1 obesity due to excess calories without serious comorbidity with body mass index (BMI) of 33.0 to 33.9 in adult     Major depression in complete remission (H)     Past Surgical History:   Procedure Laterality Date     C LIGATE FALLOPIAN TUBE       C NONSPECIFIC PROCEDURE      vaginal repair after delivery     C/SECTION, LOW TRANSVERSE      , Low Transverse     CHOLECYSTECTOMY, LAPOROSCOPIC  2010    Cholecystectomy, Laparoscopic     COLONOSCOPY  2/15/2016    Dr. Marc SILVA     HERNIA REPAIR, INCISIONAL  2010    Laparoscopic       Social History   Substance Use Topics     Smoking status: Former Smoker     Quit date: 1993     Smokeless tobacco: Never Used     Alcohol use Yes      Comment: very rarely       Family History   Problem Relation Age of Onset     Hypertension Father      Lipids Father      GASTROINTESTINAL DISEASE Father      vazquez's esophagus fr. reflux      Hyperlipidemia Father      Cancer Mother      ovarian     Arthritis Mother      lupus      Depression Paternal Grandmother      problems with schizophrenia     Arthritis Brother      Diabetes Maternal Grandmother      Diabetes Maternal Grandfather      Coronary Artery Disease Maternal Grandfather      Coronary Artery Disease Paternal Grandfather      GASTROINTESTINAL DISEASE Brother      reflux      Colon Cancer Cousin      Ovarian Cancer Other      Maternal great aunt     Breast Cancer No family hx of          Current Outpatient Prescriptions   Medication Sig Dispense Refill     aspirin 81 MG tablet Take by mouth daily 30 tablet      betamethasone, augmented, (DIPROLENE) 0.05 % lotion Apply topically 2 times daily 60 mL 1     multivitamin, therapeutic with minerals (MULTI-VITAMIN) TABS tablet Take 1 tablet by mouth daily       omeprazole (PRILOSEC) 20 MG CR capsule TAKE 1 CAPSULE BY MOUTH DAILY, 30 TO 60 MINUTES BEFORE A MEAL. 90 capsule 3     zolpidem (AMBIEN) 5 MG tablet Take 1 tablet (5 mg) by mouth nightly as needed for sleep 30 tablet 0     No Known Allergies    Mammogram Screening: Patient over age 50, mutual decision to screen reflected in health maintenance.    Pertinent mammograms are reviewed under the imaging tab.  History of abnormal Pap smear: NO - age 30-65 PAP every 5 years with negative HPV co-testing recommended  PAP / HPV 2/27/2015 10/31/2013 10/1/2012   PAP ASC-US(A) NIL NIL     Reviewed and updated as needed this visit by clinical staff  Tobacco  Allergies  Meds  Med Hx  Surg Hx  Fam Hx  Soc Hx        Reviewed and updated as needed this visit by Provider  Tobacco  Allergies  Meds  Med Hx  Surg Hx  Fam Hx  Soc Hx           ROS:  CONSTITUTIONAL: + for weight gain, see questions above. NEGATIVE for fever, chills,  "change in weight  INTEGUMENTARY/SKIN: NEGATIVE for worrisome rashes, moles or lesions  EYES: NEGATIVE for vision changes or irritation  ENT: NEGATIVE for ear, mouth and throat problems  RESP: NEGATIVE for significant cough or SOB  BREAST: + for shooting pain in her L breast for the past 1 week. Lasted for 1-2 days straight then was intermittent and now it has resolved. NEGATIVE for masses, tenderness or discharge  CV: + for CP and palpitations, see HPI. NEGATIVE for peripheral edema  GI: NEGATIVE for nausea, abdominal pain, heartburn, or change in bowel habits  : NEGATIVE for unusual urinary or vaginal symptoms. No vaginal bleeding for past 5 yrs.  MUSCULOSKELETAL: NEGATIVE for significant arthralgias or myalgia  NEURO: NEGATIVE for weakness, dizziness or paresthesias  PSYCHIATRIC: NEGATIVE for changes in mood or affect     OBJECTIVE:   /80 (BP Location: Right arm, Patient Position: Chair, Cuff Size: Adult Large)  Pulse 108  Temp 97.6  F (36.4  C) (Tympanic)  Ht 5' 10.5\" (1.791 m)  Wt 240 lb (108.9 kg)  LMP 10/01/2015 (Approximate)  SpO2 95%  BMI 33.95 kg/m2  EXAM:  GENERAL: healthy, alert and no distress  EYES: Eyes grossly normal to inspection, PERRL and conjunctivae and sclerae normal  HENT: ear canals and TM's normal, nose and mouth without ulcers or lesions  NECK: no adenopathy, no asymmetry, masses, or scars and thyroid normal to palpation  RESP: lungs clear to auscultation - no rales, rhonchi or wheezes  BREAST: normal without masses, tenderness or nipple discharge and no palpable axillary masses or adenopathy. S/p breast reduction surgery.  CV: regular rate and rhythm, normal S1 S2, no S3 or S4, no murmur, click or rub, no peripheral edema and peripheral pulses strong  ABDOMEN: soft, nontender, no hepatosplenomegaly, no masses and bowel sounds normal   (female): normal female external genitalia, normal urethral meatus, vaginal mucosa pink, moist, well rugated, and normal cervix/adnexa/uterus " without masses or discharge  MS: no gross musculoskeletal defects noted, no edema  SKIN: no suspicious lesions or rashes  NEURO: Normal strength and tone, mentation intact and speech normal  PSYCH: mentation appears normal, affect normal/bright    Diagnostic Test Results:  See flowsheets for PHQ/CHRISTEN scores  EKG personal reading shows sinus bradycardia without ST segment changes or LVH. No changes from prior tracings.    ASSESSMENT/PLAN:       ICD-10-CM    1. Encounter for routine adult medical exam with abnormal findings Z00.01 Lipid panel reflex to direct LDL Fasting   2. Screening for malignant neoplasm of cervix Z12.4 Pap imaged thin layer screen with HPV - recommended age 30 - 65 years (select HPV order below)     HPV High Risk Types DNA Cervical   3. Screening for HIV (human immunodeficiency virus) Z11.4 **HIV Antigen Antibody Combo FUTURE 2mo   4. Need for prophylactic vaccination with tetanus-diphtheria (Td) Z23 TD (ADULT, 7+) PRESERVE FREE          ADMIN VACCINE, FIRST   5. Class 1 obesity due to excess calories without serious comorbidity with body mass index (BMI) of 33.0 to 33.9 in adult E66.09 NUTRITION REFERRAL    Z68.33    6. Screening for diabetes mellitus Z13.1 **Comprehensive metabolic panel FUTURE 2mo   7. Palpitations R00.2 **TSH with free T4 reflex FUTURE 2mo     **CBC with platelets FUTURE 2mo     EKG 12-lead complete w/read - Clinics   8. Family history of malignant neoplasm of ovary - maternal Hx Dx'd age 57,  age 62; pt consider ovary removal. Z80.41      US Pelvic Complete w Transvaginal   9. Breast pain, left N64.4 MA Diagnostic Digital Bilateral   10. Encounter for screening mammogram for breast cancer Z12.31 MA Diagnostic Digital Bilateral   11. Major depression in complete remission (H) F32.5    Not fasting - future orders placed for labs.  Pt mentions additional L breast pain that lasted for 1 week and then resolved. In light of also reporting episodic CP and palpitations  "for the past 1 month an EKG was completed, but this was normal.   Reassuringly has also had normal stress echo 2017. Discussed getting holter monitor for further evaluation, but pt declines. Will check labs as noted above and she will let me know if wishes to pursue this.  Breast pain has since resolved, but will also obtain diagnostic mammogram just in case.  Pt also requested CA-125 for hx of ovarian cancer. Reports negative genetic testing in past as well. Is aware that this is not generally used as a screening test and can be elevated in non-ovarian pathology as well. She wishes to proceed anyway.  Consider resuming statin after repeat lipid. Pt had noted improvement in muscle aches with discontinuation, but isn't sure if this was also to have a less active job and now sits more so could be better due to this as well. Is amenable to considering going back on it to see if muscle aches recur.  Will also refer to nutritionist due to less active job and weight gain as well.   Pt in agreement with plan.         COUNSELING:   Reviewed preventive health counseling, as reflected in patient instructions       Regular exercise       Healthy diet/nutrition       Immunizations    Vaccinated for: Td             Osteoporosis Prevention/Bone Health       HIV screeninx in teen years, 1x in adult years, and at intervals if high risk    BP Readings from Last 1 Encounters:   18 114/80     Estimated body mass index is 33.95 kg/(m^2) as calculated from the following:    Height as of this encounter: 5' 10.5\" (1.791 m).    Weight as of this encounter: 240 lb (108.9 kg).      Weight management plan: Discussed healthy diet and exercise guidelines also see above     reports that she quit smoking about 25 years ago. She has never used smokeless tobacco.      Counseling Resources:  ATP IV Guidelines  Pooled Cohorts Equation Calculator  Breast Cancer Risk Calculator  FRAX Risk Assessment  ICSI Preventive Guidelines  Dietary " Guidelines for Americans, 2010  USDA's MyPlate  ASA Prophylaxis  Lung CA Screening    Blanka Lucio PA-C  [facility]

## 2018-12-05 NOTE — LETTER
December 14, 2018    Mallory Sargent  [address] [facility] [address] [facility] [address]    Dear Mallory,  We are happy to inform you that your PAP smear result from 12/05/18 is normal.  We are now able to do a follow up test on PAP smears. The DNA test is for HPV (Human Papilloma Virus). Cervical cancer is closely linked with certain types of HPV. Your results showed no evidence of high risk HPV.  Therefore we recommend you return in 5 years for your next pap smear and HPV test.  You will still need to return to the clinic every year for an annual exam and other preventive tests.  If you have additional questions regarding this result, please call our registered nurse, Danielle at [phone].  Sincerely,    Blanka Lucio PA-C/christine

## 2018-12-05 NOTE — MR AVS SNAPSHOT
After Visit Summary   2018    Mallory Sargent    MRN: [id number]           Patient Information     Date Of Birth          1965        Visit Information        Provider Department      2018 3:00 PM Blanka Lucio PA-C [facility]        Today's Diagnoses     Encounter for routine adult medical exam with abnormal findings    -  1    Screening for malignant neoplasm of cervix        Screening for HIV (human immunodeficiency virus)        Need for prophylactic vaccination and inoculation against influenza        Need for prophylactic vaccination with tetanus-diphtheria (Td)        Other insomnia        Class 1 obesity due to excess calories without serious comorbidity with body mass index (BMI) of 33.0 to 33.9 in adult        Screening for diabetes mellitus        Palpitations        Family history of malignant neoplasm of ovary - maternal Hx Dx'd age 57,  age 62; pt consider ovary removal.        Breast pain, left        Encounter for screening mammogram for breast cancer          Care Instructions      Preventive Health Recommendations  Female Ages 50 - 64    Yearly exam: See your health care provider every year in order to  o Review health changes.   o Discuss preventive care.    o Review your medicines if your doctor has prescribed any.      Get a Pap test every three years (unless you have an abnormal result and your provider advises testing more often).    If you get Pap tests with HPV test, you only need to test every 5 years, unless you have an abnormal result.     You do not need a Pap test if your uterus was removed (hysterectomy) and you have not had cancer.    You should be tested each year for STDs (sexually transmitted diseases) if you're at risk.     Have a mammogram every 1 to 2 years.    Have a colonoscopy at age 50, or have a yearly FIT test (stool test). These exams screen for colon cancer.      Have a cholesterol test every 5 years, or  more often if advised.    Have a diabetes test (fasting glucose) every three years. If you are at risk for diabetes, you should have this test more often.     If you are at risk for osteoporosis (brittle bone disease), think about having a bone density scan (DEXA).    Shots: Get a flu shot each year. Get a tetanus shot every 10 years.    Nutrition:     Eat at least 5 servings of fruits and vegetables each day.    Eat whole-grain bread, whole-wheat pasta and brown rice instead of white grains and rice.    Get adequate Calcium and Vitamin D.     Lifestyle    Exercise at least 150 minutes a week (30 minutes a day, 5 days a week). This will help you control your weight and prevent disease.    Limit alcohol to one drink per day.    No smoking.     Wear sunscreen to prevent skin cancer.     See your dentist every six months for an exam and cleaning.    See your eye doctor every 1 to 2 years.            Follow-ups after your visit        Additional Services     NUTRITION REFERRAL       Your provider has referred you to: [facility]: [facility] [phone]   [email or web address]  [facility]: [facility] [phone]   [email or web address]    Please be aware that coverage of these services is subject to the terms and limitations of your health insurance plan.  Call member services at your health plan with any benefit or coverage questions.      Please bring the following with you to your appointment:    (1) This referral request  (2) Any documents given to you regarding this referral  (3) Any specific questions you have about diet and/or food choices                  Follow-up notes from your care team     Return in about 1 year (around 12/5/2019) for Routine Visit.      Future tests that were ordered for you today     Open Future Orders        Priority Expected Expires Ordered    Lipid panel reflex to direct LDL Fasting Routine  "2/3/2019 4/4/2019 12/5/2018    **Comprehensive metabolic panel FUTURE 2mo Routine 2/3/2019 4/4/2019 12/5/2018    **HIV Antigen Antibody Combo FUTURE 2mo Routine 2/3/2019 4/4/2019 12/5/2018    **TSH with free T4 reflex FUTURE 2mo Routine 2/3/2019 4/4/2019 12/5/2018    **CBC with platelets FUTURE 2mo Routine 2/3/2019 4/4/2019 12/5/2018     Routine  12/5/2019 12/5/2018    US Pelvic Complete w Transvaginal Routine  12/5/2019 12/5/2018    MA Diagnostic Digital Bilateral Routine  12/5/2019 12/5/2018            Who to contact     If you have questions or need follow up information about today's clinic visit or your schedule please contact [facility] [place] directly at [phone].  Normal or non-critical lab and imaging results will be communicated to you by MyChart, letter or phone within 4 business days after the clinic has received the results. If you do not hear from us within 7 days, please contact the clinic through [employer] or phone. If you have a critical or abnormal lab result, we will notify you by phone as soon as possible.  Submit refill requests through [employer] or call your pharmacy and they will forward the refill request to us. Please allow 3 business days for your refill to be completed.          Additional Information About Your Visit        [employer] Information     [employer] gives you secure access to your electronic health record. If you see a primary care provider, you can also send messages to your care team and make appointments. If you have questions, please call your primary care clinic.  If you do not have a primary care provider, please call [phone] and they will assist you.        Care EveryWhere ID     This is your Care EveryWhere ID. This could be used by other organizations to access your [place] medical records  [id number]        Your Vitals Were     Pulse Temperature Height Last Period Pulse Oximetry BMI (Body Mass Index)    108 97.6  F (36.4  C) (Tympanic) 5' 10.5\" (1.791 " m) 10/01/2015 (Approximate) 95% 33.95 kg/m2       Blood Pressure from Last 3 Encounters:   12/05/18 114/80   01/03/18 110/68   10/31/17 102/79    Weight from Last 3 Encounters:   12/05/18 240 lb (108.9 kg)   01/03/18 223 lb (101.2 kg)   10/31/17 217 lb (98.4 kg)              We Performed the Following          ADMIN VACCINE, FIRST     EKG 12-lead complete w/read - Clinics     HPV High Risk Types DNA Cervical     NUTRITION REFERRAL     Pap imaged thin layer screen with HPV - recommended age 30 - 65 years (select HPV order below)     TD (ADULT, 7+) PRESERVE FREE        Primary Care Provider Office Phone # Fax #    Blanka Lucio PA-C [phone] [address] [facility] [address]        Equal Access to Services     [facility]: Hadii nidia leal hadasho Soomaali, waaxda luqadaha, qaybta kaalmada adeegyada, pascual carmichael hayshazia garcia . So [facility] [phone].    ATENCIÓN: Si habla español, tiene a suresh disposición servicios gratuitos de asistencia lingüística. Llame al [phone].    We comply with applicable federal civil rights laws and [place] laws. We do not discriminate on the basis of race, color, national origin, age, disability, sex, sexual orientation, or gender identity.            Thank you!     Thank you for choosing [facility]  for your care. Our goal is always to provide you with excellent care. Hearing back from our patients is one way we can continue to improve our services. Please take a few minutes to complete the written survey that you may receive in the mail after your visit with us. Thank you!             Your Updated Medication List - Protect others around you: Learn how to safely use, store and throw away your medicines at www.disposemymeds.org.          This list is accurate as of 12/5/18  4:37 PM.  Always use your most recent med list.                   Brand Name Dispense Instructions for use Diagnosis    aspirin 81 MG tablet    ASA    30  tablet    Take by mouth daily        augmented betamethasone dipropionate 0.05 % external lotion    DIPROLENE    60 mL    Apply topically 2 times daily    Seborrheic dermatitis       Multi-vitamin tablet      Take 1 tablet by mouth daily        omeprazole 20 MG DR capsule    priLOSEC    90 capsule    TAKE 1 CAPSULE BY MOUTH DAILY, 30 TO 60 MINUTES BEFORE A MEAL.    Hyperlipidemia LDL goal <130       zolpidem 5 MG tablet    AMBIEN    30 tablet    Take 1 tablet (5 mg) by mouth nightly as needed for sleep    Other insomnia

## 2018-12-06 ENCOUNTER — HOSPITAL ENCOUNTER (OUTPATIENT)
Dept: LAB | Facility: CLINIC | Age: 53
Discharge: HOME | End: 2018-12-06
Attending: PHYSICIAN ASSISTANT | Admitting: PHYSICIAN ASSISTANT
Payer: COMMERCIAL

## 2018-12-06 DIAGNOSIS — Z00.01 ENCOUNTER FOR ROUTINE ADULT MEDICAL EXAM WITH ABNORMAL FINDINGS: ICD-10-CM

## 2018-12-06 DIAGNOSIS — Z80.41 FAMILY HISTORY OF MALIGNANT NEOPLASM OF OVARY: ICD-10-CM

## 2018-12-06 DIAGNOSIS — Z11.4 SCREENING FOR HIV (HUMAN IMMUNODEFICIENCY VIRUS): ICD-10-CM

## 2018-12-06 DIAGNOSIS — R00.2 PALPITATIONS: ICD-10-CM

## 2018-12-06 DIAGNOSIS — Z13.1 SCREENING FOR DIABETES MELLITUS: ICD-10-CM

## 2018-12-06 LAB
ALBUMIN SERPL-MCNC: 4.1 G/DL (ref 3.4–5)
ALP SERPL-CCNC: 70 U/L (ref 40–150)
ALT SERPL W P-5'-P-CCNC: 56 U/L (ref 0–50)
ANION GAP SERPL CALCULATED.3IONS-SCNC: 7 MMOL/L (ref 3–14)
AST SERPL W P-5'-P-CCNC: 27 U/L (ref 0–45)
BILIRUB SERPL-MCNC: 0.5 MG/DL (ref 0.2–1.3)
BUN SERPL-MCNC: 14 MG/DL (ref 7–30)
CALCIUM SERPL-MCNC: 9.3 MG/DL (ref 8.5–10.1)
CANCER AG125 SERPL-ACNC: 8 U/ML (ref 0–30)
CHLORIDE SERPL-SCNC: 104 MMOL/L (ref 94–109)
CHOLEST SERPL-MCNC: 290 MG/DL
CO2 SERPL-SCNC: 26 MMOL/L (ref 20–32)
CREAT SERPL-MCNC: 0.84 MG/DL (ref 0.52–1.04)
ERYTHROCYTE [DISTWIDTH] IN BLOOD BY AUTOMATED COUNT: 12.4 % (ref 10–15)
GFR SERPL CREATININE-BSD FRML MDRD: 71 ML/MIN/1.7M2
GLUCOSE SERPL-MCNC: 103 MG/DL (ref 70–99)
HCT VFR BLD AUTO: 44.9 % (ref 35–47)
HDLC SERPL-MCNC: 51 MG/DL
HGB BLD-MCNC: 15 G/DL (ref 11.7–15.7)
LDLC SERPL CALC-MCNC: 184 MG/DL
MCH RBC QN AUTO: 31 PG (ref 26.5–33)
MCHC RBC AUTO-ENTMCNC: 33.4 G/DL (ref 31.5–36.5)
MCV RBC AUTO: 93 FL (ref 78–100)
NONHDLC SERPL-MCNC: 239 MG/DL
PLATELET # BLD AUTO: 235 10E9/L (ref 150–450)
POTASSIUM SERPL-SCNC: 4.2 MMOL/L (ref 3.4–5.3)
PROT SERPL-MCNC: 8.2 G/DL (ref 6.8–8.8)
RBC # BLD AUTO: 4.84 10E12/L (ref 3.8–5.2)
SODIUM SERPL-SCNC: 137 MMOL/L (ref 133–144)
TRIGL SERPL-MCNC: 275 MG/DL
TSH SERPL DL<=0.005 MIU/L-ACNC: 2.22 MU/L (ref 0.4–4)
WBC # BLD AUTO: 7.9 10E9/L (ref 4–11)

## 2018-12-06 PROCEDURE — 84443 ASSAY THYROID STIM HORMONE: CPT | Performed by: PHYSICIAN ASSISTANT

## 2018-12-06 PROCEDURE — 87389 HIV-1 AG W/HIV-1&-2 AB AG IA: CPT | Performed by: PHYSICIAN ASSISTANT

## 2018-12-06 PROCEDURE — 85027 COMPLETE CBC AUTOMATED: CPT | Performed by: PHYSICIAN ASSISTANT

## 2018-12-06 PROCEDURE — 86304 IMMUNOASSAY TUMOR CA 125: CPT | Performed by: PHYSICIAN ASSISTANT

## 2018-12-06 PROCEDURE — 36415 COLL VENOUS BLD VENIPUNCTURE: CPT | Performed by: PHYSICIAN ASSISTANT

## 2018-12-06 PROCEDURE — 80053 COMPREHEN METABOLIC PANEL: CPT | Performed by: PHYSICIAN ASSISTANT

## 2018-12-06 PROCEDURE — 80061 LIPID PANEL: CPT | Performed by: PHYSICIAN ASSISTANT

## 2018-12-07 PROBLEM — F32.5 MAJOR DEPRESSION IN COMPLETE REMISSION (H): Status: ACTIVE | Noted: 2018-12-07

## 2018-12-07 LAB — HIV 1+2 AB+HIV1 P24 AG SERPL QL IA: NONREACTIVE

## 2018-12-07 ASSESSMENT — ANXIETY QUESTIONNAIRES: GAD7 TOTAL SCORE: 0

## 2018-12-08 NOTE — PROGRESS NOTES
Please call or write patient with the following results:    -Normal red blood cell (hgb) levels, normal white blood cell count and normal platelet levels.  -LDL(bad) cholesterol level is elevated, and your triglycerides are elevated which can increase your heart disease risk.  A diet high in fat and simple carbohydrates, genetics and being overweight can contribute to this. ADVISE: exercising 150 minutes of aerobic exercise per week (30 minutes for 5 days per week or 50 minutes for 3 days per week are options), eating a low saturated fat/low carbohydrate diet, and omega-3 fatty acids (fish oil) 6908-4510 mg daily are helpful to improve this. Current guidelines recommend resuming cholesterol lowering therapy if 10-yr heart disease risk assessment score is >7.5% or if your LDL is > 190. Given you don't meet criteria at this time, it is ok to continue without your medication. That being said, I would follow-up with the nutritionist as previously discussed in clinic to help optimize your diet to limit any further progression.    The 10-year ASCVD risk score (Ebonifarhana JANE Jr, et al., 2013) is: 1.9%    Values used to calculate the score:      Age: 53 years      Sex: Female      Is Non- : No      Diabetic: No      Tobacco smoker: No      Systolic Blood Pressure: 114 mmHg      Is BP treated: No      HDL Cholesterol: 51 mg/dL      Total Cholesterol: 290 mg/dL    -Liver and gallbladder tests (ALT,AST, Alk phos,bilirubin) are normal.  -Kidney function (GFR) is normal.  -Sodium is normal.  -Potassium is normal.  -Calcium is normal.  -Glucose is slight elevated and may be a sign of early diabetes (prediabetes). ADVISE:: eating a low carbohydrate diet, exercising, trying to lose weight (if necessary) and rechecking your glucose level in 12 months.  -TSH (thyroid stimulating hormone) level is normal which indicates normal thyroid function.  -HIV test is normal.  -CA-125 level was normal.    Electronically Signed  By: Blanka Lucio PA-C

## 2018-12-10 LAB
COPATH REPORT: NORMAL
PAP: NORMAL

## 2018-12-11 LAB
FINAL DIAGNOSIS: NORMAL
HPV HR 12 DNA CVX QL NAA+PROBE: NEGATIVE
HPV16 DNA SPEC QL NAA+PROBE: NEGATIVE
HPV18 DNA SPEC QL NAA+PROBE: NEGATIVE
SPECIMEN DESCRIPTION: NORMAL
SPECIMEN SOURCE CVX/VAG CYTO: NORMAL

## 2018-12-14 ENCOUNTER — HOSPITAL ENCOUNTER (OUTPATIENT)
Dept: ULTRASOUND IMAGING | Facility: CLINIC | Age: 53
End: 2018-12-14
Attending: PHYSICIAN ASSISTANT
Payer: COMMERCIAL

## 2018-12-14 ENCOUNTER — HOSPITAL ENCOUNTER (OUTPATIENT)
Dept: MAMMOGRAPHY | Facility: CLINIC | Age: 53
Discharge: HOME | End: 2018-12-14
Attending: PHYSICIAN ASSISTANT | Admitting: PHYSICIAN ASSISTANT
Payer: COMMERCIAL

## 2018-12-14 DIAGNOSIS — N64.4 BREAST PAIN, LEFT: ICD-10-CM

## 2018-12-14 DIAGNOSIS — Z12.31 ENCOUNTER FOR SCREENING MAMMOGRAM FOR BREAST CANCER: ICD-10-CM

## 2018-12-14 PROCEDURE — 77066 DX MAMMO INCL CAD BI: CPT

## 2018-12-14 PROCEDURE — 76642 ULTRASOUND BREAST LIMITED: CPT | Mod: LT

## 2018-12-23 NOTE — RESULT ENCOUNTER NOTE
Result(s) was/were reviewed in the clinic with patient by radiology.  Electronically Signed By: Blanka Lucio PA-C

## 2019-01-02 ENCOUNTER — OFFICE VISIT (OUTPATIENT)
Dept: FAMILY MEDICINE | Facility: CLINIC | Age: 54
End: 2019-01-02
Payer: COMMERCIAL

## 2019-01-02 VITALS
BODY MASS INDEX: 33.95 KG/M2 | HEART RATE: 86 BPM | HEIGHT: 71 IN | SYSTOLIC BLOOD PRESSURE: 114 MMHG | DIASTOLIC BLOOD PRESSURE: 73 MMHG

## 2019-01-02 DIAGNOSIS — L21.9 SEBORRHEIC DERMATITIS: ICD-10-CM

## 2019-01-02 DIAGNOSIS — Z12.83 SKIN CANCER SCREENING: Primary | ICD-10-CM

## 2019-01-02 DIAGNOSIS — L81.2 EPHELIDES: ICD-10-CM

## 2019-01-02 DIAGNOSIS — Z80.8 FAMILY HISTORY OF BASAL CELL CARCINOMA: ICD-10-CM

## 2019-01-02 DIAGNOSIS — D22.30 COMPOUND NEVUS OF FACE: ICD-10-CM

## 2019-01-02 DIAGNOSIS — L81.4 SOLAR LENTIGO: ICD-10-CM

## 2019-01-02 DIAGNOSIS — R20.2 NOTALGIA PARESTHETICA: ICD-10-CM

## 2019-01-02 DIAGNOSIS — D22.9 MULTIPLE BENIGN MELANOCYTIC NEVI: ICD-10-CM

## 2019-01-02 PROCEDURE — 99214 OFFICE O/P EST MOD 30 MIN: CPT | Performed by: FAMILY MEDICINE

## 2019-01-02 RX ORDER — BETAMETHASONE DIPROPIONATE 0.5 MG/ML
LOTION, AUGMENTED TOPICAL 2 TIMES DAILY
Qty: 60 ML | Refills: 1 | Status: SHIPPED | OUTPATIENT
Start: 2019-01-02

## 2019-01-02 NOTE — PATIENT INSTRUCTIONS
"FUTURE APPOINTMENTS  Follow up in 1 year(s) for a full-body skin cancer screening.    SUN PROTECTION INSTRUCTIONS  Sun damage can lead to skin cancer and premature aging of the skin.      The best way to protect from sun damage to your skin is to avoid the sun during peak hours (10 am - 2 pm) even on overcast days.    Never use tanning beds. Tanning beds are associated with much higher risks of skin cancer.    All tanning damages the skin. Aim for ivory skin year round and you will have less trouble with your skin in years to come. There is no merit in getting \"a base tan\" before a warm weather vacation, as any tanning indicates your body's response to sun damage.    Stop smoking. Smokers have higher rates of skin cancer and also have premature skin wrinkling.    Use UPF sun-protective clothing, which while more expensive initially provides longer lasting coverage without having to worry about remembering to re-apply.  1. Wear a wide-brimmed hat and sunglasses.   2. Wear sun-protective clothing.  [employer] other [employer] make sun protective clothing that are stylish, comfortable and cool.   [employer] and other [employer] make UV arm sleeves suitable for golfing, gardening and other activities.    Sunscreen instructions:  1. Use sunscreens with Zinc Oxide, Titanium Dioxide or Avobenzone to protect from UVA rays.  2. Use SPF 30-50+ to protect from UVB rays.  3. Re-apply every 2 hours even if water resistant.  4. Apply on your face every day even when cloudy and even in the winter. UVA \"aging rays\" penetrate window glass and are just as strong in the winter as in the summer.    FYI  You should use about 3 tablespoons of sunscreen to protect your whole body. Thus a typical eight ounce bottle of sunscreen should last 4 applications. Remember, that the SPF rating is compromised if you don t apply enough. Most people only apply 1/2 - 1/3 of the amount they need. Also don t forget areas such as your ears, " feet, upper back and harder to reach places. Keep in mind that these amounts should be increased for larger body sizes.    Sunscreens with titanium dioxide and/or zinc oxide in the active ingredients are physical blockers as opposed to chemical blockers. Chemical-free sunscreens should not irritate the skin.    Spray-on sunscreens may be used for touch-up application only, not as a base layer. Also, use with caution around small children due to inhalation risk.    SPF means sun protection factor, which is just the degree to which the sunscreen can protect against UVB rays. There is no rating system for UVA rays. SPF is calculated as the time skin will burn when sunscreen is applied vs. skin without sunscreen.    Water resistant sunscreens should be re-applied every 1-2 hours.    Product Recommendations:    Consider use of sunscreen sticks with Zinc Oxide and Titanium Dioxide active ingredients such as Neutrogena Pure&Free Baby Sunscreen Stick.    Good examples include: Blue Lizard, EltaMD, Solbar    Good daily moisturizers with SPF: Vanicream, CeraVe.    For sensitive skin, consider : SkinMedica Essential Defense Mineral Shield Broad Spectrum SPF 35    Men: consider use of Neutrogena Triple Protect Facial Lotion    Avoid retinyl palmitate products.  Avoid combination products that include both sunscreen and insect repellant, as sunscreen should be applied every 2 hours, but insect repellant should not be applied as frequently.    For more information:  https://www.skincancer.org/prevention/sun-protection/sunscreen/sunscreens-safe-and-effective    DRY SKIN MANAGEMENT INSTRUCTIONS  Routine use of moisturizer is important for healthy, resilient skin not just for soft skin.     Sealing in moisture    Twice daily use of a moisturizer such as over-the-counter (OTC) CeraVe moisturizer cream (in the jar). CeraVe products contain ceramides and filaggrin proteins that can help to maintain the body's moisture layer.    After  "cleansing or washing, always apply moisturizer immediately after drying off (pat dry only) for best effect.    Protection while hydrating    Do not overuse soap. Unless you have been sweating extensively, just apply soap to groin and armpits.    Recommended products for body include: OTC unscented Dove for sensitive skin or OTC Vanicream cleansing bar.    Recommended facial cleansers include: OTC CeraVe hydrating facial cleanser or OTC Cetaphil daily facial cleanser.    Avoid use of    Scented/perfumed products    Irritating clothing (wool, new jeans, new/unwashed clothing, scratchy synthetics)    Neosporin or triple antibiotic topical products    Products containing aloe, herbs, Vitamin E, or other \"natural ingredients\".    Dryer sheets or fabric softeners (while symptoms are present)    If a topical medication is prescribed, apply topical prescription first, followed by use of moisturizing product.  "

## 2019-01-02 NOTE — LETTER
1/2/2019         RE: Mallory Sargent  [address]  [place] [address]        Dear Colleague,    Thank you for referring your patient, Mallory Sargent, to the [facility]. Please see a copy of my visit note below.    [facility] - PRIMARY CARE SKIN    CC: skin cancer screening (full-body)  SUBJECTIVE:   Mallory Sargent is a(n) 53 year old female who presents to clinic today for a full-body skin exam.    Bothersome lesions noticed by the patient or other skin concerns :  Issue One: An itchy rash on the upper back had persisted for a month. The intensity has waxed and waned.  Issue Two: Redness of the left wrist is provoked by use of a Fitbit  Issue Three: Occasional use of betamethasone dipropionate 0.05% lotion has been effective to control scalp irritation.    Personal Medical History  Skin cancer: NO  Eczema: YES  Seborrheic dermatitis: YES - previous treatments include betamethasone dipropionate 0.05% lotion, ketoconazole 2% shampoo, and T/Sal shampoo.    Family Medical History  Skin cancer: YES - basal cell carcinoma in maternal grandmother  Eczema Psoriasis Autoimmune   NO NO YES - lupus in mother, ankylosing spondylitis in brother     Sun Exposure History  Previous history of significant sun exposure:  Blistering sunburns: YES - when younger.  Tanning beds: YES.  Sunscreen usage: YES.    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    Refer to electronic medical record (EMR) for past medical history and medications.    INTEGUMENTARY/SKIN: POSITIVE for pruritis and rash  ROS: 14 point review of systems was negative except the symptoms listed above in the HPI.    This document serves as a record of the services and decisions personally performed and made by Reanna Gray MD and was created by Jose Dumas, a trained medical scribe, based on personal observations and provider statements to the medical scribe.  January 2, 2019 2:33 PM   Jose  "Dumas    OBJECTIVE:   GENERAL: healthy, alert and no distress.  SKIN: Lloyd Skin Type - I.  This patient was examined from the top of the head to the bottom of the feet  including scalp, face, neck, trunk, buttocks, both arms, both legs, both hands, both feet, and all fingers and toes. The dermatoscope was used to help evaluate pigmented lesions.  Skin Pertinent Findings:  Right lateral upper forehead: 5 mm in size smooth raised pink flesh-colored lesion most consistent with compound nevus.    Face: Scattered small, brown macule(s), consistent with ephelides.    Upper extremities: small, brown macule(s), consistent with ephelides. brown, macule(s) most consistent with benign solar lentigo. scattered brown macules of various sizes and shapes most consistent with (benign) melanocytic nevi.    Back: No visible eruption. brown, macule(s) most consistent with benign solar lentigo. small, brown macule(s), consistent with ephelides.    ASSESSMENT:     Encounter Diagnoses   Name Primary?     Skin cancer screening Yes     Family history of basal cell carcinoma      Ephelides      Compound nevus of face      Solar lentigo      Multiple benign melanocytic nevi      Notalgia paresthetica      Seborrheic dermatitis          PLAN:   Patient Instructions   FUTURE APPOINTMENTS  Follow up in 1 year(s) for a full-body skin cancer screening.    SUN PROTECTION INSTRUCTIONS  Sun damage can lead to skin cancer and premature aging of the skin.      The best way to protect from sun damage to your skin is to avoid the sun during peak hours (10 am - 2 pm) even on overcast days.    Never use tanning beds. Tanning beds are associated with much higher risks of skin cancer.    All tanning damages the skin. Aim for ivory skin year round and you will have less trouble with your skin in years to come. There is no merit in getting \"a base tan\" before a warm weather vacation, as any tanning indicates your body's response to sun damage.    Stop " "smoking. Smokers have higher rates of skin cancer and also have premature skin wrinkling.    Use UPF sun-protective clothing, which while more expensive initially provides longer lasting coverage without having to worry about remembering to re-apply.  1. Wear a wide-brimmed hat and sunglasses.   2. Wear sun-protective clothing.  [employer] and other [employer] make sun protective clothing that are stylish, comfortable and cool.   [employer] and other [employer] make UV arm sleeves suitable for golfing, gardening and other activities.    Sunscreen instructions:  1. Use sunscreens with Zinc Oxide, Titanium Dioxide or Avobenzone to protect from UVA rays.  2. Use SPF 30-50+ to protect from UVB rays.  3. Re-apply every 2 hours even if water resistant.  4. Apply on your face every day even when cloudy and even in the winter. UVA \"aging rays\" penetrate window glass and are just as strong in the winter as in the summer.    FYI  You should use about 3 tablespoons of sunscreen to protect your whole body. Thus a typical eight ounce bottle of sunscreen should last 4 applications. Remember, that the SPF rating is compromised if you don t apply enough. Most people only apply 1/2 - 1/3 of the amount they need. Also don t forget areas such as your ears, feet, upper back and harder to reach places. Keep in mind that these amounts should be increased for larger body sizes.    Sunscreens with titanium dioxide and/or zinc oxide in the active ingredients are physical blockers as opposed to chemical blockers. Chemical-free sunscreens should not irritate the skin.    Spray-on sunscreens may be used for touch-up application only, not as a base layer. Also, use with caution around small children due to inhalation risk.    SPF means sun protection factor, which is just the degree to which the sunscreen can protect against UVB rays. There is no rating system for UVA rays. SPF is calculated as the time skin will burn when sunscreen " is applied vs. skin without sunscreen.    Water resistant sunscreens should be re-applied every 1-2 hours.    Product Recommendations:    Consider use of sunscreen sticks with Zinc Oxide and Titanium Dioxide active ingredients such as Neutrogena Pure&Free Baby Sunscreen Stick.    Good examples include: Blue Lizard, EltaMD, Solbar    Good daily moisturizers with SPF: Vanicream, CeraVe.    For sensitive skin, consider : SkinMedica Essential Defense Mineral Shield Broad Spectrum SPF 35    Men: consider use of Neutrogena Triple Protect Facial Lotion    Avoid retinyl palmitate products.  Avoid combination products that include both sunscreen and insect repellant, as sunscreen should be applied every 2 hours, but insect repellant should not be applied as frequently.    For more information:  https://www.skincancer.org/prevention/sun-protection/sunscreen/sunscreens-safe-and-effective    DRY SKIN MANAGEMENT INSTRUCTIONS  Routine use of moisturizer is important for healthy, resilient skin not just for soft skin.     Sealing in moisture    Twice daily use of a moisturizer such as over-the-counter (OTC) CeraVe moisturizer cream (in the jar). CeraVe products contain ceramides and filaggrin proteins that can help to maintain the body's moisture layer.    After cleansing or washing, always apply moisturizer immediately after drying off (pat dry only) for best effect.    Protection while hydrating    Do not overuse soap. Unless you have been sweating extensively, just apply soap to groin and armpits.    Recommended products for body include: OTC unscented Dove for sensitive skin or OTC Vanicream cleansing bar.    Recommended facial cleansers include: OTC CeraVe hydrating facial cleanser or OTC Cetaphil daily facial cleanser.    Avoid use of    Scented/perfumed products    Irritating clothing (wool, new jeans, new/unwashed clothing, scratchy synthetics)    Neosporin or triple antibiotic topical products    Products containing aloe,  "herbs, Vitamin E, or other \"natural ingredients\".    Dryer sheets or fabric softeners (while symptoms are present)    If a topical medication is prescribed, apply topical prescription first, followed by use of moisturizing product.    The patient was counseled about sunscreens and sun avoidance. The patient was counseled to check the skin regularly and report any lesion that is new, changing, itching, scabbing, bleeding or otherwise bothersome. The patient was discharged ambulatory and in stable condition.    TT: 25 minutes.  CT: 15 minutes.    The information in this document, created by the medical scribe for me, accurately reflects the services I personally performed and the decisions made by me. I have reviewed and approved this document for accuracy prior to leaving the patient care area.  January 2, 2019 2:33 PM  Reanna Gray MD  [facility] [place]    Again, thank you for allowing me to participate in the care of your patient.        Sincerely,        Reanna Gray MD    "

## 2019-01-02 NOTE — PROGRESS NOTES
[facility] - PRIMARY CARE SKIN    CC: skin cancer screening (full-body)  SUBJECTIVE:   Mallory Sargent is a(n) 53 year old female who presents to clinic today for a full-body skin exam.    Bothersome lesions noticed by the patient or other skin concerns :  Issue One: An itchy rash on the upper back had persisted for a month. The intensity has waxed and waned.  Issue Two: Redness of the left wrist is provoked by use of a Fitbit  Issue Three: Occasional use of betamethasone dipropionate 0.05% lotion has been effective to control scalp irritation.    Personal Medical History  Skin cancer: NO  Eczema: YES  Seborrheic dermatitis: YES - previous treatments include betamethasone dipropionate 0.05% lotion, ketoconazole 2% shampoo, and T/Sal shampoo.    Family Medical History  Skin cancer: YES - basal cell carcinoma in maternal grandmother  Eczema Psoriasis Autoimmune   NO NO YES - lupus in mother, ankylosing spondylitis in brother     Sun Exposure History  Previous history of significant sun exposure:  Blistering sunburns: YES - when younger.  Tanning beds: YES.  Sunscreen usage: YES.    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    Refer to electronic medical record (EMR) for past medical history and medications.    INTEGUMENTARY/SKIN: POSITIVE for pruritis and rash  ROS: 14 point review of systems was negative except the symptoms listed above in the HPI.    This document serves as a record of the services and decisions personally performed and made by Reanna Gray MD and was created by Jose Dumas, a trained medical scribe, based on personal observations and provider statements to the medical scribe.  January 2, 2019 2:33 PM   Jose Dumas    OBJECTIVE:   GENERAL: healthy, alert and no distress.  SKIN: Lloyd Skin Type - I.  This patient was examined from the top of the head to the bottom of the feet  including scalp, face, neck, trunk, buttocks, both arms, both legs, both hands, both feet,  "and all fingers and toes. The dermatoscope was used to help evaluate pigmented lesions.  Skin Pertinent Findings:  Right lateral upper forehead: 5 mm in size smooth raised pink flesh-colored lesion most consistent with compound nevus.    Face: Scattered small, brown macule(s), consistent with ephelides.    Upper extremities: small, brown macule(s), consistent with ephelides. brown, macule(s) most consistent with benign solar lentigo. scattered brown macules of various sizes and shapes most consistent with (benign) melanocytic nevi.    Back: No visible eruption. brown, macule(s) most consistent with benign solar lentigo. small, brown macule(s), consistent with ephelides.    ASSESSMENT:     Encounter Diagnoses   Name Primary?     Skin cancer screening Yes     Family history of basal cell carcinoma      Ephelides      Compound nevus of face      Solar lentigo      Multiple benign melanocytic nevi      Notalgia paresthetica      Seborrheic dermatitis          PLAN:   Patient Instructions   FUTURE APPOINTMENTS  Follow up in 1 year(s) for a full-body skin cancer screening.    SUN PROTECTION INSTRUCTIONS  Sun damage can lead to skin cancer and premature aging of the skin.      The best way to protect from sun damage to your skin is to avoid the sun during peak hours (10 am - 2 pm) even on overcast days.    Never use tanning beds. Tanning beds are associated with much higher risks of skin cancer.    All tanning damages the skin. Aim for ivory skin year round and you will have less trouble with your skin in years to come. There is no merit in getting \"a base tan\" before a warm weather vacation, as any tanning indicates your body's response to sun damage.    Stop smoking. Smokers have higher rates of skin cancer and also have premature skin wrinkling.    Use UPF sun-protective clothing, which while more expensive initially provides longer lasting coverage without having to worry about remembering to re-apply.  1. Wear a " "wide-brimmed hat and sunglasses.   2. Wear sun-protective clothing.  [employer] and other [employer] make sun protective clothing that are stylish, comfortable and cool.   [employer] and other companies make UV arm sleeves suitable for golfing, gardening and other activities.    Sunscreen instructions:  1. Use sunscreens with Zinc Oxide, Titanium Dioxide or Avobenzone to protect from UVA rays.  2. Use SPF 30-50+ to protect from UVB rays.  3. Re-apply every 2 hours even if water resistant.  4. Apply on your face every day even when cloudy and even in the winter. UVA \"aging rays\" penetrate window glass and are just as strong in the winter as in the summer.    FYI  You should use about 3 tablespoons of sunscreen to protect your whole body. Thus a typical eight ounce bottle of sunscreen should last 4 applications. Remember, that the SPF rating is compromised if you don t apply enough. Most people only apply 1/2 - 1/3 of the amount they need. Also don t forget areas such as your ears, feet, upper back and harder to reach places. Keep in mind that these amounts should be increased for larger body sizes.    Sunscreens with titanium dioxide and/or zinc oxide in the active ingredients are physical blockers as opposed to chemical blockers. Chemical-free sunscreens should not irritate the skin.    Spray-on sunscreens may be used for touch-up application only, not as a base layer. Also, use with caution around small children due to inhalation risk.    SPF means sun protection factor, which is just the degree to which the sunscreen can protect against UVB rays. There is no rating system for UVA rays. SPF is calculated as the time skin will burn when sunscreen is applied vs. skin without sunscreen.    Water resistant sunscreens should be re-applied every 1-2 hours.    Product Recommendations:    Consider use of sunscreen sticks with Zinc Oxide and Titanium Dioxide active ingredients such as Neutrogena Pure&Free Baby " "Sunscreen Stick.    Good examples include: Blue Lizard, EltaMD, Solbar    Good daily moisturizers with SPF: Vanicream, CeraVe.    For sensitive skin, consider : SkinMedica Essential Defense Mineral Shield Broad Spectrum SPF 35    Men: consider use of Neutrogena Triple Protect Facial Lotion    Avoid retinyl palmitate products.  Avoid combination products that include both sunscreen and insect repellant, as sunscreen should be applied every 2 hours, but insect repellant should not be applied as frequently.    For more information:  https://www.skincancer.org/prevention/sun-protection/sunscreen/sunscreens-safe-and-effective    DRY SKIN MANAGEMENT INSTRUCTIONS  Routine use of moisturizer is important for healthy, resilient skin not just for soft skin.     Sealing in moisture    Twice daily use of a moisturizer such as over-the-counter (OTC) CeraVe moisturizer cream (in the jar). CeraVe products contain ceramides and filaggrin proteins that can help to maintain the body's moisture layer.    After cleansing or washing, always apply moisturizer immediately after drying off (pat dry only) for best effect.    Protection while hydrating    Do not overuse soap. Unless you have been sweating extensively, just apply soap to groin and armpits.    Recommended products for body include: OTC unscented Dove for sensitive skin or OTC Vanicream cleansing bar.    Recommended facial cleansers include: OTC CeraVe hydrating facial cleanser or OTC Cetaphil daily facial cleanser.    Avoid use of    Scented/perfumed products    Irritating clothing (wool, new jeans, new/unwashed clothing, scratchy synthetics)    Neosporin or triple antibiotic topical products    Products containing aloe, herbs, Vitamin E, or other \"natural ingredients\".    Dryer sheets or fabric softeners (while symptoms are present)    If a topical medication is prescribed, apply topical prescription first, followed by use of moisturizing product.    The patient was counseled " about sunscreens and sun avoidance. The patient was counseled to check the skin regularly and report any lesion that is new, changing, itching, scabbing, bleeding or otherwise bothersome. The patient was discharged ambulatory and in stable condition.    TT: 25 minutes.  CT: 15 minutes.    The information in this document, created by the medical scribe for me, accurately reflects the services I personally performed and the decisions made by me. I have reviewed and approved this document for accuracy prior to leaving the patient care area.  January 2, 2019 2:33 PM  Reanna Gray MD  [facility] [place]

## 2019-01-03 ENCOUNTER — OFFICE VISIT (OUTPATIENT)
Dept: NUTRITION | Facility: CLINIC | Age: 54
End: 2019-01-03
Payer: COMMERCIAL

## 2019-01-03 DIAGNOSIS — E66.811 CLASS 1 OBESITY DUE TO EXCESS CALORIES WITHOUT SERIOUS COMORBIDITY WITH BODY MASS INDEX (BMI) OF 33.0 TO 33.9 IN ADULT: Primary | ICD-10-CM

## 2019-01-03 DIAGNOSIS — E66.09 CLASS 1 OBESITY DUE TO EXCESS CALORIES WITHOUT SERIOUS COMORBIDITY WITH BODY MASS INDEX (BMI) OF 33.0 TO 33.9 IN ADULT: Primary | ICD-10-CM

## 2019-01-03 PROCEDURE — 97802 MEDICAL NUTRITION INDIV IN: CPT

## 2019-01-03 NOTE — PROGRESS NOTES
MEDICAL NUTRITION THERAPY  Visit Type:Initial assessment and intervention    SUBJECTIVE:   Mallory Sargent presents today for MNT and education related to weight management.   She is accompanied by self.     PATIENT STATED GOAL(S) FOR THIS VISIT: desires to lose weight, worried about diabetes    EATING HABITS:   Breakfast: cereal (Special K) OR toast - pb, jelly OR english muffin, fruit  Lunch: Lean Cuisine or frozen meal OR sandwich - 2 bread, turkey, cheese OR leftovers  Dinner: large bowl cereal OR 2 toast - pb   Snacks: candy, chocolate, cookies       Misses meals? no    Previous diet education:  No     Food allergies/intolerances: no    EXERCISE: not assessed    SOCIO/ECONOMIC:   Lives with: spouse and daughter    OBJECTIVE:   Vitals: LMP 10/01/2015 (Approximate)     Weight Change: 15 lb increase x 1 yr  ASSESSMENT:   Diet recall reveals poor balance.  Typically <2 fruit/vegetables servings daily.  Typical choices are high in carbohydrate, such as cereal, toast, etc.  Starches tend to be white versus whole wheat.  Evening snacking/grazing is a problem - cereal, sweets, etc.  Does not tend to have a sit-down family meal for dinner due to differing schedules.  Diet is high in: calories and carbs  Diet is low in: fiber, fruits, protein and vegetables    Recommended Energy Intake: 1271-1444 kcal for weight loss  VERBAL AND WRITTEN INFORMATION GIVEN TO SUPPORT:  Discussed: general nutrition guidelines, weight reduction, consistent meals, free foods, carb counting, labeling, fiber, behavior modification and portion control.  Education Materials Provided: Label Reading and Carbohydrate Counting    PLAN:   PATIENT'S BEHAVIOR CHANGE GOALS:   See Patient Instructions for patient stated behavior change goals. AVS was printed and given to patient at today's appointment.    FOLLOW UP:   Follow up with RD as needed.  Call RD with questions/concerns.     ROSALIO Escoto CDE    Time spent in minutes: 30  Encounter:  Individual

## 2019-01-03 NOTE — PATIENT INSTRUCTIONS
1. Keep a food journal.  RenovarPal praveen.  2. Carb counting at meals/snacks.  Aim for 2-3 carb choices per meal, ~1 carb per snack.  3. More fruits and vegetables.  4. Whole-grain, high-fiber grains - brown rice, whole wheat pasta, sprouted grain bread, etc.  5. Limit sweets, desserts.  6. Meal planning.

## 2019-04-23 ENCOUNTER — TELEPHONE (OUTPATIENT)
Dept: FAMILY MEDICINE | Facility: CLINIC | Age: 54
End: 2019-04-23

## 2019-04-23 NOTE — TELEPHONE ENCOUNTER
ABDOMINAL   PAIN     Onset: over a week     Description:   Character: Dull ache  Location: left lower quadrant  Radiation: Pelvic region    Intensity: mild    Progression of Symptoms:  same and intermittent    Accompanying Signs & Symptoms:  Fever/Chills?: no   Gas/Bloating: no   Nausea: YES- waves  Vomitting: no   Diarrhea?: YES- a couple of days ago - but since then she has a a stool a day   Stools are brown and difficult pass had some yellow stool   Constipation:YES- stools are harder but this is normal   Dysuria or Hematuria: YES- blood in stool 2-3 weeks ago once and has not happen sense    History:   Trauma: no   Previous similar pain: no    Previous tests done: none    Precipitating factors:   Does the pain change with:     Food: no      BM: no     Urination: no     Alleviating factors:  None noted     Therapies Tried and outcome: none noted     LMP:  not applicable      Advised pt she soul be seen this week - she wants to be seen on Thursday since it is her day off     Made an appointment for 4/25/2019 and if things worsen she will call back     Patient stated an understanding and agreed with plan.    Mallory Nugent RN, BSN  [facility]

## 2019-04-25 ENCOUNTER — OFFICE VISIT (OUTPATIENT)
Dept: FAMILY MEDICINE | Facility: CLINIC | Age: 54
End: 2019-04-25
Payer: COMMERCIAL

## 2019-04-25 VITALS
OXYGEN SATURATION: 95 % | SYSTOLIC BLOOD PRESSURE: 114 MMHG | DIASTOLIC BLOOD PRESSURE: 80 MMHG | BODY MASS INDEX: 33.95 KG/M2 | TEMPERATURE: 98.4 F | WEIGHT: 240 LBS | HEART RATE: 73 BPM

## 2019-04-25 DIAGNOSIS — F32.5 MAJOR DEPRESSION IN COMPLETE REMISSION (H): ICD-10-CM

## 2019-04-25 DIAGNOSIS — K62.5 RECTAL BLEEDING: Primary | ICD-10-CM

## 2019-04-25 PROCEDURE — 99213 OFFICE O/P EST LOW 20 MIN: CPT | Performed by: FAMILY MEDICINE

## 2019-04-25 NOTE — PROGRESS NOTES
SUBJECTIVE:   aMllory Sargent is a 53 year old female who presents to clinic today for the following   health issues:      Triage note:  ABDOMINAL   PAIN     Onset: over a week     Description:   Character: Dull ache  Location: left lower quadrant  Radiation: Pelvic region    Intensity: mild    Progression of Symptoms:  same and intermittent    Accompanying Signs & Symptoms:  Fever/Chills?: no   Gas/Bloating: no   Nausea: YES- waves  Vomitting: no   Diarrhea?: YES- a couple of days ago - but since then she has a a stool a day   Stools are brown and difficult pass had some yellow stool   Constipation:YES- stools are harder but this is normal   Dysuria or Hematuria: YES- blood in stool 2-3 weeks ago once and has not happen sense    History:   Trauma: no   Previous similar pain: no    Previous tests done: none    Precipitating factors:   Does the pain change with:     Food: no      BM: no     Urination: no     Alleviating factors:  None noted     Therapies Tried and outcome: none noted     LMP:  not applicable       Abdominal pain has resolved since talking to nurse. She is still concerned about the blood she had in her stool. She states that there was some pinkish red blood on the toilet paper and in the bowl. She does have a history of hemorrhoids. Denies any rectal itching, irritation, or pain. Has not noticed any masses or protrusions when wiping. She does have history of colonic polyps. Planning for repeat colonoscopy in .     Additional history: as documented    Reviewed  and updated as needed this visit by clinical staff  Tobacco  Allergies  Meds         Reviewed and updated as needed this visit by Provider         Patient Active Problem List   Diagnosis     Family history of malignant neoplasm of ovary - maternal Hx Dx'd age 57,  age 62; pt consider ovary removal.     Benign shuddering attack     Esophageal reflux     Acute reaction to stress     Hyperlipidemia LDL goal <130     Hip dysplasia      Family history of basal cell carcinoma     Class 1 obesity due to excess calories without serious comorbidity with body mass index (BMI) of 33.0 to 33.9 in adult     Major depression in complete remission (H)     Past Surgical History:   Procedure Laterality Date     C LIGATE FALLOPIAN TUBE       C NONSPECIFIC PROCEDURE      vaginal repair after delivery     C/SECTION, LOW TRANSVERSE      , Low Transverse     CHOLECYSTECTOMY, LAPOROSCOPIC  2010    Cholecystectomy, Laparoscopic     COLONOSCOPY  2/15/2016    Dr. Tran [facility]     HERNIA REPAIR, INCISIONAL  2010    Laparoscopic       Social History     Tobacco Use     Smoking status: Former Smoker     Last attempt to quit: 1993     Years since quittin.9     Smokeless tobacco: Never Used   Substance Use Topics     Alcohol use: Yes     Comment: very rarely      Family History   Problem Relation Age of Onset     Hypertension Father      Lipids Father      Gastrointestinal Disease Father         vazquez's esophagus fr. reflux      Hyperlipidemia Father      Cancer Mother         ovarian     Arthritis Mother         lupus      Depression Paternal Grandmother         problems with schizophrenia     Arthritis Brother      Diabetes Maternal Grandmother      Diabetes Maternal Grandfather      Coronary Artery Disease Maternal Grandfather      Coronary Artery Disease Paternal Grandfather      Gastrointestinal Disease Brother         reflux      Colon Cancer Cousin      Ovarian Cancer Other         Maternal great aunt     Breast Cancer No family hx of            ROS:  Constitutional, HEENT, cardiovascular, pulmonary, gi and gu systems are negative, except as otherwise noted.    OBJECTIVE:     /80   Pulse 73   Temp 98.4  F (36.9  C) (Oral)   Wt 108.9 kg (240 lb)   LMP 10/01/2015 (Approximate)   SpO2 95%   BMI 33.95 kg/m    Body mass index is 33.95 kg/m .  GENERAL: healthy, alert and no distress  RESP: lungs clear to auscultation - no  rales, rhonchi or wheezes  CV: regular rate and rhythm, normal S1 S2, no S3 or S4, no murmur, click or rub, no peripheral edema and peripheral pulses strong  ABDOMEN: soft, nontender, no hepatosplenomegaly, no masses and bowel sounds normal  RECTAL (female): normal sphincter tone, no rectal masses and internal hemorrhoid appreciated    Diagnostic Test Results:  none     ASSESSMENT/PLAN:   1. Rectal bleeding: likely from hemorrhoid. Advise monitoring for recurrence. If any change or worsening of symptoms, could refer to colorectal.          Joe Zhu, DO  [facility]

## 2019-10-02 ENCOUNTER — HEALTH MAINTENANCE LETTER (OUTPATIENT)
Age: 54
End: 2019-10-02

## 2019-12-04 ENCOUNTER — TRANSFERRED RECORDS (OUTPATIENT)
Dept: HEALTH INFORMATION MANAGEMENT | Facility: CLINIC | Age: 54
End: 2019-12-04

## 2020-01-16 ENCOUNTER — OFFICE VISIT (OUTPATIENT)
Dept: FAMILY MEDICINE | Facility: CLINIC | Age: 55
End: 2020-01-16
Payer: COMMERCIAL

## 2020-01-16 ENCOUNTER — ANCILLARY PROCEDURE (OUTPATIENT)
Dept: GENERAL RADIOLOGY | Facility: CLINIC | Age: 55
End: 2020-01-16
Attending: NURSE PRACTITIONER
Payer: COMMERCIAL

## 2020-01-16 VITALS
DIASTOLIC BLOOD PRESSURE: 76 MMHG | HEIGHT: 71 IN | OXYGEN SATURATION: 97 % | TEMPERATURE: 98.6 F | SYSTOLIC BLOOD PRESSURE: 108 MMHG | WEIGHT: 241 LBS | HEART RATE: 96 BPM | BODY MASS INDEX: 33.74 KG/M2

## 2020-01-16 DIAGNOSIS — J18.9 COMMUNITY ACQUIRED PNEUMONIA, UNSPECIFIED LATERALITY: ICD-10-CM

## 2020-01-16 DIAGNOSIS — R05.9 COUGH: ICD-10-CM

## 2020-01-16 DIAGNOSIS — R05.9 COUGH: Primary | ICD-10-CM

## 2020-01-16 PROCEDURE — 99214 OFFICE O/P EST MOD 30 MIN: CPT | Performed by: NURSE PRACTITIONER

## 2020-01-16 PROCEDURE — 71046 X-RAY EXAM CHEST 2 VIEWS: CPT | Mod: FY

## 2020-01-16 RX ORDER — AZITHROMYCIN 250 MG/1
TABLET, FILM COATED ORAL
Qty: 6 TABLET | Refills: 0 | Status: SHIPPED | OUTPATIENT
Start: 2020-01-16 | End: 2020-01-21

## 2020-01-16 RX ORDER — PREDNISONE 20 MG/1
40 TABLET ORAL DAILY
Qty: 10 TABLET | Refills: 0 | Status: SHIPPED | OUTPATIENT
Start: 2020-01-16 | End: 2020-01-21

## 2020-01-16 ASSESSMENT — ANXIETY QUESTIONNAIRES
6. BECOMING EASILY ANNOYED OR IRRITABLE: NOT AT ALL
3. WORRYING TOO MUCH ABOUT DIFFERENT THINGS: NOT AT ALL
1. FEELING NERVOUS, ANXIOUS, OR ON EDGE: NOT AT ALL
7. FEELING AFRAID AS IF SOMETHING AWFUL MIGHT HAPPEN: NOT AT ALL
GAD7 TOTAL SCORE: 0
5. BEING SO RESTLESS THAT IT IS HARD TO SIT STILL: NOT AT ALL
IF YOU CHECKED OFF ANY PROBLEMS ON THIS QUESTIONNAIRE, HOW DIFFICULT HAVE THESE PROBLEMS MADE IT FOR YOU TO DO YOUR WORK, TAKE CARE OF THINGS AT HOME, OR GET ALONG WITH OTHER PEOPLE: NOT DIFFICULT AT ALL
2. NOT BEING ABLE TO STOP OR CONTROL WORRYING: NOT AT ALL

## 2020-01-16 ASSESSMENT — PATIENT HEALTH QUESTIONNAIRE - PHQ9
5. POOR APPETITE OR OVEREATING: NOT AT ALL
SUM OF ALL RESPONSES TO PHQ QUESTIONS 1-9: 2

## 2020-01-16 ASSESSMENT — MIFFLIN-ST. JEOR: SCORE: 1791.36

## 2020-01-16 NOTE — PATIENT INSTRUCTIONS
Mallory was seen today for cough.    Diagnoses and all orders for this visit:    Cough  -     XR Chest 2 Views; Future    Community acquired pneumonia, unspecified laterality  -     azithromycin (ZITHROMAX) 250 MG tablet; Take 2 tablets (500 mg) by mouth daily for 1 day, THEN 1 tablet (250 mg) daily for 4 days.  -     predniSONE (DELTASONE) 20 MG tablet; Take 2 tablets (40 mg) by mouth daily for 5 days        -     Complete Augmentin course.     Monitor cough, fatigue, shortness of breath - follow-up with no improvement or worsening of symptoms.

## 2020-01-16 NOTE — LETTER
January 16, 2020      Mallory Sargent  [address] [id number]        To Whom It May Concern:    Mallory Sargent was seen in our clinic. She may return to work on 1/20/2020 if her symptoms have improved.        Sincerely,        Mary Castro, ALEXANDRAI CNP

## 2020-01-16 NOTE — PROGRESS NOTES
Subjective     Mallory Sargent is a 54 year old female who presents to clinic today for the following health issues:    HPI   Acute Illness   Acute illness concerns: Bronchitis- was diagnosed in [place] with ear infection and sinus infection  Onset: symptoms started on 2020     Fever: YES 99.4 low grade today    Chills/Sweats: YES- sweats    Headache (location?): no     Sinus Pressure:YES- post-nasal drainage and facial pain    Conjunctivitis:  no    Ear Pain: YES: left - this started hurting again    Rhinorrhea: YES    Congestion: YES    Sore Throat: no      Cough: YES-waxing and waning over time- phlegm not sure what color     Wheeze: YES    Decreased Appetite: YES    Nausea: no     Vomiting: no     Diarrhea:  YES    Dysuria/Freq.: no     Fatigue/Achiness: YES    Sick/Strep Exposure: YES- traveled      Therapies Tried and outcome: Initially felt ok first couple days while being on Augmentin,  Augmentin started on the 11, on day 6 of taking this.  Amoxicillin 875 mg, albuterol inhaler and cough medicine  - she was given this in [place], about two days ago started feeling bad again, traveled back from [place] x 4 days ago    Worsening fatigue and cough.  No noted improvement in cough.        Patient Active Problem List   Diagnosis     Family history of malignant neoplasm of ovary - maternal Hx Dx'd age 57,  age 62; pt consider ovary removal.     Benign shuddering attack     Esophageal reflux     Acute reaction to stress     Hyperlipidemia LDL goal <130     Hip dysplasia     Family history of basal cell carcinoma     Class 1 obesity due to excess calories without serious comorbidity with body mass index (BMI) of 33.0 to 33.9 in adult     Major depression in complete remission (H)     Past Surgical History:   Procedure Laterality Date     C LIGATE FALLOPIAN TUBE       C NONSPECIFIC PROCEDURE      vaginal repair after delivery     C/SECTION, LOW TRANSVERSE      , Low Transverse     CHOLECYSTECTOMY,  LAPOROSCOPIC  2010    Cholecystectomy, Laparoscopic     COLONOSCOPY  2/15/2016    Dr. Marc DIAZ     HERNIA REPAIR, INCISIONAL  2010    Laparoscopic       Social History     Tobacco Use     Smoking status: Former Smoker     Last attempt to quit: 1993     Years since quittin.6     Smokeless tobacco: Never Used   Substance Use Topics     Alcohol use: Yes     Comment: very rarely      Family History   Problem Relation Age of Onset     Hypertension Father      Lipids Father      Gastrointestinal Disease Father         vazquez's esophagus fr. reflux      Hyperlipidemia Father      Cancer Mother         ovarian     Arthritis Mother         lupus      Depression Paternal Grandmother         problems with schizophrenia     Arthritis Brother      Diabetes Maternal Grandmother      Diabetes Maternal Grandfather      Coronary Artery Disease Maternal Grandfather      Coronary Artery Disease Paternal Grandfather      Gastrointestinal Disease Brother         reflux      Colon Cancer Cousin      Ovarian Cancer Other         Maternal great aunt     Breast Cancer No family hx of          Current Outpatient Medications   Medication Sig Dispense Refill     azithromycin (ZITHROMAX) 250 MG tablet Take 2 tablets (500 mg) by mouth daily for 1 day, THEN 1 tablet (250 mg) daily for 4 days. 6 tablet 0     predniSONE (DELTASONE) 20 MG tablet Take 2 tablets (40 mg) by mouth daily for 5 days 10 tablet 0     aspirin 81 MG tablet Take by mouth daily 30 tablet      augmented betamethasone dipropionate (DIPROLENE) 0.05 % external lotion Apply topically 2 times daily Apply to AA on scalp bid when needed 60 mL 1     multivitamin, therapeutic with minerals (MULTI-VITAMIN) TABS tablet Take 1 tablet by mouth daily       omeprazole (PRILOSEC) 20 MG CR capsule TAKE 1 CAPSULE BY MOUTH DAILY, 30 TO 60 MINUTES BEFORE A MEAL. 90 capsule 3     zolpidem (AMBIEN) 5 MG tablet Take 1 tablet (5 mg) by mouth nightly as needed for sleep (Patient  "not taking: Reported on 1/2/2019) 30 tablet 0     No Known Allergies    Reviewed and updated as needed this visit by Provider         Review of Systems   ROS COMP: Constitutional, HEENT, cardiovascular, pulmonary, gi and gu systems are negative, except as otherwise noted.      Objective    /76 (BP Location: Right arm, Patient Position: Sitting, Cuff Size: Adult Regular)   Pulse 96   Temp 98.6  F (37  C) (Oral)   Ht 1.791 m (5' 10.5\")   Wt 109.3 kg (241 lb)   LMP 10/01/2015 (Approximate)   SpO2 97%   BMI 34.09 kg/m    Body mass index is 34.09 kg/m .  Physical Exam   GENERAL: healthy, alert and no acute distress  EYES: Eyes grossly normal to inspection, PERRL and conjunctivae and sclerae normal  HENT: ear canals and TM's normal, nose and mouth without ulcers or lesions  NECK: no adenopathy, no asymmetry, masses  RESP: posterior lung sounds with inspiratory and expiratory wheezes throughout  CV: regular rate and rhythm, normal S1 S2, no S3 or S4, no murmur  SKIN: no suspicious lesions or rashes  PSYCH: mentation appears normal, affect normal/bright        Assessment & Plan     Mallory was seen today for cough.    Diagnoses and all orders for this visit:    Cough  -     XR Chest 2 Views; Future: no acute pulmonary findings noted    Community acquired pneumonia, unspecified laterality  -     azithromycin (ZITHROMAX) 250 MG tablet; Take 2 tablets (500 mg) by mouth daily for 1 day, THEN 1 tablet (250 mg) daily for 4 days.  -     predniSONE (DELTASONE) 20 MG tablet; Take 2 tablets (40 mg) by mouth daily for 5 days        -     Complete Augmentin course. Schedule Albuterol inhaler every 4-6 hours until cough resolves.      Monitor cough, fatigue, shortness of breath - follow-up with no improvement or worsening of symptoms.       Return in about 4 days (around 1/20/2020) for No improvement or sooner with worsening symptoms.    Mary Castro, [place] [facility]      "

## 2020-01-17 ASSESSMENT — ANXIETY QUESTIONNAIRES: GAD7 TOTAL SCORE: 0

## 2020-01-17 NOTE — RESULT ENCOUNTER NOTE
Dear Mallory,     Your chest x-ray was overall reassuring.   See below for the radiologist's recommendation:      IMPRESSION: Since February 16, 2015, heart size is normal. No pleural  effusion, pneumothorax, or abnormal area of consolidation.    Please send a [employer] message or call [phone]  if you have any questions.      Mary Castro, [place], CNP  [place] [facility]    If you have further questions about the interpretation of your labs, [email or web address] is a good website to check out for further information.

## 2020-03-22 ENCOUNTER — HEALTH MAINTENANCE LETTER (OUTPATIENT)
Age: 55
End: 2020-03-22

## 2020-04-21 ENCOUNTER — RESULTS ONLY (OUTPATIENT)
Dept: LAB | Age: 55
End: 2020-04-21

## 2020-04-21 ENCOUNTER — OFFICE VISIT (OUTPATIENT)
Dept: URGENT CARE | Facility: URGENT CARE | Age: 55
End: 2020-04-21
Payer: COMMERCIAL

## 2020-04-21 DIAGNOSIS — Z53.9 ERRONEOUS ENCOUNTER--DISREGARD: Primary | ICD-10-CM

## 2020-04-21 NOTE — PATIENT INSTRUCTIONS
[facility] Employee Health team will receive your Covid 19 test results in the next 1-4 days.  Once your results are received, Employee Health will call you with your test result and discuss your Return to Work timeline and criteria.

## 2020-04-22 LAB
SARS-COV-2 RNA SPEC QL NAA+PROBE: NOT DETECTED
SPECIMEN SOURCE: NORMAL

## 2020-04-30 ENCOUNTER — APPOINTMENT (OUTPATIENT)
Dept: LAB | Facility: CLINIC | Age: 55
End: 2020-04-30
Payer: COMMERCIAL

## 2020-09-21 NOTE — PROGRESS NOTES
"   SUBJECTIVE:   CC: Mallory Sargent is an 54 year old woman who presents for preventive health visit.     {Split Bill scripting  The purpose of this visit is to discuss your medical history and prevent health problems before you are sick. You may be responsible for a co-pay, coinsurance, or deductible if your visit today includes services such as checking on a sore throat, having an x-ray or lab test, or treating and evaluating a new or existing condition :975609}  Patient has been advised of split billing requirements and indicates understanding: {Yes and No:725707}  Healthy Habits:    Do you get at least three servings of calcium containing foods daily (dairy, green leafy vegetables, etc.)? { :510707::\"yes\"}    Amount of exercise or daily activities, outside of work: { :901863}    Problems taking medications regularly { :273780::\"No\"}    Medication side effects: { :494983::\"No\"}    Have you had an eye exam in the past two years? { :894376}    Do you see a dentist twice per year? { :569631}    Do you have sleep apnea, excessive snoring or daytime drowsiness?{ :096713}  {Outside tests to abstract? :931753}    {additional problems to add (Optional):277353}    Today's PHQ-2 Score:   PHQ-2 (  [employer]) 2017   Q1: Little interest or pleasure in doing things 0 0   Q2: Feeling down, depressed or hopeless 0 0   PHQ-2 Score 0 0     {PHQ-2 LOOK IN ASSESSMENTS (Optional) :832775}  Abuse: Current or Past(Physical, Sexual or Emotional)- {YES/NO/NA:818001}  Do you feel safe in your environment? {YES/NO/NA:378639}        Social History     Tobacco Use     Smoking status: Former Smoker     Last attempt to quit: 1993     Years since quittin.3     Smokeless tobacco: Never Used   Substance Use Topics     Alcohol use: Yes     Comment: very rarely      If you drink alcohol do you typically have >3 drinks per day or >7 drinks per week? {ETOH :425247}                     Reviewed orders with patient.  " "Reviewed health maintenance and updated orders accordingly - {Yes/No:318434::\"Yes\"}  {Chronicprobdata (Optional):292189}    {Mammo Decision Support (Optional):057427}    Pertinent mammograms are reviewed under the imaging tab.  History of abnormal Pap smear: {PAP HX:710379}  PAP / HPV Latest Ref Rng & Units 12/5/2018 2/27/2015 10/31/2013   PAP - NIL ASC-US(A) NIL   HPV 16 DNA NEG:Negative Negative - -   HPV 18 DNA NEG:Negative Negative - -   OTHER HR HPV NEG:Negative Negative - -     Reviewed and updated as needed this visit by clinical staff         Reviewed and updated as needed this visit by Provider        {HISTORY OPTIONS (Optional):586596}    ROS:  { :555014}    OBJECTIVE:   LMP 10/01/2015 (Approximate)   EXAM:  {Exam Choices:970050}    {Diagnostic Test Results (Optional):491581::\"Diagnostic Test Results:\",\"Labs reviewed in Epic\"}    ASSESSMENT/PLAN:   {Diag Picklist:845441}    Patient has been advised of split billing requirements and indicates understanding: {YES / NO:851863::\"Yes\"}  COUNSELING:   {FEMALE COUNSELING MESSAGES:497462::\"Reviewed preventive health counseling, as reflected in patient instructions\"}    Estimated body mass index is 34.09 kg/m  as calculated from the following:    Height as of 1/16/20: 1.791 m (5' 10.5\").    Weight as of 1/16/20: 109.3 kg (241 lb).    {Weight Management Plan (ACO) Complete if BMI is abnormal-  Ages 18-64  BMI >24.9.  Age 65+ with BMI <23 or >30 (Optional):890245}    She reports that she quit smoking about 27 years ago. She has never used smokeless tobacco.      Counseling Resources:  ATP IV Guidelines  Pooled Cohorts Equation Calculator  Breast Cancer Risk Calculator  BRCA-Related Cancer Risk Assessment: FHS-7 Tool  FRAX Risk Assessment  ICSI Preventive Guidelines  Dietary Guidelines for Americans, 2010  USDA's MyPlate  ASA Prophylaxis  Lung CA Screening    Alyson Bland MD  [facility] [place]  "

## 2020-09-22 ENCOUNTER — OFFICE VISIT (OUTPATIENT)
Dept: FAMILY MEDICINE | Facility: CLINIC | Age: 55
End: 2020-09-22
Payer: COMMERCIAL

## 2020-09-22 VITALS
HEIGHT: 71 IN | BODY MASS INDEX: 34.86 KG/M2 | TEMPERATURE: 97.4 F | DIASTOLIC BLOOD PRESSURE: 76 MMHG | OXYGEN SATURATION: 97 % | SYSTOLIC BLOOD PRESSURE: 110 MMHG | WEIGHT: 249 LBS | HEART RATE: 107 BPM

## 2020-09-22 DIAGNOSIS — Z80.41 FAMILY HISTORY OF MALIGNANT NEOPLASM OF OVARY: ICD-10-CM

## 2020-09-22 DIAGNOSIS — R14.0 ABDOMINAL DISTENSION: ICD-10-CM

## 2020-09-22 DIAGNOSIS — E66.01 MORBID OBESITY (H): ICD-10-CM

## 2020-09-22 DIAGNOSIS — Z00.00 ROUTINE GENERAL MEDICAL EXAMINATION AT A HEALTH CARE FACILITY: Primary | ICD-10-CM

## 2020-09-22 PROCEDURE — 99213 OFFICE O/P EST LOW 20 MIN: CPT | Mod: 25 | Performed by: FAMILY MEDICINE

## 2020-09-22 PROCEDURE — 99396 PREV VISIT EST AGE 40-64: CPT | Performed by: FAMILY MEDICINE

## 2020-09-22 ASSESSMENT — ANXIETY QUESTIONNAIRES
GAD7 TOTAL SCORE: 0
2. NOT BEING ABLE TO STOP OR CONTROL WORRYING: NOT AT ALL
1. FEELING NERVOUS, ANXIOUS, OR ON EDGE: NOT AT ALL
3. WORRYING TOO MUCH ABOUT DIFFERENT THINGS: NOT AT ALL
5. BEING SO RESTLESS THAT IT IS HARD TO SIT STILL: NOT AT ALL
7. FEELING AFRAID AS IF SOMETHING AWFUL MIGHT HAPPEN: NOT AT ALL
6. BECOMING EASILY ANNOYED OR IRRITABLE: NOT AT ALL

## 2020-09-22 ASSESSMENT — MIFFLIN-ST. JEOR: SCORE: 1827.65

## 2020-09-22 ASSESSMENT — PATIENT HEALTH QUESTIONNAIRE - PHQ9
5. POOR APPETITE OR OVEREATING: NOT AT ALL
SUM OF ALL RESPONSES TO PHQ QUESTIONS 1-9: 2

## 2020-09-22 NOTE — PROGRESS NOTES
SUBJECTIVE:   CC: Mallory Sargent is an 54 year old woman who presents for preventive health visit.     Patient is in the clinic for annual physical exam .  Patient is overall doing very well .  Denies any new symptoms of chest pain or shortness of breath .    Patient describes her mother passed away from ovarian cancer.  Her maternal aunt also has ovarian cancer.  She was seen by the genetic specialist her genetic testing came back negative .    Patient is experiencing abdominal distension .      Patient has been advised of split billing requirements and indicates understanding: Yes  Healthy Habits:     Getting at least 3 servings of Calcium per day:  Yes    Bi-annual eye exam:  NO    Dental care twice a year:  Yes    Sleep apnea or symptoms of sleep apnea:  Sleep apnea    Diet:  Regular (no restrictions)    Frequency of exercise:  2-3 days/week    Duration of exercise:  15-30 minutes    Taking medications regularly:  Yes    Medication side effects:  Not applicable    PHQ-2 Total Score: 1    Additional concerns today:  Yes    Change in stools - yellow, diarrhea x 1-2 months      Today's PHQ-2 Score:   PHQ-2 (  [employer]) 2020   Q1: Little interest or pleasure in doing things 0   Q2: Feeling down, depressed or hopeless 1   PHQ-2 Score 1   Q1: Little interest or pleasure in doing things Not at all   Q2: Feeling down, depressed or hopeless Several days   PHQ-2 Score 1       Abuse: Current or Past (Physical, Sexual or Emotional) - No  Do you feel safe in your environment? Yes        Social History     Tobacco Use     Smoking status: Former Smoker     Last attempt to quit: 1993     Years since quittin.3     Smokeless tobacco: Never Used   Substance Use Topics     Alcohol use: Yes     Comment: very rarely      If you drink alcohol do you typically have >3 drinks per day or >7 drinks per week? Not applicable    Alcohol Use 2020   Prescreen: >3 drinks/day or >7 drinks/week? Not Applicable    Prescreen: >3 drinks/day or >7 drinks/week? -       Reviewed orders with patient.  Reviewed health maintenance and updated orders accordingly - YesMammogram not appropriate for this patient based on age.    Pertinent mammograms are reviewed under the imaging tab.    PAP / HPV Latest Ref Rng & Units 2018 2015 10/31/2013   PAP - NIL ASC-US(A) NIL   HPV 16 DNA NEG:Negative Negative - -   HPV 18 DNA NEG:Negative Negative - -   OTHER HR HPV NEG:Negative Negative - -     Reviewed and updated as needed this visit by clinical staff  Tobacco  Allergies         Reviewed and updated as needed this visit by Provider          Past Medical History:   Diagnosis Date     ASCUS favor benign 3/2015    neg HPV   Plan cotest in 3 yrs.     Depressive disorder, not elsewhere classified     lexapro = no effect and sleepy, celexa = slightly better, wellbutrin = severe restlessness.       Esophageal reflux 2006     FAMILY HX-OVARIAN MALIGNANCY 2005    maternal Hx Dx'd age 57,  age 64; pt would like to have her ovaries removed.     GERD (gastroesophageal reflux disease)      Other and unspecified hyperlipidemia 2007     RECURR Depressive disorder - MOD 2008     RECURR Depressive disorder -SEVERE 2008      Past Surgical History:   Procedure Laterality Date     C LIGATE FALLOPIAN TUBE       C NONSPECIFIC PROCEDURE      vaginal repair after delivery     C/SECTION, LOW TRANSVERSE      , Low Transverse     CHOLECYSTECTOMY, LAPOROSCOPIC  2010    Cholecystectomy, Laparoscopic     COLONOSCOPY  2/15/2016    Dr. Marc SILVA     HERNIA REPAIR, INCISIONAL  2010    Laparoscopic       Review of Systems  CONSTITUTIONAL: NEGATIVE for fever, chills, change in weight  INTEGUMENTARU/SKIN: NEGATIVE for worrisome rashes, moles or lesions  EYES: NEGATIVE for vision changes or irritation  ENT: NEGATIVE for ear, mouth and throat problems  RESP: NEGATIVE for significant cough or SOB  BREAST: NEGATIVE  "for masses, tenderness or discharge  CV: NEGATIVE for chest pain, palpitations or peripheral edema  GI: NEGATIVE for nausea, abdominal pain, heartburn, or change in bowel habits  : NEGATIVE for unusual urinary or vaginal symptoms. Periods are regular.  MUSCULOSKELETAL: NEGATIVE for significant arthralgias or myalgia  NEURO: NEGATIVE for weakness, dizziness or paresthesias  PSYCHIATRIC: NEGATIVE for changes in mood or affect     OBJECTIVE:   /76   Pulse 107   Temp 97.4  F (36.3  C) (Tympanic)   Ht 1.791 m (5' 10.5\")   Wt 112.9 kg (249 lb)   LMP 10/01/2015 (Approximate)   SpO2 97%   BMI 35.22 kg/m    Physical Exam  GENERAL: healthy, alert and no distress  EYES: Eyes grossly normal to inspection, PERRL and conjunctivae and sclerae normal  HENT: ear canals and TM's normal, nose and mouth without ulcers or lesions  NECK: no adenopathy, no asymmetry, masses, or scars and thyroid normal to palpation  RESP: lungs clear to auscultation - no rales, rhonchi or wheezes  BREAST: normal without masses, tenderness or nipple discharge and no palpable axillary masses or adenopathy  CV: regular rate and rhythm, normal S1 S2, no S3 or S4, no murmur, click or rub, no peripheral edema and peripheral pulses strong  ABDOMEN: soft, nontender, no hepatosplenomegaly, no masses and bowel sounds normal  Pelvic exam -   Uterus normal size , no abnormal adenaxa mass .  MS: no gross musculoskeletal defects noted, no edema  SKIN: no suspicious lesions or rashes  NEURO: Normal strength and tone, mentation intact and speech normal  PSYCH: mentation appears normal, affect normal/bright    Diagnostic Test Results:Labs pending  CBC . CMP  Lipid panel     ASSESSMENT/PLAN:   1. Routine general medical examination at a health care facility  - discussed importance of marinating ideal weight   - discussed regular exercise     - MA SCREENING DIGITAL BILAT - Future  (s+30); Future  - Lipid panel reflex to direct LDL Fasting; Future    2. " "Abdominal distension  Could be due to constipation   - recommend to continue with additional fiber and increased fluid intake .  - CBC with platelets; Future  - Comprehensive metabolic panel (BMP + Alb, Alk Phos, ALT, AST, Total. Bili, TP); Future    3. Morbid obesity (H)  - discussed dietary modification and exercise .  - NUTRITION REFERRAL    4. Family history of malignant neoplasm of ovary  - Patient underwent genetic testing in the past .  We did discuss no current recommendation for ovarian cancer screening .  - ; Future  - US Pelvic Limited; Future    Patient has been advised of split billing requirements and indicates understanding: Yes  COUNSELING:  Reviewed preventive health counseling, as reflected in patient instructions       Regular exercise       Healthy diet/nutrition       Vision screening       Hearing screening    Estimated body mass index is 35.22 kg/m  as calculated from the following:    Height as of this encounter: 1.791 m (5' 10.5\").    Weight as of this encounter: 112.9 kg (249 lb).        She reports that she quit smoking about 27 years ago. She has never used smokeless tobacco.      Counseling Resources:  ATP IV Guidelines  Pooled Cohorts Equation Calculator  Breast Cancer Risk Calculator  BRCA-Related Cancer Risk Assessment: FHS-7 Tool  FRAX Risk Assessment  ICSI Preventive Guidelines  Dietary Guidelines for Americans, 2010  USDA's MyPlate  ASA Prophylaxis  Lung CA Screening    Alyson Bland MD  [facility] [place]  "

## 2020-09-23 DIAGNOSIS — R14.0 ABDOMINAL DISTENSION: ICD-10-CM

## 2020-09-23 DIAGNOSIS — Z80.41 FAMILY HISTORY OF MALIGNANT NEOPLASM OF OVARY: Primary | ICD-10-CM

## 2020-09-23 PROBLEM — F32.5 MAJOR DEPRESSION IN COMPLETE REMISSION (H): Status: RESOLVED | Noted: 2018-12-07 | Resolved: 2020-09-23

## 2020-09-23 ASSESSMENT — ANXIETY QUESTIONNAIRES: GAD7 TOTAL SCORE: 0

## 2020-09-25 ENCOUNTER — HOSPITAL ENCOUNTER (OUTPATIENT)
Dept: LAB | Facility: CLINIC | Age: 55
Discharge: HOME | End: 2020-09-25
Attending: FAMILY MEDICINE | Admitting: FAMILY MEDICINE
Payer: COMMERCIAL

## 2020-09-25 DIAGNOSIS — R14.0 ABDOMINAL DISTENSION: ICD-10-CM

## 2020-09-25 DIAGNOSIS — Z80.41 FAMILY HISTORY OF MALIGNANT NEOPLASM OF OVARY: ICD-10-CM

## 2020-09-25 DIAGNOSIS — Z00.00 ROUTINE GENERAL MEDICAL EXAMINATION AT A HEALTH CARE FACILITY: ICD-10-CM

## 2020-09-25 LAB
ALBUMIN SERPL-MCNC: 4.2 G/DL (ref 3.4–5)
ALP SERPL-CCNC: 76 U/L (ref 40–150)
ALT SERPL W P-5'-P-CCNC: 100 U/L (ref 0–50)
ANION GAP SERPL CALCULATED.3IONS-SCNC: 6 MMOL/L (ref 3–14)
AST SERPL W P-5'-P-CCNC: 57 U/L (ref 0–45)
BILIRUB SERPL-MCNC: 0.5 MG/DL (ref 0.2–1.3)
BUN SERPL-MCNC: 14 MG/DL (ref 7–30)
CALCIUM SERPL-MCNC: 9.6 MG/DL (ref 8.5–10.1)
CANCER AG125 SERPL-ACNC: 9 U/ML (ref 0–30)
CHLORIDE SERPL-SCNC: 107 MMOL/L (ref 94–109)
CHOLEST SERPL-MCNC: 317 MG/DL
CO2 SERPL-SCNC: 24 MMOL/L (ref 20–32)
CREAT SERPL-MCNC: 0.95 MG/DL (ref 0.52–1.04)
ERYTHROCYTE [DISTWIDTH] IN BLOOD BY AUTOMATED COUNT: 12.1 % (ref 10–15)
GFR SERPL CREATININE-BSD FRML MDRD: 69 ML/MIN/{1.73_M2}
GLUCOSE SERPL-MCNC: 110 MG/DL (ref 70–99)
HCT VFR BLD AUTO: 45.3 % (ref 35–47)
HDLC SERPL-MCNC: 47 MG/DL
HGB BLD-MCNC: 14.9 G/DL (ref 11.7–15.7)
LDLC SERPL CALC-MCNC: 229 MG/DL
MCH RBC QN AUTO: 30.7 PG (ref 26.5–33)
MCHC RBC AUTO-ENTMCNC: 32.9 G/DL (ref 31.5–36.5)
MCV RBC AUTO: 93 FL (ref 78–100)
NONHDLC SERPL-MCNC: 270 MG/DL
PLATELET # BLD AUTO: 246 10E9/L (ref 150–450)
POTASSIUM SERPL-SCNC: 4.2 MMOL/L (ref 3.4–5.3)
PROT SERPL-MCNC: 8.2 G/DL (ref 6.8–8.8)
RBC # BLD AUTO: 4.85 10E12/L (ref 3.8–5.2)
SODIUM SERPL-SCNC: 137 MMOL/L (ref 133–144)
TRIGL SERPL-MCNC: 205 MG/DL
WBC # BLD AUTO: 6.3 10E9/L (ref 4–11)

## 2020-09-25 PROCEDURE — 36415 COLL VENOUS BLD VENIPUNCTURE: CPT | Performed by: FAMILY MEDICINE

## 2020-09-25 PROCEDURE — 86304 IMMUNOASSAY TUMOR CA 125: CPT | Performed by: FAMILY MEDICINE

## 2020-09-25 PROCEDURE — 85027 COMPLETE CBC AUTOMATED: CPT | Performed by: FAMILY MEDICINE

## 2020-09-25 PROCEDURE — 80053 COMPREHEN METABOLIC PANEL: CPT | Performed by: FAMILY MEDICINE

## 2020-09-25 PROCEDURE — 80061 LIPID PANEL: CPT | Performed by: FAMILY MEDICINE

## 2020-09-28 DIAGNOSIS — E78.5 HYPERLIPIDEMIA WITH TARGET LDL LESS THAN 130: Primary | ICD-10-CM

## 2020-09-28 DIAGNOSIS — R74.8 ELEVATED LIVER ENZYMES: ICD-10-CM

## 2020-09-28 RX ORDER — ATORVASTATIN CALCIUM 20 MG/1
20 TABLET, FILM COATED ORAL DAILY
Qty: 90 TABLET | Refills: 2 | Status: SHIPPED | OUTPATIENT
Start: 2020-09-28 | End: 2021-07-19

## 2020-10-02 ENCOUNTER — ANCILLARY PROCEDURE (OUTPATIENT)
Dept: ULTRASOUND IMAGING | Facility: CLINIC | Age: 55
End: 2020-10-02
Attending: FAMILY MEDICINE
Payer: COMMERCIAL

## 2020-10-02 DIAGNOSIS — R14.0 ABDOMINAL DISTENSION: ICD-10-CM

## 2020-10-02 DIAGNOSIS — Z80.41 FAMILY HISTORY OF MALIGNANT NEOPLASM OF OVARY: ICD-10-CM

## 2020-10-02 PROCEDURE — 76830 TRANSVAGINAL US NON-OB: CPT

## 2020-10-02 PROCEDURE — 76856 US EXAM PELVIC COMPLETE: CPT

## 2020-10-07 ENCOUNTER — ANCILLARY PROCEDURE (OUTPATIENT)
Dept: MAMMOGRAPHY | Facility: CLINIC | Age: 55
End: 2020-10-07
Payer: COMMERCIAL

## 2020-10-07 DIAGNOSIS — Z00.00 ROUTINE GENERAL MEDICAL EXAMINATION AT A HEALTH CARE FACILITY: ICD-10-CM

## 2020-10-07 PROCEDURE — 77063 BREAST TOMOSYNTHESIS BI: CPT | Performed by: RADIOLOGY

## 2020-10-07 PROCEDURE — 77067 SCR MAMMO BI INCL CAD: CPT | Performed by: RADIOLOGY

## 2020-11-18 ENCOUNTER — E-VISIT (OUTPATIENT)
Dept: FAMILY MEDICINE | Facility: CLINIC | Age: 55
End: 2020-11-18
Payer: COMMERCIAL

## 2020-11-18 ENCOUNTER — TRANSFERRED RECORDS (OUTPATIENT)
Dept: HEALTH INFORMATION MANAGEMENT | Facility: CLINIC | Age: 55
End: 2020-11-18

## 2020-11-18 DIAGNOSIS — Z20.822 SUSPECTED COVID-19 VIRUS INFECTION: ICD-10-CM

## 2020-11-18 DIAGNOSIS — R50.9 FEVER AND CHILLS: Primary | ICD-10-CM

## 2020-11-18 PROCEDURE — 99421 OL DIG E/M SVC 5-10 MIN: CPT | Performed by: FAMILY MEDICINE

## 2020-11-18 NOTE — PATIENT INSTRUCTIONS
Dear Mallory Sargent,    Your symptoms show that you may have coronavirus (COVID-19). This illness can cause fever, cough and trouble breathing. Many people get a mild case and get better on their own. Some people can get very sick.    Will I be tested for COVID-19?  We would like to test you for Covid-19 virus. I have placed orders for this test.     For all employees or close contacts (except [facility] and [facility] - see below), go to your [employer] home page and scroll down to the section that says  You have an appointment that needs to be scheduled  and click the large green button that says  Schedule Now  and follow the steps to find the next available opening.     If you are unable to complete these steps or if you cannot find any available times, please call [phone] to schedule employee testing.       [facility] employees or close contacts, please call [phone].   [place] ([facility]) employees or close contacts call [phone].    When it's time for your COVID test:  Stay at least 6 feet away from others. (If someone will drive you to your test, stay in the backseat, as far away from the  as you can.)  Cover your mouth and nose with a mask, tissue or washcloth.  Go straight to the testing site. Don't make any stops on the way there or back.    Starting now:     Do not go to work.   o If you receive a negative COVID-19 test result and were NOT exposed to someone with a known positive COVID-19 test, you can return to work once you're free of fever for 24 hours without fever-reducing medication and your symptoms are improving or resolved.  o If you receive a positive COVID-19 test result, you must be cleared by Employee Occupational Health and Safety to return to work.   o If you were exposed to someone who has tested positive for COVID-19, you can return to work 14 days after your last contact with the positive individual, provided you do not have symptoms at all during that time. In some cases,  "your manager may ask you to come back sooner than 14 days.     During this time, don't leave the house except for testing or medical care.  o Stay in your own room, even for meals. Use your own bathroom if you can.  o Stay away from others in your home. No hugging, kissing or shaking hands. No visitors.  o Don't go to work, school or anywhere else.    Clean \"high touch\" surfaces often (doorknobs, counters, handles, etc.). Use a household cleaning spray or wipes. You'll find a full list of  on the EPA website: www.epa.gov/pesticide-registration/list-n-disinfectants-use-against-sars-cov-2.    Cover your mouth and nose with a mask, tissue or washcloth to avoid spreading germs.    Wash your hands and face often. Use soap and water.    People in these groups are at risk for severe illness due to COVID-19:  o People 65 years and older  o People who live in a nursing home or long-term care facility  o People with chronic disease (lung, heart, cancer, diabetes, kidney, liver, immunologic)  o People who have a weakened immune system, including those who:  - Are in cancer treatment  - Take medicine that weakens the immune system, such as corticosteroids  - Had a bone marrow or organ transplant  - Have an immune deficiency  - Have poorly controlled HIV or AIDS  - Are obese (body mass index of 40 or higher)  - Smoke regularly      Caregivers should wear gloves while washing dishes, handling laundry and cleaning bedrooms and bathrooms.    Use caution when washing and drying laundry: Don't shake dirty laundry, and use the warmest water setting that you can.    For more tips, go to www.cdc.gov/coronavirus/2019-ncov/downloads/10Things.pdf.    Sign up for [employer]. We know it's scary to hear that you might have COVID-19. We want to track your symptoms to make sure you're okay over the next 2 weeks. Please look for an email from [employer]--this is a free, online program that we'll use to keep in touch. To sign up, " follow the link in the email you will receive. Learn more at [email or web address]    How can I take care of myself?    Get lots of rest. Drink extra fluids (unless a doctor has told you not to)    Take Tylenol (acetaminophen) for fever or pain. If you have liver or kidney problems, ask your family doctor if it's okay to take Tylenol.  Adults can take either:    650 mg (two 325 mg pills) every 4 to 6 hours, or     1,000 mg (two 500 mg pills) every 8 hours as needed.    Note: Don't take more than 3,000 mg in one day. Acetaminophen is found in many medicines (both prescribed and over-the-counter medicines). Read all labels to be sure you don't take too much.  For children, check the Tylenol bottle for the right dose. The dose is based on the child's age or weight.    If you have other health problems (like cancer, heart failure, an organ transplant or severe kidney disease): Call your specialty clinic if you don't feel better in the next 2 days.    Know when to call 911. Emergency warning signs include:  Trouble breathing or shortness of breath  Pain or pressure in the chest that doesn't go away  Feeling confused like you haven't felt before, or not being able to wake up  Bluish-colored lips or face    Where can I get more information?    [facility] - About COVID-19: [email or web address]  CDC - What to Do If You're Sick: www.cdc.gov/coronavirus/2019-ncov/about/steps-when-sick.html

## 2020-11-18 NOTE — TELEPHONE ENCOUNTER
Provider E-Visit time total (minutes): 5 Minutes .    Spoke with patient .experiencing symptoms of cough and cold .  Experiencing fever , nausea .  Symptoms now somewhat improved .    Recommendation -   Recommend covid test , self isolating .  We discussed tylenol prn and increased hydration .    Covid test ordered .    Alyson Bland M.D.

## 2020-11-20 DIAGNOSIS — R50.9 FEVER AND CHILLS: ICD-10-CM

## 2020-11-20 PROCEDURE — U0003 INFECTIOUS AGENT DETECTION BY NUCLEIC ACID (DNA OR RNA); SEVERE ACUTE RESPIRATORY SYNDROME CORONAVIRUS 2 (SARS-COV-2) (CORONAVIRUS DISEASE [COVID-19]), AMPLIFIED PROBE TECHNIQUE, MAKING USE OF HIGH THROUGHPUT TECHNOLOGIES AS DESCRIBED BY CMS-2020-01-R: HCPCS | Performed by: FAMILY MEDICINE

## 2020-11-20 NOTE — LETTER
[facility]                    November 23, 2020    Mallory Sargent  [address] [place] [address]      Dear Mallory,    Here is a summary of your recent test results:    COVID test is negative .    Your test results are enclosed.      Please contact me if you have any questions.            Thank you very much for trusting [facility].     Healthy regards,    Dr. Alyson Bland MD          Results for orders placed or performed in visit on 11/20/20   Symptomatic COVID-19 Virus (Coronavirus) by PCR     Status: None    Specimen: Nasopharyngeal   Result Value Ref [facility]    COVID-19 Virus PCR to [facility] - Source Nasopharyngeal     COVID-19 Virus PCR to [facility] - Result Not Detected

## 2020-11-21 LAB
SARS-COV-2 RNA SPEC QL NAA+PROBE: NOT DETECTED
SPECIMEN SOURCE: NORMAL

## 2021-01-15 ENCOUNTER — HEALTH MAINTENANCE LETTER (OUTPATIENT)
Age: 56
End: 2021-01-15

## 2021-04-16 ENCOUNTER — OFFICE VISIT (OUTPATIENT)
Dept: FAMILY MEDICINE | Facility: CLINIC | Age: 56
End: 2021-04-16
Payer: COMMERCIAL

## 2021-04-16 VITALS
DIASTOLIC BLOOD PRESSURE: 72 MMHG | RESPIRATION RATE: 12 BRPM | BODY MASS INDEX: 34.44 KG/M2 | HEIGHT: 71 IN | WEIGHT: 246 LBS | TEMPERATURE: 98.1 F | SYSTOLIC BLOOD PRESSURE: 120 MMHG | HEART RATE: 83 BPM | OXYGEN SATURATION: 96 %

## 2021-04-16 DIAGNOSIS — R19.5 LOOSE STOOLS: ICD-10-CM

## 2021-04-16 DIAGNOSIS — R10.11 RUQ ABDOMINAL PAIN: Primary | ICD-10-CM

## 2021-04-16 DIAGNOSIS — E78.5 HYPERLIPIDEMIA LDL GOAL <130: ICD-10-CM

## 2021-04-16 DIAGNOSIS — K76.0 FATTY LIVER: ICD-10-CM

## 2021-04-16 DIAGNOSIS — Z12.11 COLON CANCER SCREENING: ICD-10-CM

## 2021-04-16 DIAGNOSIS — Z86.0100 PERSONAL HISTORY OF COLONIC POLYPS: ICD-10-CM

## 2021-04-16 DIAGNOSIS — R74.8 ELEVATED LIVER ENZYMES: ICD-10-CM

## 2021-04-16 LAB
ALBUMIN SERPL-MCNC: 3.9 G/DL (ref 3.4–5)
ALBUMIN UR-MCNC: NEGATIVE MG/DL
ALP SERPL-CCNC: 73 U/L (ref 40–150)
ALT SERPL W P-5'-P-CCNC: 73 U/L (ref 0–50)
ANION GAP SERPL CALCULATED.3IONS-SCNC: 3 MMOL/L (ref 3–14)
APPEARANCE UR: CLEAR
AST SERPL W P-5'-P-CCNC: 28 U/L (ref 0–45)
BILIRUB SERPL-MCNC: 0.5 MG/DL (ref 0.2–1.3)
BILIRUB UR QL STRIP: NEGATIVE
BUN SERPL-MCNC: 16 MG/DL (ref 7–30)
CALCIUM SERPL-MCNC: 9.2 MG/DL (ref 8.5–10.1)
CHLORIDE SERPL-SCNC: 106 MMOL/L (ref 94–109)
CHOLEST SERPL-MCNC: 202 MG/DL
CO2 SERPL-SCNC: 29 MMOL/L (ref 20–32)
COLOR UR AUTO: YELLOW
CREAT SERPL-MCNC: 0.86 MG/DL (ref 0.52–1.04)
ERYTHROCYTE [DISTWIDTH] IN BLOOD BY AUTOMATED COUNT: 12.1 % (ref 10–15)
GFR SERPL CREATININE-BSD FRML MDRD: 77 ML/MIN/{1.73_M2}
GLUCOSE SERPL-MCNC: 99 MG/DL (ref 70–99)
GLUCOSE UR STRIP-MCNC: NEGATIVE MG/DL
HCT VFR BLD AUTO: 40.7 % (ref 35–47)
HDLC SERPL-MCNC: 48 MG/DL
HGB BLD-MCNC: 14.1 G/DL (ref 11.7–15.7)
HGB UR QL STRIP: NEGATIVE
KETONES UR STRIP-MCNC: NEGATIVE MG/DL
LDLC SERPL CALC-MCNC: 120 MG/DL
LEUKOCYTE ESTERASE UR QL STRIP: NEGATIVE
MCH RBC QN AUTO: 31.1 PG (ref 26.5–33)
MCHC RBC AUTO-ENTMCNC: 34.6 G/DL (ref 31.5–36.5)
MCV RBC AUTO: 90 FL (ref 78–100)
NITRATE UR QL: NEGATIVE
NONHDLC SERPL-MCNC: 154 MG/DL
PH UR STRIP: 7.5 PH (ref 5–7)
PLATELET # BLD AUTO: 196 10E9/L (ref 150–450)
POTASSIUM SERPL-SCNC: 4.1 MMOL/L (ref 3.4–5.3)
PROT SERPL-MCNC: 7.5 G/DL (ref 6.8–8.8)
RBC # BLD AUTO: 4.54 10E12/L (ref 3.8–5.2)
SODIUM SERPL-SCNC: 138 MMOL/L (ref 133–144)
SOURCE: ABNORMAL
SP GR UR STRIP: 1.02 (ref 1–1.03)
TRIGL SERPL-MCNC: 170 MG/DL
TSH SERPL DL<=0.005 MIU/L-ACNC: 2.2 MU/L (ref 0.4–4)
UROBILINOGEN UR STRIP-ACNC: 0.2 EU/DL (ref 0.2–1)
WBC # BLD AUTO: 6.1 10E9/L (ref 4–11)

## 2021-04-16 PROCEDURE — 99214 OFFICE O/P EST MOD 30 MIN: CPT | Performed by: PHYSICIAN ASSISTANT

## 2021-04-16 PROCEDURE — 85027 COMPLETE CBC AUTOMATED: CPT | Performed by: PHYSICIAN ASSISTANT

## 2021-04-16 PROCEDURE — 80061 LIPID PANEL: CPT | Performed by: PHYSICIAN ASSISTANT

## 2021-04-16 PROCEDURE — 83516 IMMUNOASSAY NONANTIBODY: CPT | Performed by: PHYSICIAN ASSISTANT

## 2021-04-16 PROCEDURE — 36415 COLL VENOUS BLD VENIPUNCTURE: CPT | Performed by: PHYSICIAN ASSISTANT

## 2021-04-16 PROCEDURE — 80053 COMPREHEN METABOLIC PANEL: CPT | Performed by: PHYSICIAN ASSISTANT

## 2021-04-16 PROCEDURE — 82784 ASSAY IGA/IGD/IGG/IGM EACH: CPT | Performed by: PHYSICIAN ASSISTANT

## 2021-04-16 PROCEDURE — 84443 ASSAY THYROID STIM HORMONE: CPT | Performed by: PHYSICIAN ASSISTANT

## 2021-04-16 PROCEDURE — 81003 URINALYSIS AUTO W/O SCOPE: CPT | Performed by: PHYSICIAN ASSISTANT

## 2021-04-16 PROCEDURE — 83516 IMMUNOASSAY NONANTIBODY: CPT | Mod: 59 | Performed by: PHYSICIAN ASSISTANT

## 2021-04-16 RX ORDER — MULTIVIT-MIN/IRON/FOLIC ACID/K 18-600-40
4000 CAPSULE ORAL DAILY
COMMUNITY
End: 2021-09-16

## 2021-04-16 RX ORDER — MULTIVITAMIN WITH IRON
1 TABLET ORAL DAILY
COMMUNITY

## 2021-04-16 ASSESSMENT — MIFFLIN-ST. JEOR: SCORE: 1809.04

## 2021-04-16 NOTE — PROGRESS NOTES
"    Assessment & Plan     RUQ abdominal pain  Elevated liver enzymes  Fatty liver - abdominal US from 2010  S/p cholecystectomy. Very mild dull ache only lasting 10min then resolving without intervention x3-4 months. Admits could be due to anxiousness related to being aware of fatty liver and wonders if progression of same. Difficulty with loosing weight, previously referred to nutritionist and hasn't gone. Reprinted referral for her today and pt motivated to follow-up now. Will repeat labs to reassess for interval change/worsening. If so, would advise return for additional lab work-up to rule out hepatitis and repeat liver US. Can consider GI consult as well. Overdue for colon cancer screening given personal hx of polyps and also noticing some loose stools so will complete this as well.   - *UA reflex to Microscopic and Culture ([place] [facility] (except [place] and [place])  - CBC with platelets  - Comprehensive metabolic panel    Loose stools  Screen labs noted below. See above.  - CBC with platelets  - TSH with free T4 reflex  - IgA  - Tissue transglutaminase carlie IgA and IgG    Hyperlipidemia LDL goal <130  Recheck since starting statin therapy.  - Comprehensive metabolic panel  - Lipid panel reflex to direct LDL Fasting    Colon cancer screening  Personal history of colonic polyps  As above.  - GASTROENTEROLOGY ADULT REF PROCEDURE ONLY; Future    BMI:   Estimated body mass index is 34.8 kg/m  as calculated from the following:    Height as of this encounter: 1.791 m (5' 10.5\").    Weight as of this encounter: 111.6 kg (246 lb).   Weight management plan: as above        Return in about 1 week (around 4/23/2021) for review labs.    Blanka Lucio PA-C  [facility] [place]    Maria Luisa Tejada is a 55 year old who presents for the following health issues:    HPI   Follow-up on labs from Nov - started on lipitor for HLD. Needs to have rechecked.  Reports also had elevated liver " "enzymes - mentions she did have fatty liver identified on US years back so wonders if progression of that - reviewed in Epic 2010 - showing mild fatty infiltration.  Was referred to nutritionist, but did not go since last visit.  Trying to loose weight and \"failing\". Knows she needs to stop eating \"ice cream and chocolate\".    Abdominal/Flank Pain  Onset/Duration: 3-4 months; started after she found out about her elevated liver enzymes, but doesn't feel it's \"in my head\".   Description:   Character: Dull ache/tenderness; rates as 1-2/10 at most. Notices it 2-3x per week. Brief lasting only about 10 min then resolves as so mild feels she forgets about it.  Location: right upper quadrant right lower quadrant  Radiation: None  Intensity: mild  Progression of Symptoms:  same  Accompanying Signs & Symptoms:  Fever/Chills: no  Gas/Bloating: no  Nausea: no  Vomitting: no  Diarrhea: intermittent - typically at baseline is more constipated. A few times liquid otherwise off/on throughout the week will get a loose stool 2-3x in one day maybe 2-3x per week. No blood or mucous.  Fhx: brother has Crohn's and ankylosing spondylitis.  Mother's cousin had colon cancer  Sochx:  stable sexual partner, declines concern for STD.  No hx of IVDA  A couple tattoos  No blood transfusion  No recent travel.  Rare alcohol - maybe 1-2 glasses of wine every few months, but otherwise not at all  Avoids OTC meds - uses sparingly and will take ibuprofen typically instead    Constipation: intermittent  Dysuria or Hematuria: no  History:   Trauma: no  Previous similar pain: no  Previous tests done: none  Precipitating factors:   Does the pain change with:     Food: fatty or greasy foods(of note had cholecystectomy)    Bowel Movement: no    Urination: no   Other factors:  no  Therapies tried and outcome: None  Patient's last menstrual period was 10/01/2015 (approximate).    Current Outpatient Medications   Medication     aspirin 81 MG tablet " "    atorvastatin (LIPITOR) 20 MG tablet     FIBER PO     magnesium 250 MG tablet     Omega-3 Fatty Acids (FISH OIL ADULT GUMMIES PO)     omeprazole (PRILOSEC) 20 MG DR capsule     Vitamin D, Cholecalciferol, 25 MCG (1000 UT) TABS     augmented betamethasone dipropionate (DIPROLENE) 0.05 % external lotion     No current facility-administered medications for this visit.       No Known Allergies      Review of Systems   Constitutional, HEENT, cardiovascular, pulmonary, gi and gu, psych, skin systems are negative, except as otherwise noted.      Objective    /72   Pulse 83   Temp 98.1  F (36.7  C) (Tympanic)   Resp 12   Ht 1.791 m (5' 10.5\")   Wt 111.6 kg (246 lb)   LMP 10/01/2015 (Approximate)   SpO2 96%   Breastfeeding No   BMI 34.80 kg/m    Body mass index is 34.8 kg/m .  Physical Exam   GENERAL: healthy, alert and no distress  EYES: Eyes grossly normal to inspection, PERRL and conjunctivae and sclerae normal  NECK: no adenopathy, no asymmetry, masses, or scars and thyroid normal to palpation  RESP: lungs clear to auscultation - no rales, rhonchi or wheezes  CV: regular rates and rhythm, no murmur, click or rub, peripheral pulses strong and no peripheral edema  ABDOMEN: soft, nontender, without hepatosplenomegaly or masses and bowel sounds normal  SKIN: no suspicious lesions or rashes    Reviewed last hepatic and lipid panel and abdominal US showing fatty liver            "

## 2021-04-16 NOTE — PATIENT INSTRUCTIONS
See nutritionist for weight loss.  Repeat labs since starting lipitor  If liver enzyme elevation persistent or worsening would advise return for further lab work-up and repeat liver US.

## 2021-04-16 NOTE — NURSING NOTE
"/72   Pulse 83   Temp 98.1  F (36.7  C) (Tympanic)   Resp 12   Ht 1.791 m (5' 10.5\")   Wt 111.6 kg (246 lb)   LMP 10/01/2015 (Approximate)   SpO2 96%   Breastfeeding No   BMI 34.80 kg/m      "

## 2021-04-19 LAB — IGA SERPL-MCNC: 222 MG/DL (ref 84–499)

## 2021-04-20 LAB
TTG IGA SER-ACNC: 2 U/ML
TTG IGG SER-ACNC: <1 U/ML

## 2021-04-26 NOTE — RESULT ENCOUNTER NOTE
Dear Mallory,      Your recent test results are noted below:    -Normal red blood cell (hgb) levels, normal white blood cell count and normal platelet levels.  -Cholesterol levels are improved since starting statin therapy. ADVISE: continuing your medication, a regular exercise program with at least 150 minutes of aerobic exercise per week, and eating a low saturated fat/low carbohydrate diet.  Also, you should recheck this fasting cholesterol panel in 12 months. Follow-up with the nutritionist as well and hopefully this will help continue to improve lipid levels. Can consider further dose adjustment with medication if worsening lipid control or no further improvement after seeing nutritionist.  -Liver and gallbladder tests (ALT,AST, Alk phos,bilirubin) are improved from previous and are normal excluding mild elevation of ALT. Please continue to avoid alcohol and over-the-counter tylenol use. Hopefully seeing the nutritionist will help with further weight loss as well in light of your history of fatty liver. We can repeat your liver enzymes again in another 6 months for monitoring.   -Kidney function (GFR) is normal.  -Sodium is normal.  -Potassium is normal.  -Calcium is normal.  -Glucose (diabetic screening test) is normal.  -TSH (thyroid stimulating hormone) level is normal which indicates normal thyroid function.  -Urine is normal.  -Celiac disease testing was normal/negative.    Please follow-up to repeat your colonoscopy as the next step as previously discussed in clinic. Let's recheck your right upper quadrant abdominal pain after seeing nutritionist and working on weight loss in 1-2 months or so.    For additional lab test information, [email or web address] is an excellent reference. Please contact the clinic at [phone] with any further questions or concerns.    Sincerely,      Blanka Lucio PA-C  [facility]

## 2021-06-01 ENCOUNTER — HOSPITAL ENCOUNTER (OUTPATIENT)
Dept: NUTRITION | Facility: CLINIC | Age: 56
Discharge: HOME | End: 2021-06-01
Attending: FAMILY MEDICINE | Admitting: FAMILY MEDICINE
Payer: COMMERCIAL

## 2021-06-01 PROCEDURE — 97802 MEDICAL NUTRITION INDIV IN: CPT | Mod: GT

## 2021-06-01 NOTE — PROGRESS NOTES
"[facility]  Medical Nutrition Therapy    Visit Type: Initial Assessment    Mallory Sargent, 55 year old referred by Alyson Bland MD for MNT related to Obesity with nutritional instruction: wt loss       The patient has been notified of following:      \"This video visit will be conducted via a call between you and your physician/provider. We have found that certain health care needs can be provided without the need for an in-person physical exam.  This service lets us provide the care you need with a video conversation.     Video visits are billed at different rates depending on your insurance coverage.  Please reach out to your insurance provider with any questions.     If during the course of the call the physician/provider feels a video visit is not appropriate, you will not be charged for this service.\"     Patient has given verbal consent for Video visit? YES        Video-Visit Details     Type of service:  Video Visit    Originating Location (pt. Location): home     Distant Location (provider location):  [facility]      Platform used for Video Visit: [email or web address]      Nutrition Assessment:  Anthropometrics  Height:   Ht Readings from Last 1 Encounters:   04/16/21 1.791 m (5' 10.5\")         BMI:  34.8 kg/m^2       Weight Status:  Obesity Grade I BMI 30-34.9   Weight:   Wt Readings from Last 1 Encounters:   04/16/21 111.6 kg (246 lb)       UBW: CBW is similar, luck losing 15-20 lbs however it returns. Pt explains as yo-yo dieting       IBW:  155 lbs (70.5 kg) IBW %: 158          Weight History:   Wt Readings from Last 8 Encounters:   04/16/21 111.6 kg (246 lb)   09/22/20 112.9 kg (249 lb)   01/16/20 109.3 kg (241 lb)   04/25/19 108.9 kg (240 lb)   12/05/18 108.9 kg (240 lb)   01/03/18 101.2 kg (223 lb)   10/31/17 98.4 kg (217 lb)   08/04/17 98.4 kg (217 lb)         Goal Weight: sustained wt loss for liver health and improved CHOL         Nutrition History    PMH: "   Patient Active Problem List   Diagnosis     Esophageal reflux     Hyperlipidemia LDL goal <130     Hip dysplasia     Class 1 obesity due to excess calories without serious comorbidity with body mass index (BMI) of 33.0 to 33.9 in adult     Morbid obesity (H)     Personal history of colonic polyps     Fatty liver - abdominal US from 2010     Elevated liver enzymes       Labs: reviewed  Cholesterol (mg/dL)   Date Value   04/16/2021 202 (H)   09/25/2020 317 (H)     HDL Cholesterol (mg/dL)   Date Value   04/16/2021 48 (L)     LDL Cholesterol Calculated (mg/dL)   Date Value   04/16/2021 120 (H)   09/25/2020 229 (H)     Triglycerides (mg/dL)   Date Value   04/16/2021 170 (H)     ALT (U/L)   Date Value   04/16/2021 73 (H)   09/25/2020 100 (H)     AST (U/L)   Date Value   04/16/2021 28   09/25/2020 57 (H)   -improved with meds since last check       Meds: reviewed  Current Outpatient Medications   Medication     atorvastatin (LIPITOR) 20 MG tablet     FIBER PO     magnesium 250 MG tablet     Omega-3 Fatty Acids (FISH OIL ADULT GUMMIES PO)     omeprazole (PRILOSEC) 20 MG DR capsule     Vitamin D, Cholecalciferol, 25 MCG (1000 UT) TABS     Supplements: reviewed  -see above in addition to B vitamin complex, probiotic sporadically, ka'destiny powder supplement in morning with vitamins/minerals/protein/fiber    Social/Environmental:   -average sleep per night: 7 hours. Much improved since years past, there were a few years (menopause?) with issues of sleeping and needing a supplement to support sleep, now resolved  -level of stress: 2/5 not bad at all      Food Record:   -3 meals per day, 2 snacks per day  -Bfast: honey nut cheerios with milk, greek yogurt with granola, honey wheat bread (2) with PB &J   -Lunch: lean cuisine and a fruit  -Dinner: hello fresh (800-1000 kcal portion)   -snacks: am (string cheese) evening-sweets: cold cereal, honey wheat anaya radha toast with PB & jelly or honey  -beverages: mainly water (32-64 oz) ,  coffee in morning with milk, tea rarely  -take out minimal. Was high prior to getting back to eating better    Nutritional Details:   -Food allergies: NKFA  -Food sensitivities: none mentioned  -GI concerns: Constipation (psyllium fiber powder helped stay regular, now on tablet not powder and less effective, but not concerned))   -Appetite: hungry for sweets in evening  -Pace of eating: <20 minutes, sometimes goes for second or eats large portions  -Role of cooking: pt or  (using hello fresh for dinner- 800-1000 kcal portions)  -Role of food shopping: pt and       Physical Activity:  Days per week: 1/2 x week = 30 minute aerobic  Duration: 5000/6000 steps daily   Activity type: walking in daily life   Limitations: hip pain (stationary bike is a bit less painful)        ASSESSED MALNUTRITION STATUS  % Weight Loss:  None noted  % Intake:  No decreased intake noted  Subcutaneous Fat Loss:  None observed  Loss of Muscle Mass:  None observed  Fluid Retention:  None noted    Malnutrition Diagnosis:  Patient does not meet two of the above criteria necessary for diagnosing malnutrition      Nutrition Diagnosis:  Food and nutrition-related knowledge deficit related to limited prior knowledge of General Healthful Mediterranean Nutrition therapy as evidenced by pt report of one nutrition appt that was short and rushed, nutrition recall showing high simple carb intake, BMI 34.8 kg/m^2, pt able to yo-yo cut calories and diet but wt comes back on         Nutrition Intervention:  Nutrition Prescription Summary: General Healthful Mediterranean Nutrition Therapy with emphasis of fiber, reduced added sugar, wt loss and long term feeding food swap changes      Nutrition Education (Content):  -Educated pt on meal planning using MyPlate and the importance of consuming a balanced diet including appropriate servings from all food groups (focused on whole grain only 1/4 plate grains)  -Educated pt on label reading (dietary  fiber, added sugar, protein)  -Educated pt on heart healthy nutrition therapy (choosing WGs, fresh fruits & veggies, and lean protein sources)  -Discussed limiting saturated fats (reduction in red meat and increase in plant based proteins such as beans)   -Discussed eating more whole, unprocessed foods to limit sodium intake and limiting refined CHOs especially sugar, sweets, and sugar-sweetened beverages  -Educated pt on fiber and ways to increase it in the diet; discussed the benefits of soluble fiber and went over foods high in fiber (list provided); suggested trying flax seed or patrizia seeds and seeds and nuts in place of granola  -Discussed recommended and not recommended foods from all food groups (list provided)  -Encouraged pt to increase her daily water intake (maintain 64 oz daily)  -Discussed healthy snacks (pro+ carb combination)   -Discussed kcals and macro % to track on [employer] praveen   -Discussed the importance of PA and recommended making it a goal to get 60 min per day of exercise (start w/20 min/day) FUTURE       Provided the following handouts: Fiber Content of Foods, Blood Sugar Graph, Heart Healthy Label Reading Tips, General Healthful Mediterranean Nutrition Therapy, MyPlate Guide, Weight Loss Strategies and Healthy Snacks- Complex Carb & Protein Pairing, Protein Content Food List      Nutrition Prescription: Macronutrient and Micronutrient details   Energy: 2919-4813 kcals/day ([place] Jeor)    Justification:  (obese and kcal deficit) Protein:  g Pro/day (1.2-1.5 g pro/Kg)    Justification:  (obesity guidelines  and preservation of lean body mass)   Fluid: 64+ oz/day   (1 mL/Kcal)     Justification:  (maintenance and obese, constipation and higher fiber intake)   Fat: 54-62 g/day   (35%)       Carbohydrate: 140-160 g/day   (40%)    Fiber: Women (>50 years): 21 grams per day                 Patient Engagement:   Assessed learning needs and learning preference: Yes.  Teaching Method(s)  used: Booklet / Handout  Explanation    Nutrition Education (Application):  a)  Discussed current eating plans / recommended alternative food choices    b)  Patient verbalizes understanding of diet by agreeing to begin making food swaps for healthier sweet treats and higher fiber lower sugar meal items especially focus on grain swaps and portion and adding in more fruit/veg and fiber      Anticipate fair-good compliance   Stage of Change:  preparation  Additional: pt appears ready to make changes, wt loss but also to support liver health and medical health. Pt has tried to lose wt but cycles. Discussed how to break cycle today with some good permanent swaps of food. Pt appeared engaged and ready to commit however, with pattern of past- may find barriers in sticking to goals       Nutrition Goals:  1) initially track kcals and macros and use myPlate and resources to meet adequate/reduced nutrition goals for wt loss and improved health (long term goal to not rely on these items)  2) Increase fruit and vegetable consumption and swap in higher whole grain items for refined carbs  3) Consume 64 ounces of water daily to support GI tract and moving additional fiber through system    Nutrition Follow Up / Monitoring:   Progress towards goals will be monitored and evaluated per protocol and Practice Guidelines, Food intake, Liquid meal replacement or supplement, Weight, Food and Nutrition Knowledge/Skill and Readiness to change nutrition-related behavior    Nutrition Recommendations:  Patient to follow-up with RD in 6 weeks.  Patient has RD contact information to call/email if needed.      Start time: 1133  End time: 1236    Total Time Duration: 60 minutes        Missy AMBRIZ  Clinical Dietitian  [facility] office [phone]  on-call weekend pager [phone]

## 2021-06-21 ENCOUNTER — APPOINTMENT (OUTPATIENT)
Dept: URBAN - METROPOLITAN AREA CLINIC 253 | Age: 56
Setting detail: DERMATOLOGY
End: 2021-06-25

## 2021-06-21 VITALS — RESPIRATION RATE: 15 BRPM | HEIGHT: 70.5 IN | WEIGHT: 245 LBS

## 2021-06-21 DIAGNOSIS — D18.0 HEMANGIOMA: ICD-10-CM

## 2021-06-21 DIAGNOSIS — Z71.89 OTHER SPECIFIED COUNSELING: ICD-10-CM

## 2021-06-21 DIAGNOSIS — D22 MELANOCYTIC NEVI: ICD-10-CM

## 2021-06-21 DIAGNOSIS — L82.1 OTHER SEBORRHEIC KERATOSIS: ICD-10-CM

## 2021-06-21 DIAGNOSIS — L81.4 OTHER MELANIN HYPERPIGMENTATION: ICD-10-CM

## 2021-06-21 DIAGNOSIS — L91.8 OTHER HYPERTROPHIC DISORDERS OF THE SKIN: ICD-10-CM

## 2021-06-21 DIAGNOSIS — L21.8 OTHER SEBORRHEIC DERMATITIS: ICD-10-CM

## 2021-06-21 DIAGNOSIS — L30.4 ERYTHEMA INTERTRIGO: ICD-10-CM

## 2021-06-21 PROBLEM — D18.01 HEMANGIOMA OF SKIN AND SUBCUTANEOUS TISSUE: Status: ACTIVE | Noted: 2021-06-21

## 2021-06-21 PROBLEM — D22.5 MELANOCYTIC NEVI OF TRUNK: Status: ACTIVE | Noted: 2021-06-21

## 2021-06-21 PROBLEM — D22.62 MELANOCYTIC NEVI OF LEFT UPPER LIMB, INCLUDING SHOULDER: Status: ACTIVE | Noted: 2021-06-21

## 2021-06-21 PROCEDURE — OTHER COUNSELING: OTHER

## 2021-06-21 PROCEDURE — OTHER ADDITIONAL NOTES: OTHER

## 2021-06-21 PROCEDURE — 11200 RMVL SKIN TAGS UP TO&INC 15: CPT

## 2021-06-21 PROCEDURE — OTHER PRESCRIPTION: OTHER

## 2021-06-21 PROCEDURE — 99203 OFFICE O/P NEW LOW 30 MIN: CPT | Mod: 25

## 2021-06-21 PROCEDURE — OTHER SKIN TAG REMOVAL: OTHER

## 2021-06-21 PROCEDURE — OTHER OBSERVATION: OTHER

## 2021-06-21 RX ORDER — FLUOCINONIDE 0.5 MG/ML
0.05% SOLUTION TOPICAL BID
Qty: 1 | Refills: 1 | Status: ERX | COMMUNITY
Start: 2021-06-21

## 2021-06-21 ASSESSMENT — LOCATION ZONE DERM
LOCATION ZONE: SCALP
LOCATION ZONE: NECK
LOCATION ZONE: ARM
LOCATION ZONE: TRUNK

## 2021-06-21 ASSESSMENT — LOCATION DETAILED DESCRIPTION DERM
LOCATION DETAILED: RIGHT INGUINAL CREASE
LOCATION DETAILED: LEFT MEDIAL FRONTAL SCALP
LOCATION DETAILED: LEFT ANTERIOR LATERAL DISTAL UPPER ARM
LOCATION DETAILED: INFERIOR THORACIC SPINE
LOCATION DETAILED: RIGHT CLAVICULAR NECK
LOCATION DETAILED: LEFT INGUINAL CREASE

## 2021-06-21 ASSESSMENT — LOCATION SIMPLE DESCRIPTION DERM
LOCATION SIMPLE: UPPER BACK
LOCATION SIMPLE: RIGHT ANTERIOR NECK
LOCATION SIMPLE: LEFT UPPER ARM
LOCATION SIMPLE: GROIN
LOCATION SIMPLE: LEFT SCALP

## 2021-06-21 NOTE — PROCEDURE: ADDITIONAL NOTES
Additional Notes: Avoid picking at scalp. Apply olive oil and fluocinonide at night and occlude with shower cap. Wash out in morning.
Detail Level: Detailed
Render Risk Assessment In Note?: no

## 2021-06-21 NOTE — PROCEDURE: SKIN TAG REMOVAL
Consent: Written consent obtained and the risks of skin tag removal was reviewed with the patient including but not limited to bleeding, pigmentary change, infection, pain, and remote possibility of scarring.
Medical Necessity Clause: This procedure was medically necessary because the lesions that were treated were:
Medical Necessity Information: It is in your best interest to select a reason for this procedure from the list below. All of these items fulfill various CMS LCD requirements except the new and changing color options.
Include Z78.9 (Other Specified Conditions Influencing Health Status) As An Associated Diagnosis?: No
Hemostasis: Drysol
Add Associated Diagnoses If Applicable When Selecting Medical Necessity: Yes
Anesthesia Type: 1% lidocaine with epinephrine
Anesthesia Volume In Cc: 1
Detail Level: Detailed

## 2021-06-21 NOTE — PROCEDURE: OBSERVATION
Detail Level: Detailed
Morphology Per Location (Optional): Evenly pigmented round brown macule
Size Of Lesion In Cm (Optional): 2
X Size Of Lesion In Cm (Optional): 2.5

## 2021-06-22 ENCOUNTER — TRANSFERRED RECORDS (OUTPATIENT)
Dept: HEALTH INFORMATION MANAGEMENT | Facility: CLINIC | Age: 56
End: 2021-06-22

## 2021-09-04 ENCOUNTER — HEALTH MAINTENANCE LETTER (OUTPATIENT)
Age: 56
End: 2021-09-04

## 2021-09-16 ENCOUNTER — HOSPITAL ENCOUNTER (OUTPATIENT)
Dept: CARDIOLOGY | Facility: CLINIC | Age: 56
Discharge: HOME | End: 2021-09-16
Attending: NURSE PRACTITIONER | Admitting: NURSE PRACTITIONER
Payer: COMMERCIAL

## 2021-09-16 ENCOUNTER — OFFICE VISIT (OUTPATIENT)
Dept: FAMILY MEDICINE | Facility: CLINIC | Age: 56
End: 2021-09-16
Payer: COMMERCIAL

## 2021-09-16 VITALS
TEMPERATURE: 97.2 F | BODY MASS INDEX: 35.42 KG/M2 | OXYGEN SATURATION: 100 % | HEIGHT: 71 IN | HEART RATE: 99 BPM | WEIGHT: 253 LBS | SYSTOLIC BLOOD PRESSURE: 112 MMHG | RESPIRATION RATE: 16 BRPM | DIASTOLIC BLOOD PRESSURE: 82 MMHG

## 2021-09-16 DIAGNOSIS — R42 LIGHTHEADEDNESS: Primary | ICD-10-CM

## 2021-09-16 DIAGNOSIS — R20.0 NUMBNESS OF TOES: ICD-10-CM

## 2021-09-16 DIAGNOSIS — E78.5 HYPERLIPIDEMIA LDL GOAL <130: ICD-10-CM

## 2021-09-16 DIAGNOSIS — M25.512 ACUTE PAIN OF LEFT SHOULDER: ICD-10-CM

## 2021-09-16 DIAGNOSIS — R42 LIGHTHEADEDNESS: ICD-10-CM

## 2021-09-16 DIAGNOSIS — E66.01 MORBID OBESITY (H): ICD-10-CM

## 2021-09-16 PROCEDURE — 99214 OFFICE O/P EST MOD 30 MIN: CPT | Performed by: NURSE PRACTITIONER

## 2021-09-16 PROCEDURE — 93000 ELECTROCARDIOGRAM COMPLETE: CPT | Performed by: NURSE PRACTITIONER

## 2021-09-16 PROCEDURE — 93350 STRESS TTE ONLY: CPT | Mod: 26 | Performed by: INTERNAL MEDICINE

## 2021-09-16 PROCEDURE — 93321 DOPPLER ECHO F-UP/LMTD STD: CPT | Mod: 26 | Performed by: INTERNAL MEDICINE

## 2021-09-16 PROCEDURE — 93017 CV STRESS TEST TRACING ONLY: CPT

## 2021-09-16 PROCEDURE — 255N000002 HC RX 255 OP 636: Performed by: NURSE PRACTITIONER

## 2021-09-16 PROCEDURE — 93325 DOPPLER ECHO COLOR FLOW MAPG: CPT | Mod: 26 | Performed by: INTERNAL MEDICINE

## 2021-09-16 PROCEDURE — 93018 CV STRESS TEST I&R ONLY: CPT | Performed by: INTERNAL MEDICINE

## 2021-09-16 PROCEDURE — 93016 CV STRESS TEST SUPVJ ONLY: CPT | Performed by: INTERNAL MEDICINE

## 2021-09-16 RX ADMIN — HUMAN ALBUMIN MICROSPHERES AND PERFLUTREN 3 ML: 10; .22 INJECTION, SOLUTION INTRAVENOUS at 11:22

## 2021-09-16 ASSESSMENT — ANXIETY QUESTIONNAIRES
2. NOT BEING ABLE TO STOP OR CONTROL WORRYING: NOT AT ALL
IF YOU CHECKED OFF ANY PROBLEMS ON THIS QUESTIONNAIRE, HOW DIFFICULT HAVE THESE PROBLEMS MADE IT FOR YOU TO DO YOUR WORK, TAKE CARE OF THINGS AT HOME, OR GET ALONG WITH OTHER PEOPLE: NOT DIFFICULT AT ALL
5. BEING SO RESTLESS THAT IT IS HARD TO SIT STILL: NOT AT ALL
7. FEELING AFRAID AS IF SOMETHING AWFUL MIGHT HAPPEN: NOT AT ALL
GAD7 TOTAL SCORE: 0
1. FEELING NERVOUS, ANXIOUS, OR ON EDGE: NOT AT ALL
6. BECOMING EASILY ANNOYED OR IRRITABLE: NOT AT ALL
3. WORRYING TOO MUCH ABOUT DIFFERENT THINGS: NOT AT ALL

## 2021-09-16 ASSESSMENT — MIFFLIN-ST. JEOR: SCORE: 1840.79

## 2021-09-16 ASSESSMENT — PATIENT HEALTH QUESTIONNAIRE - PHQ9
SUM OF ALL RESPONSES TO PHQ QUESTIONS 1-9: 3
5. POOR APPETITE OR OVEREATING: NOT AT ALL

## 2021-09-16 NOTE — PROGRESS NOTES
Assessment & Plan     Diagnoses and all orders for this visit:    Lightheadedness Episode  Acute pain episode of left shoulder  Patient with an acute episode of lightheadedness and left shoulder pain that radiated to left side of jaw during the middle of the night, which resolved spontaneously.    Will proceed with stress echocardiogram for further cardiac work-up.   To ED with return of acute symptoms or worsening.    -     EKG 12-lead complete w/read - Clinics - SR, no acute arrhythmias or abnormalities  -     Echocardiogram Exercise Stress; Future    Morbid obesity (H)  Continue to work with patient regarding dietary and lifestyle modifications to support weight loss.    -     Comprehensive Weight Management; Future    Hyperlipidemia LDL goal <130  Recent Labs   Lab Test 04/16/21  0913 09/25/20  0910 03/31/16  1015 02/27/15  0923 10/31/13  0927 10/31/13  0927   CHOL 202* 317*   < > 195   < > 180   HDL 48* 47*   < > 51   < > 47*   * 229*   < > 108   < > 100   TRIG 170* 205*   < > 178*   < > 167*   CHOLHDLRATIO  --   --   --  3.8  --  3.9    < > = values in this interval not displayed.   Due for repeat, fasting labs.    -     Comprehensive metabolic panel (BMP + Alb, Alk Phos, ALT, AST, Total. Bili, TP); Future  -     Lipid panel reflex to direct LDL Fasting; Future    Numbness of toes  Continue to closely monitor.  Follow-up with no improvement or worsening.   Consider EMG versus Neurology referral as next steps.    -     Hemoglobin A1c; Future      Return in about 1 month (around 10/16/2021) for Preventative Visit.    Mary Castro, APRN [facility]        Maria Luisa Tejada is a 55 year old who presents for the following health issues:      HPI     Musculoskeletal problem/pain - is a nurse at [employer] at [employer]     Patient reports an episode of lightheadedness  Was sitting on toilet and had lightheadedness/left shoulder pain that radiated up to left side  "of jaw.   +Lightheadedness lasted for approximately 2-3 hours.    Took 4 baby aspirin right away.    No worsening of symptoms, went back to sleep and pain resolved by the morning.    No diaphoresis, nausea, or change in fatigue.    No chest pain.      Onset/Duration: x 1 week ago- middle of the night - episode of light headedness- and shoulder pain started all within one hour, in the morning it all resolved after she woke up.   Description  Location: shoulder - left nothing wrong with joint more cardiac   Joint Swelling: no  Redness: no  Pain: no- came and went   Warmth: no  Intensity:  mild  Progression of Symptoms:  improving  Accompanying signs and symptoms:   Fevers: no  Numbness/tingling/weakness: no related to that.  Right big great toe is constantly numb, worried about A1C- worried about diabetes   History  Trauma to the area: no  Recent illness:  no  Previous similar problem: no  Previous evaluation:  no  Precipitating or alleviating factors:  Aggravating factors include: none  Therapies tried and outcome: nothing    History of hyperlipidemia, currently on Atorvastatin 20 mg daily.   Recent Labs   Lab Test 04/16/21  0913 09/25/20  0910 03/31/16  1015 02/27/15  0923 10/31/13  0927 10/31/13  0927   CHOL 202* 317*   < > 195   < > 180   HDL 48* 47*   < > 51   < > 47*   * 229*   < > 108   < > 100   TRIG 170* 205*   < > 178*   < > 167*   CHOLHDLRATIO  --   --   --  3.8  --  3.9    < > = values in this interval not displayed.       Maternal and paternal grandfathers with heart disease.        Right big toe with tingling/numbness - started a couple months ago. Patient is worried about Diabetes or neurological cause,    Wants to start with labs.        Review of Systems   Constitutional, HEENT, cardiovascular, pulmonary, gi and gu systems are negative, except as otherwise noted.      Objective    /82   Pulse 99   Temp 97.2  F (36.2  C)   Resp 16   Ht 1.791 m (5' 10.5\")   Wt 114.8 kg (253 lb)   LMP " 10/01/2015 (Approximate)   SpO2 100%   BMI 35.79 kg/m    Body mass index is 35.79 kg/m .  Physical Exam     GENERAL: healthy, alert and no distress  EYES: Eyes grossly normal to inspection  RESP: lungs clear to auscultation - no rales, rhonchi or wheezes  CV: regular rate and rhythm, normal S1 S2, no S3 or S4, no murmur, click or rub, no peripheral edema  NEURO: CN grossly intact, Normal strength and tone, mentation intact and speech normal  PSYCH: mentation appears normal, affect normal/bright

## 2021-09-17 ASSESSMENT — ANXIETY QUESTIONNAIRES: GAD7 TOTAL SCORE: 0

## 2021-09-17 NOTE — RESULT ENCOUNTER NOTE
Dear Mallory,     I am happy to see you were able to get your stress echocardiogram done so quickly!      Your results were overall normal and reassuring.      Please send a [employer] message or call [phone]  if you have any questions.      ALEXANDRIA Serrato, CNP  [facility]

## 2021-09-21 ENCOUNTER — LAB (OUTPATIENT)
Dept: LAB | Facility: CLINIC | Age: 56
End: 2021-09-21
Payer: COMMERCIAL

## 2021-09-21 DIAGNOSIS — E78.5 HYPERLIPIDEMIA LDL GOAL <130: ICD-10-CM

## 2021-09-21 DIAGNOSIS — R20.0 NUMBNESS OF TOES: ICD-10-CM

## 2021-09-21 LAB — HBA1C MFR BLD: 5.6 % (ref 0–5.6)

## 2021-09-21 PROCEDURE — 80053 COMPREHEN METABOLIC PANEL: CPT

## 2021-09-21 PROCEDURE — 83036 HEMOGLOBIN GLYCOSYLATED A1C: CPT

## 2021-09-21 PROCEDURE — 80061 LIPID PANEL: CPT

## 2021-09-21 PROCEDURE — 36415 COLL VENOUS BLD VENIPUNCTURE: CPT

## 2021-09-22 LAB
ALBUMIN SERPL-MCNC: 3.9 G/DL (ref 3.4–5)
ALP SERPL-CCNC: 74 U/L (ref 40–150)
ALT SERPL W P-5'-P-CCNC: 69 U/L (ref 0–50)
ANION GAP SERPL CALCULATED.3IONS-SCNC: 3 MMOL/L (ref 3–14)
AST SERPL W P-5'-P-CCNC: 33 U/L (ref 0–45)
BILIRUB SERPL-MCNC: 0.4 MG/DL (ref 0.2–1.3)
BUN SERPL-MCNC: 13 MG/DL (ref 7–30)
CALCIUM SERPL-MCNC: 8.7 MG/DL (ref 8.5–10.1)
CHLORIDE BLD-SCNC: 108 MMOL/L (ref 94–109)
CHOLEST SERPL-MCNC: 206 MG/DL
CO2 SERPL-SCNC: 27 MMOL/L (ref 20–32)
CREAT SERPL-MCNC: 0.8 MG/DL (ref 0.52–1.04)
FASTING STATUS PATIENT QL REPORTED: YES
GFR SERPL CREATININE-BSD FRML MDRD: 84 ML/MIN/1.73M2
GLUCOSE BLD-MCNC: 101 MG/DL (ref 70–99)
HDLC SERPL-MCNC: 47 MG/DL
LDLC SERPL CALC-MCNC: 132 MG/DL
NONHDLC SERPL-MCNC: 159 MG/DL
POTASSIUM BLD-SCNC: 4.3 MMOL/L (ref 3.4–5.3)
PROT SERPL-MCNC: 7.5 G/DL (ref 6.8–8.8)
SODIUM SERPL-SCNC: 138 MMOL/L (ref 133–144)
TRIGL SERPL-MCNC: 133 MG/DL

## 2021-09-24 NOTE — RESULT ENCOUNTER NOTE
Dear Mallory,     -LDL(bad) cholesterol level is elevated, HDL(good) cholesterol level is low.   ADVISE: Increasing your Atorvastatin dose - Please let me know if you are ok with this and I will send in a new prescription to your pharmacy.      -Liver and gallbladder tests (AST, Alk phos,bilirubin) are normal.  ALT is slightly elevated, but improved from when we last checked this.    -Kidney function (GFR) is normal.  -Sodium is normal.  -Potassium is normal.  -Calcium is normal.  -Glucose is slightly elevated and may be a sign of early diabetes (prediabetes). ADVISE: eating a low carbohydrate diet, exercising, trying to lose weight (if necessary) and rechecking your glucose level in 12 months.    -A1C (diabetic test) is normal and indicates that your blood sugar has been in a normal range the last 3 months.      Please send a [employer] message or call [phone]  if you have any questions.      Mary Castro, APRN, CNP  [facility]    If you have further questions about the interpretation of your labs, [email or web address] is a good website to check out for further information.

## 2021-09-25 ENCOUNTER — MYC MEDICAL ADVICE (OUTPATIENT)
Dept: FAMILY MEDICINE | Facility: CLINIC | Age: 56
End: 2021-09-25

## 2021-09-25 DIAGNOSIS — E78.5 HYPERLIPIDEMIA WITH TARGET LDL LESS THAN 130: ICD-10-CM

## 2021-09-28 RX ORDER — ATORVASTATIN CALCIUM 40 MG/1
40 TABLET, FILM COATED ORAL DAILY
Qty: 90 TABLET | Refills: 3 | Status: SHIPPED | OUTPATIENT
Start: 2021-09-28 | End: 2022-05-03

## 2021-09-28 NOTE — TELEPHONE ENCOUNTER
Routing to Mary Castro to review    Jesus KONG RN   [facility]

## 2021-10-19 PROBLEM — F32.9 MAJOR DEPRESSION: Status: RESOLVED | Noted: 2018-12-07 | Resolved: 2020-09-23

## 2021-10-30 ENCOUNTER — HEALTH MAINTENANCE LETTER (OUTPATIENT)
Age: 56
End: 2021-10-30

## 2021-11-03 ENCOUNTER — TELEPHONE (OUTPATIENT)
Dept: SURGERY | Facility: CLINIC | Age: 56
End: 2021-11-03

## 2021-11-03 NOTE — TELEPHONE ENCOUNTER
Left voicemail message for patient reminding them to complete the new patient questionnaire before their appointment.    Vanessa Mendez MA

## 2021-11-04 ENCOUNTER — VIRTUAL VISIT (OUTPATIENT)
Dept: SURGERY | Facility: CLINIC | Age: 56
End: 2021-11-04
Payer: COMMERCIAL

## 2021-11-04 VITALS — WEIGHT: 256 LBS | BODY MASS INDEX: 35.84 KG/M2 | HEIGHT: 71 IN

## 2021-11-04 DIAGNOSIS — E78.5 HYPERLIPIDEMIA LDL GOAL <130: ICD-10-CM

## 2021-11-04 DIAGNOSIS — K21.9 GASTROESOPHAGEAL REFLUX DISEASE WITHOUT ESOPHAGITIS: ICD-10-CM

## 2021-11-04 DIAGNOSIS — E66.812 CLASS 2 SEVERE OBESITY DUE TO EXCESS CALORIES WITH SERIOUS COMORBIDITY AND BODY MASS INDEX (BMI) OF 36.0 TO 36.9 IN ADULT (H): Primary | ICD-10-CM

## 2021-11-04 DIAGNOSIS — G47.33 OSA (OBSTRUCTIVE SLEEP APNEA): ICD-10-CM

## 2021-11-04 DIAGNOSIS — E66.01 CLASS 2 SEVERE OBESITY DUE TO EXCESS CALORIES WITH SERIOUS COMORBIDITY AND BODY MASS INDEX (BMI) OF 36.0 TO 36.9 IN ADULT (H): Primary | ICD-10-CM

## 2021-11-04 PROCEDURE — 99205 OFFICE O/P NEW HI 60 MIN: CPT | Mod: 95 | Performed by: PHYSICIAN ASSISTANT

## 2021-11-04 PROCEDURE — 97802 MEDICAL NUTRITION INDIV IN: CPT | Mod: GT | Performed by: DIETITIAN, REGISTERED

## 2021-11-04 ASSESSMENT — MIFFLIN-ST. JEOR: SCORE: 1849.4

## 2021-11-04 NOTE — PATIENT INSTRUCTIONS
It was great meeting you today!  Below is the link to cut and paste into your browser to watch the online seminar.  Please call [phone] and schedule for a return visit in 5 weeks.        [email or web address]      Goals:  Meet with sleep center - Referral placed today  Several purposeful walks weekly            Below is the information about the medication we discussed.  Please go to [email or web address] to see if you qualify for a savings card.  IF YOU  THE MEDICATION PLEASE Blend SO WE CAN DO A VIRTUAL TEACH        MEDICATION STARTED AT THIS APPOINTMENT    We are starting a GLP-1 (Glucagon-like Peptide-1) medication called Saxenda. One of the ways it works is by slowing down the rate that food leaves your stomach. You feel hassan and will eat less. It also helps regulate hormones that can help improve your blood sugars.    If you are a patient that checks blood sugars, continue to check as instructed by your doctor. Low blood sugars are rare but can happen if patients are on insulin or other oral agents. If you notice consistent low sugars or high sugars, your medication may need to be adjusted after your appointment. If this is the case, please call RN and provide her your blood sugar record from the last 3-4 days. The RN will get in touch with the doctor and call you back/[employer] message with recommendations. We tolerate high sugars for a bit, so if sugars are running 180-200, this is ok. As weight starts dropping the blood sugars should too. If readings are consistently over 200 for 1-2 weeks, then you should call the doctor/nurse.    Dosing for this medication:   Week 1- Inject 0.6 mg daily  Week 2- Inject 1.2 mg daily  Week 3- Inject 1.8 mg daily  Week 4- Inject 2.4 mg daily  Week 5 and thereafter- Inject 3.0 mg daily    Side effects of GLP- Medications include: The most common side effects are all GI related and consist of: nausea, constipation,  diarrhea, burping, or gassiness. Patients are advised to eat slowly and less, and nausea typically passes if people can stick it out.     The risk of pancreatitis (inflammation of the pancreas) has been associated with this type of medication, but is very rare.  If you have had pancreatitis in the past, this medication may not be for you. Please let us know about any past history of pancreas problems.    Symptoms of pancreatitis include: Pain in your upper stomach area which may travel to your back and be worse after eating. Your stomach area may be tender to the touch.  You may have vomiting or nausea and/or have a fever. If you should develop any of these symptoms, stop the medication and contact your primary care doctor. They will do a blood test to check for pancreatitis.         There is a small chance you may have some low blood sugar after taking the medication.   The signs of low blood sugar are:  o Weakness  o Shaky   o Hungry  o Sweating  o Confusion      See below for ways to treat low blood sugar without adding in lots of extra calories.      Treating Low Blood Sugar    If you have symptoms of low blood sugar (sweating, shaking, dizzy, confused) eat 15 grams of carbs and wait 15 minutes:      Glucose Tabs are best for sugars under 70 -  Dex4 or BD Glucose tablets are good, you will need to take 3-4 of these to equal 15 grams.       One small box of raisins    4 oz fruit juice box or   cup fruit juice    1 small apple    1 small banana      cup canned fruit in water      English muffin or a slice of bread with jelly     1 low fat frozen waffle with sugar-free syrup      cup cottage cheese with   cup frozen or fresh blueberries    1 cup skim or low-fat milk      cup whole grain cereal    4-6 crackers such as Triscuits      This medication is usually not covered by insurance and can be quite expensive. Sometimes a prior authorization is required, which may take up to 1-2 weeks for an insurance company to  make a decision if they will cover the medication. Please be patient, you will be notified after a decision has been made.    Contact the nurse via [employer] or call [phone] if you have any questions or concerns. (Do not stop taking it if you don't think it's working. For some people it works without them knowing it.)     In order to get refills of this or any medication we prescribe you must be seen in the medical weight mgmt clinic every 2-4 months.                                Protein Link:    Protein Sources for Weight Loss  [email or web address]

## 2021-11-04 NOTE — PROGRESS NOTES
"Mallory is a 56 year old who is being evaluated via a billable video visit.      If the video visit is dropped, the invitation should be resent by: Text to cell phone: [phone]  Will anyone else be joining your video visit? No      Video-Visit Details    Type of service:  Video Visit    Video Start Time: 9:07    Video End Time: 10:01    62 minutes spent on the date of the encounter doing chart review, review of test results, patient visit and documentation       Originating Location (pt. Location): Home    Distant Location (provider location):  [facility] [place]     Platform used for Video Visit: [facility]          PATIENT:  Mallory Sargent  MRN:         [id number]  :         1965  KATLYN:         2021        Dear Mary Castro CNP,    I had the pleasure of seeing your patient, Mallory Sargent. Full intake/assessment was done to determine barriers to weight loss success and develop a treatment plan. Mallory Sargent is a 56 year old female interested in treatment of medical problems associated with excess weight. She has a height of 5' 10.5\"[pt reported[, a weight of 256 lbs 0 oz, and the calculated Body mass index is 36.21 kg/m .       Ever since having children has struggled with weight.  Has tried various things to lose weight but weight loss did not last      ASSESSMENT/PLAN:  Class 2 severe obesity due to excess calories with Body Mass Index (BMI) of 36  GERD without esophagitis  Elevated liver enzymes  High cholesterol  JESSIE        Goals:  Meet with sleep center   Several purposeful walks weekly      Discussed the 24 week program.  Patient is interested in starting. Not interested in surgery at this time.       We discussed the role of pharmacological agents in the treatment of obesity and the \"off-label\" use of medications in this practice. We discussed the risks and benefits of each. We discussed " "indications, contraindications, potential side effects, and estimated costs of each. Discussed that medications must always be used together with lifestyle changes such as improvements in diet choices, portion control and establishing and maintaining a regular exercise program.     Took phentermine about 15 years ago.  RX for Saxenda sent to pharmacy.  Reviewed side effects. Patient information placed in [employer].  Discussed Savings card.        All of patients questions about the weight loss program were answered fully.        She has the following co-morbidities:       11/3/2021   I have the following health issues associated with obesity: Pre-Diabetes, High Cholesterol, Sleep Apnea, GERD (Reflux), Fatty Liver   I have the following symptoms associated with obesity: Depression, Back Pain, Fatigue, Hip Pain       Patient Goals 11/3/2021   I am interested in having a healthier weight to diminish current health problems: Yes   I am interested in having a healthier weight in order to prevent future health problems: Yes   I am interested in having a healthier weight in order to have a future surgery: Yes   If yes, please indicate which surgery? Hip replacement       Referring Provider 11/3/2021   Please name the provider who referred you to Medical Weight Management.  If you do not know, please answer: \"I Don't Know\". Jesus Castro       Weight History 11/3/2021   How concerned are you about your weight? Very Concerned   Would you describe your weight gain as gradual? Yes   I became overweight: As an Adult   The following factors have contributed to my weight gain:  Mental Health Issues, Eating Wrong Types of Food, Eating Too Much, Stress   I have tried the following methods to lose weight: Watching Portions or Calories, Exercise, Weight Watchers, Slim Fast or Other Liquid Diets, Medications, Meal Replacements, Fasting   Please list the medications you have tried.  Phentermine   My lowest weight since age 20 was: 160   My " highest weight since age 20 was: 250   The most weight I have ever lost was: (lbs) 35   I have the following family history of obesity/being overweight:  I am the only one in my immediate family who is overweight   Has anyone in your family had weight loss surgery? No   How has your weight changed over the last year?  Gained   How many pounds? 15       Diet Recall Review with Patient 11/3/2021   Do you typically eat breakfast? Yes   If you do eat breakfast, what types of food do you eat? Cereal, toast, fruit, oatmeal, yogurt, pancakes   Do you typically eat lunch? Yes   If you do eat lunch, what types of food do you typically eat?  Frozen meals from the store like lean cuisine   Do you typically eat supper? Yes   If you do eat supper, what types of food do you typically eat? Meat, potatoes, veggies   Do you typically eat snacks? Yes   If you do snack, what types of food do you typically eat? Cottage cheese, crackers, cheese   Do you like vegetables?  Yes   Do you drink water? Yes   How many glasses of juice do you drink in a typical day? 0   How many of glasses of milk do you drink in a typical day? 0   If you do drink milk, what type? 1%   How many 8oz glasses of sugar containing drinks such as Chaka-Aid/sweet tea do you drink in a day? 0   How many cans/bottles of sugar pop/soda/tea/sports drinks do you drink in a day? 0   How many cans/bottles of diet pop/soda/tea or sports drink do you drink in a day? 0   How often do you have a drink of alcohol? 2-4 Times a Month   If you do drink, how many drinks might you have in a day? 1 or 2       Eating Habits 11/3/2021   Generally, my meals include foods like these: bread, pasta, rice, potatoes, corn, crackers, sweet dessert, pop, or juice. Everyday   Generally, my meals include foods like these: fried meats, brats, burgers, french fries, pizza, cheese, chips, or ice cream. Almost Everyday   Eat fast food (like [employer], [employer], Taco Bell). A Few Times a Week   Eat  at a buffet or sit-down restaurant. Once a Week   Eat most of my meals in front of the TV or computer. Everyday   Often skip meals, eat at random times, have no regular eating times. Almost Everyday   Rarely sit down for a meal but snack or graze throughout.  A Few Times a Week   Eat extra snacks between meals. Everyday   Eat most of my food at the end of the day. Never   Eat in the middle of the night or wake up at night to eat. Less Than Weekly   Eat extra snacks to prevent or correct low blood sugar. Less Than Weekly   Eat to prevent acid reflux or stomach pain. Less Than Weekly   Worry about not having enough food to eat. Never   Have you been to the food shelf at least a few times this year? No   I eat when I am depressed. A Few Times a Week   I eat when I am stressed. A Few Times a Week   I eat when I am bored. A Few Times a Week   I eat when I am anxious. A Few Times a Week   I eat when I am happy or as a reward. A Few Times a Week   I feel hungry all the time even if I just have eaten. Never   Feeling full is important to me. Almost Everyday   I finish all the food on my plate even if I am already full. Almost Everyday   I can't resist eating delicious food or walk past the good food/smell. Almost Everyday   I eat/snack without noticing that I am eating. Never   I eat when I am preparing the meal. A Few Times a Week   I eat more than usual when I see others eating. A Few Times a Week   I have trouble not eating sweets, ice cream, cookies, or chips if they are around the house. Everyday   I think about food all day. Almost Everyday   What foods, if any, do you crave? Sweets/Candy/Chocolate   Please list any other foods you crave? Savory foods       Amount of Food 11/3/2021   I make myself vomit what I have eaten or use laxatives to get rid of food. Never   I eat a large amount of food, like a loaf of bread, a box of cookies, a pint/quart of ice cream, all at once. Monthly   I eat a large amount of food even  when I am not hungry. Weekly   I eat rapidly. Almost Everyday   I eat alone because I feel embarrassed and do not want others to see how much I have eaten. Weekly   I eat until I am uncomfortably full. Weekly   I feel bad, disgusted, or guilty after I overeat. Almost Everyday   I make myself vomit what I have eaten or use laxatives to get rid of food. Never       Activity/Exercise History 11/3/2021   How much of a typical 12 hour day do you spend sitting? Most of the Day   How much of a typical 12 hour day do you spend lying down? Less Than Half the Day   How much of a typical day do you spend walking/standing? Less Than Half the Day   How many hours (not including work) do you spend on the TV/Video Games/Computer/Tablet/Phone? 4-5 Hours   How many times a week are you active for the purpose of exercise? Once a Week   What keeps you from being more active? Lack of Time, Too tired, Worried People Will Look At Me   How many total minutes do you spend doing some activity for the purpose of exercising when you exercise? More Than 30 Minutes     During this time of the year will walk outside.  Has not been in a regular pattern      PAST MEDICAL HISTORY:  Past Medical History:   Diagnosis Date     ASCUS favor benign 3/2015    neg HPV   Plan cotest in 3 yrs.     Depressive disorder      Depressive disorder, not elsewhere classified     lexapro = no effect and sleepy, celexa = slightly better, wellbutrin = severe restlessness.       Esophageal reflux 2006     FAMILY HX-OVARIAN MALIGNANCY 2005    maternal Hx Dx'd age 57,  age 64; pt would like to have her ovaries removed.     GERD (gastroesophageal reflux disease)      Other and unspecified hyperlipidemia 2007     RECURR Depressive disorder - MOD 2008     RECURR Depressive disorder -SEVERE 2008       Work/Social History Reviewed With Patient 11/3/2021   My employment status is: Part-Time   My job is: RN   How much of your job is spent on the  computer or phone? 75%   How many hours do you spend commuting to work daily?  1.5   What is your marital status? /In a Relationship   If in a relationship, is your significant other overweight? Yes   Do you have children? Yes   If you have children, are they overweight? No   Who do you live with?     Are they supportive of your health goals? Yes   Who does the food shopping?  Me       Mental Health History Reviewed With Patient 11/3/2021   Have you ever been physically or sexually abused? No   How often in the past 2 weeks have you felt little interest or pleasure in doing things? Not at all   Over the past 2 weeks how often have you felt down, depressed, or hopeless? Not at all       Sleep History Reviewed With Patient 11/3/2021   How many hours do you sleep at night? 7   Do you think that you snore loudly or has anybody ever heard you snore loudly (louder than talking or so loud it can be heard behind a shut door)? Yes   Has anyone seen or heard you stop breathing during your sleep? No   Do you often feel tired, fatigued, or sleepy during the day? Yes   Do you have a TV/Computer in your bedroom? Yes     ROS  General  Fatigue: No  Sleep Quality: not great.  Gets good night sleep about half the nights  Birth Control: postmenopausal  HEENT  Hx of glaucoma: No  Vision changes: No  Cardiovascular  Chest Pain with Exertion: No  Palpitations: No  Hx of heart disease: No  Had a recent EKG and Stress echo that was reviewed today   Pulmonary  Shortness of breath at rest: No  Shortness of breath with exertion: Yes  Snoring: Yes - was diagnosed with JESSIE 3 years ago with mild JESSIE. No treatment.  Has gained roughly 40 lbs since then  Gastrointestinal  Heartburn: yes takes medication daily  Abdominal pain: No  History of pancreatitis: No  Severe constipation: No  Hx of bowel obstruction: No  Gastrourinary  History of kidney stones: No  Psychiatric  Moods Stable: Yes  History of drug abuse: No  No history of eating  "disorder  Endocrine  Polydipsia: No  Polyuria: No  Personal or family history of thyroid cancer: No  Neurologic:  Hx of seizures: No  Hx of paresthesias: No          MEDICATIONS:   Current Outpatient Medications   Medication Sig Dispense Refill     aspirin 81 MG tablet Take by mouth daily 30 tablet      atorvastatin (LIPITOR) 40 MG tablet Take 1 tablet (40 mg) by mouth daily 90 tablet 3     augmented betamethasone dipropionate (DIPROLENE) 0.05 % external lotion Apply topically 2 times daily Apply to AA on scalp bid when needed 60 mL 1     FIBER PO        magnesium 250 MG tablet Take 1 tablet by mouth daily       Omega-3 Fatty Acids (FISH OIL ADULT GUMMIES PO) Take 1,000 mg by mouth daily       omeprazole (PRILOSEC) 20 MG DR capsule Take 1 capsule (20 mg) by mouth daily 30-60 minutes before a meal. Pt typically takes QOD 90 capsule 3       ALLERGIES:   No Known Allergies    PHYSICAL EXAM:   5' 10.5\" (1.791 m)   Wt 256 lb (116.1 kg)   LMP 10/01/2015 (Approximate)   BMI 36.21 kg/m    GENERAL: Healthy, alert and no distress  EYES: Eyes grossly normal to inspection.  No discharge or erythema, or obvious scleral/conjunctival abnormalities.  RESP: No audible wheeze, cough, or visible cyanosis.  No visible retractions or increased work of breathing.    SKIN: Visible skin clear. No significant rash, abnormal pigmentation or lesions.  NEURO: Cranial nerves grossly intact.  Mentation and speech appropriate for age.  PSYCH: Mentation appears normal, affect normal/bright, judgement and insight intact, normal speech and appearance well-groomed.        FOLLOW-UP:   5 weeks provider   Remainder of visits per 24 week program        Sincerely,    Maury Mcclure PA-C      "

## 2021-11-04 NOTE — Clinical Note
Please contact patient tomorrow morning (Friday) around 8:30 am to get signed up for the 24 week program [employer].  If that time does not work please MyChart her.    ALSO ASK IF SHE GOT THE SAXENDA BECAUSE YOU CAN DO A VIRTUAL TEACH    Thanks   Maury

## 2021-11-04 NOTE — PROGRESS NOTES
Originating Location (pt. Location): Other work-lobby    Distant Location (provider location):  [facility] [place]     Mode of Communication:  Video Conference via [employer]; [employer] and telephone due to connectivity issues     MEDICAL WEIGHT LOSS INITIAL EVALUATION  DIAGNOSIS:  Obese, class II     NUTRITION HISTORY:  Do you typically eat breakfast? Yes   If you do eat breakfast, what types of food do you eat? Cereal, toast, fruit, oatmeal, yogurt, pancakes (6:30)   Do you typically eat lunch? Yes   If you do eat lunch, what types of food do you typically eat?  Frozen meals from the store like lean cuisine   Do you typically eat supper? Yes   If you do eat supper, what types of food do you typically eat? Meat, potatoes, veggies (Hello Fresh) 1000 calorie meal; eats on the couch    Do you typically eat snacks? Yes   If you do snack, what types of food do you typically eat? Cottage cheese, crackers, cheese (11:00)  Snacks on drive home and until goes to bed (jed chips and hummus, KIND bar, yogurt); ice cream or sweets    Do you like vegetables?  Yes   Do you drink water? Yes   How many glasses of juice do you drink in a typical day? 0   How many of glasses of milk do you drink in a typical day? 0   If you do drink milk, what type? 1%   How many 8oz glasses of sugar containing drinks such as Chaka-Aid/sweet tea do you drink in a day? 0   How many cans/bottles of sugar pop/soda/tea/sports drinks do you drink in a day? 0   How many cans/bottles of diet pop/soda/tea or sports drink do you drink in a day? 0   How often do you have a drink of alcohol? 2-4 Times a Month   If you do drink, how many drinks might you have in a day? 1 or 2      Beverage choices: water; coffee   Patient feels her biggest struggle with food is snacking.  Patient feels her work days are easiest as has scheduled meals.  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  Patient has 35  "minute commute to work.  Patient often snacks in the car on the way home from work.  Patient has tried Weight Watchers in the past but found it was not helpful.  Patient met with RD in June and did not have follow up appointment.  Patient felt tracking foods in My Fitness Pal was useful.      MEDICATIONS:  Saxenda    ANTHROPOMETRICS:  Height: 5'10.5\"  Weight: 256 lbs  BMI: 36.1 kg/m2  NUTRITION DIAGNOSIS:   Obese, class II related to excess energy intake as evidence by BMI of 36.1 kg/m2  NUTRITION INTERVENTIONS  Nutrition Prescription:  Recommend modified energy- nutrient intake  Implementation:  Nutrition Education (Content):    Discussed current po intake and eating behaviors.  Determined how to reduce calories from evening meal.  Patient's  prepares Hello Fresh meals.  Patient feels she could talk with  and choose lower calorie options from Hello Fresh.     Provided   My Plate Planner\" handout     Nutrition Education (Application):     Patient to practice goals as stated below    Patient verbalizes understanding of diet by stating will eliminate snacking on the way home from work     Patient expected engagement: good     Goals:  Reduce night time eating by stopping eating after 8 PM and turn off kitchen light   Eliminate car eating   Sit at table when eating       FOLLOW UP AND MONITORING:   Other  - follow up in 4-8 weeks.     TIME SPENT WITH PATIENT:  36 minutes   Staci Moran, RD, LD  [facility] Diet[facility]   [phone] (office phone)    "

## 2021-11-05 ENCOUNTER — TELEPHONE (OUTPATIENT)
Dept: SURGERY | Facility: CLINIC | Age: 56
End: 2021-11-05

## 2021-11-05 NOTE — TELEPHONE ENCOUNTER
RN spoke with patient following provider visit at which patient signed up for 24 Week Plan and expressed interest in [employer]ari[employer] product line.    Discussed with patient the 24 Week Healthy Lifestyle Program, including cost/payment, [employer] membership, schedule of visits, journal and healthy habits.    Discussed with patient the Bariatrix meal replacement products, including cost and ordering.    Reviewed schedule/appointments.    24 Week HLP kit to be mailed to patient.    Summarized with patient--Patient plan is 24 Week Healthy Lifestyle Plan.  The patient graduation date is 4/29/22.  The fee is $99. The fee will be applied to about.me after initial Health  visit.    Informed pt to sign the form 24-week Healthy Lifestyle Plan Costs Overview and to return to clinic via mail, fax, or [email or web address].me.    Reviewed contact information, address and e-mail address with pt: yes    Patient assisted by staff to make initial Health  visit and follow-up appointments for 24 week program.    Sophia Petit RN on 11/5/2021 at 8:59 AM

## 2021-11-05 NOTE — PATIENT INSTRUCTIONS
Khurram Tejada,    It was nice meeting you today.  I hope you had good day at work.  Here are the goals we dicussed:    Goals:  Reduce night time eating by stopping eating after 8 PM and turn off kitchen light   Eliminate car eating   Sit at table when eating     This handout may be helpful when planning meals:     My Plate Planner_English - Pt Education  [email or web address]    Please call [phone] to schedule your next appointment with RD.    Thanks,    Staci Borja, RD, LD  [facility] Outpatient Dietitian/[facility] Loss [facility]   [phone] (office phone)

## 2021-11-12 ENCOUNTER — VIRTUAL VISIT (OUTPATIENT)
Dept: SURGERY | Facility: CLINIC | Age: 56
End: 2021-11-12

## 2021-11-12 DIAGNOSIS — E66.812 CLASS 2 SEVERE OBESITY DUE TO EXCESS CALORIES WITH SERIOUS COMORBIDITY AND BODY MASS INDEX (BMI) OF 36.0 TO 36.9 IN ADULT (H): ICD-10-CM

## 2021-11-12 DIAGNOSIS — E66.9 OBESE: Primary | ICD-10-CM

## 2021-11-12 DIAGNOSIS — E66.01 CLASS 2 SEVERE OBESITY DUE TO EXCESS CALORIES WITH SERIOUS COMORBIDITY AND BODY MASS INDEX (BMI) OF 36.0 TO 36.9 IN ADULT (H): ICD-10-CM

## 2021-11-12 PROCEDURE — 99207 PR MWM HEALTH COACH NO CHARGE: CPT | Performed by: COMMUNITY HEALTH WORKER

## 2021-11-12 PROCEDURE — 99207 PR MEDICAL WEIGHT MANAGEMENT PROGRAM EMPLOYEE ENROLLMENT: CPT | Performed by: COMMUNITY HEALTH WORKER

## 2021-11-12 NOTE — PROGRESS NOTES
IDA CHAVEZ    Progress Notes    New Weight Management Health Coaching Note    Mallory Sargent          MRN: [id number]  : 1965  KATLYN: 2021    Health  Visit #: 1  Visit Type:  24 week   Telephone Visit - Call: [phone]     Initial Start Date: 2021  Graduation Date: 2022     Provider on : LINDSEY Marin per Provider Obesity Class 2 (BMI 35 to 39.9).  Initial Height and Wt: 5' 10.5 , 256 lbs   BMI = 36.21 kg/m     ASSESSMENT:  Current Weight: 256 lbs (self-reported)  Average Daily Water: ? oz/day  Weight Loss Medication: Saxenda  Nutrition Plan: Other: Regular Diet, per Rd using Estore products to assist with meal prepping    Additional Program Support.  Payment Dropped:  21     Confirmed 24-wk HLP Kit Rec by Pt on: 21  Confirmed Payment & [employer] forms signed: confirmed pt will sign 21    Pillar Assessment Completed on: Initial 21  Estore: Pt self-enrolled  Measurements: pt will [employer]* team  Wellbeats Access Begins/Ends: 21 - 22  Support Group Availability, [employer] info: 21    INITIAL INTAKE:  What brought pt to the program?  She was enrolled in the can do program. She enrolled due to the interdisciplinary team and happy to hear about the  holistic approach of the program.     - Pt wants to learn how to improve all four of health pillars in a sustainable way.    Pillars of Health Discussion.  The four pillars of well-being may impact your ability to manage weight.    Initial Level of Satisfaction completed on : All rated on a scale of 1-10.        Sleep: 6    Movement: 3    Nutrition: 3    Emotional Health/Wellbein    Sleep: Would like to be at a 8 in 6 months.   Notes: 8 = most nights (8 out of 10) she would be able to get back to sleep better. She struggles to go back to sleep 4 out of 5 nights - reports feeling wide awake. She has a sleep consult in .     Movement: Would like to be at a 7 in  6 months.   Notes: She would like to have more energy & motivation to be active. She reports to have a high sedentary life. Plus she would like to add in structure movement at least a couple days a week. She wants to look forward to moving her body.  Her work position requires high amount of time on the phone; she is curious ~ adding in movement while at her desk; by the end of the day she notices she isn't motivated to move.    Nutrition: Would like to be at a 8 in 6 months.   Notes: She feels she chooses healthy meals yet over indulges with sweets.     Emotional Health/Wellbeing: Would like to be at a 8 in 6 months.   Notes: She would like to feel more connected with others. She isn't sure want is having her reach out & initiate the connection. She also feels the above pillars advancing will automatically advance this pillar.    Takeaways from initial Provider & RD Goals.    Action Steps at Today's HC Visit.  1) Nutrition, per RD recommendations.  - Continue to with nutritions recommendations.    2) EWB.  - She wants to reflect about whether a  visit would be something to add in or not. As she thinks more on this she would feel this would be helpful yet connecting with a good one is what is holding her back. She will reach out to her PCP or discuss options marilyn/Maury on 12/10 for referral if needed.    3) Other  - Implement weigh-in schedule.   - Do measurements - and MyChart to clinic.  - Sign Payment & [employer] forms, Abdi Cortes.  - When time allows, organize program info & come with ?'s to next hc visit.    *See movement notes under pillar assessment.    Next Visit: Body Measurements - Weigh-in Schedule - Reminder to Return Payment Form      PATIENT INFORMATION PROVIDED:  - 24 Week Healthy Lifestyle Plan Overview Handout:  o Explained the structure of the 24-week HLP  o Reviewed scheduling information  o Discussed option to do coaching sessions via phone or in person  - Explain the role of a HEALTH   and the FOUR Pillars of Health  - Discussed Telephonic Process - Rescheduling Process  - Discussed 'Wellness Plan' Concept  - Explained MyChart Billing Process   - Reminder to Return 24-wk HLP Patient Form   - Explained MyChart Process  - Scheduled addt'l HC visit + encouraged pt to record in phone/office calendar.    - Wellbeats Discussion   - MyCharted My Contact Info, Program Contact Info including Support Group & [email or web address] phone #.   - Weigh-in Schedule and Measurement Discussions  - Scale Info   - Contact Info (HC & Program)  - Support Group  - Pt requested resources emailed. No PHI in email: Well-being Assessment Wellbeats [employer] phone #.    HC Visit #1 11/12, #2 11/19, #3 11/29, #4 12/10, #5 12/24, #6   *She works [employer] with some days off; prefers to schedule her hc visits on her days off or on days she doesn't go in until 9 or 10 am.    24-wk HLP Provider/RD visits: Provider 11/4/, 12/10; RD 11/4    FOLLOW-UP: Scheduled next health  visits during telephonic visit today, reminder to switch to phone or reschedule by calling [phone].     TIME: 60+ minutes spent with patient, care coordination, dropping payment, sending resources & charting.     SIGNATURE:  Lulu Rachel, Certified Health  on 11/12/2021 at 1:11 PM

## 2021-11-15 NOTE — PROGRESS NOTES
Patients first measurements in the medical weight loss program     Date: 11/4/2021  Neck: 16  Waist: 45  Hip: 47

## 2021-11-19 ENCOUNTER — VIRTUAL VISIT (OUTPATIENT)
Dept: SURGERY | Facility: CLINIC | Age: 56
End: 2021-11-19

## 2021-11-19 DIAGNOSIS — E66.9 OBESE: Primary | ICD-10-CM

## 2021-11-19 PROCEDURE — 99207 PR MWM HEALTH COACH NO CHARGE: CPT | Performed by: COMMUNITY HEALTH WORKER

## 2021-11-19 NOTE — PROGRESS NOTES
IDA CHAVEZ     Progress Notes    Return Weight Management Health Coaching Note    Mallory Sargent          MRN: [id number]  : 1965  KATLYN: 2021    Health  Visit #: 2a - connected w/pt few minutes (due to Epic restart) &   Type of Visit: 24 week   Telephone Visit - Call: [phone]  Initial Start Date: 2021  Graduation Date: 2022      Provider on : LINDSEY Marin per Provider Obesity Class 2 (BMI 35 to 39.9).  Initial Height and Wt: 5' 10.5 , 256 lbs   BMI = 36.21 kg/m     Measurements Completed on 11/15/21.  Neck 16  Waist 45  Hips 47    ASSESSMENT:  HC Visit on : 256 lbs (self-reported)    Current Weight: ran out of time to capture  Average Daily Water: ? oz/day  Weight Loss Medication: Saxenda  Nutrition Plan: Other: Regular Diet, per Rd using Estore products to assist with meal prepping    Additional Program Support.  (Refer to 21 visit notes for addt'l info.)  Measurements & Forms sent via [employer] on 11/15/21.Measurements doc in 21 hc visit.    Highlights from Past Week's and Today's Visit:  *Pt is noticing concern ~ being able to sustain habit change with the nutrition pillar. She was able to recognize the strong fear emotions that come up. >>> Leading her to get curious ~ what science says ~ authentice, sustainable habit change.   *Pt reported: Wt loss med is effectively curbing her hunger.     Action Steps pt set until next HC Visit are:  1) Keep with nutritional action steps from RD (including tracking w/MyFitness Pal for awareness) & implementing weigh-in schedule. See  notes.    2) EWB.  -  Dena [employer] Resources - listen to podcast.  If it resonates with her, she may check into her book & workbook.    - Mindful Apps - she is checking into activating her ThrivePass acct & may checkout  She has a resource that highlights the features of the two apps & will check it out prior to her next visit if time allows. Her initial  curiosity with this praveen is the courses particularly ones on habit change. Focus is on the ET resources.      Next Visit: Capture Wt & Water Intake - let her know measurements & forms were received - add January visits   F/u conversation ~ 10% happier praveen & value of being present our inner dialogue & podcast she is curious ~ (what science says ~ changing our thoughts/belief/programs can >>> positively our actions).    PATIENT EDUCATION PROVIDED:  OEQ -Captured insights &/or awareness gained & other pt's perceived victories.  Assessed How Visit Today Would Best Serve Pt  Progress Review: Behavioral   Affirmations / Character Strength Reflection / Value Alignment  OEQ - on topics noted above  Pt requested resource emailed; no phi in email.    HC Visit #1 11/12, #2a 11/19, #2b 11/29, #3 12/10, #4 12/24, #5   *She works M-F with some days off; prefers to schedule her hc visits on her days off or on days she doesn't go in until 9 or 10 am.     24-wk HLP Provider/RD visits: Provider 11/4/, 12/10; RD 11/4    FOLLOW-UP: Reminder to switch to phone or reschedule by calling [phone].    TIME: ~30+/- minutes spent with patient, care coordination, sending resources & charting.     SIGNATURE:  Lulu Rachel, Certified Health  on 11/19/2021 at 8:41 AM

## 2021-11-23 ENCOUNTER — ANCILLARY PROCEDURE (OUTPATIENT)
Dept: MAMMOGRAPHY | Facility: CLINIC | Age: 56
End: 2021-11-23
Attending: PHYSICIAN ASSISTANT
Payer: COMMERCIAL

## 2021-11-23 DIAGNOSIS — Z12.31 VISIT FOR SCREENING MAMMOGRAM: ICD-10-CM

## 2021-11-23 PROCEDURE — 77063 BREAST TOMOSYNTHESIS BI: CPT

## 2021-11-23 ASSESSMENT — ENCOUNTER SYMPTOMS
HEMATURIA: 0
CHILLS: 0
ABDOMINAL PAIN: 0
FREQUENCY: 0
SHORTNESS OF BREATH: 0
MYALGIAS: 0
HEARTBURN: 0
PALPITATIONS: 0
DIARRHEA: 0
NERVOUS/ANXIOUS: 0
EYE PAIN: 0
NAUSEA: 0
PARESTHESIAS: 0
FEVER: 0
HEMATOCHEZIA: 0
WEAKNESS: 0
BREAST MASS: 0
ARTHRALGIAS: 0
CONSTIPATION: 0
DYSURIA: 0
SORE THROAT: 0
JOINT SWELLING: 0
DIZZINESS: 0
HEADACHES: 0
COUGH: 0

## 2021-11-26 ENCOUNTER — OFFICE VISIT (OUTPATIENT)
Dept: FAMILY MEDICINE | Facility: CLINIC | Age: 56
End: 2021-11-26
Payer: COMMERCIAL

## 2021-11-26 VITALS
TEMPERATURE: 96.8 F | BODY MASS INDEX: 34.58 KG/M2 | HEIGHT: 71 IN | OXYGEN SATURATION: 100 % | WEIGHT: 247 LBS | HEART RATE: 95 BPM | DIASTOLIC BLOOD PRESSURE: 80 MMHG | SYSTOLIC BLOOD PRESSURE: 118 MMHG | RESPIRATION RATE: 16 BRPM

## 2021-11-26 DIAGNOSIS — Z00.00 ROUTINE GENERAL MEDICAL EXAMINATION AT A HEALTH CARE FACILITY: Primary | ICD-10-CM

## 2021-11-26 DIAGNOSIS — Z80.41 FAMILY HISTORY OF OVARIAN CANCER: ICD-10-CM

## 2021-11-26 DIAGNOSIS — G47.00 INSOMNIA, UNSPECIFIED TYPE: ICD-10-CM

## 2021-11-26 DIAGNOSIS — E66.01 MORBID OBESITY (H): ICD-10-CM

## 2021-11-26 DIAGNOSIS — E78.5 HYPERLIPIDEMIA WITH TARGET LDL LESS THAN 130: ICD-10-CM

## 2021-11-26 LAB
ALBUMIN SERPL-MCNC: 3.9 G/DL (ref 3.4–5)
ALP SERPL-CCNC: 67 U/L (ref 40–150)
ALT SERPL W P-5'-P-CCNC: 80 U/L (ref 0–50)
ANION GAP SERPL CALCULATED.3IONS-SCNC: 3 MMOL/L (ref 3–14)
AST SERPL W P-5'-P-CCNC: 33 U/L (ref 0–45)
BILIRUB SERPL-MCNC: 0.4 MG/DL (ref 0.2–1.3)
BUN SERPL-MCNC: 18 MG/DL (ref 7–30)
CALCIUM SERPL-MCNC: 9.2 MG/DL (ref 8.5–10.1)
CHLORIDE BLD-SCNC: 109 MMOL/L (ref 94–109)
CHOLEST SERPL-MCNC: 135 MG/DL
CO2 SERPL-SCNC: 28 MMOL/L (ref 20–32)
CREAT SERPL-MCNC: 0.88 MG/DL (ref 0.52–1.04)
FASTING STATUS PATIENT QL REPORTED: YES
GFR SERPL CREATININE-BSD FRML MDRD: 75 ML/MIN/1.73M2
GLUCOSE BLD-MCNC: 86 MG/DL (ref 70–99)
HBA1C MFR BLD: 5.3 % (ref 0–5.6)
HDLC SERPL-MCNC: 45 MG/DL
LDLC SERPL CALC-MCNC: 72 MG/DL
NONHDLC SERPL-MCNC: 90 MG/DL
POTASSIUM BLD-SCNC: 4.4 MMOL/L (ref 3.4–5.3)
PROT SERPL-MCNC: 7.5 G/DL (ref 6.8–8.8)
SODIUM SERPL-SCNC: 140 MMOL/L (ref 133–144)
TRIGL SERPL-MCNC: 90 MG/DL

## 2021-11-26 PROCEDURE — 80061 LIPID PANEL: CPT | Performed by: NURSE PRACTITIONER

## 2021-11-26 PROCEDURE — 99213 OFFICE O/P EST LOW 20 MIN: CPT | Mod: 25 | Performed by: NURSE PRACTITIONER

## 2021-11-26 PROCEDURE — 86304 IMMUNOASSAY TUMOR CA 125: CPT | Performed by: NURSE PRACTITIONER

## 2021-11-26 PROCEDURE — 99396 PREV VISIT EST AGE 40-64: CPT | Performed by: NURSE PRACTITIONER

## 2021-11-26 PROCEDURE — 80053 COMPREHEN METABOLIC PANEL: CPT | Performed by: NURSE PRACTITIONER

## 2021-11-26 PROCEDURE — 36415 COLL VENOUS BLD VENIPUNCTURE: CPT | Performed by: NURSE PRACTITIONER

## 2021-11-26 PROCEDURE — 83036 HEMOGLOBIN GLYCOSYLATED A1C: CPT | Performed by: NURSE PRACTITIONER

## 2021-11-26 RX ORDER — HYDROXYZINE HYDROCHLORIDE 25 MG/1
12.5-5 TABLET, FILM COATED ORAL 3 TIMES DAILY PRN
Qty: 45 TABLET | Refills: 5 | Status: SHIPPED | OUTPATIENT
Start: 2021-11-26 | End: 2022-05-03

## 2021-11-26 ASSESSMENT — ENCOUNTER SYMPTOMS
WEAKNESS: 0
NAUSEA: 0
HEMATOCHEZIA: 0
PARESTHESIAS: 0
SORE THROAT: 0
DYSURIA: 0
FEVER: 0
ABDOMINAL PAIN: 0
DIARRHEA: 0
BREAST MASS: 0
COUGH: 0
NERVOUS/ANXIOUS: 0
HEADACHES: 0
PALPITATIONS: 0
ARTHRALGIAS: 0
SHORTNESS OF BREATH: 0
CONSTIPATION: 0
CHILLS: 0
EYE PAIN: 0
DIZZINESS: 0
MYALGIAS: 0
HEMATURIA: 0
FREQUENCY: 0
HEARTBURN: 0
JOINT SWELLING: 0

## 2021-11-26 ASSESSMENT — MIFFLIN-ST. JEOR: SCORE: 1808.57

## 2021-11-26 NOTE — PROGRESS NOTES
SUBJECTIVE:   CC: Mallory Sargent is an 56 year old woman who presents for preventive health visit.     {  Patient has been advised of split billing requirements and indicates understanding: Yes  Healthy Habits:     Getting at least 3 servings of Calcium per day:  Yes    Bi-annual eye exam:  NO    Dental care twice a year:  Yes    Sleep apnea or symptoms of sleep apnea:  Daytime drowsiness and Sleep apnea    Diet:  Regular (no restrictions)    Frequency of exercise:  None    Taking medications regularly:  Yes    Medication side effects:  None    PHQ-2 Total Score: 0    Additional concerns today:  Yes      Started Saxenda with weight management clinic 3 weeks ago.  Doing better with dose currently, did have significant nausea with start of medication.        Social:  , Son in college in [place] and Daughter at [employer]      Hyperlipidemia Follow-Up  Recent Labs   Lab Test 09/21/21  1105 04/16/21  0913 03/31/16  1015 02/27/15  0923 10/31/13  0927   CHOL 206* 202*   < > 195 180   HDL 47* 48*   < > 51 47*   * 120*   < > 108 100   TRIG 133 170*   < > 178* 167*   CHOLHDLRATIO  --   --   --  3.8 3.9    < > = values in this interval not displayed.         Are you regularly taking any medication or supplement to lower your cholesterol?   Yes- Lipitor    Are you having muscle aches or other side effects that you think could be caused by your cholesterol lowering medication?  No    Is using Benadryl, Tylenol PM 3-4 times per week.   Worsening insomnia with start of Saxenda.      Today's PHQ-2 Score:   PHQ-2 ( 1999 Pfizer) 11/23/2021   Q1: Little interest or pleasure in doing things 0   Q2: Feeling down, depressed or hopeless 0   PHQ-2 Score 0   PHQ-2 Total Score (12-17 Years)- Positive if 3 or more points; Administer PHQ-A if positive -   Q1: Little interest or pleasure in doing things Not at all   Q2: Feeling down, depressed or hopeless Not at all   PHQ-2 Score 0       Abuse: Current or Past (Physical, Sexual  or Emotional) - No  Do you feel safe in your environment? Yes    Have you ever done Advance Care Planning? (For example, a Health Directive, POLST, or a discussion with a medical provider or your loved ones about your wishes): declined    Social History     Tobacco Use     Smoking status: Former Smoker     Packs/day: 0.00     Years: 0.00     Pack years: 0.00     Quit date: 1993     Years since quittin.5     Smokeless tobacco: Never Used   Substance Use Topics     Alcohol use: Yes     Comment: very rarely      If you drink alcohol do you typically have >3 drinks per day or >7 drinks per week? No    Alcohol Use 2021   Prescreen: >3 drinks/day or >7 drinks/week? -   Prescreen: >3 drinks/day or >7 drinks/week? No       Reviewed orders with patient.  Reviewed health maintenance and updated orders accordingly - Yes  BP Readings from Last 3 Encounters:   21 118/80   21 112/82   21 120/72    Wt Readings from Last 3 Encounters:   21 112 kg (247 lb)   21 116.1 kg (256 lb)   21 114.8 kg (253 lb)                  Patient Active Problem List   Diagnosis     Family history of malignant neoplasm of ovary - maternal Hx Dx'd age 57,  age 62; pt consider ovary removal.     Benign shuddering attack     Esophageal reflux     Acute reaction to stress     Hyperlipidemia LDL goal <130     Hip dysplasia     Family history of basal cell carcinoma     Class 1 obesity due to excess calories without serious comorbidity with body mass index (BMI) of 33.0 to 33.9 in adult     Morbid obesity (H)     Personal history of colonic polyps     Fatty liver - abdominal US from      Elevated liver enzymes     Past Surgical History:   Procedure Laterality Date     ABDOMEN SURGERY       APPENDECTOMY       BREAST SURGERY       C LIGATE FALLOPIAN TUBE       C/SECTION, LOW TRANSVERSE      , Low Transverse     CHOLECYSTECTOMY, LAPOROSCOPIC  2010    Cholecystectomy, Laparoscopic      COLONOSCOPY  2/15/2016    Dr. Tran [facility]     HERNIA REPAIR, INCISIONAL  2010    Laparoscopic     ZZC NONSPECIFIC PROCEDURE      vaginal repair after delivery       Social History     Tobacco Use     Smoking status: Former Smoker     Packs/day: 0.00     Years: 0.00     Pack years: 0.00     Quit date: 1993     Years since quittin.5     Smokeless tobacco: Never Used   Substance Use Topics     Alcohol use: Yes     Comment: very rarely      Family History   Problem Relation Age of Onset     Hypertension Father      Lipids Father      Gastrointestinal Disease Father         vazquez's esophagus fr. reflux      Hyperlipidemia Father      Cancer Mother         ovarian     Arthritis Mother         lupus      Depression Paternal Grandmother         problems with schizophrenia     Arthritis Brother      Diabetes Maternal Grandmother      Diabetes Maternal Grandfather      Coronary Artery Disease Maternal Grandfather      Coronary Artery Disease Paternal Grandfather      Gastrointestinal Disease Brother         reflux      Colon Cancer Cousin      Ovarian Cancer Other         Maternal great aunt     Breast Cancer No family hx of          Current Outpatient Medications   Medication Sig Dispense Refill     aspirin 81 MG tablet Take by mouth daily 30 tablet      atorvastatin (LIPITOR) 40 MG tablet Take 1 tablet (40 mg) by mouth daily 90 tablet 3     augmented betamethasone dipropionate (DIPROLENE) 0.05 % external lotion Apply topically 2 times daily Apply to AA on scalp bid when needed 60 mL 1     hydrOXYzine (ATARAX) 25 MG tablet Take 0.5-2 tablets (12.5-50 mg) by mouth 3 times daily as needed (insomnia) 45 tablet 5     magnesium 250 MG tablet Take 1 tablet by mouth daily       Omega-3 Fatty Acids (FISH OIL ADULT GUMMIES PO) Take 1,000 mg by mouth daily       omeprazole (PRILOSEC) 20 MG DR capsule Take 1 capsule (20 mg) by mouth daily 30-60 minutes before a meal. Pt typically takes QOD 90 capsule 3     FIBER PO         insulin pen needle (31G X 5 MM) 31G X 5 MM miscellaneous Use 1 pen needles daily or as directed. 100 each 1     liraglutide - Weight Management (SAXENDA) 18 MG/3ML pen Week 1: 0.6 mg subcutaneous daily, Week 2: 1.2 mg daily, Week 3: 1.8 mg daily, Week 4: 2.4 mg daily, Week 5 & on: 3 mg daily 15 mL 1       Breast Cancer Screening:  Any new diagnosis of family breast, ovarian, or bowel cancer? No    FHS-7:   Breast CA Risk Assessment (FHS-7) 11/23/2021   Did any of your first-degree relatives have breast or ovarian cancer? Yes   Did any of your relatives have bilateral breast cancer? No   Did any man in your family have breast cancer? No   Did any woman in your family have breast and ovarian cancer? No   Did any woman in your family have breast cancer before age 50 y? No   Do you have 2 or more relatives with breast and/or ovarian cancer? No   Do you have 2 or more relatives with breast and/or bowel cancer? No       Mammogram Screening: Recommended annual mammography  Pertinent mammograms are reviewed under the imaging tab.    History of abnormal Pap smear: NO - age 30-65 PAP every 5 years with negative HPV co-testing recommended  PAP / HPV Latest Ref Rng & Units 12/5/2018 2/27/2015 10/31/2013   PAP (Historical) - NIL ASC-US(A) NIL   HPV16 NEG:Negative Negative - -   HPV18 NEG:Negative Negative - -   HRHPV NEG:Negative Negative - -     Reviewed and updated as needed this visit by clinical staff  Tobacco  Allergies  Meds  Problems  Med Hx  Surg Hx  Fam Hx  Soc Hx         Reviewed and updated as needed this visit by Provider    Meds            Past Medical History:   Diagnosis Date     ASCUS favor benign 3/2015    neg HPV   Plan cotest in 3 yrs.     Depressive disorder      Depressive disorder, not elsewhere classified     lexapro = no effect and sleepy, celexa = slightly better, wellbutrin = severe restlessness.       Esophageal reflux 11/13/2006     FAMILY HX-OVARIAN MALIGNANCY 6/20/2005    maternal Hx  "Dx'd age 57,  age 64; pt would like to have her ovaries removed.     GERD (gastroesophageal reflux disease)      Other and unspecified hyperlipidemia 2007     RECURR Depressive disorder - MOD 2008     RECURR Depressive disorder -SEVERE 2008      Past Surgical History:   Procedure Laterality Date     ABDOMEN SURGERY       APPENDECTOMY       BREAST SURGERY       C LIGATE FALLOPIAN TUBE       C/SECTION, LOW TRANSVERSE      , Low Transverse     CHOLECYSTECTOMY, LAPOROSCOPIC  2010    Cholecystectomy, Laparoscopic     COLONOSCOPY  2/15/2016    Dr. Tran [facility]     HERNIA REPAIR, INCISIONAL  2010    Laparoscopic     ZZC NONSPECIFIC PROCEDURE      vaginal repair after delivery       Review of Systems   Constitutional: Negative for chills and fever.   HENT: Negative for congestion, ear pain, hearing loss and sore throat.    Eyes: Negative for pain and visual disturbance.   Respiratory: Negative for cough and shortness of breath.    Cardiovascular: Negative for chest pain, palpitations and peripheral edema.   Gastrointestinal: Negative for abdominal pain, constipation, diarrhea, heartburn, hematochezia and nausea.   Breasts:  Negative for tenderness, breast mass and discharge.   Genitourinary: Negative for dysuria, frequency, genital sores, hematuria, pelvic pain, urgency, vaginal bleeding and vaginal discharge.   Musculoskeletal: Negative for arthralgias, joint swelling and myalgias.   Skin: Negative for rash.   Neurological: Negative for dizziness, weakness, headaches and paresthesias.   Psychiatric/Behavioral: Negative for mood changes. The patient is not nervous/anxious.      OBJECTIVE:   /80   Pulse 95   Temp 96.8  F (36  C) (Tympanic)   Resp 16   Ht 1.791 m (5' 10.5\")   Wt 112 kg (247 lb)   LMP 10/01/2015 (Approximate)   SpO2 100%   BMI 34.94 kg/m    Physical Exam     GENERAL: healthy, alert and no distress  EYES: Eyes grossly normal to inspection, PERRL and " conjunctivae and sclerae normal  HENT: ear canals and TM's normal  NECK: no adenopathy, no asymmetry, masses  RESP: lungs clear to auscultation - no rales, rhonchi or wheezes  CV: regular rate and rhythm, normal S1 S2, no S3 or S4, no murmur, click or rub, no peripheral edema  ABDOMEN: soft, nontender, no hepatosplenomegaly, no masses and bowel sounds normal  MS: no gross musculoskeletal defects noted, no edema  SKIN: no suspicious lesions or rashes  NEURO: Normal strength and tone, mentation intact and speech normal  PSYCH: mentation appears normal, affect normal/bright      ASSESSMENT/PLAN:     Mallory was seen today for physical.    Diagnoses and all orders for this visit:    Routine general medical examination at a health care facility    Family history of ovarian cancer  -         Hyperlipidemia with target LDL less than 130  Recent Labs   Lab Test 09/21/21  1105 04/16/21  0913 03/31/16  1015 02/27/15  0923 10/31/13  0927   CHOL 206* 202*   < > 195 180   HDL 47* 48*   < > 51 47*   * 120*   < > 108 100   TRIG 133 170*   < > 178* 167*   CHOLHDLRATIO  --   --   --  3.8 3.9    < > = values in this interval not displayed.   Currently on Atorvastatin 40 mg daily.  Due for recheck lipid panel.    -     Lipid panel reflex to direct LDL Fasting  -     Comprehensive metabolic panel (BMP + Alb, Alk Phos, ALT, AST, Total. Bili, TP)    Morbid obesity (H)  Currently working with weight management clinic.   Due for recheck screening HgbA1C.    -     Hemoglobin A1c    Insomnia, unspecified type  Worsening of insomnia with initiation of Saxenda, will plan trial of Hydroxyzine.    -     hydrOXYzine (ATARAX) 25 MG tablet; Take 0.5-2 tablets (12.5-50 mg) by mouth 3 times daily as needed (insomnia)        COUNSELING:  Reviewed preventive health counseling, as reflected in patient instructions    Estimated body mass index is 34.94 kg/m  as calculated from the following:    Height as of this encounter: 1.791 m (5'  "10.5\").    Weight as of this encounter: 112 kg (247 lb).      She reports that she quit smoking about 28 years ago. She smoked 0.00 packs per day for 0.00 years. She has never used smokeless tobacco.      Counseling Resources:  ATP IV Guidelines  Pooled Cohorts Equation Calculator  Breast Cancer Risk Calculator  BRCA-Related Cancer Risk Assessment: FHS-7 Tool  FRAX Risk Assessment  ICSI Preventive Guidelines  Dietary Guidelines for Americans, 2010  USDA's MyPlate  ASA Prophylaxis  Lung CA Screening    Mary Castro, [facility]  "

## 2021-11-27 LAB — CANCER AG125 SERPL-ACNC: 8 U/ML (ref 0–30)

## 2021-11-29 ENCOUNTER — VIRTUAL VISIT (OUTPATIENT)
Dept: SURGERY | Facility: CLINIC | Age: 56
End: 2021-11-29

## 2021-11-29 DIAGNOSIS — E66.9 OBESE: Primary | ICD-10-CM

## 2021-11-29 PROCEDURE — 99207 PR MWM HEALTH COACH NO CHARGE: CPT | Performed by: COMMUNITY HEALTH WORKER

## 2021-11-29 NOTE — RESULT ENCOUNTER NOTE
Dear Mallory,      Your recent test results are noted below:    -Mammogram was normal.  ADVISE: rechecking in 1 year.    For additional lab test information, labtestsonline.org is an excellent reference. Please contact the clinic at [phone] with any further questions or concerns.    Sincerely,      Blanka Lucio PA-C  [facility]

## 2021-11-29 NOTE — PROGRESS NOTES
IDA CHAVEZ     Progress Notes    Return Weight Management Health Coaching Note    Mallory Sargent          MRN: [id number]  : 1965  KATLYN: 2021    Health  Visit #: 2b  Type of Visit: 24 week  Telephone Visit - Call: [phone]    Initial Start Date: 2021  Graduation Date: 2022      Provider on : LINDSEY Marin per Provider Obesity Class 2 (BMI 35 to 39.9).  Initial Height and Wt: 5' 10.5 , 256 lbs   BMI = 36.21 kg/m     Measurements Completed on 11/15/21 - recorded in 21 hc visit.     ASSESSMENT:  HC Visit on : 256 lbs (self-reported)    Current Weight: ran our of time to capture  Average Daily Water: ? oz/day  Weight Loss Medication: Saxenda  Nutrition Plan: Other: Regular Diet, per Rd using [employer] products to assist with meal prepping     Additional Program Support.  (Refer to 21 visit notes for addt'l info.)  Measurements & Forms sent via [employer] on 11/15/21.    Highlights from Past Week's and Today's Visit:  *She felt more in control of her dietary choices. She feels the medication was a key factor in helping her.   *She is eager to starting to learn and advance her well-being growth. She feels starting the learning process now while on the medicine will be beneficial for her to make sustainable, long-term changes.     Action Steps pt set until next HC Visit are:  1) EWB  With the holidays she didn't have time to review the resources below in as much detail as she planned. Committed to do so before her next hc visit.    - [employer] Resources - listen to podcast. If it resonates with her, she is curious ~ looking into her audiobook/book & her workbook to guide. Initially, she feels that this will be a helpful tool in her changing her relationship with food.    - 10% Happiness Ana - review courses & meditation. She isn't curious about adding in mindfulness practices and the bite-size learning that this ana offers.  She has a  resource that bullets the above & Calm praveen features. She hasn't used any of her [employer] $ yet so may try out both. (Seeing the Calm praveen may be valuable, too.)    - Submit above & her MyFitness Pal to [employer]. She has steps on how to do this (video tuturial & intranet; she ran into barriers before if she does she also now has support email and       *She was redeployed so her work schedule has changed; she will know more of what it could look like in the wks ahead. Her shifts will vary with ending anytime before or after 4 pm (& as late as 6:30 pm). She will reach out to our clinic's scheduling team if she    Next Visit: Potentially r/s other Dec & Add in Jan once she knows her new schedule    PATIENT EDUCATION PROVIDED:  OEQ -Captured insights &/or awareness gained & other pt's perceived victories.  Assessed How Visit Today Would Best Serve Pt  Progress Review: Behavioral  Affirmations / Character Strength Reflection / Value Alignment  OEQ - on topics noted above    HC Visit #1 11/12, #2a 11/19, #2b 11/29, #3 12/10, #4 12/24, #5   *She works M-F with some days off; prefers to schedule her hc visits on her days off or on days she doesn't go in until 9 or 10 am.     24-wk HLP Provider/RD visits: Provider 11/4/, 12/10; RD 11/4    FOLLOW-UP: Reminder to switch to phone or reschedule by calling [phone].    TIME: ~30+/- minutes spent with patient, care coordination, sending resources & charting.     SIGNATURE:  Lulu Rachel, Certified Health  on 11/29/2021 at 8:03 AM

## 2021-11-30 NOTE — RESULT ENCOUNTER NOTE
Dear Mallory,     -Cholesterol levels are at your goal levels.  ADVISE: continuing your medication, a regular exercise program with at least 150 minutes of aerobic exercise per week, and eating a low saturated fat/low carbohydrate diet.  Also, you should recheck this fasting cholesterol panel in 12 months.  -Liver and gallbladder tests (AST, Alk phos,bilirubin) are normal.  ALT is slightly elevated, but stable.      -Kidney function (GFR) is normal.  -Sodium is normal.  -Potassium is normal.  -Calcium is normal.  -Glucose (diabetic screening test) is normal.  -A1C (diabetic test) is normal and indicates that your blood sugar has been in a normal range the last 3 months.       Please send a [employer] message or call [phone]  if you have any questions.      ALEXANDRIA Serrato, CNP  [facility] [place]    If you have further questions about the interpretation of your labs, [email or web address] is a good website to check out for further information.

## 2021-12-10 ENCOUNTER — VIRTUAL VISIT (OUTPATIENT)
Dept: SURGERY | Facility: CLINIC | Age: 56
End: 2021-12-10
Payer: COMMERCIAL

## 2021-12-10 VITALS — HEIGHT: 71 IN | WEIGHT: 241.8 LBS | BODY MASS INDEX: 33.85 KG/M2

## 2021-12-10 DIAGNOSIS — R74.8 ELEVATED LIVER ENZYMES: ICD-10-CM

## 2021-12-10 DIAGNOSIS — E78.5 HYPERLIPIDEMIA LDL GOAL <130: ICD-10-CM

## 2021-12-10 DIAGNOSIS — E66.09 CLASS 1 OBESITY DUE TO EXCESS CALORIES WITHOUT SERIOUS COMORBIDITY WITH BODY MASS INDEX (BMI) OF 34.0 TO 34.9 IN ADULT: Primary | ICD-10-CM

## 2021-12-10 DIAGNOSIS — E66.811 CLASS 1 OBESITY DUE TO EXCESS CALORIES WITHOUT SERIOUS COMORBIDITY WITH BODY MASS INDEX (BMI) OF 34.0 TO 34.9 IN ADULT: Primary | ICD-10-CM

## 2021-12-10 DIAGNOSIS — E66.9 OBESE: Primary | ICD-10-CM

## 2021-12-10 PROCEDURE — 99207 PR MWM HEALTH COACH NO CHARGE: CPT | Performed by: COMMUNITY HEALTH WORKER

## 2021-12-10 PROCEDURE — 99214 OFFICE O/P EST MOD 30 MIN: CPT | Mod: 95 | Performed by: PHYSICIAN ASSISTANT

## 2021-12-10 ASSESSMENT — MIFFLIN-ST. JEOR: SCORE: 1784.99

## 2021-12-10 NOTE — PROGRESS NOTES
IDA CHAVEZ     Progress Notes    Return Weight Management Health Coaching Note    Mallory Sargent          MRN: [id number]  : 1965  KATLYN: December 10, 2021    Health  Visit #: 3  Type of Visit: 24 week   Telephone Visit - Call: [phone]     Initial Start Date: 2021  Graduation Date: 2022      Provider on : LINDSEY Marin per Provider Obesity Class 2 (BMI 35 to 39.9).  Initial Height and Wt: 5' 10.5 , 256 lbs   BMI = 36.21 kg/m     ASSESSMENT:  HC Visit on : 256 lbs (self-reported)    Current Weight: 241.8 lbs (self-reported), BMI = 34.2 kg/m , 14.2 lbs lost, ~ 5.6 wt loss  Average Daily Water: ? oz/day  Weight Loss Medication: Saxenda  Nutrition Plan: Other: Regular Diet, per Rd using [employer] products to assist with meal prepping     Additional Program Support.  (Refer to 21 visit notes for addt'l info.)  Measurements & Forms sent via [employer] on 11/15/21  [employer] Access Begins/Ends: 21 - 22    Highlights from Past Week's and Today's Visit:  *She followed through w/provider visit re: wt loss med. She is feeling comfortable & content here.   *She noticed some disappointment with how much she has dedicated as much time to her well-being intentions as she thought she would since her last hc visit.   *She is noticing value in offering herself more compassion ~ the above.     Action Steps pt set until next HC Visit are:  1) EWB/Nutrition.  - Focus she wants to take: Heartwell w/3-part compartment exercise - she likes the idea of writing both self-comp statement & mantra to help support her in steadily & consistently working on building an authentic & sustainable well-being plan at a pace that is doable & joyful yet gently pushes her forward. *Letting go of arriving at some end, instead embracing & accepting the reality that her well-being growth will be like any growth process.    - Cont . To listen to intuitive eating podcast    - She now has an -  Inner Dialogue & Intuitive Eating Toolbox.   *She wants to give herself permission to utilize pet the above focus.   *She read an article ~ how ppl can retrain their brain - she found this very interesting. Hence curious ~ ID podcast above.    Next Visit: capture Water Intake - f/u more conversation on Inner Dialogue Podcast. Review BMI & wt loss %     PATIENT EDUCATION PROVIDED:  OEQ -Captured insights &/or awareness gained & other pt's perceived victories.  Assessed How Visit Today Would Best Serve Pt  Progress Review: Behavioral / Capture Wt   Affirmations / Character Strength Reflection / Value Alignment  OEQ - on topics noted above  Pt requested resource emailed; no phi in email.    HC Visit #1 11/12, #2a 11/19, #2b 11/29, #3 12/10, #4 12/24, #5 1/7, #6 1/28   *She works M-F with some days off; prefers to schedule her hc visits on her days off or on days she doesn't go in until 9 or 10 am.     24-wk HLP Provider/RD visits: Provider 11/4/, 12/10, 2/10; RD 11/4 - scheduling w/RD feb or march?    FOLLOW-UP: Reminder to switch to phone or reschedule by calling [phone].    TIME: ~30+/- minutes spent with patient, care coordination, sending resources & charting.     SIGNATURE:  Lulu Rachel, Certified Health  on 12/10/2021 at 11:02 AM

## 2021-12-10 NOTE — PATIENT INSTRUCTIONS
It was great talking with you today!  Please call [phone] and schedule for a return visit in 8 weeks!

## 2021-12-10 NOTE — PROGRESS NOTES
Mallory is a 56 year old who is being evaluated via a billable video visit.      If the video visit is dropped, the invitation should be resent by: Text to cell phone: [phone]  Will anyone else be joining your video visit? No      Video-Visit Details    Type of service:  Video Visit    Video Start Time:  9:38    Video End Time: 9:58    31 minutes spent on the date of the encounter doing chart review, review of test results, patient visit and documentation       Originating Location (pt. Location): Home    Distant Location (provider location):  [facility]     Platform used for Video Visit: [employer]                December 10, 2021          Return Virtual Medical Weight Management Note       Mallory Sargent  MRN:  [id number]  :  1965      Dear Blanka Lucio,    I had the pleasure of doing a virtual visit with your patient Mallory Sargent.  She is a 56 year old female who I am continuing to see for treatment of obesity related to:       11/3/2021   I have the following health issues associated with obesity: Pre-Diabetes, High Cholesterol, Sleep Apnea, GERD (Reflux), Fatty Liver   I have the following symptoms associated with obesity: Depression, Back Pain, Fatigue, Hip Pain       INTERVAL HISTORY:  Patient was seen last month and started Saxenda.  She is currently on the 1.8 mg for the past week.  Titrating slow because it is making her nauseated with each dose increase. It then gets better after taking for a couple days. Her energy level seems down and has some insomnia.  She had insomnia before but seems worse now. Thinks the medication is very effective at decreasing appetite. Patient is down 14 lbs! Giving injection at night time.  No low blood sugar symptoms.     Participating in the 24 week program and using products    Previous goals:  Made appointment with sleep center  Has not met goal of several purposeful walks weekly       CURRENT  WEIGHT (PATIENT REPORTED):  241 lbs 12.8 oz    Wt Readings from Last 4 Encounters:   12/10/21 241 lb 12.8 oz (109.7 kg)   11/26/21 247 lb (112 kg)   11/04/21 256 lb (116.1 kg)   09/16/21 253 lb (114.8 kg)       BARIATRIC METRICS:  Current Weight: 241 lb 12.8 oz (109.7 kg)  Body mass index is 34.2 kg/m .   Wt change since last visit (lbs): -5.2  Cumulative weight loss (lbs): 14.2      DIETARY HISTORY  Meals Per Day:   Food Diary:   B: Smoothie from 24 week program  L: homemade roast beef sandwich  D: 6 inch tuna sandwich from [employer]   Snacks Per Day: Most days no  Typical Snack: bar from the 24 week program  Fluid Intake:  48 oz water daily and 1 cup of coffee with milk  Meals at Restaurant per week: 2      Exercise: walked once with . Because of weather will now start stationary bike      ROS: 12/10/21  General  Fatigue: some now with medication  Sleep Quality: not great.  bit worse since starting medication   Birth Control: postmenopausal  HEENT  Hx of glaucoma: No  Vision changes: No  Cardiovascular  Chest Pain with Exertion: No  Palpitations: No  Hx of heart disease: No  Had a recent EKG and Stress echo that was reviewed today   Pulmonary  Shortness of breath at rest: No  Shortness of breath with exertion: Yes  Snoring: Yes - was diagnosed with JESSIE 3 years ago with mild JESSIE. No treatment.  Has gained roughly 40 lbs since then  Gastrointestinal  Heartburn: yes takes medication daily. Since Saxenda same or a bit better  Abdominal pain: No  History of pancreatitis: No  Severe constipation: No - Having regular BMs  Hx of bowel obstruction: No  Nausea with medication changes - manageable  Gastrourinary  History of kidney stones: No  Psychiatric  Moods Stable: Yes  History of drug abuse: No  No history of eating disorder  Endocrine  Polydipsia: No  Polyuria: No  Personal or family history of thyroid cancer: No  Neurologic:  Hx of seizures: No  Hx of paresthesias: No        MEDICATIONS:   Current Outpatient  Medications   Medication     aspirin 81 MG tablet     atorvastatin (LIPITOR) 40 MG tablet     augmented betamethasone dipropionate (DIPROLENE) 0.05 % external lotion     FIBER PO     hydrOXYzine (ATARAX) 25 MG tablet     insulin pen needle (31G X 5 MM) 31G X 5 MM miscellaneous     liraglutide - Weight Management (SAXENDA) 18 MG/3ML pen     magnesium 250 MG tablet     Omega-3 Fatty Acids (FISH OIL ADULT GUMMIES PO)     omeprazole (PRILOSEC) 20 MG DR capsule     No current facility-administered medications for this visit.       LABS:    Hemoglobin A1C   Date Value Ref Range Status   11/26/2021 5.3 0.0 - 5.6 % Final     Comment:     Normal <5.7%   Prediabetes 5.7-6.4%    Diabetes 6.5% or higher     Note: Adopted from ADA consensus guidelines.     Vitamin D Deficiency screening   Date Value Ref Range Status   04/28/2017 27 20 - 75 ug/L Final     Comment:     Season, race, dietary intake, and treatment affect the concentration of   25-hydroxy-Vitamin D. Values may decrease during winter months and increase   during summer months. Values 20-29 ug/L may indicate Vitamin D insufficiency   and values <20 ug/L may indicate Vitamin D deficiency.   Vitamin D determination is routinely performed by an immunoassay specific for   25 hydroxyvitamin D3.  If an individual is on vitamin D2 (ergocalciferol)   supplementation, please specify 25 OH vitamin D2 and D3 level determination   by   LCMSMS test VITD23.       Sodium   Date Value Ref Range Status   11/26/2021 140 133 - 144 mmol/L Final   04/16/2021 138 133 - 144 mmol/L Final     Potassium   Date Value Ref Range Status   11/26/2021 4.4 3.4 - 5.3 mmol/L Final   04/16/2021 4.1 3.4 - 5.3 mmol/L Final     Chloride   Date Value Ref Range Status   11/26/2021 109 94 - 109 mmol/L Final   04/16/2021 106 94 - 109 mmol/L Final     Carbon Dioxide   Date Value Ref Range Status   04/16/2021 29 20 - 32 mmol/L Final     Carbon Dioxide (CO2)   Date Value Ref Range Status   11/26/2021 28 20 - 32  mmol/L Final     Anion Gap   Date Value Ref Range Status   11/26/2021 3 3 - 14 mmol/L Final   04/16/2021 3 3 - 14 mmol/L Final     Glucose   Date Value Ref Range Status   11/26/2021 86 70 - 99 mg/dL Final   04/16/2021 99 70 - 99 mg/dL Final     Urea Nitrogen   Date Value Ref Range Status   11/26/2021 18 7 - 30 mg/dL Final   04/16/2021 16 7 - 30 mg/dL Final     Creatinine   Date Value Ref Range Status   11/26/2021 0.88 0.52 - 1.04 mg/dL Final   04/16/2021 0.86 0.52 - 1.04 mg/dL Final     GFR Estimate   Date Value Ref Range Status   11/26/2021 75 >60 mL/min/1.73m2 Final     Comment:     As of July 11, 2021, eGFR is calculated by the CKD-EPI creatinine equation, without race adjustment. eGFR can be influenced by muscle mass, exercise, and diet. The reported eGFR is an estimation only and is only applicable if the renal function is stable.   04/16/2021 77 >60 mL/min/[1.73_m2] Final     Comment:     Non  GFR Calc  Starting 12/18/2018, serum creatinine based estimated GFR (eGFR) will be   calculated using the Chronic Kidney Disease Epidemiology Collaboration   (CKD-EPI) equation.       Calcium   Date Value Ref Range Status   11/26/2021 9.2 8.5 - 10.1 mg/dL Final   04/16/2021 9.2 8.5 - 10.1 mg/dL Final     Bilirubin Total   Date Value Ref Range Status   11/26/2021 0.4 0.2 - 1.3 mg/dL Final   04/16/2021 0.5 0.2 - 1.3 mg/dL Final     Alkaline Phosphatase   Date Value Ref Range Status   11/26/2021 67 40 - 150 U/L Final   04/16/2021 73 40 - 150 U/L Final     ALT   Date Value Ref Range Status   11/26/2021 80 (H) 0 - 50 U/L Final   04/16/2021 73 (H) 0 - 50 U/L Final     AST   Date Value Ref Range Status   11/26/2021 33 0 - 45 U/L Final   04/16/2021 28 0 - 45 U/L Final     Cholesterol   Date Value Ref Range Status   11/26/2021 135 <200 mg/dL Final   04/16/2021 202 (H) <200 mg/dL Final     Comment:     Desirable:       <200 mg/dl     HDL Cholesterol   Date Value Ref Range Status   04/16/2021 48 (L) >49 mg/dL Final  "    Direct Measure HDL   Date Value Ref Range Status   11/26/2021 45 (L) >=50 mg/dL Final     LDL Cholesterol Calculated   Date Value Ref Range Status   11/26/2021 72 <=100 mg/dL Final   04/16/2021 120 (H) <100 mg/dL Final     Comment:     Above desirable:  100-129 mg/dl  Borderline High:  130-159 mg/dL  High:             160-189 mg/dL  Very high:       >189 mg/dl       Triglycerides   Date Value Ref Range Status   11/26/2021 90 <150 mg/dL Final   04/16/2021 170 (H) <150 mg/dL Final     Comment:     Borderline high:  150-199 mg/dl  High:             200-499 mg/dl  Very high:       >499 mg/dl          PE:  Ht 5' 10.5\" (1.791 m)   Wt 241 lb 12.8 oz (109.7 kg)   LMP 10/01/2015 (Approximate)   BMI 34.20 kg/m    GENERAL Pt sounds alert, in NAD  Psych: Affect sounds normal  Respiratory: Patient is speaking in full sentences, no coughing or audible wheezing      ASSESSMENT/PLAN:   Class 2 severe obesity due to excess calories with Body Mass Index (BMI) of 36  GERD without esophagitis  Elevated liver enzymes  High cholesterol  JESSIE       Congratulated patient on her overall success!  She is thinking she may stay at the Saxenda 1.8 mg dose for a while.  Knows she does not have to get to the 3.0 mg dose if her current dose is effective.  She just picked up refill. Will send over additional refill in the next month. At that time she will MyChart what her current dose is.      Patient is asked to follow up in 8 weeks.        Maury Mcclure MS, PA-C        "

## 2022-01-07 ENCOUNTER — VIRTUAL VISIT (OUTPATIENT)
Dept: SURGERY | Facility: CLINIC | Age: 57
End: 2022-01-07

## 2022-01-07 DIAGNOSIS — E66.9 OBESE: Primary | ICD-10-CM

## 2022-01-07 PROCEDURE — 99207 PR MWM HEALTH COACH NO CHARGE: CPT | Performed by: COMMUNITY HEALTH WORKER

## 2022-01-07 NOTE — PROGRESS NOTES
"IDA CHAVEZ     Progress Notes    Return Weight Management Health Coaching Note    Mallory Sargent          MRN: [id number]  : 1965  KATLYN: 2022    Health  Visit #: 4  Type of Visit: 24 week    Telephone Visit - Call: [phone]     Initial Start Date: 2021  Graduation Date: 2022      Provider on : LINDSEY Marin per Provider Obesity Class 2 (BMI 35 to 39.9).  Initial Height and Wt: 5' 10.5 , 256 lbs   BMI = 36.21 kg/m     ASSESSMENT:  HC Visit on : 256 lbs (self-reported)  ... on 12/10: 241.8 lbs (self-reported), BMI = 34.2 kg/m , 14.2 lbs lost, ~ 5.6 wt loss    Current Weight: ran out of time to capture  Average Daily Water: ? oz/day  Weight Loss Medication: Saxenda  Nutrition Plan: Other: Regular Diet, per Rd using [employer] products to assist with meal prepping     Additional Program Support.  (Refer to 21 visit notes for addt'l info.)  Measurements & Forms sent via [employer] on 11/15/21  [employer] Access Begins/Ends: 21 - 22    Highlights from Past Week's and Today's Visit:  *She is noticing guilt about not diving into the resources she was curious about.   *Reflection today realized that working on the B - E - T connection around a specific behavior seems less daunting. ...and would serve her better.  *She also realized value in working     Action Steps pt set until next HC Visit are:  1) EWB  Behavior to see change around: (breaking the PM snack \"comfort\" habit - she notices a tendency to eat to comfort herself not lisette she's hungry)      She wants to experiment with the 3-Part Mental Exercise when she noticed the urge eat the PM snack. She may do just the first or last step. Tbd.     Self-compassion statements that may resonate with you:      \"You are strong person. You are doing a really good job.\"  \"You are doing enough. Over time, every small step you do will lead to significant positive change.\"    Mantra tbd.    *She feels the " above exercise might be helpful in addressing her tendency to run late >>> leads to stress & inner critic thoughts. For now, she will focus on applying it to the above.     *She has other well-being resources (ten percent happier praveen, intuitive eating podcast session & inner dialogue podcast) that she is curious about; yet wants to focus on the above for now. She feels this in more implementable & relevant in the here & now.     Next Visit: check-in on above exercise - f/u conversation ~ mantra writing?  As time allows: review bmi & % wt loss, capture water intake - on 12/10 she planned to schedule w/RD feb or march, still the plan?    PATIENT EDUCATION PROVIDED:  OEQ -Captured insights &/or awareness gained & other pt's perceived victories.  Assessed How Visit Today Would Best Serve Pt  Progress Review: Behavioral  Affirmations / Character Strength Reflection / Value Alignment  OEQ - on topics noted above  Pt requested resource emailed; no phi in email.    HC Visit #1 11/12, #2a 11/19, #2b 11/29, #3 12/10, #4 12/24 > 1/7, #5 1/28, #6 2/11, #7 2/23.  *Doing EOW visits in March & April will allow her to complete all visits prior to grad date.   *She works M-F with some days off; prefers to schedule her hc visits on her days off or on days she doesn't go in until 9 or 10 am.     24-wk P Provider/RD visits: Provider 11/4/, 12/10, 2/10; RD 11/4 - scheduling w/RD feb or march?    FOLLOW-UP: Reminder to switch to phone or reschedule by calling [phone].    TIME: ~30+/- minutes spent with patient, care coordination, sending resources & charting.     SIGNATURE:  Lulu Rachel, Certified Health  on 1/7/2022 at 11:34 AM

## 2022-01-12 ASSESSMENT — SLEEP AND FATIGUE QUESTIONNAIRES
HOW LIKELY ARE YOU TO NOD OFF OR FALL ASLEEP WHILE WATCHING TV: MODERATE CHANCE OF DOZING
HOW LIKELY ARE YOU TO NOD OFF OR FALL ASLEEP WHILE SITTING AND TALKING TO SOMEONE: WOULD NEVER DOZE
HOW LIKELY ARE YOU TO NOD OFF OR FALL ASLEEP WHEN YOU ARE A PASSENGER IN A CAR FOR AN HOUR WITHOUT A BREAK: SLIGHT CHANCE OF DOZING
HOW LIKELY ARE YOU TO NOD OFF OR FALL ASLEEP WHILE SITTING QUIETLY AFTER LUNCH WITHOUT ALCOHOL: WOULD NEVER DOZE
HOW LIKELY ARE YOU TO NOD OFF OR FALL ASLEEP WHILE LYING DOWN TO REST IN THE AFTERNOON WHEN CIRCUMSTANCES PERMIT: HIGH CHANCE OF DOZING
HOW LIKELY ARE YOU TO NOD OFF OR FALL ASLEEP IN A CAR, WHILE STOPPED FOR A FEW MINUTES IN TRAFFIC: WOULD NEVER DOZE
HOW LIKELY ARE YOU TO NOD OFF OR FALL ASLEEP WHILE SITTING AND READING: MODERATE CHANCE OF DOZING
HOW LIKELY ARE YOU TO NOD OFF OR FALL ASLEEP WHILE SITTING INACTIVE IN A PUBLIC PLACE: WOULD NEVER DOZE

## 2022-01-14 ENCOUNTER — VIRTUAL VISIT (OUTPATIENT)
Dept: SLEEP MEDICINE | Facility: CLINIC | Age: 57
End: 2022-01-14
Attending: PHYSICIAN ASSISTANT
Payer: COMMERCIAL

## 2022-01-14 VITALS — WEIGHT: 236 LBS | BODY MASS INDEX: 33.79 KG/M2 | HEIGHT: 70 IN

## 2022-01-14 DIAGNOSIS — G47.33 OSA (OBSTRUCTIVE SLEEP APNEA): ICD-10-CM

## 2022-01-14 PROCEDURE — 99205 OFFICE O/P NEW HI 60 MIN: CPT | Mod: 95 | Performed by: PHYSICIAN ASSISTANT

## 2022-01-14 ASSESSMENT — MIFFLIN-ST. JEOR: SCORE: 1750.74

## 2022-01-14 NOTE — PATIENT INSTRUCTIONS
Your BMI is Body mass index is 33.86 kg/m .  Weight management is a personal decision.  If you are interested in exploring weight loss strategies, the following discussion covers the approaches that may be successful. Body mass index (BMI) is one way to tell whether you are at a healthy weight, overweight, or obese. It measures your weight in relation to your height.  A BMI of 18.5 to 24.9 is in the healthy range. A person with a BMI of 25 to 29.9 is considered overweight, and someone with a BMI of 30 or greater is considered obese. More than two-thirds of American adults are considered overweight or obese.  Being overweight or obese increases the risk for further weight gain. Excess weight may lead to heart disease and diabetes.  Creating and following plans for healthy eating and physical activity may help you improve your health.  Weight control is part of healthy lifestyle and includes exercise, emotional health, and healthy eating habits. Careful eating habits lifelong are the mainstay of weight control. Though there are significant health benefits from weight loss, long-term weight loss with diet alone may be very difficult to achieve- studies show long-term success with dietary management in less than 10% of people. Attaining a healthy weight may be especially difficult to achieve in those with severe obesity. In some cases, medications, devices and surgical management might be considered.  What can you do?  If you are overweight or obese and are interested in methods for weight loss, you should discuss this with your provider.     Consider reducing daily calorie intake by 500 calories.     Keep a food journal.     Avoiding skipping meals, consider cutting portions instead.    Diet combined with exercise helps maintain muscle while optimizing fat loss. Strength training is particularly important for building and maintaining muscle mass. Exercise helps reduce stress, increase energy, and improves fitness.  Increasing exercise without diet control, however, may not burn enough calories to loose weight.       Start walking three days a week 10-20 minutes at a time    Work towards walking thirty minutes five days a week     Eventually, increase the speed of your walking for 1-2 minutes at time    And look into health and wellness programs that may be available through your health insurance provider, employer, local community center, or [employer].    Weight management plan: Patient was referred to their PCP to discuss a diet and exercise plan.

## 2022-01-14 NOTE — PROGRESS NOTES
Mallory is a 56 year old who is being evaluated via a billable video visit.      How would you like to obtain your AVS? MyChart  If the video visit is dropped, the invitation should be resent by: Text to cell phone: [phone]  Will anyone else be joining your video visit? No      Video-Visit Details    Type of service:  Video Visit    Video Start Time: 11:09AM    Video End Time:11:46AM    Originating Location (pt. Location): Home    Distant Location (provider location):  [facility] SLEEP [facility]     Platform used for Video Visit: Well       [facility] Sleep [place]   Outpatient Sleep Medicine Consultation  Jan 14, 2022       Name: Mallory Sargent MRN# [id number]   Age: 56 year old YOB: 1965     Date of Consultation: Jan 14, 2022   Consultation is requested by: Maury Mcclure PA-C  [address]  [place] [address] Maury Mcclure  Primary care provider: Blanka Lucio         Assessment and Plan:   1. JESSIE (obstructive sleep apnea)    Patient presents to clinic today to re-establish care of her mild obstructive sleep apnea. Diagnosed via PSG at our clinic 10/5/2017 with AHI 10.8. Weight at time of study 217lbs, today is 236lbs. Discussed role of weight gain and how weight gain often worsens sleep apnea severity. She voiced understanding and is actively working to lose weight. We discussed repeating sleep study today, either via PSG or HST to determine current JESSIE severity. Alternatively, as to not delay treatment, we also discussed starting treatment based off of old PSG results. Discussed treatment options for sleep apnea including CPAP, oral appliance, positional restriction, and lastly consideration of surgical options. After discussion of the above patient has elected treatment with oral appliance. Referral to sleep dentistry placed today.   - SLEEP DENTAL REFERRAL; Future    Follow-up per sleep dentistry recommendations. Could consider repeat  "PSG/HST with oral appliance to verify efficacy.        Chief Complaint / Reason for Sleep Consult:     Chief Complaint   Patient presents with     Video Visit     sleep apnea symptoms. Had a sleep study 5 years ago  No CPAP          History of Present Illness:     Mallory Sargent is a 56 year old female who presents to the clinic to re-establish care of her mild JESSIE. Other past medical history significant for history of MDD, dyslipidemia, GERD, and obesity.     Reviewed prior PSG completed 10/5/2017 (217#, BMI 30.7) - mild JESSIE was identified with AHI 10.8, REM AHI 15.7, supine AHI 23.1, RDI 14.9. No hypoxemia with 0.1 minute spent <=88% and mauricio 87.6%. No abnormal movements or behaviors, PLM index 0.7.     Last seen by Dr. Amaro 10/31/2017. This visit was to discuss PSG results. Chart notes show that she opted to treat her mild JESSIE with oral applaince. Referral also placed to sleep psychology for CBTI to treat her psychophysiologic insomnia.     Did not follow through with oral appliance or CBTI, did not think either were needed.     Returns today interested in treating her JESSIE as recommended by weight management provider. She has been seeing weight management the past few months and has lost 20lbs so far. Knows she is still up 20lbs since her last sleep study but working on losing this.     Current symptoms include snoring, daytime tiredness, sleep maintenance insomnia.     Sleep schedule depends on work schedule. 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 Bedtime is typically 11:00PM but can be as early as 9:30-10:00PM if works early. Falls asleep in \"5 seconds\", denies difficulty falling asleep. Wake time depends on what time she starts work. On weekends/non-work days bedtime is 11:00PM-12:00AM at the latest and wakes between 7-8:00AM. Wakes once per night typically around 3:00AM for unknown reason and can have difficulty returning to sleep, might take 1-2 hours. Has hydroxyzine " "prescribed to help with insomnia but when she takes it feels \"just terrible and drugged\" the next day. Her Apple Watch reports she is awake every hour during the middle of the night but she is unaware.     No daytime napping. No difficulty with staying alert and awake while driving. Patient was counseled on the importance of driving while alert.    Occasional restless legs syndrome symptoms in evening before bed if she gets \"too tired\", symptoms do not interfere with falling asleep or disrupt sleep. No leg movements in sleep.     No dream enactment behavior. No somniloquy.  No somnambulism.  Bruxism per dentist.     SCALES       INSOMNIA:  Insomnia severity score: 15/28   absence of insomnia (0-7); sub-threshold insomnia (8-14); moderate insomnia (15-21); and severe insomnia (22-28)       SLEEPINESS: [place] sleepiness scale: 8/24 [normal < 11]          Medications:     Current Outpatient Medications   Medication Sig     aspirin 81 MG tablet Take by mouth daily     atorvastatin (LIPITOR) 40 MG tablet Take 1 tablet (40 mg) by mouth daily     augmented betamethasone dipropionate (DIPROLENE) 0.05 % external lotion Apply topically 2 times daily Apply to AA on scalp bid when needed     FIBER PO      hydrOXYzine (ATARAX) 25 MG tablet Take 0.5-2 tablets (12.5-50 mg) by mouth 3 times daily as needed (insomnia)     insulin pen needle (31G X 5 MM) 31G X 5 MM miscellaneous Use 1 pen needles daily or as directed.     liraglutide - Weight Management (SAXENDA) 18 MG/3ML pen Week 1: 0.6 mg subcutaneous daily, Week 2: 1.2 mg daily, Week 3: 1.8 mg daily, Week 4: 2.4 mg daily, Week 5 & on: 3 mg daily     magnesium 250 MG tablet Take 1 tablet by mouth daily     Omega-3 Fatty Acids (FISH OIL ADULT GUMMIES PO) Take 1,000 mg by mouth daily     omeprazole (PRILOSEC) 20 MG DR capsule Take 1 capsule (20 mg) by mouth daily 30-60 minutes before a meal. Pt typically takes QOD     No current facility-administered medications for this visit.    "          Allergies:     No Known Allergies         Past Medical History:     Past Medical History:   Diagnosis Date     ASCUS favor benign 3/2015    neg HPV   Plan cotest in 3 yrs.     Depressive disorder      Depressive disorder, not elsewhere classified     lexapro = no effect and sleepy, celexa = slightly better, wellbutrin = severe restlessness.       Esophageal reflux 2006     FAMILY HX-OVARIAN MALIGNANCY 2005    maternal Hx Dx'd age 57,  age 64; pt would like to have her ovaries removed.     GERD (gastroesophageal reflux disease)      Other and unspecified hyperlipidemia 2007     RECURR Depressive disorder - MOD 2008     RECURR Depressive disorder -SEVERE 2008             Past Surgical History:    Previous upper airway surgery: denies   Past Surgical History:   Procedure Laterality Date     ABDOMEN SURGERY       APPENDECTOMY       BREAST SURGERY       C/SECTION, LOW TRANSVERSE      , Low Transverse     CHOLECYSTECTOMY, LAPOROSCOPIC  2010    Cholecystectomy, Laparoscopic     COLONOSCOPY  2/15/2016    Dr. Marc DIAZ     HERNIA REPAIR, INCISIONAL  2010    Laparoscopic     ZZC LIGATE FALLOPIAN TUBE       ZZC NONSPECIFIC PROCEDURE      vaginal repair after delivery          Social History:     Social History     Tobacco Use     Smoking status: Former Smoker     Packs/day: 0.00     Years: 0.00     Pack years: 0.00     Quit date: 1993     Years since quittin.6     Smokeless tobacco: Never Used   Substance Use Topics     Alcohol use: Yes     Comment: very rarely      Chemical History:  Alcohol use: Very rarely, every couple months       Tobacco use: Denies   Illicit substances: Denies  Caffeine intake: Drinks 1 cups/day of coffee in AM         Family History:     Family History   Problem Relation Age of Onset     Hypertension Father      Lipids Father      Gastrointestinal Disease Father         vazquez's esophagus fr. reflux      Hyperlipidemia Father   "    Cancer Mother         ovarian     Arthritis Mother         lupus      Depression Paternal Grandmother         problems with schizophrenia     Arthritis Brother      Diabetes Maternal Grandmother      Diabetes Maternal Grandfather      Coronary Artery Disease Maternal Grandfather      Coronary Artery Disease Paternal Grandfather      Gastrointestinal Disease Brother         reflux      Colon Cancer Cousin      Ovarian Cancer Other         Maternal great aunt     Breast Cancer No family hx of      Sleep Family Hx: Mom with \"heavy snoring\". Patient denies any known family history of sleep apnea, restless legs syndrome, narcolepsy or other sleep disorders.          Review of Systems:   Answers for HPI/ROS submitted by the patient on 1/12/2022  General Symptoms: No  Skin Symptoms: No  HENT Symptoms: No  EYE SYMPTOMS: No  HEART SYMPTOMS: No  LUNG SYMPTOMS: No  INTESTINAL SYMPTOMS: No  URINARY SYMPTOMS: No  GYNECOLOGIC SYMPTOMS: No  BREAST SYMPTOMS: No  SKELETAL SYMPTOMS: No  BLOOD SYMPTOMS: No  NERVOUS SYSTEM SYMPTOMS: No  MENTAL HEALTH SYMPTOMS: No         Physical Examination:   Ht 1.778 m (5' 10\")   Wt 107 kg (236 lb)   LMP 10/01/2015 (Approximate)   BMI 33.86 kg/m    General appearance: Awake, alert, cooperative. Well groomed. Sitting comfortably in chair. In no apparent distress.  HEENT: Head: Normocephalic, atraumatic. Eyes:Conjunctiva clear. Sclera normal. Nose: External appearance without deformity.   Neck: No visible thyroid enlargement.   Cardiovascular: No JVD  Pulmonary:  Able to speak easily in full sentences. No cough or wheeze.   Skin:  No rashes or significant lesions on visible skin.   Neurologic: Alert, oriented x3.   Psychiatric: Mood euthymic. Affect congruent with full range and intensity.         Data: All pertinent previous laboratory data reviewed     No results found for: PH, PHARTERIAL, PO2, [id number], SAT, PCO2, HCO3, BASEEXCESS, BENNIE, BEB  Lab Results   Component Value Date    TSH 2.20 " 04/16/2021    TSH 2.22 12/06/2018     Lab Results   Component Value Date    GLC 86 11/26/2021     (H) 09/21/2021     Lab Results   Component Value Date    HGB 14.1 04/16/2021    HGB 14.9 09/25/2020     Lab Results   Component Value Date    BUN 18 11/26/2021    BUN 13 09/21/2021    CR 0.88 11/26/2021    CR 0.80 09/21/2021     Lab Results   Component Value Date    AST 33 11/26/2021    AST 33 09/21/2021    ALT 80 (H) 11/26/2021    ALT 69 (H) 09/21/2021    ALKPHOS 67 11/26/2021    ALKPHOS 74 09/21/2021    BILITOTAL 0.4 11/26/2021    BILITOTAL 0.4 09/21/2021    BILICONJ 0.0 02/20/2008    BILICONJ 0.0 12/15/2006     No results found for: UAMP, UBARB, BENZODIAZEUR, UCANN, UCOC, OPIT, UPCP    Chest x-ray:   Narrative & Impression   CHEST TWO VIEWS  1/16/2020 4:41 PM      HISTORY: 54-year-old woman with history of cough.                                                                       IMPRESSION: Since February 16, 2015, heart size is normal. No pleural  effusion, pneumothorax, or abnormal area of consolidation.        Copy to: Blanka Lucio PA-C  Jan 14, 2022     [facility]  [address] [place] [address]     [facility]  [address] [address]    Chart documentation was completed, in part, with [employer] voice-recognition software. Even though reviewed, some grammatical, spelling, and word errors may remain.    65 minutes spent on day of encounter doing chart review, history and exam, documentation, and further activities as noted above

## 2022-01-28 ENCOUNTER — TELEPHONE (OUTPATIENT)
Dept: SURGERY | Facility: CLINIC | Age: 57
End: 2022-01-28

## 2022-01-28 NOTE — TELEPHONE ENCOUNTER
Pt Requested Rescheduled for Today's HC Visit.    Pt answered yet requested to r/s due to work schedule change.    - R/s pt to 2/4/22.     -GDP

## 2022-02-04 ENCOUNTER — VIRTUAL VISIT (OUTPATIENT)
Dept: SURGERY | Facility: CLINIC | Age: 57
End: 2022-02-04

## 2022-02-04 ENCOUNTER — TELEPHONE (OUTPATIENT)
Dept: SURGERY | Facility: CLINIC | Age: 57
End: 2022-02-04

## 2022-02-04 DIAGNOSIS — E66.9 OBESE: Primary | ICD-10-CM

## 2022-02-04 PROCEDURE — 99207 PR MWM HEALTH COACH NO CHARGE: CPT | Performed by: COMMUNITY HEALTH WORKER

## 2022-02-04 NOTE — PROGRESS NOTES
"IDA [facility]NN     Progress Notes    Return Weight Management Health Coaching Note    Mallory Sargent          MRN: [id number]  : 1965  KATLYN: 2022    Health  Visit #: 5  Type of Visit: 24 week  Telephone Visit - Call: [phone]     Initial Start Date: 2021  Graduation Date: 2022      Provider on : LINDSEY Marin per Provider Obesity Class 2 (BMI 35 to 39.9).  Initial Height and Wt: 5' 10.5 , 256 lbs   BMI = 36.21 kg/m     ASSESSMENT:  HC Visit on : 256 lbs (self-reported)  ... on 12/10: 241.8 lbs (self-reported), BMI = 34.2 kg/m , 14.2 lbs lost, ~ 5.6% wt loss     Current Weight: 230 lb (self-reported), BMI = 36.21 kg/m , ~ 10.2% wt loss   Average Daily Water: ? oz/day  Weight Loss Medication: Saxenda  Nutrition Plan: Other: Regular Diet, per Rd; 22 - only using products on busier days ~ 5 products/wk.     Additional Program Support.  (Refer to 21 visit notes for addt'l info.)  Measurements & Forms sent via [employer] on 11/15/21  [employer] Access Begins/Ends: 21 - 22     Highlights from Past Week's and Today's Visit:  *She is thrilled to reach 25 lb wt loss > hopeful that she can achieve her well-being intentions, what she is doing is working, and notcing excitement.  *She is also physically feeling better: more energy, no back pain when walking.     Action Steps pt set until next HC Visit are:  1) EWB      She wants to experiment with the 3-Part Mental Exercise when she noticed the urge eat the PM snack.  - She likes the exercise and how it helped her in breaking the PM snack \"comfort\" habit. She was noticing  a tendency to eat to comfort herself not lisette she's hungry.) She particularly finds step 1 & 3 helpful.   - She has the mantras helpful - added them into her phone.     - She feels the above exercise will be helpful in other areas e.g. addressing her tendency to run late >>> leads to stress & inner critic thoughts.     B) " She also likes the idea of using the following verbiage when noticing experiences:helpful? or not helpful?, serves me well? Or doesn't serve me well?  Instead of labeling them as good/bad, healthy/not healthy, positive or negative.    *She has other well-being resources (ten percent happier praveen, intuitive eating podcast session & inner dialogue podcast) to utilize in the future. Right now her focus is to be present with her experiences with the 3-Part Mental Exercise to be present with/slow down her thoughts >>> increase her self-compassion & see what dialogue she wants to change.    2) Nutrition.  - She is tracking [employer] Pal to track dietary & water intake.   - She will call schedule RD visit late March or April. She is feeling great ~ where she is at right yet would like to ck in with Mary when she loses another 10 lbs.     Next Visit: Next visit will be quick check-in - virtul phone line IT issues stalled called & cut us off when scheduling. Review BMI & % wt loss  As time allows: review bmi & % wt loss, capture water intake - on 12/10 she planned to schedule w/RD feb or march, still the plan?     PATIENT EDUCATION PROVIDED:  OEQ -Captured insights &/or awareness gained & other pt's perceived victories.  Assessed How Visit Today Would Best Serve Pt  Progress Review: Behavioral  Affirmations / Character Strength Reflection / Value Alignment  OEQ - on topics noted above  Pt requested resource emailed; no phi in email.  MyCharted [employer] Contact info again.      HC Visit #1 11/12, #2a 11/19, #2b 11/29, #3 12/10, #4 12/24 > 1/7, #5 1/28 > 2/4, #6 2/11 or 2/23, #7&8 3/11, #9 & 10 4/1, #10? & 11 4/22.  *Doing EOW visits in March & April will allow her to complete all visits prior to grad date.   *She works M-F with some days off; prefers to schedule her hc visits on her days off or on days she doesn't go in until 9 or 10 am.     24-wk HLP Provider/RD visits: Provider 11/4/, 12/10, 2/10; RD 11/4 - holding on  scheduling w/RD until late March - April.     FOLLOW-UP: Reminder to switch to phone or reschedule by calling [phone].    TIME: ~30+/- minutes spent with patient, care coordination, sending resources & charting.     SIGNATURE:  IDA AGOSTO, Certified Health  on 2/4/2022 at 8:06 AM

## 2022-02-04 NOTE — TELEPHONE ENCOUNTER
At the end of her HC visit, the call dropped when scheduling her March & April vists. When I attempted to call her back the line kept ringing. (Later I realized the virtual phone service line was experiencing technical visits & switched to calling pts using blocked line.)    - Called pt to let her know I held visits for her. She mentioned she can see them on [employer].  - Invited her to call scheduling team if her schedule is limited these two months (& if she would like to reserve other times.) Otherwise we can adjust at her 2/11 visit.    - GDP

## 2022-02-07 ENCOUNTER — TELEPHONE (OUTPATIENT)
Dept: SURGERY | Facility: CLINIC | Age: 57
End: 2022-02-07
Payer: COMMERCIAL

## 2022-02-07 NOTE — TELEPHONE ENCOUNTER
Reason for call:  Medication   If this is a refill request, has the caller requested the refill from the pharmacy already? N/A  Will the patient be using a [facility]? Yes  Name of the pharmacy and phone number for the current request: [facility]  [place], [place] - [address] [place] [address]    Name of the medication requested: SAXENDA    Other request: please let pt know when this is completed    Phone number to reach patient:  Cell number on file:    Telephone Information:   Mobile [phone]       Best Time:  anytime    Can we leave a detailed message on this number?  YES    Travel screening: Not Applicable

## 2022-02-08 ENCOUNTER — VIRTUAL VISIT (OUTPATIENT)
Dept: SURGERY | Facility: CLINIC | Age: 57
End: 2022-02-08
Payer: COMMERCIAL

## 2022-02-08 VITALS — WEIGHT: 230 LBS | BODY MASS INDEX: 32.2 KG/M2 | HEIGHT: 71 IN

## 2022-02-08 DIAGNOSIS — R74.8 ELEVATED LIVER ENZYMES: ICD-10-CM

## 2022-02-08 DIAGNOSIS — E66.01 CLASS 2 SEVERE OBESITY DUE TO EXCESS CALORIES WITH SERIOUS COMORBIDITY AND BODY MASS INDEX (BMI) OF 36.0 TO 36.9 IN ADULT (H): ICD-10-CM

## 2022-02-08 DIAGNOSIS — E66.812 CLASS 2 SEVERE OBESITY DUE TO EXCESS CALORIES WITH SERIOUS COMORBIDITY AND BODY MASS INDEX (BMI) OF 36.0 TO 36.9 IN ADULT (H): ICD-10-CM

## 2022-02-08 DIAGNOSIS — E66.09 CLASS 1 OBESITY DUE TO EXCESS CALORIES WITHOUT SERIOUS COMORBIDITY WITH BODY MASS INDEX (BMI) OF 32.0 TO 32.9 IN ADULT: Primary | ICD-10-CM

## 2022-02-08 DIAGNOSIS — Z79.899 MEDICATION MANAGEMENT: ICD-10-CM

## 2022-02-08 DIAGNOSIS — K21.9 GASTROESOPHAGEAL REFLUX DISEASE WITHOUT ESOPHAGITIS: ICD-10-CM

## 2022-02-08 DIAGNOSIS — E66.811 CLASS 1 OBESITY DUE TO EXCESS CALORIES WITHOUT SERIOUS COMORBIDITY WITH BODY MASS INDEX (BMI) OF 32.0 TO 32.9 IN ADULT: Primary | ICD-10-CM

## 2022-02-08 PROCEDURE — 99214 OFFICE O/P EST MOD 30 MIN: CPT | Mod: 95 | Performed by: PHYSICIAN ASSISTANT

## 2022-02-08 ASSESSMENT — MIFFLIN-ST. JEOR: SCORE: 1731.46

## 2022-02-08 NOTE — TELEPHONE ENCOUNTER
Called patient and scheduled for next opening per provider - today at 3 PM.   Cx'd the appointment for 2/10/22.  Patient will need next injection 2/11/22.  Annette Arevalo, MS, RD, RN

## 2022-02-08 NOTE — PROGRESS NOTES
Mallory is a 56 year old who is being evaluated via a billable video visit.      If the video visit is dropped, the invitation should be resent by: Text to cell phone: [phone]  Will anyone else be joining your video visit? No      Video-Visit Details    Type of service:  Video Visit    Video Start Time: 3:08    Video End Time: 3:30    31 minutes spent on the date of the encounter doing chart review, review of test results, patient visit and documentation       Originating Location (pt. Location): Home    Distant Location (provider location):  [facility] [place]     Platform used for Video Visit: [employer]            2022        Return Virtual Medical Weight Management Note         Mallory Sargent  MRN:  [id number]  :  1965      Dear Blanka Lucio,    I had the pleasure of doing a virtual visit with your patient Mallory Sargent.  She is a 56 year old female who I am continuing to see for treatment of obesity related to:       11/3/2021   I have the following health issues associated with obesity: Pre-Diabetes, High Cholesterol, Sleep Apnea, GERD (Reflux), Fatty Liver   I have the following symptoms associated with obesity: Depression, Back Pain, Fatigue, Hip Pain       INTERVAL HISTORY:  Patient continues to inject Saxenda 1.8 mg daily in the evening. Feels like it is working but not quite as much as before. Tolerating it very well.  No nausea or vomiting. Feels like with her lifestyle changes she is continuing to lose weight.      CURRENT WEIGHT:   230 lbs 0 oz    Wt Readings from Last 4 Encounters:   22 230 lb (104.3 kg)   22 236 lb (107 kg)   12/10/21 241 lb 12.8 oz (109.7 kg)   21 247 lb (112 kg)       BARIATRIC METRICS:  Current Weight: 230 lb (104.3 kg) (pt reported)  Body mass index is 32.54 kg/m .   Wt change since last visit (lbs): -6  Cumulative weight loss (lbs): 26      DIETARY HISTORY  Meals Per Day: 3  Food  Diary:   B: greek yogurt with blueberries and coffee with skim milk  L: leftover chicken, corn and mashed potatoes  D: chicken, corn and potatoes from a restaurant  Snacks Per Day: 1  Typical Snack: orange or yogurt   Fluid Intake: at least 64 oz water and coffee  Meals at Restaurant per week: 3    Struggling With: exercise    Sleeping 7-8 hours/day: yes  Stress management: good      Exercise:  Walking on stationary bike maybe 1 time per week      ROS: 2/8/2022  General  Fatigue: improved  Sleep Quality: slightly improved  Birth Control: postmenopausal  HEENT  Hx of glaucoma: No  Vision changes: No  Cardiovascular  Chest Pain with Exertion: No  Palpitations: No  Hx of heart disease: No  Had a recent EKG and Stress echo that was reviewed today   Pulmonary  Shortness of breath at rest: No  Shortness of breath with exertion: Yes  Snoring: Yes - was diagnosed with JESSIE 3 years ago with mild JESSIE. No treatment. Met [facility] sleep center in the past month. Was advised to meet with a sleep dentist for a mouth guard. Has not made appointment  Gastrointestinal  Heartburn: yes takes medication daily. Does think it is a bit worse since starting the Saxenda. Was only taking it 3-4 times weekly before Saxenda  Abdominal pain: No  History of pancreatitis: No  Severe constipation: No - Having regular BMs  Hx of bowel obstruction: No  Nausea with medication changes - manageable  Gastrourinary  History of kidney stones: No  Psychiatric  Moods Stable: Yes  History of drug abuse: No  No history of eating disorder  Endocrine  Polydipsia: No  Polyuria: No  Personal or family history of thyroid cancer: No  Neurologic:  Hx of seizures: No  Hx of paresthesias: No        MEDICATIONS:   Current Outpatient Medications   Medication     aspirin 81 MG tablet     atorvastatin (LIPITOR) 40 MG tablet     augmented betamethasone dipropionate (DIPROLENE) 0.05 % external lotion     FIBER PO     hydrOXYzine (ATARAX) 25 MG tablet     insulin pen needle (31G  "X 5 MM) 31G X 5 MM miscellaneous     liraglutide - Weight Management (SAXENDA) 18 MG/3ML pen     magnesium 250 MG tablet     Omega-3 Fatty Acids (FISH OIL ADULT GUMMIES PO)     omeprazole (PRILOSEC) 20 MG DR capsule     No current facility-administered medications for this visit.       PE:  Ht 5' 10.5\" (1.791 m)   Wt 230 lb (104.3 kg)   LMP 10/01/2015 (Approximate)   BMI 32.54 kg/m    GENERAL: Healthy, alert and no distress  EYES: Eyes grossly normal to inspection.  No discharge or erythema, or obvious scleral/conjunctival abnormalities.  RESP: No audible wheeze, cough, or visible cyanosis.  No visible retractions or increased work of breathing.    SKIN: Visible skin clear. No significant rash, abnormal pigmentation or lesions.  NEURO: Cranial nerves grossly intact.  Mentation and speech appropriate for age.  PSYCH: Mentation appears normal, affect normal/bright, judgement and insight intact, normal speech and appearance well-groomed.          ASSESSMENT/PLAN:   Class 2 severe obesity due to excess calories with Body Mass Index (BMI) of 36  GERD without esophagitis  Elevated liver enzymes  High cholesterol  JESSIE        Congratulated on weight loss.  Feels really good at staying at the 1.8 mg dose as it is still helping. Discussed the option of going up to 2.4 mg if she feels necessary.  Refill sent to pharmacy Placed lab for patient to get drawn.     Let patient know that next appointment in person    Follow up: Beginning of May    Maury Mcclure PA-C    "

## 2022-02-08 NOTE — PATIENT INSTRUCTIONS
It was good seeing you today!  Please call [phone] and schedule for an in person visit the first week of May.    Also please get lab drawn.      Have a great afternoon      Maury CHUA      Eat Better ? Move More ? Live Well    Eat 3 nutrient-rich meals each day     Don t skip meals--it will cause you to overeat later in the day!     Eating fiber (vegetables/fruits/whole grains) and protein with meals helps you stay full longer     Choose foods with less than 10 grams of sugar and 5 grams of fat per serving to prevent excess calories and weight re-gain   Eat around the same times each day to develop a routine eating schedule    Avoid snacking unless physically hungry.   Planned snacks: 1-2 times per day and no more than 150 calories    Eat protein first    Protein helps with healing, maintaining adequate muscle mass, reducing hunger and optimizing nutritional status    Aim for 60-80 grams of protein per day   Fill up on Fiber    Fiber comes from plants--fruits, veggies, whole grains, nuts/seeds and beans    Fiber is low in calories, high in phytonutrients and helps you stay full longer    Aim for 25-35 grams per day by eating fiber with meals and snacks  Eat S-L-[id number]-W-L-Y    Take 20-30 minutes to eat each meal by taking small bites, chewing foods to applesauce consistency or 20-30 times before you swallow    Eating foods too fast can delay satiety/fullness signals and increase overeating   ? Slow down your eating by using toddler utensils, putting your fork/spoon down between bites and not watching TV or emailing during meals!   Keep a Journal          Writing down what you eat, how you feel and when you are active helps you identify new changes to work on from week to week          Look for ways to cut 100 calories from your current diet 2-3 times per day  Drink 64 ounces of 0-Calorie drinks between meals    Water    Zero calorie Propel  or Vitamin Water      SoBe Lifewater  Zero Calories    Crystal Light ,  Sugar-Free Chaka-Aid , and other sugar-free lemonade or flavored hough    Keep Caffeine to less than 300mg per day ie: 3-6oz cups coffee     Work up to 45-60 minutes of physical activity most days of the week    Helps with losing weight and prevent regaining those extra pounds!     Do a combo of cardio (walking/water exercises) and strength training (lifting weights/Vinyasa yoga)    Avoid Mindless Eating    Be present when you eat--take note of the smell, taste and quality of your food    Make a list of alternative activities you could do to prevent eating out of boredom/stress  ? Go for a walk, call a friend, chew gum, paint your nails, re-organize the garage, etc

## 2022-02-09 ENCOUNTER — TELEPHONE (OUTPATIENT)
Dept: SURGERY | Facility: CLINIC | Age: 57
End: 2022-02-09
Payer: COMMERCIAL

## 2022-02-09 NOTE — TELEPHONE ENCOUNTER
Called patient to discuss cost of Saxenda.  She believes she is going to pay 1000$+ this month as she will reach her deductible.      Also discussed topamax as a future option.    Maury Mcclure MS, PAZohrehC

## 2022-02-10 ENCOUNTER — LAB (OUTPATIENT)
Dept: LAB | Facility: CLINIC | Age: 57
End: 2022-02-10
Payer: COMMERCIAL

## 2022-02-10 DIAGNOSIS — E66.811 CLASS 1 OBESITY DUE TO EXCESS CALORIES WITHOUT SERIOUS COMORBIDITY WITH BODY MASS INDEX (BMI) OF 32.0 TO 32.9 IN ADULT: ICD-10-CM

## 2022-02-10 DIAGNOSIS — Z79.899 MEDICATION MANAGEMENT: ICD-10-CM

## 2022-02-10 DIAGNOSIS — E66.09 CLASS 1 OBESITY DUE TO EXCESS CALORIES WITHOUT SERIOUS COMORBIDITY WITH BODY MASS INDEX (BMI) OF 32.0 TO 32.9 IN ADULT: ICD-10-CM

## 2022-02-10 PROCEDURE — 36415 COLL VENOUS BLD VENIPUNCTURE: CPT

## 2022-02-10 PROCEDURE — 80053 COMPREHEN METABOLIC PANEL: CPT

## 2022-02-11 ENCOUNTER — VIRTUAL VISIT (OUTPATIENT)
Dept: SURGERY | Facility: CLINIC | Age: 57
End: 2022-02-11

## 2022-02-11 DIAGNOSIS — E66.9 OBESE: Primary | ICD-10-CM

## 2022-02-11 LAB
ALBUMIN SERPL-MCNC: 3.8 G/DL (ref 3.4–5)
ALP SERPL-CCNC: 67 U/L (ref 40–150)
ALT SERPL W P-5'-P-CCNC: 47 U/L (ref 0–50)
ANION GAP SERPL CALCULATED.3IONS-SCNC: 4 MMOL/L (ref 3–14)
AST SERPL W P-5'-P-CCNC: 18 U/L (ref 0–45)
BILIRUB SERPL-MCNC: 0.6 MG/DL (ref 0.2–1.3)
BUN SERPL-MCNC: 15 MG/DL (ref 7–30)
CALCIUM SERPL-MCNC: 9.2 MG/DL (ref 8.5–10.1)
CHLORIDE BLD-SCNC: 108 MMOL/L (ref 94–109)
CO2 SERPL-SCNC: 27 MMOL/L (ref 20–32)
CREAT SERPL-MCNC: 0.92 MG/DL (ref 0.52–1.04)
GFR SERPL CREATININE-BSD FRML MDRD: 74 ML/MIN/1.73M2
GLUCOSE BLD-MCNC: 86 MG/DL (ref 70–99)
POTASSIUM BLD-SCNC: 4.1 MMOL/L (ref 3.4–5.3)
PROT SERPL-MCNC: 7.4 G/DL (ref 6.8–8.8)
SODIUM SERPL-SCNC: 139 MMOL/L (ref 133–144)

## 2022-02-11 PROCEDURE — 99207 PR MWM HEALTH COACH NO CHARGE: CPT | Performed by: COMMUNITY HEALTH WORKER

## 2022-02-11 NOTE — PROGRESS NOTES
IDA WYNN     Progress Notes    Return Weight Management Health Coaching Note    Mallory Sargent          MRN: [id number]  : 1965  KATLYN: 2022    Health  Visit #: 6a - Time spent with admin tasks (r/s pt's visits)   *See  notes on IT issues with virtual phone line.  *Pt's schedule changes so held visits didn't work for her.  Her availability & my schedule = hard to line up = timely to schedule. One of the best days for her is Thursday & I don't have any clinic hrs for her.   Type of Visit: 24 week  Telephone Visit - Call: [phone]     Initial Start Date: 2021  Graduation Date: 2022 - Notes ~ #11  visit.(see above + I'm off the  wk of May)     Provider on : LINDSEY Marin per Provider Obesity Class 2 (BMI 35 to 39.9).  Initial Height and Wt: 5' 10.5 , 256 lbs   BMI = 36.21 kg/m     ASSESSMENT:   Visit on : 256 lbs (self-reported)  ... on 12/10: 241.8 lbs (self-reported), BMI = 34.2 kg/m , 14.2 lbs lost, ~ 5.6% wt loss     Current Weight: 228.4 lb (self-reported), BMI = 32.3 kg/m , ~ 10.8% wt loss, cumulative wt loss 27.6 lbs  Average Daily Water: ? oz/day  Weight Loss Medication: Saxenda  Nutrition Plan: Other: Regular Diet, per Rd; 22 - only using products on busier days ~ 5 products/wk.     Additional Program Support.  (Refer to 21 visit notes for addt'l info.)  Measurements & Forms sent via [employer] on 11/15/21  [employer] Access Begins/Ends: 21 - 22    Highlights from Past Week's and Today's Visit:  *Managing to maintain her wt loss is something she hasn't found success in wt maintenance before.   *She realized that in the past she felt like a passenger in wt loss (didn't have control) and this time she is the  seat.   *She noted how heartbreaking it was to see the wt come back on >>> which lead her to avoidance - avoiding even thinking about wt loss.     Action Steps pt set until next HC Visit  are:  1) See 2/4 visit notes      Next Visit:     PATIENT EDUCATION PROVIDED:  OEQ -Captured insights &/or awareness gained & other pt's perceived victories.  Assessed How Visit Today Would Best Serve Pt  Progress Review: Behavioral / Capture Wt - Reviewed BMI & % wt loss  Affirmations / Character Strength Reflection / Value Alignment  OEQ - on topics noted above    HC Visit #1 11/12, #2a 11/19, #2b 11/29, #3 12/10, #4 12/24 > 1/7, #5 1/28 > 2/4, #6 2/11 &  2/23, #7 3/7 3/11, #8 4/1, #9 4/11, #10 4/29, #11 5/11.       24-wk HLP Provider/RD visits: Provider 11/4/, 12/10, 2/10; RD 11/4 - holding on scheduling w/RD until late March - April.    FOLLOW-UP: Reminder to switch to phone or reschedule by calling [phone].    TIME: ~30+/- minutes spent with patient, care coordination, sending resources & charting.   SIGNATURE:  IDA AGOSTO, Certified Health  on 2/11/2022 at 1:12 PM

## 2022-03-07 ENCOUNTER — VIRTUAL VISIT (OUTPATIENT)
Dept: SURGERY | Facility: CLINIC | Age: 57
End: 2022-03-07

## 2022-03-07 DIAGNOSIS — E66.9 OBESE: Primary | ICD-10-CM

## 2022-03-07 PROCEDURE — 99207 PR MWM HEALTH COACH NO CHARGE: CPT | Performed by: COMMUNITY HEALTH WORKER

## 2022-03-07 NOTE — PROGRESS NOTES
LUCI AGOSTO, IDA ALDRIDGE     Progress Notes    Return Weight Management Health Coaching Note    Mallory Sargent          MRN: [id number]  : 1965  KATLYN: 2022    Health  Visit #: 6b  Type of Visit: 24 week    Telephone Visit - Call: [phone]     Initial Start Date: 2021  Graduation Date: 2022 - Notes ~ #11  visit.(see above + I'm off the  wk of May)     Provider on : LINDSEY Marin per Provider Obesity Class 2 (BMI 35 to 39.9).  Initial Height and Wt: 5' 10.5 , 256 lbs   BMI = 36.21 kg/m     ASSESSMENT:   Visit on : 228.4 lb (self-reported), BMI = 32.3 kg/m , ~ 10.8% wt loss, cumulative wt loss 27.6 lbs    Current Weight: 225.0 lb (self-reported),  Average Daily Water: aver. 64  oz/day, 50 oz to 80 oz/day    See 5:30 pm notes - pt had to r/s her last visit so extended today's to make up.      SIGNATURE:  IDA AGOSTO, Certified Health  on 3/7/2022 at 5:11 PM

## 2022-03-07 NOTE — PROGRESS NOTES
IDA WYNN     Progress Notes    Return Weight Management Health Coaching Note    Mallory Sargent          MRN: [id number]  : 1965  KATLYN: 2022    Health  Visit #: 7  Type of Visit: 24 week  Telephone Visit - Call: [phone]     Initial Start Date: 2021  Graduation Date: 2022 - Notes ~ #11  visit.(see above + I'm off the 1st wk of May)     Provider on : LINDSEY Marin per Provider Obesity Class 2 (BMI 35 to 39.9).  Initial Height and Wt: 5' 10.5 , 256 lbs   BMI = 36.21 kg/m     ASSESSMENT: (See 22 Visit Notes for more weigh-ins)  HC Visit on : 228.4 lb (self-reported), BMI = 32.3 kg/m , ~ 10.8% wt loss, cumulative wt loss 27.6 lbs    Current Weight: 225 lbs (self-reported), BMI = 31.8 kg/m   Average Daily Water: aver. 64  oz/day, 50 oz to 80 oz/day  Weight Loss Medication: Saxenda  Nutrition Plan: Other: Regular Diet, per Rd; 22 - only using products on busier days ~ 5 products/wk.     Additional Program Support.  (Refer to 21 visit notes for addt'l info.)  Measurements & Forms sent via [employer] on 11/15/21  [email or web address] Access Begins/Ends: 21 - 22    Highlights from Past Week's and Today's Visit:  *She is feeling great ~ her wt loss & the actions steps she has in place to continue her progress.  *With the above in place, she is ready to work on enhancing her well-being knowledge/learning.  *She feels this learning will have her feeling more self-assured ~ maintaining her wt loss... which is one reason she came to this program.    Action Steps pt set until next HC Visit are:  1) EWB - She wants to schedule well-being growth time. 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  - She will reflect on what workbook & audios she  wants to focus on & create time in her schedule - perhaps 20-60 minutes/wk? Tbd.    2) Other  - Schedule with Provider Maury & RD Mary    Next Visit: discuss life purpose & visualization resources to assess where she wants to go in next 6 months    PATIENT EDUCATION PROVIDED:  OEQ -Captured insights &/or awareness gained & other pt's perceived victories.  Assessed How Visit Today Would Best Serve Pt  Progress Review: Behavioral / Capture Wt & Water Intake  Affirmations / Character Strength Reflection / Value Alignment  OEQ - on topics noted above  Review BMI & % Wt Loss Changes  Pt requested resource emailed; no phi in email.    HC Visit #1 11/12, #2a 11/19, #2b 11/29, #3 12/10, #4 12/24 > 1/7, #5 1/28 > 2/4, #6 2/11 &  2/23> 3/7, #7 3/7, #8 4/1, #9 4/11, #10 4/29, #11 5/11.     24-wk HLP Provider/RD visits: Provider 11/4/, 12/10, 2/10; RD 11/4 - holding on scheduling w/RD until late March - April.    FOLLOW-UP: Reminder to switch to phone or reschedule by calling [phone].    TIME: ~30+/- minutes spent with patient, care coordination, sending resources & charting.       SIGNATURE:  IDA AGOSTO, Certified Health  on 3/7/2022 at 5:19 PM

## 2022-03-16 ENCOUNTER — TELEPHONE (OUTPATIENT)
Dept: SURGERY | Facility: CLINIC | Age: 57
End: 2022-03-16
Payer: COMMERCIAL

## 2022-03-16 NOTE — TELEPHONE ENCOUNTER
Reason for call:  Other   Patient called regarding (reason for call): call back  Additional comments: Patient would like a call back from CHRIS Mendiola on how to proceed for scheduling her final follow up for the 24 week program. She was told it needed to be in person, but first available with Maury is not until June and any other provider is not available until May 5th, which she is unable to do since she works. Her program ends on 4/29. Please call the patient to discuss/assist on how to schedule.     Phone number to reach patient:  Home number on file [phone] (home)    Best Time:  Anytime     Can we leave a detailed message on this number?  YES    Travel screening: Not Applicable

## 2022-04-01 ENCOUNTER — TELEPHONE (OUTPATIENT)
Dept: SURGERY | Facility: CLINIC | Age: 57
End: 2022-04-01

## 2022-04-01 ENCOUNTER — VIRTUAL VISIT (OUTPATIENT)
Dept: SURGERY | Facility: CLINIC | Age: 57
End: 2022-04-01
Payer: COMMERCIAL

## 2022-04-01 ENCOUNTER — VIRTUAL VISIT (OUTPATIENT)
Dept: SURGERY | Facility: CLINIC | Age: 57
End: 2022-04-01

## 2022-04-01 VITALS — WEIGHT: 221.6 LBS | BODY MASS INDEX: 31.35 KG/M2

## 2022-04-01 DIAGNOSIS — E66.811 CLASS 1 OBESITY DUE TO EXCESS CALORIES WITHOUT SERIOUS COMORBIDITY WITH BODY MASS INDEX (BMI) OF 32.0 TO 32.9 IN ADULT: ICD-10-CM

## 2022-04-01 DIAGNOSIS — E66.09 CLASS 1 OBESITY DUE TO EXCESS CALORIES WITHOUT SERIOUS COMORBIDITY WITH BODY MASS INDEX (BMI) OF 32.0 TO 32.9 IN ADULT: ICD-10-CM

## 2022-04-01 DIAGNOSIS — E66.9 OBESE: Primary | ICD-10-CM

## 2022-04-01 PROCEDURE — 97803 MED NUTRITION INDIV SUBSEQ: CPT | Mod: GT | Performed by: DIETITIAN, REGISTERED

## 2022-04-01 PROCEDURE — 99207 PR MWM HEALTH COACH NO CHARGE: CPT | Performed by: COMMUNITY HEALTH WORKER

## 2022-04-01 NOTE — PROGRESS NOTES
IDA WYNN     Progress Notes    Return Weight Management Health Coaching Note    Mallory Sargent          MRN: [id number]  : 1965  KATLYN: 2022    Health  Visit #: 8  Type of Visit: 24 week  Telephone Visit - Call: [phone]     Initial Start Date: 2021  Graduation Date: 2022 - Notes ~ #11  visit.(see above + I'm off the  wk of May)     Provider on : LINDSEY Marin per Provider Obesity Class 2 (BMI 35 to 39.9).  Initial Height and Wt: 5' 10.5 , 256 lbs   BMI = 36.21 kg/m      ASSESSMENT: (See 22 Visit Notes for more weigh-ins)  HC Visit on : 228.4 lb (self-reported), BMI = 32.3 kg/m , ~ 10.8% wt loss, cumulative wt loss 27.6 lbs  ... on 3/7: 225 lbs (self-reported), BMI = 31.8 kg/m     Current Weight: 221.6 lbs (self-reported), BMI = 31.3 kg/m   Average Daily Water: aver. 64  oz/day, 50 oz to 80 oz/day  Weight Loss Medication: Saxenda  Nutrition Plan: Other: Regular Diet, per Rd; 22 - only using products on busier days ~ 5 products/wk.     Additional Program Support.  (Refer to 21 visit notes for addt'l info.)  Measurements & Forms sent via [employer] on 11/15/21  [employer] Access Begins/Ends: 21 - 22    Highlights from Past Week's and Today's Visit:  *She is noticing guilt ~ not doing enough with her well-being growth work. >>> She has the perspectives to know that this doesn't serve her well & would like to create change here.     Action Steps pt set until next HC Visit are:  1) Nutrition.  - She began reading her intuitive eating book & finds it interesting & relevant. She wants to work twds being consistent with her well-being growth work, starting with this book.  She wants to place her intuitive eating book & workbook in her environment to provide her a reminder to work on it.  Until her next health  she wants to read something on the days she doesn't work; she works 3 to 4 days a week.    - When she is  "noticing guilt ~ not taking time to dive into the well-being resources she is curious about. To let go of this, she wrote some statements. She has some other ones she wrote previously.    She likes the idea of setting & ending her day with intention by reading them. She is thinking posting these on her bathroom mirror and habit stacking it with evening & morning routine.       Next Visit: capture water intake & review % wt loss  - cont discussion on: pre-frame, reframe, post-frame questions  *She also has her ashok's workbook to add in the future.    Maintenance Plan Discussion.  She would like to use her visit with Maury on 5/3 as a restart visit, too. Connecting with team to inquire if 30 minutes enough for Maury?   - She understood that this visit was \"squeezed in\" and she doesn't want to reschedule this one.  - If not, she requested a call to add a follow up visit.If not with Maury, or another provider if needed.   - Given her home weigh-in and online BMI calculator, her current BMI = 31.3 kg/m . Even if her BMI goes down the next month, it looks like she has obesity-related co-morbities that would qualify her. RN Sophia or provider will confirm this with pt.  *If for some reason she wouldn't qualify, she is open to doing extension pkgs.       PATIENT EDUCATION PROVIDED:  OEQ -Captured insights &/or awareness gained & other pt's perceived victories.  Assessed How Visit Today Would Best Serve Pt  Progress Review: Behavioral / Capture Wt  Affirmations / Character Strength Reflection / Value Alignment  OEQ - on topics noted above  Review BMI   Discussed Maintenance Plan Options  Pt requested resource emailed; no phi in email.    HC Visit #1 11/12, #2a 11/19, #2b 11/29, #3 12/10, #4 12/24 > 1/7, #5 1/28 > 2/4, #6 2/11 &  2/23> 3/7, #7 3/7, #8 4/1, #9 4/11, #10 4/29, #11 5/11.     24-wk HLP Provider/RD visits: Provider 11/4/, 12/10, 2/10; RD 11/4 - holding on scheduling w/RD until late March - April.    FOLLOW-UP: " Reminder to switch to phone or reschedule by calling [phone].    TIME: ~30+/- minutes spent with patient, care coordination, sending resources & charting.     SIGNATURE:  IDA AGOSTO, Certified Health  on 4/1/2022 at 11:04 AM

## 2022-04-01 NOTE — PROGRESS NOTES
"Video-Visit Details    Type of service:  Video Visit    Video Start Time (time video started): 9:33    Video End Time (time video stopped): 10:00    Originating Location (pt. Location): Home    Distant Location (provider location):  [facility]     Mode of Communication:  Video Conference via [facility] WEIGHT LOSS FOLLOW UP    DIAGNOSIS:  Obese, class I    NUTRITION HISTORY:  Breakfast: Cereal, toast, fruit, oatmeal, yogurt, pancakes (6:30)  Lunch:  Frozen meals from the store like lean cuisine  Dinner: Meat, potatoes, veggies (Hello Fresh) 1000 calorie meal; eats on the couch   Snacks: Cottage cheese, crackers, cheese (11:00)  Snacks on drive home and until goes to bed (jed chips and hummus, KIND bar, yogurt); ice cream or sweets   Beverage Choices: water; coffee     Exercise: Patient exercises 1-2 times per week (hiking with ).  Patient has stationary bike at home that she could use.       Patient feels her biggest struggle with food is snacking.  Patient feels her work days are easiest as has scheduled meals. 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  Patient has 35 minute commute to work.  Patient often snacks in the car on the way home from work.  Patient has tried Weight Watchers in the past but found it was not helpful. Patient met with RD in June and did not have follow up appointment.  Patient felt tracking foods in My Fitness Pal was useful.      4/1/2022 Patient continues to track on MyFitnessPal-aiming for 1450 calories, 40% CHO, 25% protein and 35% fat.  Patient feels she still struggles with sweets (Crumble cookies).   Patient has stopped snacking in the car on the way home from work.      Ptwhqdnc-9-3 times per week (weekend hiking); stationary bike     ANTHROPOMETRICS:  Height: 5'10.5\"  Weight: 256 lbs  Current weight: 221.6 lbs   Weight Change: 34.4 lbs decrease   BMI: 31.3 " kg/m2    MEDICATIONS:  Saxenda    EVALUATION/PROGRESS TOWARDS GOALS:  Previous Goals:  Reduce night time eating by stopping eating after 8 PM and turn off kitchen light -improving   Eliminate car eating -met  Sit at table when eating -improving      Previous Nutrition Diagnosis:  Obese class II related to excess energy intake as evidence by BMI of 36 kg/m2-no change      Current Nutrition Diagnosis:   Obese class I related to excess energy intake as evidence by BMI of 31.3 kg/m2    INTERVENTION:    Nutrition Prescription:  Recommend modified nutrient intake by decreasing energy intake    Implementation:    Meals and Snacks: 3 meals + snacks if hungry     Nutrition Education (Content):    Discussed previous goals and determined new goals    Encouraged increase in physical activity    Supported patient in attempted weight loss and behavior changes    Congratulated patient on successful weight loss     Patient verbalizes understanding of diet by stating will continue tracking     Expected patient engagement: good     Goals:  Practice alternatives to evening eating   Continue tracking intake   Add 1-2 additional days exercise (before or after work)   Sit at table when eating    Follow Up/Monitoring:  Other  -  patient to follow up as desired     Time Spent With Patient:  27 Minutes    Staci Moran, RD, LD  [facility] Outpatient Dietitian  [phone] (office phone)

## 2022-04-01 NOTE — TELEPHONE ENCOUNTER
Called pt to discuss 24 week HLP FULL REPEAT. Pt will qualify if BMI in between 27-30 with her comorbs. Pt aware and has next appt with Maury 5/3 for full repeat. No further questions per pt.    Sophia Petit RN on 4/1/2022 at 2:03 PM

## 2022-04-01 NOTE — TELEPHONE ENCOUNTER
"----- Message from Lulu Castellon sent at 4/1/2022 12:02 PM CDT -----  Regarding: Pt Plans to Repeat Program  Contact: [phone]  karine Cortesie plans to repeat the 24-wk HLP. She would like to use her visit with Maury on 5/3 as a restart visit, too. Is 30 minutes enough for Maury? (She mentioned this visit was \"squeezed in\" and she doesn't want to reschedule this one.)    - If not can you reach out to Mallory and add an follow up visit.If not with Maury, another provider.   Pt is off today until Wednesday so availability is best prior to Wednesday.  Call her [phone]; ok to leave a detailed vm.    - Given her home weigh-in and online BMI calculator, her current BMI = 31.3 kg/m . Even if her BMI goes down the next month, it looks like she has enough co-morbities to qualify. Can you confirm this with her?    #If for some reason she wouldn't qualify, she is open to doing extension pkgs. Yet she feels the extra support with 24-wk HLP would be best & more financially advantageous for her as a [employer] w/ [employer].      Thank you! - G    "

## 2022-04-01 NOTE — PROGRESS NOTES
IDA WYNN     Progress Notes    Return Weight Management Health Coaching Note    Mallory Sargent          MRN: [id number]  : 1965  KATLYN: 2022    Health  Visit #: 9  Type of Visit: 24 week  Telephone Visit - Call: [phone]     Initial Start Date: 2021  Graduation Date: 2022 - Notes ~ #11  visit.(see above + I'm off the  wk of May)     Provider on : LINDSEY Marin per Provider Obesity Class 2 (BMI 35 to 39.9).  Initial Height and Wt: 5' 10.5 , 256 lbs   BMI = 36.21 kg/m     ASSESSMENT: (See 22 Visit Notes for more weigh-ins)  HC Visit on : 228.4 lb (self-reported), BMI = 32.3 kg/m , ~ 10.8% wt loss, cumulative wt loss 27.6 lbs  ... on 3/7: 225 lbs (self-reported), BMI = 31.8 kg/m   ... on : 221.6 lbs (self-reported), BMI = 31.3 kg/m     Current Weight: ran out of time to capture  Average Daily Water: aver. 64  oz/day, 50 oz to 80 oz/day  Weight Loss Medication: Saxenda  Nutrition Plan: Other: Regular Diet, per Rd; 22 - only using products on busier days ~ 5 products/wk.     Additional Program Support.  (Refer to 21 visit notes for addt'l info.)  Measurements & Forms sent via [employer] on 11/15/21  [employer] Access Begins/Ends: 21 - 22    Highlights from Past Week's and Today's Visit:  *She got confirmation from ARMAND MARTINS re repeating the program: Yes her comorbs definitely qualify & her upcoming visit with Maury 30 minutes is sufficient.  *She was able to let go of any guilt she felt ~ not diving into her well-being materials as she had planned >>> hence the statements were no longer relevant.   *She is still noticing some doubt & fears ~ maintaining her wt loss over time.      Action Steps pt set until next HC Visit are:    She wants to:  1) Nutrition  - Read one page of her intuitive eating book today.  She feels applying the 3 E's of Experimentation (ease) of setting the goal of one page will get her to open  the book & easily have her reading 2 chapters or more.    Environment: Keeping the book out & committing to reading the 1 pg today on her day off.   Enjoyable: She enjoyed what she read of the book so far.    Next Visit: capture weigh-in & water intake    PATIENT EDUCATION PROVIDED:  OEQ -Captured insights &/or awareness gained & other pt's perceived victories.  Assessed How Visit Today Would Best Serve Pt  Progress Review: Behavioral  Affirmations / Character Strength Reflection / Value Alignment  OEQ - on topics noted above    HC Visit #1 11/12, #2a 11/19, #2b 11/29, #3 12/10, #4 12/24 > 1/7, #5 1/28 > 2/4, #6 2/11 &  2/23> 3/7, #7 3/7, #8 4/1, #9 4/11, #10 4/29, #11 5/11.     24-wk HLP Provider/RD visits: Provider 11/4/21, 12/10, 2/10/22; RD 11/4 - holding on scheduling w/RD until late March - April.    Restarting June 2022: Provider Maury 4/29, 6/21    (Repeating) HC Visit #1 6/7, #2 6/28, #3 7/22, #4 8/10    FOLLOW-UP: Reminder to switch to phone or reschedule by calling [phone].    TIME: ~30+/- minutes spent with patient, care coordination, sending resources & charting.     SIGNATURE:  IDA AGOSTO, Certified Health  on 4/11/2022 at 2:04 PM

## 2022-04-11 ENCOUNTER — VIRTUAL VISIT (OUTPATIENT)
Dept: SURGERY | Facility: CLINIC | Age: 57
End: 2022-04-11

## 2022-04-11 DIAGNOSIS — E66.9 OBESE: Primary | ICD-10-CM

## 2022-04-11 PROCEDURE — 99207 PR MWM HEALTH COACH NO CHARGE: CPT | Performed by: COMMUNITY HEALTH WORKER

## 2022-04-29 ENCOUNTER — VIRTUAL VISIT (OUTPATIENT)
Dept: SURGERY | Facility: CLINIC | Age: 57
End: 2022-04-29

## 2022-04-29 DIAGNOSIS — E66.9 OBESE: Primary | ICD-10-CM

## 2022-04-29 PROCEDURE — 99207 PR MWM HEALTH COACH NO CHARGE: CPT | Performed by: COMMUNITY HEALTH WORKER

## 2022-05-03 ENCOUNTER — OFFICE VISIT (OUTPATIENT)
Dept: SURGERY | Facility: CLINIC | Age: 57
End: 2022-05-03
Payer: COMMERCIAL

## 2022-05-03 ENCOUNTER — DOCUMENTATION ONLY (OUTPATIENT)
Dept: SURGERY | Facility: CLINIC | Age: 57
End: 2022-05-03

## 2022-05-03 VITALS
BODY MASS INDEX: 30.84 KG/M2 | OXYGEN SATURATION: 98 % | HEART RATE: 73 BPM | HEIGHT: 71 IN | WEIGHT: 220.3 LBS | DIASTOLIC BLOOD PRESSURE: 74 MMHG | SYSTOLIC BLOOD PRESSURE: 112 MMHG

## 2022-05-03 DIAGNOSIS — K21.00 GASTROESOPHAGEAL REFLUX DISEASE WITH ESOPHAGITIS WITHOUT HEMORRHAGE: ICD-10-CM

## 2022-05-03 DIAGNOSIS — E66.09 CLASS 1 OBESITY DUE TO EXCESS CALORIES WITH SERIOUS COMORBIDITY AND BODY MASS INDEX (BMI) OF 31.0 TO 31.9 IN ADULT: Primary | ICD-10-CM

## 2022-05-03 DIAGNOSIS — E66.811 CLASS 1 OBESITY DUE TO EXCESS CALORIES WITH SERIOUS COMORBIDITY AND BODY MASS INDEX (BMI) OF 31.0 TO 31.9 IN ADULT: Primary | ICD-10-CM

## 2022-05-03 DIAGNOSIS — E78.5 HYPERLIPIDEMIA LDL GOAL <130: ICD-10-CM

## 2022-05-03 PROCEDURE — 99213 OFFICE O/P EST LOW 20 MIN: CPT | Performed by: PHYSICIAN ASSISTANT

## 2022-05-03 NOTE — NURSING NOTE
Patients first measurements in the medical weight loss program     Date: 11/4/21  Neck: 16  Waist: 45  Hip: 47      Date: 05/03/22    Neck: 15  Waist: 44.5  Hip: 46

## 2022-05-03 NOTE — PROGRESS NOTES
RN met with patient following provider visit at which patient signed up for 24 Week Plan full REPEAT.    Discussed with patient the 24 Week Healthy Lifestyle Program, including cost/payment, [employer] membership, schedule of visits, and healthy habits.    Reviewed schedule/appointments.    24 Week HLP kit given to patient.    Summarized with patient--Patient plan is 24 week HLP.  The patient graduation date is 11/22/22.  The fee is $99. The fee will be applied to [facility] after initial Health  visit.    Informed pt to sign the form 24-week Healthy Lifestyle Plan Costs Overview and to return to clinic via mail, fax, or [facility].    Reviewed contact information, address and e-mail address with pt: yes    Patient assisted by staff to make initial Health  visit and follow-up appointments for 24 week program.        Sophia Petit RN on 5/3/2022 at 2:24 PM

## 2022-05-03 NOTE — PATIENT INSTRUCTIONS
It was good seeing you today!  Please schedule for a return visit in August.      Have a great day    Maury PITTS          Eat Better ? Move More ? Live Well    Eat 3 nutrient-rich meals each day    Don t skip meals--it will cause you to overeat later in the day!    Eating fiber (vegetables/fruits/whole grains) and protein with meals helps you stay full longer    Choose foods with less than 10 grams of sugar and 5 grams of fat per serving to prevent excess calories and weight re-gain  Eat around the same times each day to develop a routine eating schedule   Avoid snacking unless physically hungry.   Planned snacks: 1-2 times per day and no more than 150 calories    Eat protein first   Protein helps with healing, maintaining adequate muscle mass, reducing hunger and optimizing nutritional status   Aim for 60-80 grams of protein per day   Fill up on Fiber   Fiber comes from plants--fruits, veggies, whole grains, nuts/seeds and beans   Fiber is low in calories, high in phytonutrients and helps you stay full longer   Aim for 25-35 grams per day by eating fiber with meals and snacks  Eat S-[id number]   Take 20-30 minutes to eat each meal by taking small bites, chewing foods to applesauce consistency or 20-30 times before you swallow   Eating foods too fast can delay satiety/fullness signals and increase overeating   Slow down your eating by using toddler utensils, putting your fork/spoon down between bites and not watching TV or emailing during meals!   Keep a Journal         Writing down what you eat, how you feel and when you are active helps you identify new changes to work on from week to week         Look for ways to cut 100 calories from your current diet 2-3 times per day  Drink 64 ounces of 0-Calorie drinks between meals   Water   Zero calorie Propel  or Vitamin Water     SoBe Lifewater  Zero Calories   Crystal Light , Sugar-Free Chaka-Aid , and other sugar-free lemonade or flavored hough   Keep Caffeine to less  than 300mg per day ie: 3-6oz cups coffee     Work up to 45-60 minutes of physical activity most days of the week   Helps with losing weight and prevent regaining those extra pounds!    Do a combo of cardio (walking/water exercises) and strength training (lifting weights/Vinyasa yoga)    Avoid Mindless Eating   Be present when you eat--take note of the smell, taste and quality of your food   Make a list of alternative activities you could do to prevent eating out of boredom/stress  Go for a walk, call a friend, chew gum, paint your nails, re-organize the garage, etc

## 2022-05-03 NOTE — PROGRESS NOTES
May 3, 2022        Return Medical Weight Management Note       Mallory Sargent  MRN:  [id number]  :  1965      Dear Blanka Lucio,    I had the pleasure of doing a visit with your patient Mallory Sargent.  She is a 56 year old female who I am continuing to see for treatment of obesity related to:       11/3/2021   I have the following health issues associated with obesity: Pre-Diabetes, High Cholesterol, Sleep Apnea, GERD (Reflux), Fatty Liver   I have the following symptoms associated with obesity: Depression, Back Pain, Fatigue, Hip Pain       INTERVAL HISTORY:  Patient continues with Saxenda 1.8 mg in the evening. Has gotten nauseated and vomited 3-4 times over the past several months. The last time she got nauseated was after having pizza. Continues to take omeprazole daily.  No abdominal pain. Went up to 2.4 mg for a couple weeks but a bit more nauseated so went back to 1.8 mg.   Patient is happy with her weight loss.       CURRENT WEIGHT:   220 lbs 4.8 oz    Wt Readings from Last 4 Encounters:   22 220 lb 4.8 oz (99.9 kg)   22 221 lb 9.6 oz (100.5 kg)   22 230 lb (104.3 kg)   22 236 lb (107 kg)       BARIATRIC METRICS:  Current Weight: 220 lb 4.8 oz (99.9 kg)  Body mass index is 31.16 kg/m .   Wt change since last visit (lbs): -1.3  Cumulative weight loss (lbs): 35.7      DIETARY HISTORY  Meals Per Day: 3  Food Diary:   B: Yogurt with blueberries and banana and keto granola. Water and black coffee  L: tuna sandwich on sour dough bread - water  D: [employer] hamburger happy meal - and a second hamburger water  Snacks Per Day: 1  Typical Snack: piece of fruit  Fluid Intake: 80 oz water daily, coffee in the AM with oat milk. 1 drink of alcohol every 2-3 months. Has 1 Severino sparkling drink daily        Exercise: going on walks/hikes 2-3 times weekly for 35 minutes      ROS: 5/3/22  General  Fatigue: improved  Sleep Quality: slightly improved  Birth  "Control: postmenopausal  HEENT  Hx of glaucoma: No  Vision changes: No  Cardiovascular  Chest Pain with Exertion: No  Palpitations: No  Hx of heart disease: No  Had a recent EKG and Stress echo that was reviewed today   Pulmonary  Shortness of breath at rest: No  Shortness of breath with exertion: No  Snoring: Yes - was diagnosed with JESSIE 3 years ago with mild JESSIE. No treatment. Met [facility] sleep center in the past month. Was advised to meet with a sleep dentist for a mouth guard. Has not made appointment  Gastrointestinal  Heartburn: yes takes omeprazole daily. Does think it is a bit worse since starting the Saxenda.  Abdominal pain: No  History of pancreatitis: No  Severe constipation: No - but does mild. Takes a fiber gummy - as needed miralax  Hx of bowel obstruction: No  Nausea with medication changes - manageable  Gastrourinary  History of kidney stones: No  Psychiatric  Moods Stable: Yes  History of drug abuse: No  No history of eating disorder      MEDICATIONS:   Current Outpatient Medications   Medication     aspirin 81 MG tablet     augmented betamethasone dipropionate (DIPROLENE) 0.05 % external lotion     FIBER PO     insulin pen needle (31G X 5 MM) 31G X 5 MM miscellaneous     liraglutide - Weight Management (SAXENDA) 18 MG/3ML pen     magnesium 250 MG tablet     Omega-3 Fatty Acids (FISH OIL ADULT GUMMIES PO)     omeprazole (PRILOSEC) 20 MG DR capsule     No current facility-administered medications for this visit.       PE:  /74   Pulse 73   Ht 5' 10.5\" (1.791 m)   Wt 220 lb 4.8 oz (99.9 kg)   LMP 10/01/2015 (Approximate)   SpO2 98%   BMI 31.16 kg/m    GENERAL: Healthy, alert and no distress  EYES: Eyes grossly normal to inspection.  No discharge or erythema, or obvious scleral/conjunctival abnormalities.  RESP: No audible wheeze, cough, or visible cyanosis.  No visible retractions or increased work of breathing.    SKIN: Visible skin clear. No significant rash, abnormal pigmentation or " lesions.  NEURO: Cranial nerves grossly intact.  Mentation and speech appropriate for age.  PSYCH: Mentation appears normal, affect normal/bright, judgement and insight intact, normal speech and appearance well-groomed.        ASSESSMENT/PLAN:   Class 1 severe obesity due to excess calories with Body Mass Index (BMI) of 31  GERD without esophagitis  High cholesterol  JESSIE      Congratulated patient on her overall weight loss!  Emphasized the importance of lifestyle and diet for long term success.  She will continue with the Saxenda 1.8 mg daily dose. Since she just refilled will send over request from pharmacy when needed. Enough will be sent over to get her through until her next follow up August.       Maury Mcclure MS, PA-C    21 minutes spent on the date of the encounter doing chart review, review of test results, patient visit and documentation

## 2022-05-11 ENCOUNTER — DOCUMENTATION ONLY (OUTPATIENT)
Dept: SURGERY | Facility: CLINIC | Age: 57
End: 2022-05-11

## 2022-05-11 NOTE — PROGRESS NOTES
"Pt needed to r/s her 5/11 HC Visit.    Pt had to r/s her 11th HC visit today. Due to my availability & a previous visit r/s, today's visit was already scheduled past her GD.    Given all ctr's note I will assume she is declining her 11th visit: \"Patient (work change. Not resched, ok waiting till 6/7-djr)\"    Will confirm on 6/7.    GDP  "

## 2022-05-25 DIAGNOSIS — E66.812 CLASS 2 SEVERE OBESITY DUE TO EXCESS CALORIES WITH SERIOUS COMORBIDITY AND BODY MASS INDEX (BMI) OF 36.0 TO 36.9 IN ADULT (H): ICD-10-CM

## 2022-05-25 DIAGNOSIS — E66.01 CLASS 2 SEVERE OBESITY DUE TO EXCESS CALORIES WITH SERIOUS COMORBIDITY AND BODY MASS INDEX (BMI) OF 36.0 TO 36.9 IN ADULT (H): ICD-10-CM

## 2022-05-25 RX ORDER — FLURBIPROFEN SODIUM 0.3 MG/ML
SOLUTION/ DROPS OPHTHALMIC
Qty: 100 EACH | Refills: 3 | Status: SHIPPED | OUTPATIENT
Start: 2022-05-25 | End: 2023-06-27

## 2022-05-25 RX ORDER — LIRAGLUTIDE 6 MG/ML
INJECTION, SOLUTION SUBCUTANEOUS
Qty: 15 ML | Refills: 1 | Status: SHIPPED | OUTPATIENT
Start: 2022-05-25 | End: 2022-10-05

## 2022-06-07 ENCOUNTER — VIRTUAL VISIT (OUTPATIENT)
Dept: SURGERY | Facility: CLINIC | Age: 57
End: 2022-06-07

## 2022-06-07 ENCOUNTER — APPOINTMENT (OUTPATIENT)
Dept: URBAN - METROPOLITAN AREA CLINIC 253 | Age: 57
Setting detail: DERMATOLOGY
End: 2022-06-07

## 2022-06-07 VITALS — RESPIRATION RATE: 15 BRPM | HEIGHT: 70.5 IN | WEIGHT: 218 LBS

## 2022-06-07 DIAGNOSIS — L82.0 INFLAMED SEBORRHEIC KERATOSIS: ICD-10-CM

## 2022-06-07 DIAGNOSIS — D22 MELANOCYTIC NEVI: ICD-10-CM

## 2022-06-07 DIAGNOSIS — Z71.89 OTHER SPECIFIED COUNSELING: ICD-10-CM

## 2022-06-07 DIAGNOSIS — D18.0 HEMANGIOMA: ICD-10-CM

## 2022-06-07 DIAGNOSIS — E66.9 OBESE: Primary | ICD-10-CM

## 2022-06-07 DIAGNOSIS — L81.4 OTHER MELANIN HYPERPIGMENTATION: ICD-10-CM

## 2022-06-07 DIAGNOSIS — L82.1 OTHER SEBORRHEIC KERATOSIS: ICD-10-CM

## 2022-06-07 DIAGNOSIS — L21.8 OTHER SEBORRHEIC DERMATITIS: ICD-10-CM

## 2022-06-07 PROBLEM — D18.01 HEMANGIOMA OF SKIN AND SUBCUTANEOUS TISSUE: Status: ACTIVE | Noted: 2022-06-07

## 2022-06-07 PROBLEM — D22.5 MELANOCYTIC NEVI OF TRUNK: Status: ACTIVE | Noted: 2022-06-07

## 2022-06-07 PROCEDURE — OTHER ADDITIONAL NOTES: OTHER

## 2022-06-07 PROCEDURE — OTHER MIPS QUALITY: OTHER

## 2022-06-07 PROCEDURE — OTHER LIQUID NITROGEN: OTHER

## 2022-06-07 PROCEDURE — OTHER PHOTO-DOCUMENTATION: OTHER

## 2022-06-07 PROCEDURE — 99207 PR MEDICAL WEIGHT MANAGEMENT PROGRAM EMPLOYEE ENROLLMENT: CPT | Performed by: COMMUNITY HEALTH WORKER

## 2022-06-07 PROCEDURE — 99213 OFFICE O/P EST LOW 20 MIN: CPT | Mod: 25

## 2022-06-07 PROCEDURE — 99207 PR MWM HEALTH COACH NO CHARGE: CPT | Performed by: COMMUNITY HEALTH WORKER

## 2022-06-07 PROCEDURE — 17110 DESTRUCT B9 LESION 1-14: CPT

## 2022-06-07 PROCEDURE — OTHER COUNSELING: OTHER

## 2022-06-07 PROCEDURE — OTHER OBSERVATION: OTHER

## 2022-06-07 ASSESSMENT — LOCATION ZONE DERM
LOCATION ZONE: TRUNK
LOCATION ZONE: SCALP
LOCATION ZONE: ARM

## 2022-06-07 ASSESSMENT — LOCATION DETAILED DESCRIPTION DERM
LOCATION DETAILED: INFERIOR THORACIC SPINE
LOCATION DETAILED: LEFT MEDIAL FRONTAL SCALP
LOCATION DETAILED: RIGHT PROXIMAL DORSAL FOREARM
LOCATION DETAILED: LEFT SUPERIOR UPPER BACK

## 2022-06-07 ASSESSMENT — LOCATION SIMPLE DESCRIPTION DERM
LOCATION SIMPLE: LEFT SCALP
LOCATION SIMPLE: LEFT UPPER BACK
LOCATION SIMPLE: UPPER BACK
LOCATION SIMPLE: RIGHT FOREARM

## 2022-06-07 NOTE — PROCEDURE: LIQUID NITROGEN
Spray Paint Text: The liquid nitrogen was applied to the skin utilizing a spray paint frosting technique.
Show Aperture Variable?: Yes
Spray Paint Technique: No
Medical Necessity Clause: This procedure was medically necessary because the lesions that were treated were:
Medical Necessity Information: It is in your best interest to select a reason for this procedure from the list below. All of these items fulfill various CMS LCD requirements except the new and changing color options.
Post-Care Instructions: I reviewed with the patient in detail post-care instructions. Patient is to wear sunprotection, and avoid picking at any of the treated lesions. Pt may apply Vaseline to crusted or scabbing areas.
Detail Level: Detailed
Consent: The patient's consent was obtained including but not limited to risks of crusting, scabbing, blistering, scarring, darker or lighter pigmentary change, recurrence, incomplete removal and infection.

## 2022-06-07 NOTE — HPI: FULL BODY SKIN EXAMINATION
What Type Of Note Output Would You Prefer (Optional)?: Standard Output
What Is The Reason For Today's Visit?: Full Body Skin Examination
What Is The Reason For Today's Visit? (Being Monitored For X): the re-examination of lesions previously examined
Additional History: FBE. No concerns today. One spot on her forearm she is concerned about actually.

## 2022-06-07 NOTE — PROGRESS NOTES
IDA WYNN    Progress Notes    New Weight Management Health Coaching Note    Mallory Sargent          MRN: [id number]  : 1965  KATLYN: 2022    Health  Visit #: 1  Visit Type:  24 week   Telephone Visit - Call: [phone]     Restart Initial Start Date: 2022  Graduation Date: 2022     Per [facility] Maury Mcclure on 5/3: Obesity Class 1 (BMI 30 to 34.9).  Initial Height and Wt: 5' 10.5 , 220.3 lbs   BMI = 31.16 kg/m     Initial Start Date: 2021  Graduation Date: 2022 - Notes ~ #11  visit.(see above + I'm off the  wk of May)     Provider on : LINDSEY Marin per Provider Obesity Class 2 (BMI 35 to 39.9).  Initial Height and Wt: 5' 10.5 , 256 lbs   BMI = 36.21 kg/m     ASSESSMENT:  (See 22 Visit Notes for more weigh-ins)  HC Visit on : 228.4 lb (self-reported), BMI = 32.3 kg/m , ~ 10.8% wt loss, cumulative wt loss 27.6 lbs  ... on 3/7: 225 lbs (self-reported), BMI = 31.8 kg/m   ... on : 221.6 lbs (self-reported), BMI = 31.3 kg/m     Current Weight: 218 lbs (self-reported), BMI = 30.8 kg/m  - she was at 216 prior to her trip  Average Daily Water: aver. 64  oz/day, 50 oz to 80 oz/day  Weight Loss Medication: Saxenda  Nutrition Plan: Other: Regular Diet, per Rd; 22 - only using products on busier days ~ 5 products/wk.    Additional Program Support.  Payment Dropped:  22     24-week Healthy Lifestyle Gift Package Codes MyCharted: 22  Confirmed Payment & [employer] forms signed: 5/3/22    Pillar Assessment Completed on: 22  Estore: Pt will self-enroll if opts to utilize.  Measurements: Confirmed pt MyCharted MA/Completed  Wellbeats Access Begins/Ends:  22 - 22  Support Group Availability, MyCharted info: 22    INITIAL INTAKE:  What brought pt to the program?  She is repeating program to continue her journey.    Pillars of Health Discussion.  The four pillars of well-being may impact your ability  to manage weight.    Pt had scheduling ?'s & was driving ran out of time - so r/s to her next visit.     Takeaways from initial Provider & RD Goals.    Provider: Discussion with Maury ~ nauseous & vomiting episodes    Highlights from Past Week's and Today's Visit:  *She feels great about her movement.  *She didn't track during her trip - she feels she made generally better choices than previous trips.   *She is pleased with her ability to reframe her doubting & inner critic thoughts.    Action Steps at Today's HC Visit.  1) Nutrition.  - Minnie into her nutritional intake & get back to tracking using Ace[employer] Pal - now that she is back from her trip.  She feels tracking really helps her be aware of her intake.     - Add back in her intuitive bk & wkbk. Frequency to schedule in time tbd.     2) EWB.  - Reframing scale experience more intentionally by using concepts of pre- & post framing.   - Add in assertiveness podcast on way home work. She wants to aim to do this 3x/wk.  - Consider posting an image or other reminder of short-term goal of moving into new BMI category when reaching 209.9. Perhaps on bathroom mirror Or other location to see daily?    3) Other  - Schedule with Maury sooner than August if she decides to explore other wt loss meds.  She feels her random episodes are related to her wt loss med. There seemed to be a longer time between her last two episodes; yet she reports them to be quite unpleasant & disruptive to her normal routine & healthy habits (result in changes in nutritional intake & decline in movement).   Reflecting today: If they occur more frequently than every 2 months, she wants to discuss other options with [facility].  She may proactively schedule, knowing she can always move out to her visit until August.  She wants to track the episodes to see if there is any correlation with something that may be triggering them & track frequency.     Next Visit: Reminder to Abdi on Gift Pkg - Add more  hc visits    PATIENT INFORMATION PROVIDED:  - 24 Week Healthy Lifestyle Plan Overview Handout:  o Explained the structure of the 24-week HLP & role of team members.  o Reviewed scheduling information.  o Discussed option to do coaching sessions via phone or AmWell.  - Explain the role of a HEALTH  and the FOUR Pillars of Health.  - Discussed telephonic visit & rescheduling process.  - Discussed well-being plan concept.  - Explained [email or web address] billing process.  - Reminder to return 24-wk HLP Patient Form.  - Explained MyChart process.  - Scheduled addt'l HC visit + encouraged pt to record in phone/office calendar.    - Wellbeats Discussion   - MyCharted My Contact Info, Program Contact Info including Support Group & [employer] [facility] [facility] phone #.   - Weigh-in Schedule and Measurement Discussions.  - Contact info (HC & Program), sent via Phizzle.  - Support Group info, sent via Phizzle.  - Pt requested resources emailed. No PHI in email.     HC Visit #1 6/7, #2 6/28, #3 7/22, #4 8/10, #5 , #6     24-wk HLP Provider/RD visits, 2022 repeating program: Provider Maury 5/3 & July or August; RD Mary     FOLLOW-UP: Scheduled next health  visits during telephonic visit today, reminder to switch to phone or reschedule by calling [phone].     TIME: 60+ minutes spent with patient, care coordination, dropping payment, sending resources & charting.     SIGNATURE:  IDA AGOSTO, Certified Health  on 6/7/2022 at 8:02 AM

## 2022-06-07 NOTE — PROGRESS NOTES
[facility]     Progress Notes    Return Weight Management Health Coaching Note    Mallory Sargent          MRN: [id number]  : 1965  KATLYN: 2022    Health  Visit #: 2 & 1b  Type of Visit: 24 week    Telephone Visit - Call: [phone]     Restart Initial Start Date: 2022  Graduation Date: 2022     Per [facility] Maury Mcclure on 5/3: Obesity Class 1 (BMI 30 to 34.9).  Initial Height and Wt: 5' 10.5 , 220.3 lbs   BMI = 31.16 kg/m     Initial Start Date: 2021  Graduation Date: 2022 - Notes ~ #11  visit.(see above + I'm off the 1st wk of May)     Provider on : LINDSEY Marin per Provider Obesity Class 2 (BMI 35 to 39.9).  Initial Height and Wt: 5' 10.5 , 256 lbs   BMI = 36.21 kg/m    ASSESSMENT:  (See 22 Visit Notes for more weigh-ins)  HC Visit on : 228.4 lb (self-reported), BMI = 32.3 kg/m , ~ 10.8% wt loss, cumulative wt loss 27.6 lbs  ... on 3/7: 225 lbs (self-reported), BMI = 31.8 kg/m   ... on : 221.6 lbs (self-reported), BMI = 31.3 kg/m   ... on : 218 lbs (self-reported), BMI = 30.8 kg/m  - she was at 216 prior to her trip    Current Weight: ran out of time to capture  Average Daily Water: aver. 64  oz/day, 50 oz to 80 oz/day  Weight Loss Medication: Saxenda  Nutrition Plan: Other: Regular Diet, per Rd; 22 - only using products on busier days ~ 5 products/wk.    Additional Program Support.  (See 22 notes for addt'l info)  Wellbeats Access Begins/Ends:  22 - 22    Pillars of Health Discussion.  The four pillars of well-being may impact your ability to manage weight.    Original Initial Level of Satisfaction completed on 21: All rated on a scale of 1-10.         Sleep: 6    Movement: 3    Nutrition: 3    Emotional Health/Wellbein     Sleep: Would like to be at a 8 in 6 months.   Notes: 8 = most nights (8 out of 10) she would be able to get back to sleep better. She struggles to go back to  sleep 4 out of 5 nights - reports feeling wide awake. She has a sleep consult in .      Movement: Would like to be at a 7 in 6 months.   Notes: She would like to have more energy & motivation to be active. She reports to have a high sedentary life. Plus she would like to add in structure movement at least a couple days a week. She wants to look forward to moving her body.  Her work position requires high amount of time on the phone; she is curious ~ adding in movement while at her desk; by the end of the day she notices she isn't motivated to move.     Nutrition: Would like to be at a 8 in 6 months.   Notes: She feels she chooses healthy meals yet over indulges with sweets.      Emotional Health/Wellbeing: Would like to be at a 8 in 6 months.   Notes: She would like to feel more connected with others. She isn't sure want is having her reach out & initiate the connection. She also feels the above pillars advancing will automatically advance this pillar.    Initial Pillar Assessment completed on 22: All rated on a scale of 1-10.        Sleep: 7 -8    Movement: 6    Nutrition: 8    Emotional Health/Wellbein -7     Sleep: Would like to maintain 7-8 in 6 months.   Notes: Reflecting back on her subjective reporting from  its been months since she wasn't able to get back to bed after waking up in middle of the night.   The only time she doesn't get enough sleep now is when she occasionally stays up late. Catching this tendency and getting to bed on time in these situations, she feels would have her at an 8.     Movement: Would like to be at a 7 in 6 months.   Notes: Reflecting on previous reporting, currently she feels good about high activity on her vacation and weekend hikes with husbands. Her apple watch helps her capture the increased movement during the day >>> reflecting on changes: being intentional about movement and naturally seeking for opportunities to move during her workday both have lead to  "this increase.  7 = 3-4 x/week substantial structured workout. Currently days she works walking 15-20 minutes which she doesn't feel is a substantial workout. She is hiking 2-3 x/week; she feels these are substantial structured workout.  She noted the apple watch & sleep track praveen >>> looking at this tracking has her making the connection that more movement is promoting better sleep.     Nutrition: Would like to maintain 8 in 6 months.   Notes: She feels her choices are aligned with RD's guidelines and properly nourishing her body. She notices a sense of freedom with her increased self-efficacy with a relationship with food.  She noted being in a much different place than in this past November. Her reports hat her general nutition to be nourishing, she actually values the nourishing food now, and if she intentionally chooses a dessert it no longer feels out of control.    Emotional Health/Wellbeing: Would like to be at a 8 in 6 months.   Notes: She feels good with achieving all her intentions with the exception of reaching out for connection with friends.   8 = would a bit more connection with friends & travel. Recently adding in travel (recent road trip with  & a planned cruise). She is also more consistent connection with her father; it is important for her to keep this connection perhaps improve slightly here with frequency.     Highlights from Past Week's and Today's Visit:  *She listened to a owning your edge episode of a podcast >>> found it insightful yet curious to listen more of this author's podcast to connect it more to her well-being journey.  *Acceptance that her well-being journey is a process meant to enjoy. One choice, one day not being \"perfect\" is less significant.   *Implementing setting & ending her day feels comforting >>> she is noticing greater self-compassion.    Action Steps pt set until next HC Visit are:    She wants to:  1) EWB.  - Setting & ending her day with intention - current " "intentions ~ nutrition pillar.   \"I'm done eating for today.\"  + another one that reminds her of growth process: over time small steps will lead to significant change.     2) Movement: She is ready to progress.  - Increase her 2-3 x substantial structured workout to 3-4 x/week, committing to adding them on her days off.  Barrier: time >>> she has 3 to 4 days off per wk and time isn't a barrier these days.     Next Visit: when ready to learn ~ interval walking & adding in mini strength workouts, cont conversation here - capture weigh-in & water intake    Scheduling:  add more hc visits, adding in an occasional longer one to utilize all 11 before GD, when does she need to schedule in provider & ready for RD visit?    PATIENT EDUCATION PROVIDED:  OEQ -Captured insights &/or awareness gained & other pt's perceived victories.  Assessed How Visit Today Would Best Serve Pt  Progress Review: Behavioral   Affirmations / Character Strength Reflection / Value Alignment  OEQ - on topics noted above  Pt requested pillar assessments MyCharted.     HC Visit #1 6/7, #2 6/28, #3 & 4? 7/22, #5 8/10, #6 , #7      24-wk HLP Provider/RD visits, 2022 repeating program: Provider Maury 5/3 & July or August; RD Mary    FOLLOW-UP: Reminder to switch to phone or reschedule by calling [phone].    TIME: ~30+/- minutes spent with patient, care coordination, sending resources & charting.     SIGNATURE:  IDA AGOSTO, Certified Health  on 6/7/2022 at 8:54 AM  "

## 2022-06-07 NOTE — PROCEDURE: ADDITIONAL NOTES
Render Risk Assessment In Note?: no
Detail Level: Simple
Additional Notes: A clinical assistant was present for the exam. Care instructions of treated sites were explained to the patient in detail. Told patient to call with any concerns or questions. The patient verbalized understanding and agreement of the education provided and the treatment plan. Encouraged patient to schedule a follow up appointment right after visit. At the end of the visit, all questions had been answered and the patient was satisfied with the visit.

## 2022-06-28 ENCOUNTER — VIRTUAL VISIT (OUTPATIENT)
Dept: SURGERY | Facility: CLINIC | Age: 57
End: 2022-06-28

## 2022-06-28 DIAGNOSIS — E66.9 OBESE: Primary | ICD-10-CM

## 2022-06-28 PROCEDURE — 99207 PR MWM HEALTH COACH NO CHARGE: CPT | Performed by: COMMUNITY HEALTH WORKER

## 2022-07-08 ENCOUNTER — MYC MEDICAL ADVICE (OUTPATIENT)
Dept: FAMILY MEDICINE | Facility: CLINIC | Age: 57
End: 2022-07-08

## 2022-07-08 DIAGNOSIS — G47.00 INSOMNIA, UNSPECIFIED TYPE: ICD-10-CM

## 2022-07-11 RX ORDER — HYDROXYZINE HYDROCHLORIDE 25 MG/1
12.5-5 TABLET, FILM COATED ORAL
Qty: 45 TABLET | Refills: 3 | Status: SHIPPED | OUTPATIENT
Start: 2022-07-11 | End: 2023-05-05

## 2022-07-11 NOTE — TELEPHONE ENCOUNTER
Please advise - as this medication is no longer on pts med list     Thank you     Mallory Nugent RN, BSN  [facility]

## 2022-07-11 NOTE — TELEPHONE ENCOUNTER
Reviewed with TAM Castro at her last appt in Nov. Ok to extend.  Electronically Signed By: Blanka Lucio PA-C

## 2022-07-20 ENCOUNTER — IMMUNIZATION (OUTPATIENT)
Dept: NURSING | Facility: CLINIC | Age: 57
End: 2022-07-20
Payer: COMMERCIAL

## 2022-07-20 PROCEDURE — 91305 COVID-19,PF,PFIZER (12+ YRS): CPT

## 2022-07-20 PROCEDURE — 0054A COVID-19,PF,PFIZER (12+ YRS): CPT

## 2022-07-22 ENCOUNTER — VIRTUAL VISIT (OUTPATIENT)
Dept: SURGERY | Facility: CLINIC | Age: 57
End: 2022-07-22

## 2022-07-22 DIAGNOSIS — E66.9 OBESE: Primary | ICD-10-CM

## 2022-07-22 PROCEDURE — 99207 PR MWM HEALTH COACH NO CHARGE: CPT | Performed by: COMMUNITY HEALTH WORKER

## 2022-07-22 NOTE — PROGRESS NOTES
IDA WYNN     Progress Notes    Return Weight Management Health Coaching Note    Mallory Sargent          MRN: [id number]  : 1965  KATLYN: 2022    Health  Visit #: 3  Type of Visit: 24 week  Telephone Visit - Call: [phone]     Restart Initial Start Date: 2022  Graduation Date: 2022      Per [facility] Maury Mcclure on 5/3: Obesity Class 1 (BMI 30 to 34.9).  Initial Height and Wt: 5' 10.5 , 220.3 lbs   BMI = 31.16 kg/m      Initial Start Date: 2021  Graduation Date: 2022 - Notes ~ #11  visit.(see above + I'm off the  wk of May)    Provider on : LINDSEY Marin per Provider Obesity Class 2 (BMI 35 to 39.9).  Initial Height and Wt: 5' 10.5 , 256 lbs   BMI = 36.21 kg/m    ASSESSMENT: (See 22 Visit Notes for more weigh-ins)  HC Visit on : 228.4 lb (self-reported), BMI = 32.3 kg/m , ~ 10.8% wt loss, cumulative wt loss 27.6 lbs  ... on 3/7: 225 lbs (self-reported), BMI = 31.8 kg/m   ... on : 221.6 lbs (self-reported), BMI = 31.3 kg/m   ... on : 218 lbs (self-reported), BMI = 30.8 kg/m  - she was at 216 prior to her trip    Current Weight: ran out of time to capture  Average Daily Water: aver. 64  oz/day, 50 oz to 80 oz/day  Weight Loss Medication: Saxenda  Nutrition Plan: Other: Regular Diet, per Rd; 22 - only using products on busier days ~ 5 products/wk.     Additional Program Support.  (See 22 notes for addt'l info)  [employer] Access Begins/Ends:  22 - 22    Highlights from Past Week's and Today's Visit:  *Reflection on recent setback in behavior >>> lead to her recognizing that she is moving more & more to a growth mindset with well-being and adopting the idea of her journey about creating a healthy lifestyle (ve achieving an end - wt loss) supported by small steps >>> creating a well-being plan. This has her feeling more self-assured that she will keep with her journey.   *Reframing the resist to  "urge to move >>> leading to a sense of accomplishment & increased self-efficacy.    Action Steps pt set until next HC Visit are:    She wants to:  1) EWB.  - Cont setting & ending her day with intention - current intentions ~ nutrition pillar.   \"I'm done eating for today.\"  + another one that reminds her of growth process: over time small steps will lead to significant change.     2) Movement: She is ready to progress.  - Increase her 2-3 x substantial structured workout to 3-4 x/week, committing to adding them on her days off.    *Working extra hours + covid booster (post-symptoms from it) >>> lead to less substantial workouts yet she kept with her intention above.    - Cont to reframe her thoughts when noticing the urge to resist movement.     3) Nutrition.  - Increase her consistency with checking in with herself to ask \"is this a craving?\" or actual hunger?    - Listen or read to one more chapter of the intuitive eating before her next hc visit.    Next Visit: capture weigh-in & water intake    PATIENT EDUCATION PROVIDED:  OEQ -Captured insights &/or awareness gained & other pt's perceived victories.  Assessed How Visit Today Would Best Serve Pt  Progress Review: Behavioral   Affirmations / Character Strength Reflection / Value Alignment  OEQ - on topics noted above    HC Visit #1 6/7, #2 6/28, #3 7/22, #4 8/10, #5 8/19 , #6 9/16, #7 9/28  *If she completes 2 visits in Oct & Nov she'll be on track to complete all 11 by GD.     24-wk P Provider/RD visits, 2022 repeating program: Provider Maury 5/3 & July or August; RD Mary    FOLLOW-UP: Reminder to switch to phone or reschedule by calling [phone].    TIME: ~30+/- minutes spent with patient, care coordination, sending resources & charting.     SIGNATURE:  IDA AGOSTO, Certified Health  on 7/22/2022 at 9:06 AM  "

## 2022-08-12 ENCOUNTER — E-VISIT (OUTPATIENT)
Dept: URGENT CARE | Facility: URGENT CARE | Age: 57
End: 2022-08-12
Payer: COMMERCIAL

## 2022-08-12 DIAGNOSIS — J01.90 ACUTE SINUSITIS WITH SYMPTOMS > 10 DAYS: Primary | ICD-10-CM

## 2022-08-12 PROCEDURE — 99421 OL DIG E/M SVC 5-10 MIN: CPT | Performed by: PHYSICIAN ASSISTANT

## 2022-08-12 NOTE — PATIENT INSTRUCTIONS
Sinusitis (Antibiotic Treatment)    The sinuses are air-filled spaces within the bones of the face. They connect to the inside of the nose. Sinusitis is an inflammation of the tissue that lines the sinuses. Sinusitis can occur during a cold. It can also happen due to allergies to pollens and other particles in the air. Sinusitis can cause symptoms of sinus congestion and a feeling of fullness. A sinus infection causes fever, headache, and facial pain. There is often green or yellow fluid draining from the nose or into the back of the throat (post-nasal drip). You have been given antibiotics to treat this condition.   Home care  Take the full course of antibiotics as instructed. Don't stop taking them, even when you feel better.  Drink plenty of water, hot tea, and other liquids as directed by the healthcare provider. This may help thin nasal mucus. It also may help your sinuses drain fluids.  Heat may help soothe painful areas of your face. Use a towel soaked in hot water. Or,  the shower and direct the warm spray onto your face. Using a vaporizer along with a menthol rub at night may also help soothe symptoms.   An expectorant with guaifenesin may help thin nasal mucus and help your sinuses drain fluids. Talk with your provider or pharmacists before taking an over-the-counter (OTC) medicine if you have any questions about it or its side effects..  You can use an OTC decongestant, unless a similar medicine was prescribed to you. Nasal sprays work the fastest. Use one that contains phenylephrine or oxymetazoline. First blow your nose gently. Then use the spray. Don't use these medicines more often than directed on the label. If you do, your symptoms may get worse. You may also take pills that contain pseudoephedrine. Don t use products that combine multiple medicines. This is because side effects may be increased. Read labels. You can also ask the pharmacist for help. (People with high blood pressure  should not use decongestants. They can raise blood pressure.) Talk with your provider or pharmacist if you have any questions about the medicine..  OTC antihistamines may help if allergies contributed to your sinusitis. Talk with your provider or pharmacist if you have any questions about the medicine..  Don't use nasal rinses or irrigation during an acute sinus infection, unless your healthcare provider tells you to. Rinsing may spread the infection to other areas in your sinuses.  Use acetaminophen or ibuprofen to control pain, unless another pain medicine was prescribed to you. If you have chronic liver or kidney disease or ever had a stomach ulcer, talk with your healthcare provider before using these medicines. Never give aspirin to anyone under age 18 who is ill with a fever. It may cause severe liver damage.  Don't smoke. This can make symptoms worse.    Follow-up care  Follow up with your healthcare provider, or as advised.   When to seek medical advice  Call your healthcare provider if any of these occur:   Facial pain or headache that gets worse  Stiff neck  Unusual drowsiness or confusion  Swelling of your forehead or eyelids  Symptoms don't go away in 10 days  Vision problems, such as blurred or double vision  Fever of 100.4 F (38 C) or higher, or as directed by your healthcare provider  Call 911  Call 911 if any of these occur:   Seizure  Trouble breathing  Feeling dizzy or faint  Fingernails, skin or lips look blue, purple , or gray  Prevention  Here are steps you can take to help prevent an infection:   Keep good hand washing habits.  Don t have close contact with people who have sore throats, colds, or other upper respiratory infections.  Don t smoke, and stay away from secondhand smoke.  Stay up to date with of your vaccines.  Sabre last reviewed this educational content on 12/1/2019 2000-2021 [employer]. All rights reserved. This information is not intended as a substitute for  professional medical care. Always follow your healthcare professional's instructions.        Dear Mallory Sargent    After reviewing your responses, I've been able to diagnose you with?a sinus infection caused by bacteria.?     Based on your responses and diagnosis, I have prescribed augmentin to treat your symptoms. I have sent this to your pharmacy.?     It is also important to stay well hydrated, get lots of rest and take over-the-counter decongestants,?tylenol?or ibuprofen if you?are able to?take those medications per your primary care provider to help relieve discomfort.?     It is important that you take?all of?your prescribed medication even if your symptoms are improving after a few doses.? Taking?all of?your medicine helps prevent the symptoms from returning.?     If your symptoms worsen, you develop severe headache, vomiting, high fever (>102), or are not improving in 7 days, please contact your primary care provider for an appointment or visit any of our convenient Walk-in Care or Urgent Care Centers to be seen which can be found on our website?here.?     Thanks again for choosing?us?as your health care partner,?   ?  Edilberto Hawley, [facility], PA-C?

## 2022-08-29 ENCOUNTER — VIRTUAL VISIT (OUTPATIENT)
Dept: SURGERY | Facility: CLINIC | Age: 57
End: 2022-08-29

## 2022-08-29 DIAGNOSIS — E66.9 OBESE: Primary | ICD-10-CM

## 2022-08-29 PROCEDURE — 99207 PR MWM HEALTH COACH NO CHARGE: CPT | Performed by: COMMUNITY HEALTH WORKER

## 2022-08-29 NOTE — PROGRESS NOTES
IDA WYNN     Progress Notes    Return Weight Management Health Coaching Note    Mallory Sargent          MRN: [id number]  : 1965  KATLYN: 2022    Health  Visit #: 4  Type of Visit: 24 week    Telephone Visit - Call: [phone]     Restart Initial Start Date: 2022  Graduation Date: 2022      Per [facility] Maury Mcclure on 5/3: Obesity Class 1 (BMI 30 to 34.9).  Initial Height and Wt: 5' 10.5 , 220.3 lbs   BMI = 31.16 kg/m      Initial Start Date: 2021  Graduation Date: 2022 - Notes ~ #11  visit.(see above + I'm off the  wk of May)     Provider on : LINDSEY Marin per Provider Obesity Class 2 (BMI 35 to 39.9).  Initial Height and Wt: 5' 10.5 , 256 lbs   BMI = 36.21 kg/m    ASSESSMENT:  (See 22 Visit Notes for more weigh-ins)  HC Visit on : 228.4 lb (self-reported), BMI = 32.3 kg/m , ~ 10.8% wt loss, cumulative wt loss 27.6 lbs  ... on 3/7: 225 lbs (self-reported), BMI = 31.8 kg/m   ... on : 221.6 lbs (self-reported), BMI = 31.3 kg/m   ... on : 218 lbs (self-reported), BMI = 30.8 kg/m  - she was at 216 prior to her trip     Current Weight: ran out of time to capture  Average Daily Water: aver. 64  oz/day, 50 oz to 80 oz/day  Weight Loss Medication: Saxenda  Nutrition Plan: Other: Regular Diet, per Rd; 22 - only using products on busier days ~ 5 products/wk.     Additional Program Support.  (See 22 notes for addt'l info)  [employer] Access Begins/Ends:  22 - 22    Highlights from Past Week's and Today's Visit:  *She feels great about gaining only 1 lb on the cruise.  *She noticed a tendency to over eat, randomly. She is curious to explore what thoughts/beliefs & programs that might be triggering this.     Action Steps pt set until next HC Visit are:    She wants to:  1) Nutrition  - Off week with illness, add in time to work through her IE bk & wkbk.     - Work on being present when noticing the  urge to overeat.    - Given the above, adjust setting & ending her day with intention if/as needed to support addsindi'l growth with changing her relationship w/food.    2) EWB  - Create a well-being audio library and listen to these resources more regularly.  Reflect on the 3 E's of Exper. To determine when, frequency, etc.    3) Other  - Schedule w/[facility].    Next Visit: capture weigh-in & water intake - Review BMI & % Wt Loss Changes    PATIENT EDUCATION PROVIDED:  OEQ -Captured insights &/or awareness gained & other pt's perceived victories.  Assessed How Visit Today Would Best Serve Pt  Progress Review: Behavioral   Affirmations / Character Strength Reflection / Value Alignment  OEQ - on topics noted above  Pt requested resource emailed; no phi in email.    HC Visit #1 6/7, #2 6/28, #3 7/22, #4 8/10 > 8/28, #5 9/16 , #6 9/28, #7 10/18, #8 & 9 11/11, #10 & 11 11/30  *If she completes 2 visits in Oct & Nov she'll be on track to complete all 11 by GD.     24-wk P Provider/RD visits, 2022 repeating program: Provider Maury 5/3 & July or August; RD Mary    FOLLOW-UP: Reminder to switch to phone or reschedule by calling [phone].    TIME: ~30+/- minutes spent with patient, care coordination, sending resources & charting.     SIGNATURE:  IDA AGOSTO, Certified Health  on 8/29/2022 at 9:34 AM

## 2022-09-28 ENCOUNTER — VIRTUAL VISIT (OUTPATIENT)
Dept: SURGERY | Facility: CLINIC | Age: 57
End: 2022-09-28

## 2022-09-28 DIAGNOSIS — E66.9 OBESE: Primary | ICD-10-CM

## 2022-09-28 PROCEDURE — 99207 PR MWM HEALTH COACH NO CHARGE: CPT | Performed by: COMMUNITY HEALTH WORKER

## 2022-09-28 NOTE — PROGRESS NOTES
IDA WYNN     Progress Notes    Return Weight Management Health Coaching Note    Mallory Sargent          MRN: [id number]  : 1965  KATLYN: 2022    Health  Visit #: 5  Type of Visit: 24 week    Telephone Visit - Call: [phone]     Restart Initial Start Date: 2022  Graduation Date: 2022      Per [facility] Maury Mcclure on 5/3: Obesity Class 1 (BMI 30 to 34.9).  Initial Height and Wt: 5' 10.5 , 220.3 lbs   BMI = 31.16 kg/m     Initial Start Date: 2021  Graduation Date: 2022 - Notes ~ #11  visit.(see above + I'm off the 1st wk of May)     Provider on : LINDSEY Marin per Provider Obesity Class 2 (BMI 35 to 39.9).  Initial Height and Wt: 5' 10.5 , 256 lbs   BMI = 36.21 kg/m    ASSESSMENT:  (See 22 Visit Notes for more weigh-ins)  HC Visit on : 228.4 lb (self-reported), BMI = 32.3 kg/m , ~ 10.8% wt loss, cumulative wt loss 27.6 lbs  ... on 3/7: 225 lbs (self-reported), BMI = 31.8 kg/m   ... on : 221.6 lbs (self-reported), BMI = 31.3 kg/m   ... on : 218 lbs (self-reported), BMI = 30.8 kg/m  - she was at 216 prior to her trip    Current Weight: not appropriate to capture  Average Daily Water: aver. 64  oz/day, 50 oz to 80 oz/day  Weight Loss Medication: Saxenda  Nutrition Plan: Other: Regular Diet, per Rd; 22 - only using products on busier days ~ 5 products/wk.     Additional Program Support.  (See 22 notes for addt'l info)  [employer] Access Begins/Ends:  22 - 22    Highlights from Past Week's and Today's Visit:  *Pt is noticing urge to focus on the scale.    Action Steps pt set until next HC Visit are:    She wants to:  1) EWB  - [place] with setting & ending her day with intention to support embracing her well-being journey as a growth process (moving away from focusing on the scale).    Next Visit: takeaways from 10/5 [facility] visit - capture weigh-in & water intake    PATIENT EDUCATION  PROVIDED:  OEQ -Captured insights &/or awareness gained & other pt's perceived victories.  Assessed How Visit Today Would Best Serve Pt  Progress Review: Behavioral  Affirmations / Character Strength Reflection / Value Alignment  OEQ - on topics noted above    HC Visits: #6 10/14, #7 10/28, # 8 11/11, #9 11/21, #10 & 11 11/30    Other 24-wk HLP visits, when repeating program: Provider Maury 5/3, 10/5  Initial Enrollment: Provider Maury 11/4, 12/10,  2/8 ; ROSALIO Hernandez 11/4, 4/1    FOLLOW-UP: Reminder to switch to phone or reschedule by calling [phone].    TIME: ~30+/- minutes spent with patient, care coordination, sending resources & charting.     SIGNATURE:  IDA AGOSTO, Certified Health  on 9/28/2022 at 5:43 PM

## 2022-10-05 ENCOUNTER — LAB (OUTPATIENT)
Dept: LAB | Facility: CLINIC | Age: 57
End: 2022-10-05
Payer: COMMERCIAL

## 2022-10-05 ENCOUNTER — VIRTUAL VISIT (OUTPATIENT)
Dept: SURGERY | Facility: CLINIC | Age: 57
End: 2022-10-05
Payer: COMMERCIAL

## 2022-10-05 VITALS — BODY MASS INDEX: 30.21 KG/M2 | HEIGHT: 70 IN | WEIGHT: 211 LBS

## 2022-10-05 DIAGNOSIS — E66.812 CLASS 2 SEVERE OBESITY DUE TO EXCESS CALORIES WITH SERIOUS COMORBIDITY AND BODY MASS INDEX (BMI) OF 36.0 TO 36.9 IN ADULT (H): ICD-10-CM

## 2022-10-05 DIAGNOSIS — E66.811 CLASS 1 OBESITY DUE TO EXCESS CALORIES WITHOUT SERIOUS COMORBIDITY WITH BODY MASS INDEX (BMI) OF 30.0 TO 30.9 IN ADULT: Primary | ICD-10-CM

## 2022-10-05 DIAGNOSIS — E66.09 CLASS 1 OBESITY DUE TO EXCESS CALORIES WITHOUT SERIOUS COMORBIDITY WITH BODY MASS INDEX (BMI) OF 30.0 TO 30.9 IN ADULT: ICD-10-CM

## 2022-10-05 DIAGNOSIS — E66.01 CLASS 2 SEVERE OBESITY DUE TO EXCESS CALORIES WITH SERIOUS COMORBIDITY AND BODY MASS INDEX (BMI) OF 36.0 TO 36.9 IN ADULT (H): ICD-10-CM

## 2022-10-05 DIAGNOSIS — E66.811 CLASS 1 OBESITY DUE TO EXCESS CALORIES WITHOUT SERIOUS COMORBIDITY WITH BODY MASS INDEX (BMI) OF 30.0 TO 30.9 IN ADULT: ICD-10-CM

## 2022-10-05 DIAGNOSIS — E66.09 CLASS 1 OBESITY DUE TO EXCESS CALORIES WITHOUT SERIOUS COMORBIDITY WITH BODY MASS INDEX (BMI) OF 30.0 TO 30.9 IN ADULT: Primary | ICD-10-CM

## 2022-10-05 DIAGNOSIS — E78.5 HYPERLIPIDEMIA LDL GOAL <130: ICD-10-CM

## 2022-10-05 DIAGNOSIS — K21.9 GASTROESOPHAGEAL REFLUX DISEASE WITHOUT ESOPHAGITIS: ICD-10-CM

## 2022-10-05 LAB
ALBUMIN SERPL BCG-MCNC: 4.4 G/DL (ref 3.5–5.2)
ALP SERPL-CCNC: 81 U/L (ref 35–104)
ALT SERPL W P-5'-P-CCNC: 38 U/L (ref 10–35)
ANION GAP SERPL CALCULATED.3IONS-SCNC: 9 MMOL/L (ref 7–15)
AST SERPL W P-5'-P-CCNC: 29 U/L (ref 10–35)
BILIRUB SERPL-MCNC: 0.5 MG/DL
BUN SERPL-MCNC: 15.6 MG/DL (ref 6–20)
CALCIUM SERPL-MCNC: 9.9 MG/DL (ref 8.6–10)
CHLORIDE SERPL-SCNC: 103 MMOL/L (ref 98–107)
CREAT SERPL-MCNC: 0.87 MG/DL (ref 0.51–0.95)
DEPRECATED HCO3 PLAS-SCNC: 28 MMOL/L (ref 22–29)
GFR SERPL CREATININE-BSD FRML MDRD: 79 ML/MIN/1.73M2
GLUCOSE SERPL-MCNC: 105 MG/DL (ref 70–99)
POTASSIUM SERPL-SCNC: 4.1 MMOL/L (ref 3.4–5.3)
PROT SERPL-MCNC: 7.6 G/DL (ref 6.4–8.3)
SODIUM SERPL-SCNC: 140 MMOL/L (ref 136–145)

## 2022-10-05 PROCEDURE — 84132 ASSAY OF SERUM POTASSIUM: CPT

## 2022-10-05 PROCEDURE — 36415 COLL VENOUS BLD VENIPUNCTURE: CPT

## 2022-10-05 PROCEDURE — 99213 OFFICE O/P EST LOW 20 MIN: CPT | Mod: 95 | Performed by: PHYSICIAN ASSISTANT

## 2022-10-05 RX ORDER — LIRAGLUTIDE 6 MG/ML
2.4 INJECTION, SOLUTION SUBCUTANEOUS DAILY
Qty: 33 ML | Refills: 0 | Status: SHIPPED | OUTPATIENT
Start: 2022-10-05 | End: 2022-12-21

## 2022-10-05 NOTE — PROGRESS NOTES
Mallory is a 56 year old who is being evaluated via a billable video visit.      If the video visit is dropped, the invitation should be resent by: Text to cell phone: [phone]  Will anyone else be joining your video visit? No      Video-Visit Details    Type of service:  Video Visit    Video Start Time:  1:06    Video End Time: 1:24      24 minutes spent on the date of the encounter doing chart review, review of test results, patient visit and documentation       Originating Location (pt. Location): Home    Distant Location (provider location):  [facility] [place]     Platform used for Video Visit: [employer]          2022          Return Virtual Medical Weight Management Note       Mallory Sargent  MRN:  [id number]  :  1965      Dear Blanka Lucio,    I had the pleasure of doing a visit with your patient Mallory Sargent.  She is a 56 year old female who I am continuing to see for treatment of obesity related to:       11/3/2021   I have the following health issues associated with obesity: Pre-Diabetes, High Cholesterol, Sleep Apnea, GERD (Reflux), Fatty Liver   I have the following symptoms associated with obesity: Depression, Back Pain, Fatigue, Hip Pain       INTERVAL HISTORY:  Last visit 5/3/2022. Continues to inject Saxenda 1.8 mg daily in the evening.  Has not had any nausea or vomiting for months.  Feels like she has minimal appetite suppression as compared to several months ago. Still gets pretty hungry when has not eaten.  Reports no side effects other than constipation. No abdominal pain.   For the past few months has been doing 16 hour fasting.  Continues to take omeprazole daily.Patient is happy with her weight loss.       CURRENT WEIGHT:   211 lbs 0 oz    Wt Readings from Last 4 Encounters:   10/05/22 211 lb (95.7 kg)   22 220 lb 4.8 oz (99.9 kg)   22 221 lb 9.6 oz (100.5 kg)   22 230 lb (104.3 kg)       BARIATRIC  METRICS:  Current Weight: 211 lb (95.7 kg)  Body mass index is 30.28 kg/m .   Wt change since last visit (lbs): -9  Cumulative weight loss (lbs): 45      DIETARY HISTORY  Meals Per Day: 3  Food Diary:   B: fasting   L: shrimp  D: [place] turkey bowl  Snacks Per Day: 1  Typical Snack: piece of fruit  Fluid Intake: 80 oz water daily, coffee in the AM with oat milk. 1 drink of alcohol every 2-3 months. Has 1 Severino sparkling drink daily        Exercise: going on walks/hikes 1-3 times weekly for 45 minutes. Does stationary bike 1-2 times weekly if she does not walk       ROS: 10/4/22  General  Fatigue: improved  Sleep Quality: slightly improved  Birth Control: postmenopausal  HEENT  Hx of glaucoma: No  Vision changes: No  Cardiovascular  Chest Pain with Exertion: No  Palpitations: No  Hx of heart disease: No  Had a recent EKG and Stress echo that was reviewed today   Pulmonary  Shortness of breath at rest: No  Shortness of breath with exertion: No  Snoring: Yes - was diagnosed with JESSIE 3 years ago  Was advised to meet with a sleep dentist for a mouth guard. Has not made appointment  Gastrointestinal  Heartburn: yes takes omeprazole daily.   Abdominal pain: No  History of pancreatitis: No  Severe constipation: pretty significant. Taking a psyllim fiber daily. Goes daily but is tough. Drinks minimum of 64 oz water  Hx of bowel obstruction: No  Nausea with medication changes - manageable  Gastrourinary  History of kidney stones: No  Psychiatric  Moods Stable: Yes  History of drug abuse: No  No history of eating disorder      MEDICATIONS:   Current Outpatient Medications   Medication     aspirin 81 MG tablet     augmented betamethasone dipropionate (DIPROLENE) 0.05 % external lotion     FIBER PO     hydrOXYzine (ATARAX) 25 MG tablet     insulin pen needle (B-D U/F) 31G X 5 MM miscellaneous     magnesium 250 MG tablet     Omega-3 Fatty Acids (FISH OIL ADULT GUMMIES PO)     omeprazole (PRILOSEC) 20 MG DR capsule      "SAXENDA 18 MG/3ML pen     No current facility-administered medications for this visit.       PE:  Ht 5' 10\" (1.778 m)   Wt 211 lb (95.7 kg)   LMP 10/01/2015 (Approximate)   Breastfeeding No   BMI 30.28 kg/m    GENERAL: Healthy, alert and no distress  EYES: Eyes grossly normal to inspection.  No discharge or erythema, or obvious scleral/conjunctival abnormalities.  RESP: No audible wheeze, cough, or visible cyanosis.  No visible retractions or increased work of breathing.    SKIN: Visible skin clear. No significant rash, abnormal pigmentation or lesions.  NEURO: Cranial nerves grossly intact.  Mentation and speech appropriate for age.  PSYCH: Mentation appears normal, affect normal/bright, judgement and insight intact, normal speech and appearance well-groomed.        ASSESSMENT/PLAN:   Class 1 severe obesity due to excess calories with Body Mass Index (BMI) of 30  GERD without esophagitis  High cholesterol  JESSIE      Congratulated patient on her overall weight loss!  Emphasized the importance of lifestyle and diet for long term success. Ordered a lab for medication management.   RX for Saxenda 2.4 mg dose sent to pharmacy. Patient will schedule mid December for follow up. Can use Miralax to help with constipation         Maury Mcclure MS, PA-C          "

## 2022-10-05 NOTE — PATIENT INSTRUCTIONS
"Nice to talk with you today. Thank you for allowing me the privilege of caring for you. We hope we provided you with the excellent service you deserve.     To ensure the quality of our services you may receive a patient satisfaction survey from an independent monitoring company.  The greatest compliment you can give is \"Likely to Recommend\"    Below is our plan we discussed.-  TOMA Mendiola        Please call [phone] and schedule a follow up with Maury Mcclure PA-C for mid December.  If you need to reach me sooner you can do so by calling [phone].    Have a great day!     Eat Better ? Move More ? Live Well    Eat 3 nutrient-rich meals each day    Don t skip meals--it will cause you to overeat later in the day!    Eating fiber (vegetables/fruits/whole grains) and protein with meals helps you stay full longer    Choose foods with less than 10 grams of sugar and 5 grams of fat per serving to prevent excess calories and weight re-gain  Eat around the same times each day to develop a routine eating schedule   Avoid snacking unless physically hungry.   Planned snacks: 1-2 times per day and no more than 150 calories    Eat protein first   Protein helps with healing, maintaining adequate muscle mass, reducing hunger and optimizing nutritional status   Aim for 60-80 grams of protein per day   Fill up on Fiber   Fiber comes from plants--fruits, veggies, whole grains, nuts/seeds and beans   Fiber is low in calories, high in phytonutrients and helps you stay full longer   Aim for 25-35 grams per day by eating fiber with meals and snacks  Eat [id number]   Take 20-30 minutes to eat each meal by taking small bites, chewing foods to applesauce consistency or 20-30 times before you swallow   Eating foods too fast can delay satiety/fullness signals and increase overeating   Slow down your eating by using toddler utensils, putting your fork/spoon down between bites and not watching TV or emailing during meals!   Keep a " Journal         Writing down what you eat, how you feel and when you are active helps you identify new changes to work on from week to week         Look for ways to cut 100 calories from your current diet 2-3 times per day  Drink 64 ounces of 0-Calorie drinks between meals   Water   Zero calorie Propel  or Vitamin Water     SoBe Lifewater  Zero Calories   Crystal Light , Sugar-Free Chaka-Aid , and other sugar-free lemonade or flavored hough   Keep Caffeine to less than 300mg per day ie: 3-6oz cups coffee     Work up to 45-60 minutes of physical activity most days of the week   Helps with losing weight and prevent regaining those extra pounds!    Do a combo of cardio (walking/water exercises) and strength training (lifting weights/Vinyasa yoga)    Avoid Mindless Eating   Be present when you eat--take note of the smell, taste and quality of your food   Make a list of alternative activities you could do to prevent eating out of boredom/stress  Go for a walk, call a friend, chew gum, paint your nails, re-organize the garage, etc

## 2022-10-14 ENCOUNTER — VIRTUAL VISIT (OUTPATIENT)
Dept: SURGERY | Facility: CLINIC | Age: 57
End: 2022-10-14

## 2022-10-14 DIAGNOSIS — E66.9 OBESE: Primary | ICD-10-CM

## 2022-10-14 PROCEDURE — 99207 PR MWM HEALTH COACH NO CHARGE: CPT | Performed by: COMMUNITY HEALTH WORKER

## 2022-10-14 NOTE — PROGRESS NOTES
IDA WYNN     Progress Notes    Return Weight Management Health Coaching Note    Mallory Sargent          MRN: [id number]  : 1965  KATLYN: 2022    Health  Visit #: 6  Type of Visit: 24 week  Telephone Visit - Call: [phone]     Restart Initial Start Date: 2022  Graduation Date: 2022      Per [facility] Maury Mcclure on 5/3: Obesity Class 1 (BMI 30 to 34.9).  Initial Height and Wt: 5' 10.5 , 220.3 lbs   BMI = 31.16 kg/m     Initial Start Date: 2021  Graduation Date: 2022 - Notes ~ #11  visit.(see above + I'm off the 1st wk of May)     Provider on : LINDSEY Marin per Provider Obesity Class 2 (BMI 35 to 39.9).  Initial Height and Wt: 5' 10.5 , 256 lbs   BMI = 36.21 kg/m    ASSESSMENT:  (See 22 Visit Notes for more weigh-ins)  HC Visit on : 228.4 lb (self-reported), BMI = 32.3 kg/m , ~ 10.8% wt loss, cumulative wt loss 27.6 lbs  ... on 3/7: 225 lbs (self-reported), BMI = 31.8 kg/m   ... on : 221.6 lbs (self-reported), BMI = 31.3 kg/m   ... on : 218 lbs (self-reported), BMI = 30.8 kg/m  - she was at 216 prior to her trip    Current Weight, per [facility]MP on 10/5: 211 lbs (self-reported) - pt feels weigh-in less frequently is supporting her in adopting a growth mindset ~ well-being, BMI = 30.28 kg/m    Average Daily Water: aver. 64  oz/day, 50 oz to 80 oz/day  Weight Loss Medication: Saxenda. 10/5 - per [facility] increased dose.  Nutrition Plan: Other: Regular Diet, per Rd; 22 - only using products on busier days ~ 5 products/wk.     Additional Program Support.  (See 22 notes for addt'l info)  Wellbeats Access Begins/Ends:  22 - 22    Highlights from Past Week's and Today's Visit:  *She finds s/e day with intention is having her feeling more focused & control over her choices >>> & also letting go of perceived failure.    Action Steps pt set until next HC Visit are:    She wants to:  1) EWB.    - Continue to set  "& end her day with intention to support embracing her well-being journey as a growth process (moving away from focusing on the scale).     - When she notices urge to focus on her perceived failures, she like of reframing as what are takeaways/lessons learned from experience.  To do this she likes experiment with using the two processes to reframe:  Does this serve me well? Or not? If not, what would?  After referring to an experience as a failure using: \"Better said\" followed by reframing it: What is my lesson learned from this experience?    *Referring to the 3 E's of Experimentation to help her implement the above.  She likes the idea of stone. Perhaps .    - When she notices the urge to say \"tmr I will start again\", remind herself that today still offers the opportunity to refocus on her intentions.    Next Visit: capture water intake & check-in w/pt reporting on effectiveness of wt loss med     PATIENT EDUCATION PROVIDED:  OEQ -Captured insights &/or awareness gained & other pt's perceived victories.  Assessed How Visit Today Would Best Serve Pt  Progress Review: Behavioral   Affirmations / Character Strength Reflection / Value Alignment  OEQ - on topics noted above    HC Visits: #6 10/14, #7 10/28, # 8 11/11, #9 11/21, #10 & 11 11/30     Other 24-wk HLP visits, when repeating program: Provider Maury 5/3, 10/5, 12/21.  Initial Enrollment: Provider Maury 11/4, 12/10,  2/8 ; ROSALIO Hernandez 11/4, 4/1  10/14/22 - pt reports to be implementing RD recommendations.     FOLLOW-UP: Reminder to switch to phone or reschedule by calling [phone].    TIME: ~30+/- minutes spent with patient, care coordination, sending resources & charting.     SIGNATURE:  IDA AGOSTO, Certified Health  on 10/14/2022 at 1:02 PM  "

## 2022-10-26 ENCOUNTER — APPOINTMENT (OUTPATIENT)
Dept: URBAN - METROPOLITAN AREA CLINIC 253 | Age: 57
Setting detail: DERMATOLOGY
End: 2022-10-31

## 2022-10-26 VITALS — HEIGHT: 70.5 IN | WEIGHT: 212 LBS | RESPIRATION RATE: 14 BRPM

## 2022-10-26 DIAGNOSIS — D22 MELANOCYTIC NEVI: ICD-10-CM

## 2022-10-26 DIAGNOSIS — L82.1 OTHER SEBORRHEIC KERATOSIS: ICD-10-CM

## 2022-10-26 PROBLEM — D22.5 MELANOCYTIC NEVI OF TRUNK: Status: ACTIVE | Noted: 2022-10-26

## 2022-10-26 PROCEDURE — OTHER COUNSELING: OTHER

## 2022-10-26 PROCEDURE — OTHER MIPS QUALITY: OTHER

## 2022-10-26 PROCEDURE — 99213 OFFICE O/P EST LOW 20 MIN: CPT

## 2022-10-26 ASSESSMENT — LOCATION SIMPLE DESCRIPTION DERM
LOCATION SIMPLE: UPPER BACK
LOCATION SIMPLE: LEFT UPPER BACK

## 2022-10-26 ASSESSMENT — LOCATION DETAILED DESCRIPTION DERM
LOCATION DETAILED: SUPERIOR THORACIC SPINE
LOCATION DETAILED: LEFT MEDIAL UPPER BACK

## 2022-10-26 ASSESSMENT — LOCATION ZONE DERM: LOCATION ZONE: TRUNK

## 2022-11-07 NOTE — MR AVS SNAPSHOT
After Visit Summary   10/26/2017    Mallory Sargent    MRN: [id number]           Patient Information     Date Of Birth          1965        Visit Information        Provider Department      10/26/2017 10:15 AM Taryn [facility] Cancer Risk Management Program        Today's Diagnoses     Family history of malignant neoplasm of ovary    -  1      Care Instructions            Negative Genetic Test Result    Genetic Testing  You had a blood test that looked at the genetic information in one or more genes associated with increased cancer risk.  The testing looked for any harmful changes that would stop this particular gene from working like it should. If an individual does not have any harmful changes or variants of unknown significance found from their blood test, their genetic test result is reported as negative.       Results  The genetic test did not identify any pathogenic (harmful) changes in the genes that were tested. There are several possible explanations for a negative test result. Without knowing the gene mutation in your family, the cause of the cancer in you or your relatives is still unknown. Your genetic counselor can help interpret the result for you and your relatives. In this case, there are several reasons that may explain the negative test result:    There may be a gene mutation in the family that you did not inherit.     You may have a gene mutation in a different gene that was not included in the test, or has not yet been discovered.     The cancers in you or your family may be due to a combination of genetic factors and environment (multifactorial/familial).    The cancers in you or your family may be sporadic/random cancers.    There is very small chance that a mutation was not found by current testing methods.  As testing technology evolves over time, it may still be possible to identify a mutation in a gene that was not found on this test.    It is important to note which  Ordered    genes were included in your test. A list of these genes can be found on your test result.    Screening Recommendations  Due to this negative test result, cancer screening recommendations should be based on your personal and family history. This may include increased cancer screening for you and/or your family members. Your genetic counselor and health care provider can help make appropriate recommendations.      Please call us if you have any questions or concerns.     Cancer Risk Management Program  [phone] ([phone])  ? Gina Akhtar, MS, [facility] [place]  [phone]  ? Candice Hannah, MS, [facility] [place]  [phone]  ? Marlin Barrett, MS, [facility] [place]  [phone]  ? Taryn Echeverria, MS, [facility] [place]  [phone]  ? Conner Rodriguez, MS, [facility] [place]  [phone]              Follow-ups after your visit        Your next 10 appointments already scheduled     Oct 31, 2017  9:00 AM CDT   Return Sleep Patient with Jorge Amaro [place]   [facility]    [address]  [facility] [address] [phone]              Who to contact     If you have questions or need follow up information about today's clinic visit or your schedule please contact CANCER RISK MANAGEMENT PROGRAM directly at [phone].  Normal or non-critical lab and imaging results will be communicated to you by Event [employer], letter or phone within 4 business days after the clinic has received the results. If you do not hear from us within 7 days, please contact the clinic through Event [employer] or phone. If you have a critical or abnormal lab result, we will notify you by phone as soon as possible.  Submit refill requests through Swipp or call your pharmacy and they will forward the refill request to us. Please allow 3 business days for your refill to be completed.          Additional Information About Your Visit        Swipp Information     Swipp gives you secure access to your electronic health record. If you see a primary  care provider, you can also send messages to your care team and make appointments. If you have questions, please call your primary care clinic.  If you do not have a primary care provider, please call [phone] and they will assist you.        Care EveryWhere ID     This is your Care EveryWhere ID. This could be used by other organizations to access your [facility] medical records  [id number]        Your Vitals Were     Last Period                   06/09/2015            Blood Pressure from Last 3 Encounters:   08/04/17 109/72   06/30/17 104/66   04/28/17 110/70    Weight from Last 3 Encounters:   08/04/17 98.4 kg (217 lb)   06/30/17 103.4 kg (228 lb)   04/28/17 103.4 kg (228 lb)              Today, you had the following     No orders found for display       Primary Care Provider Office Phone # Fax #    Karen Weiler, [phone] [address] [facility] [address]        Equal Access to Services     [facility]: Hadii aad ku hadasho Soomaali, waaxda luqadaha, qaybta kaalmada adeegyada, waxay idiin haybashirn robi garcia . So [facility] [phone].    ATENCIÓN: Si habla español, tiene a suresh disposición servicios gratuitos de asistencia lingüística. Llame al [phone].    We comply with applicable federal civil rights laws and [place] laws. We do not discriminate on the basis of race, color, national origin, age, disability, sex, sexual orientation, or gender identity.            Thank you!     Thank you for choosing CANCER RISK MANAGEMENT PROGRAM  for your care. Our goal is always to provide you with excellent care. Hearing back from our patients is one way we can continue to improve our services. Please take a few minutes to complete the written survey that you may receive in the mail after your visit with us. Thank you!             Your Updated Medication List - Protect others around you: Learn how to safely use, store and throw away your medicines at www.disposemymeds.org.           This list is accurate as of: 10/26/17 10:18 AM.  Always use your most recent med list.                   Brand Name Dispense Instructions for use Diagnosis    aspirin 81 MG tablet     30 tablet    Take by mouth daily        Multi-vitamin Tabs tablet      Take 1 tablet by mouth daily        omeprazole 20 MG CR capsule    priLOSEC    90 capsule    TAKE 1 CAPSULE BY MOUTH DAILY, 30 TO 60 MINUTES BEFORE A MEAL.    Hyperlipidemia LDL goal <130       simvastatin 40 MG tablet    ZOCOR    90 tablet    Take 1 tablet (40 mg) by mouth At Bedtime    Hyperlipidemia LDL goal <130       traZODone 50 MG tablet    DESYREL    30 tablet    Take 1 tablet (50 mg) by mouth nightly as needed for sleep    Insomnia, unspecified type       * zolpidem 5 MG tablet    AMBIEN    30 tablet    Take 1 tablet (5 mg) by mouth nightly as needed for sleep    Other insomnia       * zolpidem 5 MG tablet    AMBIEN    1 tablet    Take tablet by mouth 15 minutes prior to sleep, for Sleep Study    Snoring, Psychophysiological insomnia, Non morbid obesity due to excess calories, Other fatigue, Excessive daytime sleepiness       * Notice:  This list has 2 medication(s) that are the same as other medications prescribed for you. Read the directions carefully, and ask your doctor or other care provider to review them with you.

## 2022-11-11 ENCOUNTER — VIRTUAL VISIT (OUTPATIENT)
Dept: SURGERY | Facility: CLINIC | Age: 57
End: 2022-11-11

## 2022-11-11 DIAGNOSIS — E66.9 OBESE: Primary | ICD-10-CM

## 2022-11-11 PROCEDURE — 99207 PR MWM HEALTH COACH NO CHARGE: CPT | Performed by: COMMUNITY HEALTH WORKER

## 2022-11-11 NOTE — PROGRESS NOTES
IDA WYNN     Progress Notes    Return Weight Management Health Coaching Note    Mallory Sargent          MRN: [id number]  : 1965  KATLYN: 2022    Health  Visit #: 7 & 8  Type of Visit: 24 week    Telephone Visit - Call: [phone]     Restart Initial Start Date: 2022  Graduation Date: 2022      Per [facility] Maury Mcclure on 5/3: Obesity Class 1 (BMI 30 to 34.9).  Initial Height and Wt: 5' 10.5 , 220.3 lbs   BMI = 31.16 kg/m     *See 10/14/22 HC Visit notes for Initial Program info.   Initial Height and Wt: 5' 10.5 , 256 lbs   BMI = 36.21 kg/m    ASSESSMENT:  (See 22 & 10/14/22 Visit Notes for more weigh-ins)  HC Visit on : 228.4 lb (self-reported), BMI = 32.3 kg/m , ~ 10.8% wt loss, cumulative wt loss 27.6 lbs  ... 211 lbs (self-reported) - pt feels weigh-in less frequently is supporting her in adopting a growth mindset ~ well-being, BMI = 30.28 kg/m      Current Weight: pt reports to be avoiding the scale - she feels she may have regained some wt.   Average Daily Water: aver. 64  oz/day, 50 oz to 80 oz/day.  - She noticed less consistency on busy work days.  Weight Loss Medication: Saxenda. 10/5 - per [facility] increased dose.  Nutrition Plan: Other: Regular Diet, per Rd; 22 - only using products on busier days ~ 5 products/wk.     Additional Program Support.  (See 22 notes for addt'l info)  Wellbeats Access Begins/Ends:  22 - 22    Highlights from Past Week's and Today's Visit:  *Initially feeling frustrated she isn't further along in her well-being journey and a strong fear of regaining wt coming back up.   *She enjoys reading yet seeing resistance to read her IE. Reflection: She is noticing that she is viewing like homework. Yet not cont her well-being growth brings up self-judging feelings.  *Reflecting on a significant change for her: No longer eats in the car on the way home & frequently stopping at fast food.     Action Steps  pt set until next HC Visit are:    She wants to:  1) EWB    - Adding to set & end her day with intention to be more structured & support:  A) Her well-being growth btw reading her IE resources & well-being podcast.   B) Reminder to be present with her cravings. (She is noticing this is helping her to be successful with resisting the urge.)    *Reflect on the ?'s & statements that she brainstormed. >>> Write them & post them where she decides will be helpful.  *Reflecting on 3 E's of Experimentation concept to decide what's needed to provide her the structure she is seeking.    - Add Tung Guerra to her podcast list.      - Reflect on KM quote ~ habit change - post on bathroom mirror.     2) Nutrition  - Reflect on what can might be needed to support better water intake on her busier workdays.   E.g. water schedule or check-in or image or statement included with her set & end her day with intention.     3) Other  - Prep for [facility]MP on 12/21 re: ?'s ~ wt loss med effectiveness & long term use.     Next Visit: with above, is she feeling ready to step on the scale &/or want to discuss ~ pre-framing that experience? - capture weigh-in & water intake    PATIENT EDUCATION PROVIDED:  OEQ -Captured insights &/or awareness gained & other pt's perceived victories.  Assessed How Visit Today Would Best Serve Pt  Progress Review: Behavioral / Capture Water Intake  Affirmations / Character Strength Reflection / Value Alignment  OEQ - on topics noted above  Pt requested resource emailed; no phi in email.    HC Visits: #6 10/14, #7 10/28, # 8 11/11, #9 11/21, #10 & 11 11/30     Other 24-wk HLP visits, when repeating program: Provider Maury 5/3, 10/5, 12/21.  Initial Enrollment: Provider Maury 11/4, 12/10,  2/8 ; ROSALIO Hernandez 11/4, 4/1  10/14/22 - pt reports to be implementing RD recommendations.     FOLLOW-UP: Reminder to switch to phone or reschedule by calling [phone].    TIME: ~30+/- minutes spent with patient, care coordination,  sending resources & charting.     SIGNATURE:  IDA AGOSTO, Certified Health  on 11/11/2022 at 11:04 AM

## 2022-11-21 ENCOUNTER — VIRTUAL VISIT (OUTPATIENT)
Dept: SURGERY | Facility: CLINIC | Age: 57
End: 2022-11-21

## 2022-11-21 DIAGNOSIS — E66.9 OBESE: Primary | ICD-10-CM

## 2022-11-21 PROCEDURE — 99207 PR MWM HEALTH COACH NO CHARGE: CPT | Performed by: COMMUNITY HEALTH WORKER

## 2022-11-21 NOTE — PROGRESS NOTES
IDA WYNN     Progress Notes    Return Weight Management Health Coaching Note    Mallory Sargent          MRN: [id number]  : 1965  KATLYN: 2022    Health  Visit #: 9  Type of Visit: 24 week    Telephone Visit - Call: [phone]     Restart Initial Start Date: 2022  Graduation Date: 2022      Per [facility] Maury Mcclure on 5/3: Obesity Class 1 (BMI 30 to 34.9).  Initial Height and Wt: 5' 10.5 , 220.3 lbs   BMI = 31.16 kg/m     *See 10/14/22 HC Visit notes for Initial Program info.   Initial Height and Wt: 5' 10.5 , 256 lbs   BMI = 36.21 kg/m    ASSESSMENT:  Per [facility]MP on 10/5: 211 lbs (self-reported), BMI = 30.28 kg/m  - pt feels weigh-in less frequently is supporting her in adopting a growth  HC Visit on : pt reports to be avoiding the scale - she feels she may have regained some wt.    Current Weight: pt reports not ready to step on the scale    Additional Program Support.  (See 22 notes for addt'l info)  Wellbeats Access Begins/Ends:  22 - 22    Highlights from Past Week's and Today's Visit:  *She shared victory: prioritizing time to read her IE book. >>> Resource has her feeling validated that she is doing a lot of things right & notices two areas that she is curious ~ challenging her thoughts: rejecting diet mentality & comforting herself with food.     Action Steps pt set until next HC Visit are:    She wants to:  1) Nutrition  - Cont to read her IE bk & being wkbk.  She wants to read 30 minutes/day 4-5 x/wk.  She will keep it by her bed.    - Adding to her set/end day with intention.  She added the following reflective:  What do I need to implement the IE principals?  In what way did implement.. ? In what way did I hold space for myself to.. ?    - In preparation for Thanksgiving holiday, be present with preparing her plate to nourish her body in a way that feels good about.  She feels using the preference meter & visualizing her MyPlate  "Method when preparing her plate will help her be more present (& avoid adding foods out of habit).    2) Other  - Call scheduling team to \"hold\" a CWMP visit early 2023.   CWMP to inquire ~ timing, if needed.    Next Visit: tt a WMP - capture weigh-in & water intake    PATIENT EDUCATION PROVIDED:  OEQ -Captured insights &/or awareness gained & other pt's perceived victories.  Assessed How Visit Today Would Best Serve Pt  Progress Review: Behavioral  Affirmations / Character Strength Reflection / Value Alignment  OEQ - on topics noted above    HC Visits: #6 10/14, #7 10/28, # 8 11/11, #9 11/21, #10 & 11 11/30     Other 24-wk HLP visits, when repeating program: Provider Maury 5/3, 10/5, 12/21.  Initial Enrollment: Provider Maury 11/4, 12/10,  2/8 ; ROASLIO Hernandez 11/4, 4/1  10/14/22 - pt reports to be implementing RD recommendations.     FOLLOW-UP: Reminder to reach out to our scheduling team by calling [phone], as needed for any reschuling needs.    TIME: ~30+/- minutes spent with patient, care coordination, sending resources & charting.     SIGNATURE:  IDA AGOSTO, Certified Health  on 11/21/2022 at 3:31 PM  "

## 2022-11-30 ENCOUNTER — VIRTUAL VISIT (OUTPATIENT)
Dept: SURGERY | Facility: CLINIC | Age: 57
End: 2022-11-30

## 2022-11-30 DIAGNOSIS — E66.9 OBESE: Primary | ICD-10-CM

## 2022-11-30 PROCEDURE — 99207 PR MWM HEALTH COACH NO CHARGE: CPT | Performed by: COMMUNITY HEALTH WORKER

## 2022-11-30 NOTE — PROGRESS NOTES
IDA WYNN     Progress Notes    Return Weight Management Health Coaching Note    Mallory Sargent          MRN: [id number]  : 1965  KATLYN: 2022    Health  Visit #: 10 & 11  Type of Visit: 24 week  Telephone Visit - Call: [phone]     Restart Initial Start Date: 2022  Graduation Date: 2022      Per [facility] Maury Mcclure on 5/3: Obesity Class 1 (BMI 30 to 34.9).  Initial Height and Wt: 5' 10.5 , 220.3 lbs   BMI = 31.16 kg/m     *See 10/14/22 HC Visit notes for Initial Program info.   Initial Height and Wt: ' 10.5 , 256 lbs   BMI = 36.21 kg/m    ASSESSMENT:  Per [facility] on 10/5: 211 lbs (self-reported), BMI = 30.28 kg/m  - pt feels weigh-in less frequently is supporting her in adopting a growth    Current Weight: pt is still resistent to weigh-ing herself, concerned she gained wt back.  Average Daily Water: she is reporting intake lower on busy workdays: 50 oz/day & higher other days: 80 oz/day    Additional Program Support.  (See 22 notes for addt'l info)  Wellbeats Access Begins/Ends:  22 - 22    Well-being Assessments.     See 22 or 21 HC Visit notes for initial Assessment.      Initial Assessment completed on 22: All rated on a scale of 1-10.         Sleep: 7 -8    Movement: 6    Nutrition: 8    Emotional Health/Wellbein -7      Sleep: Would like to maintain 7-8 in 6 months.   Notes: Reflecting back on her subjective reporting from  its been months since she wasn't able to get back to bed after waking up in middle of the night.   The only time she doesn't get enough sleep now is when she occasionally stays up late. Catching this tendency and getting to bed on time in these situations, she feels would have her at an 8.      Movement: Would like to be at a 7 in 6 months.   Notes: Reflecting on previous reporting, currently she feels good about high activity on her vacation and weekend hikes with husbands. Her apple watch  helps her capture the increased movement during the day >>> reflecting on changes: being intentional about movement and naturally seeking for opportunities to move during her workday both have lead to this increase.  7 = 3-4 x/week substantial structured workout. Currently days she works walking 15-20 minutes which she doesn't feel is a substantial workout. She is hiking 2-3 x/week; she feels these are substantial structured workout.  She noted the apple watch & sleep track praveen >>> looking at this tracking has her making the connection that more movement is promoting better sleep.      Nutrition: Would like to maintain 8 in 6 months.   Notes: She feels her choices are aligned with RD's guidelines and properly nourishing her body. She notices a sense of freedom with her increased self-efficacy with a relationship with food.  She noted being in a much different place than in this past November. Her reports hat her general nutition to be nourishing, she actually values the nourishing food now, and if she intentionally chooses a dessert it no longer feels out of control.     Emotional Health/Wellbeing: Would like to be at a 8 in 6 months.   Notes: She feels good with achieving all her intentions with the exception of reaching out for connection with friends.   8 = would a bit more connection with friends & travel. Recently adding in travel (recent road trip with  & a planned cruise). She is also more consistent connection with her father; it is important for her to keep this connection perhaps improve slightly here with frequency.     Today's/HC#11 Assessment completed on 11/30/22. All rated on a scale of 1-10.   Sleep: now at a 7-8, wants to aim to continue to maintain over the next 6 months.    Movement: now at a 5, wants to aim to be at a 7 over the next 6 months.  She would like to see more consistent with her 30-minutes of movements most days.   Her modes: Walks & stationary bike.     Nutrition: now at a 7,  "wants to aim to be back at her 8 over the next 6 months.  Reduces her sweet intake. She is noticing she is craving sweets daily & giving into that craving.    Emotional Health/Wellbeing: now at a 7, wants to aim to be at a 8 over the next 6 months.  She still seeks more connection with others. She feels her mood is positive & energetic.      Highlights from Past Week's and Today's Visit:  *She listened to a owning your edge episode of a podcast >>> found it insightful yet curious to listen more of this author's podcast to connect it more to her well-being journey.  *Acceptance that her well-being journey is a process meant to enjoy. One choice, one day not being \"perfect\" is less significant.   *Implementing setting & ending her day feels comforting >>> she is noticing greater self-compassion.    Highlights from Past Week's and Today's Visit:  *She felt she navigated the holidays well.  *Assessment above & discussion highlighted what she wants to focus on: movement, water intake + potential support to add to her WMP.    Action Steps pt set until next HC Visit are:    She wants to:  1) Movement  - Focus adding in walks or indoor cycle 3 days for 30 minutes.   Currently she is moving 1x/wk. Typically a walk with .    Environment: Record on sheet on door (that goes out to garage).   She wants to involve her  & the two being each others movement supporters to check-in with.   They have done similar to above & it worked great.    Enjoyable: To make the indoor cycle, she can listen to the audio book or watch a show.     *She wants to reflect & decide how to make the movement tracking sheet fun & inspiring.    2) Nutrition  - Work to increase water intake on the busier workdays.  She notices more cravings when her intake is lower.     3) Other  - At her next [facility] w/Maury she wants to discuss/ask ~: continuing medication. healthy psychology recommendations, and water intake guidelines (given wt loss " med).    Well-being Maintenance Plan.  She feels the wt loss med is helping to curb hunger. Initially it was helping with cravings & reducing the desire to comfort herself with food.   She feels a deep sense of accomplishment when she is present with her thoughts & choices.     She plans to continue with [facility] & curious ~ healthy psychology services.    Next Visit: capture weigh-in & water intake    PATIENT EDUCATION PROVIDED:  OEQ -Captured insights &/or awareness gained & other pt's perceived victories.  Assessed How Visit Today Would Best Serve Pt  Progress Review: Behavioral / Capture Water Intake  Affirmations / Character Strength Reflection / Value Alignment  OEQ - on topics noted above  Sent team msg: Pt graduated 24-wk HLP.    HC Visits: #10 & 11 11/30    Other 24-wk HLP visits, when repeating program: Provider Maury 5/3, 10/5, 12/21.  Initial Enrollment: Provider Maury 11/4, 12/10,  2/8 ; ROSALIO Hernandez 11/4, 4/1  10/14/22 - pt reports to be implementing RD recommendations.     FOLLOW-UP: Reminder to reach out to our scheduling team by calling [phone], as needed for any reschuling needs.    TIME: ~30+/- minutes spent with patient, care coordination, sending resources & charting.     SIGNATURE:  IDA AGOSTO, Certified Health  on 11/30/2022 at 5:04 PM

## 2022-12-06 ENCOUNTER — HOSPITAL ENCOUNTER (OUTPATIENT)
Dept: MAMMOGRAPHY | Facility: CLINIC | Age: 57
Discharge: HOME | End: 2022-12-06
Attending: PHYSICIAN ASSISTANT | Admitting: PHYSICIAN ASSISTANT
Payer: COMMERCIAL

## 2022-12-06 DIAGNOSIS — Z12.31 VISIT FOR SCREENING MAMMOGRAM: ICD-10-CM

## 2022-12-06 PROCEDURE — 77067 SCR MAMMO BI INCL CAD: CPT

## 2022-12-10 NOTE — RESULT ENCOUNTER NOTE
Dear Mallory,      Your recent test results are noted below:    -Mammogram was normal.  ADVISE: rechecking in 1 year.    For additional lab test information, [email or web address] is an excellent reference. Please contact the clinic at [phone] with any further questions or concerns.    Sincerely,      Blanka Lucio PA-C  [facility]

## 2022-12-21 ENCOUNTER — VIRTUAL VISIT (OUTPATIENT)
Dept: SURGERY | Facility: CLINIC | Age: 57
End: 2022-12-21
Payer: COMMERCIAL

## 2022-12-21 VITALS — HEIGHT: 70 IN | WEIGHT: 218 LBS | BODY MASS INDEX: 31.21 KG/M2

## 2022-12-21 DIAGNOSIS — E66.09 CLASS 1 OBESITY DUE TO EXCESS CALORIES WITHOUT SERIOUS COMORBIDITY WITH BODY MASS INDEX (BMI) OF 31.0 TO 31.9 IN ADULT: Primary | ICD-10-CM

## 2022-12-21 DIAGNOSIS — K21.9 GASTROESOPHAGEAL REFLUX DISEASE WITHOUT ESOPHAGITIS: ICD-10-CM

## 2022-12-21 DIAGNOSIS — E66.811 CLASS 1 OBESITY DUE TO EXCESS CALORIES WITHOUT SERIOUS COMORBIDITY WITH BODY MASS INDEX (BMI) OF 31.0 TO 31.9 IN ADULT: Primary | ICD-10-CM

## 2022-12-21 PROCEDURE — 99213 OFFICE O/P EST LOW 20 MIN: CPT | Mod: 95 | Performed by: PHYSICIAN ASSISTANT

## 2022-12-21 RX ORDER — LIRAGLUTIDE 6 MG/ML
3 INJECTION, SOLUTION SUBCUTANEOUS DAILY
Qty: 45 ML | Refills: 0 | Status: SHIPPED | OUTPATIENT
Start: 2022-12-21 | End: 2023-06-27

## 2022-12-21 NOTE — PROGRESS NOTES
Mallory is a 57 year old who is being evaluated via a billable video visit.      If the video visit is dropped, the invitation should be resent by: Text to cell phone: [phone]  Will anyone else be joining your video visit? No      Video-Visit Details    Type of service:  Video Visit    Video Start Time: 1:01    Video End Time: 1:17      25 minutes spent on the date of the encounter doing chart review, review of test results, patient visit and documentation       Originating Location (pt. Location): Home    Distant Location (provider location):  Home     Platform used for Video Visit: [facility]          ]      2022      Return Virtual Medical Weight Management Note       Mallory Sargent  MRN:  [id number]  :  1965      Dear Blanka Lucio,    I had the pleasure of doing a visit with your patient Mallory Sargetn.  She is a 57 year old female who I am continuing to see for treatment of obesity related to:       11/3/2021   I have the following health issues associated with obesity: Pre-Diabetes, High Cholesterol, Sleep Apnea, GERD (Reflux), Fatty Liver   I have the following symptoms associated with obesity: Depression, Back Pain, Fatigue, Hip Pain       INTERVAL HISTORY:  Last seen 10/05/2022. Continues to inject Saxenda 2.4 mg in the mornings. Does feel like there is still appetite suppression but not as much.   Has not been doing 16 hour fasting lately. Up about 7 lbs since the last visit and finds this concerning. Knows the holidays are a hard time of the year.   Taking omeprazole prn.  Has not done much purposeful exercise in the past few weeks.  Does have a stationary bike at home      CURRENT WEIGHT:   218 lbs 0 oz    Wt Readings from Last 4 Encounters:   22 218 lb (98.9 kg)   10/05/22 211 lb (95.7 kg)   22 220 lb 4.8 oz (99.9 kg)   22 221 lb 9.6 oz (100.5 kg)       BARIATRIC METRICS:  Current Weight: 218 lb (98.9 kg)  Body mass index is 31.28 kg/m .   Wt  change since last visit (lbs): 7  Cumulative weight loss (lbs): -7        DIETARY HISTORY  Meals Per Day: 3  Food Diary:   B: bowl of cereal with 1%milk. Cup of coffee  L: sandwich from cafeteria  D: chicken breast with green beans and garlic bread  Snacks Per Day: not really on work days but on days off couple of snacks daily.   Typical Snack: piece of fruit, handfuls of trailmix   Fluid Intake: 60-80 oz water daily, coffee in the AM with oat milk. 2-3 drinkd of alcohol in the  Month. Has 1 Severino sparkling drink daily        Exercise: not much in the past couple of weeks      ROS: 12/21/2022  General  Fatigue: improved  Sleep Quality: slightly improved  Birth Control: postmenopausal  HEENT  Hx of glaucoma: No  Vision changes: No  Cardiovascular  Chest Pain with Exertion: No  Palpitations: No  Hx of heart disease: No  Had a recent EKG and Stress echo that was reviewed today   Pulmonary  Shortness of breath at rest: No  Shortness of breath with exertion: No  Snoring: Yes - was diagnosed with JESSIE 3 years ago  Was advised to meet with a sleep dentist for a mouth guard. Has not made appointment  Gastrointestinal  Heartburn: ok. Omeprazole prn.   Abdominal pain: No  History of pancreatitis: No  Severe constipation: managed with daily fiber.  Taking psyllium fiber capsules daily  Hx of bowel obstruction: No  Nausea with medication changes - manageable  Gastrourinary  History of kidney stones: No  Psychiatric  Moods Stable: Yes  History of drug abuse: No  No history of eating disorder      MEDICATIONS:   Current Outpatient Medications   Medication     aspirin 81 MG tablet     atorvastatin (LIPITOR) 40 MG tablet     augmented betamethasone dipropionate (DIPROLENE) 0.05 % external lotion     FIBER PO     hydrOXYzine (ATARAX) 25 MG tablet     insulin pen needle (31G X 5 MM) 31G X 5 MM miscellaneous     insulin pen needle (B-D U/F) 31G X 5 MM miscellaneous     liraglutide - Weight Management (SAXENDA) 18 MG/3ML pen      "magnesium 250 MG tablet     Omega-3 Fatty Acids (FISH OIL ADULT GUMMIES PO)     omeprazole (PRILOSEC) 20 MG DR capsule     No current facility-administered medications for this visit.       PE:  Ht 5' 10\" (1.778 m)   Wt 218 lb (98.9 kg)   LMP 10/01/2015 (Approximate)   BMI 31.28 kg/m    GENERAL: Healthy, alert and no distress  EYES: Eyes grossly normal to inspection.  No discharge or erythema, or obvious scleral/conjunctival abnormalities.  RESP: No audible wheeze, cough, or visible cyanosis.  No visible retractions or increased work of breathing.    SKIN: Visible skin clear. No significant rash, abnormal pigmentation or lesions.  NEURO: Cranial nerves grossly intact.  Mentation and speech appropriate for age.  PSYCH: Mentation appears normal, affect normal/bright, judgement and insight intact, normal speech and appearance well-groomed.        ASSESSMENT/PLAN:   Class 1 severe obesity due to excess calories with Body Mass Index (BMI) of 31  GERD without esophagitis  High cholesterol  JESSIE      Discussed patient going up to 3.0 mg dose of Saxenda.  Emphasized the importance of lifestyle and diet for long term success.   RX for Saxenda 3.0 mg sent to pharmacy. Patient to follow up in 3 months.     Patient will need to schedule a nurse only visit to get weight, blood pressure and pulse taken. It can then be forwarded to clinic.          Maury Mcclure MS, PA-C        "

## 2022-12-21 NOTE — PATIENT INSTRUCTIONS
"Nice to talk with you today. Thank you for allowing me the privilege of caring for you. We hope we provided you with the excellent service you deserve.     To ensure the quality of our services you may receive a patient satisfaction survey from an independent monitoring company.  The greatest compliment you can give is \"Likely to Recommend\"    Below is our plan we discussed.-  TOMA Mendiola        Please call [phone] and schedule a follow up with Maury Mcclure PA-C in 3 months.  If you need to reach me sooner you can do so by calling [phone].    Have a great day!     Eat Better ? Move More ? Live Well    Eat 3 nutrient-rich meals each day    Don t skip meals--it will cause you to overeat later in the day!    Eating fiber (vegetables/fruits/whole grains) and protein with meals helps you stay full longer    Choose foods with less than 10 grams of sugar and 5 grams of fat per serving to prevent excess calories and weight re-gain  Eat around the same times each day to develop a routine eating schedule   Avoid snacking unless physically hungry.   Planned snacks: 1-2 times per day and no more than 150 calories    Eat protein first   Protein helps with healing, maintaining adequate muscle mass, reducing hunger and optimizing nutritional status   Aim for 60-80 grams of protein per day   Fill up on Fiber   Fiber comes from plants--fruits, veggies, whole grains, nuts/seeds and beans   Fiber is low in calories, high in phytonutrients and helps you stay full longer   Aim for 25-35 grams per day by eating fiber with meals and snacks  Eat [id number]   Take 20-30 minutes to eat each meal by taking small bites, chewing foods to applesauce consistency or 20-30 times before you swallow   Eating foods too fast can delay satiety/fullness signals and increase overeating   Slow down your eating by using toddler utensils, putting your fork/spoon down between bites and not watching TV or emailing during meals!   Keep a Journal     "     Writing down what you eat, how you feel and when you are active helps you identify new changes to work on from week to week         Look for ways to cut 100 calories from your current diet 2-3 times per day  Drink 64 ounces of 0-Calorie drinks between meals   Water   Zero calorie Propel  or Vitamin Water     SoBe Lifewater  Zero Calories   Crystal Light , Sugar-Free Chaka-Aid , and other sugar-free lemonade or flavored hough   Keep Caffeine to less than 300mg per day ie: 3-6oz cups coffee     Work up to 45-60 minutes of physical activity most days of the week   Helps with losing weight and prevent regaining those extra pounds!    Do a combo of cardio (walking/water exercises) and strength training (lifting weights/Vinyasa yoga)    Avoid Mindless Eating   Be present when you eat--take note of the smell, taste and quality of your food   Make a list of alternative activities you could do to prevent eating out of boredom/stress  Go for a walk, call a friend, chew gum, paint your nails, re-organize the garage, etc

## 2022-12-30 ENCOUNTER — ALLIED HEALTH/NURSE VISIT (OUTPATIENT)
Dept: INTERNAL MEDICINE | Facility: CLINIC | Age: 57
End: 2022-12-30
Payer: COMMERCIAL

## 2022-12-30 VITALS
HEART RATE: 66 BPM | DIASTOLIC BLOOD PRESSURE: 66 MMHG | BODY MASS INDEX: 31.42 KG/M2 | WEIGHT: 219 LBS | SYSTOLIC BLOOD PRESSURE: 113 MMHG | OXYGEN SATURATION: 99 %

## 2022-12-30 DIAGNOSIS — Z01.30 BLOOD PRESSURE CHECK: Primary | ICD-10-CM

## 2022-12-30 PROCEDURE — 99207 PR NO CHARGE NURSE ONLY: CPT

## 2023-01-06 ENCOUNTER — OFFICE VISIT (OUTPATIENT)
Dept: FAMILY MEDICINE | Facility: CLINIC | Age: 58
End: 2023-01-06
Payer: COMMERCIAL

## 2023-01-06 VITALS
WEIGHT: 219 LBS | HEIGHT: 71 IN | RESPIRATION RATE: 18 BRPM | BODY MASS INDEX: 30.66 KG/M2 | OXYGEN SATURATION: 99 % | TEMPERATURE: 97.3 F | HEART RATE: 96 BPM | DIASTOLIC BLOOD PRESSURE: 72 MMHG | SYSTOLIC BLOOD PRESSURE: 112 MMHG

## 2023-01-06 DIAGNOSIS — Z00.00 ROUTINE GENERAL MEDICAL EXAMINATION AT A HEALTH CARE FACILITY: Primary | ICD-10-CM

## 2023-01-06 DIAGNOSIS — E78.5 HYPERLIPIDEMIA LDL GOAL <130: ICD-10-CM

## 2023-01-06 DIAGNOSIS — Z12.4 SCREENING FOR CERVICAL CANCER: ICD-10-CM

## 2023-01-06 DIAGNOSIS — E66.01 CLASS 2 SEVERE OBESITY DUE TO EXCESS CALORIES WITH SERIOUS COMORBIDITY AND BODY MASS INDEX (BMI) OF 36.0 TO 36.9 IN ADULT (H): ICD-10-CM

## 2023-01-06 DIAGNOSIS — Z80.41 FAMILY HISTORY OF OVARIAN CANCER: ICD-10-CM

## 2023-01-06 DIAGNOSIS — E66.812 CLASS 2 SEVERE OBESITY DUE TO EXCESS CALORIES WITH SERIOUS COMORBIDITY AND BODY MASS INDEX (BMI) OF 36.0 TO 36.9 IN ADULT (H): ICD-10-CM

## 2023-01-06 LAB
CANCER AG125 SERPL-ACNC: 8 U/ML
CHOLEST SERPL-MCNC: 161 MG/DL
HDLC SERPL-MCNC: 46 MG/DL
LDLC SERPL CALC-MCNC: 88 MG/DL
NONHDLC SERPL-MCNC: 115 MG/DL
TRIGL SERPL-MCNC: 133 MG/DL

## 2023-01-06 PROCEDURE — 80061 LIPID PANEL: CPT | Performed by: NURSE PRACTITIONER

## 2023-01-06 PROCEDURE — 99396 PREV VISIT EST AGE 40-64: CPT | Performed by: NURSE PRACTITIONER

## 2023-01-06 PROCEDURE — 36415 COLL VENOUS BLD VENIPUNCTURE: CPT | Performed by: NURSE PRACTITIONER

## 2023-01-06 PROCEDURE — 99213 OFFICE O/P EST LOW 20 MIN: CPT | Mod: 25 | Performed by: NURSE PRACTITIONER

## 2023-01-06 PROCEDURE — G0145 SCR C/V CYTO,THINLAYER,RESCR: HCPCS | Performed by: NURSE PRACTITIONER

## 2023-01-06 PROCEDURE — 87624 HPV HI-RISK TYP POOLED RSLT: CPT | Performed by: NURSE PRACTITIONER

## 2023-01-06 PROCEDURE — 86304 IMMUNOASSAY TUMOR CA 125: CPT | Performed by: NURSE PRACTITIONER

## 2023-01-06 RX ORDER — ATORVASTATIN CALCIUM 40 MG/1
40 TABLET, FILM COATED ORAL DAILY
Qty: 90 TABLET | Refills: 3 | Status: SHIPPED | OUTPATIENT
Start: 2023-01-06 | End: 2024-01-11

## 2023-01-06 ASSESSMENT — ENCOUNTER SYMPTOMS
HEMATURIA: 0
SORE THROAT: 0
FEVER: 0
SHORTNESS OF BREATH: 0
CONSTIPATION: 1
ABDOMINAL PAIN: 0
MYALGIAS: 0
DIZZINESS: 0
NERVOUS/ANXIOUS: 0
PALPITATIONS: 0
FREQUENCY: 0
WEAKNESS: 0
HEARTBURN: 0
ARTHRALGIAS: 0
JOINT SWELLING: 0
CHILLS: 0
HEADACHES: 0
COUGH: 0
BREAST MASS: 0
NAUSEA: 0
HEMATOCHEZIA: 0
DIARRHEA: 0
DYSURIA: 0
PARESTHESIAS: 0
EYE PAIN: 0

## 2023-01-06 NOTE — PROGRESS NOTES
SUBJECTIVE:   CC: Mallory is an 57 year old who presents for preventive health visit.   Patient has been advised of split billing requirements and indicates understanding: Yes     Healthy Habits:     Getting at least 3 servings of Calcium per day:  NO    Bi-annual eye exam:  Yes    Dental care twice a year:  Yes    Sleep apnea or symptoms of sleep apnea:  None    Diet:  Regular (no restrictions)    Frequency of exercise:  1 day/week    Duration of exercise:  Less than 15 minutes    Taking medications regularly:  Yes    Medication side effects:  Not applicable    PHQ-2 Total Score: 2    Additional concerns today:  No      Hyperlipidemia Follow-Up    Recent Labs   Lab Test 11/26/21  1045 09/21/21  1105 03/31/16  1015 02/27/15  0923   CHOL 135 206*   < > 195   HDL 45* 47*   < > 51   LDL 72 132*   < > 108   TRIG 90 133   < > 178*   CHOLHDLRATIO  --   --   --  3.8    < > = values in this interval not displayed.         .Are you regularly taking any medication or supplement to lower your cholesterol?   Yes- Atorvastatin     Are you having muscle aches or other side effects that you think could be caused by your cholesterol lowering medication?  No      Today's PHQ-2 Score:   PHQ-2 ( 1999 [employer]) 1/6/2023   Q1: Little interest or pleasure in doing things 1   Q2: Feeling down, depressed or hopeless 1   PHQ-2 Score 2   PHQ-2 Total Score (12-17 Years)- Positive if 3 or more points; Administer PHQ-A if positive -   Q1: Little interest or pleasure in doing things Several days   Q2: Feeling down, depressed or hopeless Several days   PHQ-2 Score 2       Have you ever done Advance Care Planning? (For example, a Health Directive, POLST, or a discussion with a medical provider or your loved ones about your wishes): No, advance care planning information given to patient to review.  Patient declined advance care planning discussion at this time.    Social History     Tobacco Use     Smoking status: Former     Packs/day: 0.00      Years: 0.00     Pack years: 0.00     Types: Cigarettes     Quit date: 1993     Years since quittin.6     Smokeless tobacco: Never   Substance Use Topics     Alcohol use: Yes     Comment: very rarely      If you drink alcohol do you typically have >3 drinks per day or >7 drinks per week? No    Alcohol Use 2023   Prescreen: >3 drinks/day or >7 drinks/week? Not Applicable   Prescreen: >3 drinks/day or >7 drinks/week? -       Reviewed orders with patient.  Reviewed health maintenance and updated orders accordingly - Yes  BP Readings from Last 3 Encounters:   23 112/72   22 113/66   22 112/74    Wt Readings from Last 3 Encounters:   23 99.3 kg (219 lb)   22 99.3 kg (219 lb)   22 98.9 kg (218 lb)                  Patient Active Problem List   Diagnosis     Family history of malignant neoplasm of ovary - maternal Hx Dx'd age 57,  age 62; pt consider ovary removal.     Benign shuddering attack     Esophageal reflux     Acute reaction to stress     Hyperlipidemia LDL goal <130     Hip dysplasia     Family history of basal cell carcinoma     Class 1 obesity due to excess calories without serious comorbidity with body mass index (BMI) of 30.0 to 30.9 in adult     Morbid obesity (H)     Personal history of colonic polyps     Fatty liver - abdominal US from      Elevated liver enzymes     Past Surgical History:   Procedure Laterality Date     ABDOMEN SURGERY       APPENDECTOMY       BREAST SURGERY       C/SECTION, LOW TRANSVERSE      , Low Transverse     CHOLECYSTECTOMY, LAPOROSCOPIC  2010    Cholecystectomy, Laparoscopic     COLONOSCOPY  2/15/2016    Dr. Tran [facility]     HERNIA REPAIR, INCISIONAL  2010    Laparoscopic     ZZC LIGATE FALLOPIAN TUBE       ZZC NONSPECIFIC PROCEDURE      vaginal repair after delivery       Social History     Tobacco Use     Smoking status: Former     Packs/day: 0.00     Years: 0.00     Pack years: 0.00     Types:  Cigarettes     Quit date: 1993     Years since quittin.6     Smokeless tobacco: Never   Substance Use Topics     Alcohol use: Yes     Comment: very rarely      Family History   Problem Relation Age of Onset     Hypertension Father      Lipids Father      Gastrointestinal Disease Father         vazquez's esophagus fr. reflux      Hyperlipidemia Father      Cancer Mother         ovarian     Arthritis Mother         lupus      Depression Paternal Grandmother         problems with schizophrenia     Arthritis Brother      Diabetes Maternal Grandmother      Diabetes Maternal Grandfather      Coronary Artery Disease Maternal Grandfather      Coronary Artery Disease Paternal Grandfather      Gastrointestinal Disease Brother         reflux      Colon Cancer Cousin      Ovarian Cancer Other         Maternal great aunt     Breast Cancer No family hx of          Current Outpatient Medications   Medication Sig Dispense Refill     aspirin 81 MG tablet Take by mouth daily 30 tablet      atorvastatin (LIPITOR) 40 MG tablet Take 1 tablet (40 mg) by mouth daily 90 tablet 3     FIBER PO        hydrOXYzine (ATARAX) 25 MG tablet Take 0.5-2 tablets (12.5-50 mg) by mouth nightly as needed (insomnia) 45 tablet 3     insulin pen needle (31G X 5 MM) 31G X 5 MM miscellaneous Use 1 pen needles daily or as directed. 100 each 1     insulin pen needle (B-D U/F) 31G X 5 MM miscellaneous USE 1 PEN NEEDLE DAILY OR AS DIRECTED. 100 each 3     liraglutide - Weight Management (SAXENDA) 18 MG/3ML pen Inject 3 mg Subcutaneous daily 45 mL 0     magnesium 250 MG tablet Take 1 tablet by mouth daily       Omega-3 Fatty Acids (FISH OIL ADULT GUMMIES PO) Take 1,000 mg by mouth daily       omeprazole (PRILOSEC) 20 MG DR capsule Take 1 capsule (20 mg) by mouth daily 30-60 minutes before a meal. Pt typically takes QOD 90 capsule 3     augmented betamethasone dipropionate (DIPROLENE) 0.05 % external lotion Apply topically 2 times daily Apply to AA on  scalp bid when needed 60 mL 1       Breast Cancer Screening:    FHS-7:   Breast CA Risk Assessment (FHS-7) 2021   Did any of your first-degree relatives have breast or ovarian cancer? Yes Yes Yes   Did any of your relatives have bilateral breast cancer? No No No   Did any man in your family have breast cancer? No No No   Did any woman in your family have breast and ovarian cancer? No No Yes   Did any woman in your family have breast cancer before age 50 y? No No No   Do you have 2 or more relatives with breast and/or ovarian cancer? No No Yes   Do you have 2 or more relatives with breast and/or bowel cancer? No No No       Mammogram Screening: Recommended mammography every 1-2 years with patient discussion and risk factor consideration  Pertinent mammograms are reviewed under the imaging tab.    History of abnormal Pap smear: NO - age 30-65 PAP every 5 years with negative HPV co-testing recommended  PAP / HPV Latest Ref Rng & Units 2018 2015 10/31/2013   PAP (Historical) - NIL ASC-US(A) NIL   HPV16 NEG:Negative Negative - -   HPV18 NEG:Negative Negative - -   HRHPV NEG:Negative Negative - -     Reviewed and updated as needed this visit by clinical staff   Tobacco  Allergies  Meds              Reviewed and updated as needed this visit by Provider                 Past Medical History:   Diagnosis Date     ASCUS favor benign 3/2015    neg HPV   Plan cotest in 3 yrs.     Depressive disorder      Depressive disorder, not elsewhere classified     lexapro = no effect and sleepy, celexa = slightly better, wellbutrin = severe restlessness.       Esophageal reflux 2006     FAMILY HX-OVARIAN MALIGNANCY 2005    maternal Hx Dx'd age 57,  age 62; pt would like to have her ovaries removed.     GERD (gastroesophageal reflux disease)      Other and unspecified hyperlipidemia 2007     RECURR Depressive disorder - MOD 2008     RECURR Depressive disorder -SEVERE  "2008      Past Surgical History:   Procedure Laterality Date     ABDOMEN SURGERY       APPENDECTOMY       BREAST SURGERY       C/SECTION, LOW TRANSVERSE      , Low Transverse     CHOLECYSTECTOMY, LAPOROSCOPIC  2010    Cholecystectomy, Laparoscopic     COLONOSCOPY  2/15/2016    Dr. Tran [facility]     HERNIA REPAIR, INCISIONAL  2010    Laparoscopic     ZZC LIGATE FALLOPIAN TUBE       ZZC NONSPECIFIC PROCEDURE      vaginal repair after delivery       Review of Systems   Constitutional: Negative for chills and fever.   HENT: Negative for congestion, ear pain, hearing loss and sore throat.    Eyes: Negative for pain and visual disturbance.   Respiratory: Negative for cough and shortness of breath.    Cardiovascular: Negative for chest pain, palpitations and peripheral edema.   Gastrointestinal: Positive for constipation. Negative for abdominal pain, diarrhea, heartburn, hematochezia and nausea.   Breasts:  Negative for tenderness, breast mass and discharge.   Genitourinary: Negative for dysuria, frequency, genital sores, hematuria, pelvic pain, urgency, vaginal bleeding and vaginal discharge.   Musculoskeletal: Negative for arthralgias, joint swelling and myalgias.   Skin: Negative for rash.   Neurological: Negative for dizziness, weakness, headaches and paresthesias.   Psychiatric/Behavioral: Negative for mood changes. The patient is not nervous/anxious.         OBJECTIVE:   /72   Pulse 96   Temp 97.3  F (36.3  C)   Resp 18   Ht 1.797 m (5' 10.75\")   Wt 99.3 kg (219 lb)   LMP 10/01/2015 (Approximate)   SpO2 99%   BMI 30.76 kg/m       Physical Exam     GENERAL: healthy, alert and no distress  EYES: Eyes grossly normal to inspection, PERRL and conjunctivae and sclerae normal  HENT: ear canals and TM's normal, nose and mouth without ulcers or lesions  NECK: no adenopathy, no asymmetry, masses, or scars and thyroid normal to palpation  RESP: lungs clear to auscultation - no rales, rhonchi " "or wheezes  CV: regular rate and rhythm, normal S1 S2, no S3 or S4, no murmur, click or rub, no peripheral edema  ABDOMEN: soft, nontender, no hepatosplenomegaly, no masses and bowel sounds normal   (female): normal female external genitalia, normal urethral meatus, vaginal mucosa pink, moist, well rugated, and normal cervix/adnexa/uterus without masses or discharge  MS: no gross musculoskeletal defects noted, no edema  SKIN: no suspicious lesions or rashes  NEURO: Normal strength and tone, mentation intact and speech normal  PSYCH: mentation appears normal, affect normal/bright      ASSESSMENT/PLAN:     Mallory was seen today for physical.    Diagnoses and all orders for this visit:    Routine general medical examination at a health care facility    Hyperlipidemia LDL goal <130  Recent Labs   Lab Test 11/26/21  1045 09/21/21  1105 03/31/16  1015 02/27/15  0923   CHOL 135 206*   < > 195   HDL 45* 47*   < > 51   LDL 72 132*   < > 108   TRIG 90 133   < > 178*   CHOLHDLRATIO  --   --   --  3.8    < > = values in this interval not displayed.   Stable on Atorvastatin 40 mg daily.    -     atorvastatin (LIPITOR) 40 MG tablet; Take 1 tablet (40 mg) by mouth daily  -     Lipid panel reflex to direct LDL Fasting    Family history of ovarian cancer  -         Screening for cervical cancer  -     Pap screen with HPV - recommended age 30 - 65 years    Class 2 severe obesity due to excess calories with serious comorbidity and body mass index (BMI) of 36.0 to 36.9 in adult (H)  Following with weight management clinic.     Other orders  -     REVIEW OF HEALTH MAINTENANCE PROTOCOL ORDERS        COUNSELING:  Reviewed preventive health counseling, as reflected in patient instructions      BMI:   Estimated body mass index is 30.76 kg/m  as calculated from the following:    Height as of this encounter: 1.797 m (5' 10.75\").    Weight as of this encounter: 99.3 kg (219 lb).       She reports that she quit smoking about 29 years " ago. Her smoking use included cigarettes. She has never used smokeless tobacco.      ALEXANDRIA Serrato CNP  [facility] [place]

## 2023-01-06 NOTE — RESULT ENCOUNTER NOTE
Dear Mallory,     -Cholesterol levels are at your goal levels.  ADVISE: continuing your medication, a regular exercise program with at least 150 minutes of aerobic exercise per week, and eating a low saturated fat/low carbohydrate diet.  Also, you should recheck this fasting cholesterol panel in 12 months.    - was negative.        Please send a Related Content Database (RCDb) message or call [phone]  if you have any questions.      ALEXANDRIA Serrato, CNP  [facility]    If you have further questions about the interpretation of your labs, [email or web address].org is a good website to check out for further information.

## 2023-01-10 LAB
BKR LAB AP GYN ADEQUACY: NORMAL
BKR LAB AP GYN INTERPRETATION: NORMAL
BKR LAB AP HPV REFLEX: NORMAL
BKR LAB AP PREVIOUS ABNORMAL: NORMAL
PATH REPORT.COMMENTS IMP SPEC: NORMAL
PATH REPORT.COMMENTS IMP SPEC: NORMAL
PATH REPORT.RELEVANT HX SPEC: NORMAL

## 2023-01-11 LAB
HUMAN PAPILLOMA VIRUS 16 DNA: NEGATIVE
HUMAN PAPILLOMA VIRUS 18 DNA: NEGATIVE
HUMAN PAPILLOMA VIRUS FINAL DIAGNOSIS: NORMAL
HUMAN PAPILLOMA VIRUS OTHER HR: NEGATIVE

## 2023-02-24 ENCOUNTER — VIRTUAL VISIT (OUTPATIENT)
Dept: FAMILY MEDICINE | Facility: CLINIC | Age: 58
End: 2023-02-24
Payer: COMMERCIAL

## 2023-02-24 DIAGNOSIS — F41.9 ANXIETY: ICD-10-CM

## 2023-02-24 DIAGNOSIS — F33.0 MILD RECURRENT MAJOR DEPRESSION (H): Primary | ICD-10-CM

## 2023-02-24 PROCEDURE — 99214 OFFICE O/P EST MOD 30 MIN: CPT | Mod: VID | Performed by: NURSE PRACTITIONER

## 2023-02-24 ASSESSMENT — ANXIETY QUESTIONNAIRES
GAD7 TOTAL SCORE: 6
GAD7 TOTAL SCORE: 6
3. WORRYING TOO MUCH ABOUT DIFFERENT THINGS: SEVERAL DAYS
7. FEELING AFRAID AS IF SOMETHING AWFUL MIGHT HAPPEN: MORE THAN HALF THE DAYS
8. IF YOU CHECKED OFF ANY PROBLEMS, HOW DIFFICULT HAVE THESE MADE IT FOR YOU TO DO YOUR WORK, TAKE CARE OF THINGS AT HOME, OR GET ALONG WITH OTHER PEOPLE?: SOMEWHAT DIFFICULT
GAD7 TOTAL SCORE: 6
7. FEELING AFRAID AS IF SOMETHING AWFUL MIGHT HAPPEN: MORE THAN HALF THE DAYS
4. TROUBLE RELAXING: NOT AT ALL
2. NOT BEING ABLE TO STOP OR CONTROL WORRYING: SEVERAL DAYS
5. BEING SO RESTLESS THAT IT IS HARD TO SIT STILL: NOT AT ALL
1. FEELING NERVOUS, ANXIOUS, OR ON EDGE: SEVERAL DAYS
IF YOU CHECKED OFF ANY PROBLEMS ON THIS QUESTIONNAIRE, HOW DIFFICULT HAVE THESE PROBLEMS MADE IT FOR YOU TO DO YOUR WORK, TAKE CARE OF THINGS AT HOME, OR GET ALONG WITH OTHER PEOPLE: SOMEWHAT DIFFICULT
6. BECOMING EASILY ANNOYED OR IRRITABLE: SEVERAL DAYS

## 2023-02-24 ASSESSMENT — PATIENT HEALTH QUESTIONNAIRE - PHQ9
10. IF YOU CHECKED OFF ANY PROBLEMS, HOW DIFFICULT HAVE THESE PROBLEMS MADE IT FOR YOU TO DO YOUR WORK, TAKE CARE OF THINGS AT HOME, OR GET ALONG WITH OTHER PEOPLE: SOMEWHAT DIFFICULT
SUM OF ALL RESPONSES TO PHQ QUESTIONS 1-9: 7
SUM OF ALL RESPONSES TO PHQ QUESTIONS 1-9: 7

## 2023-02-24 NOTE — PROGRESS NOTES
"Mallory is a 57 year old who is being evaluated via a billable video visit.      How would you like to obtain your AVS? MyChart  If the video visit is dropped, the invitation should be resent by: Text to cell phone: [phone]  Will anyone else be joining your video visit? No        Assessment & Plan     Mallory was seen today for consult.    Diagnoses and all orders for this visit:    Mild recurrent major depression (H)  Anxiety  PHQ 9/22/2020 9/16/2021 2/24/2023   PHQ-9 Total Score 2 3 7   Q9: Thoughts of better off dead/self-harm past 2 weeks Not at all Not at all Not at all     CHRISTEN-7 SCORE 9/22/2020 9/16/2021 2/24/2023   Total Score - - -   Total Score - - 6 (mild anxiety)   Total Score 0 0 6     Worsening mood/anxiety symptoms.  Currently participating in therapy.    Plan to initiate selective serotonin reuptake inhibitor - Sertraline and plan close follow-up in 4 weeks for recheck, sooner as needed.    -     sertraline (ZOLOFT) 50 MG tablet; Take 1/2 tablet (25 mg) by mouth once daily for 7 days and then increase to 1 tablet (50 mg) by mouth once daily      BMI:   Estimated body mass index is 30.76 kg/m  as calculated from the following:    Height as of 1/6/23: 1.797 m (5' 10.75\").    Weight as of 1/6/23: 99.3 kg (219 lb).     Return in about 4 weeks (around 3/24/2023) for Video Visit, Med check.    Mary Castro, [facility]      Mallory is a 57 year old, presenting for the following health issues:  Consult (Discuss medication for anxiety/depression)    History of Present Illness       Reason for visit:  Discuss starting med for anxiety/depression    Recently sought out therapy through [facility].  Hasn't been feeling so great.   Has had 4 sessions with therapist.  Therapist recommended initiation of medication.    Patient reports intense feelings of sadness on many days, not every days and mixed with this is worry - mostly about children.  Mind goes " to worst case scenario.  +Ruminating thoughts.    Possible OCD tendency - if she doesn't do something then something bad will happen.   Worsening of symptoms around the holidays.  - worsening over the past 3 months.      Previous trial of medication - Lexapro (2007) caused fatigue.    Citalopram - 2011  Effexor - 2009    She eats 2-3 servings of fruits and vegetables daily.She consumes 0 sweetened beverage(s) daily.She exercises with enough effort to increase her heart rate 10 to 19 minutes per day.  She exercises with enough effort to increase her heart rate 3 or less days per week.   She is taking medications regularly.      PHQ 9/22/2020 9/16/2021 2/24/2023   PHQ-9 Total Score 2 3 7   Q9: Thoughts of better off dead/self-harm past 2 weeks Not at all Not at all Not at all     CHRISTEN-7 SCORE 9/22/2020 9/16/2021 2/24/2023   Total Score - - -   Total Score - - 6 (mild anxiety)   Total Score 0 0 6         Review of Systems     Constitutional, HEENT, cardiovascular, pulmonary, gi and gu systems are negative, except as otherwise noted.      Objective           Vitals:  No vitals were obtained today due to virtual visit.    Physical Exam   GENERAL: Healthy, alert and no distress  EYES: Eyes grossly normal to inspection.  No discharge or erythema, or obvious scleral/conjunctival abnormalities.  RESP: No audible wheeze, cough, or visible cyanosis.  No visible retractions or increased work of breathing.    SKIN: Visible skin clear. No significant rash, abnormal pigmentation or lesions.  NEURO: Cranial nerves grossly intact.  Mentation and speech appropriate for age.  PSYCH: Mentation appears normal, affect normal/bright, judgement and insight intact, normal speech and appearance well-groomed.            Video-Visit Details    Type of service:  Video Visit     Originating Location (pt. Location): Home  Distant Location (provider location):  On-site  Platform used for Video Visit: [employer]

## 2023-04-04 ENCOUNTER — VIRTUAL VISIT (OUTPATIENT)
Dept: FAMILY MEDICINE | Facility: CLINIC | Age: 58
End: 2023-04-04
Payer: COMMERCIAL

## 2023-04-04 DIAGNOSIS — F33.0 MILD RECURRENT MAJOR DEPRESSION (H): ICD-10-CM

## 2023-04-04 DIAGNOSIS — F41.9 ANXIETY: ICD-10-CM

## 2023-04-04 PROCEDURE — 99213 OFFICE O/P EST LOW 20 MIN: CPT | Mod: VID | Performed by: NURSE PRACTITIONER

## 2023-04-04 ASSESSMENT — ANXIETY QUESTIONNAIRES
1. FEELING NERVOUS, ANXIOUS, OR ON EDGE: NOT AT ALL
GAD7 TOTAL SCORE: 0
IF YOU CHECKED OFF ANY PROBLEMS ON THIS QUESTIONNAIRE, HOW DIFFICULT HAVE THESE PROBLEMS MADE IT FOR YOU TO DO YOUR WORK, TAKE CARE OF THINGS AT HOME, OR GET ALONG WITH OTHER PEOPLE: NOT DIFFICULT AT ALL
5. BEING SO RESTLESS THAT IT IS HARD TO SIT STILL: NOT AT ALL
GAD7 TOTAL SCORE: 0
6. BECOMING EASILY ANNOYED OR IRRITABLE: NOT AT ALL
7. FEELING AFRAID AS IF SOMETHING AWFUL MIGHT HAPPEN: NOT AT ALL
3. WORRYING TOO MUCH ABOUT DIFFERENT THINGS: NOT AT ALL
2. NOT BEING ABLE TO STOP OR CONTROL WORRYING: NOT AT ALL

## 2023-04-04 ASSESSMENT — PATIENT HEALTH QUESTIONNAIRE - PHQ9
5. POOR APPETITE OR OVEREATING: NOT AT ALL
SUM OF ALL RESPONSES TO PHQ QUESTIONS 1-9: 0

## 2023-04-04 NOTE — PROGRESS NOTES
"Mallory is a 57 year old who is being evaluated via a billable video visit.      How would you like to obtain your AVS? MyChart  If the video visit is dropped, the invitation should be resent by: Text to cell phone: [phone]  Will anyone else be joining your video visit? No        Assessment & Plan     Mallory was seen today for recheck medication.    Diagnoses and all orders for this visit:    Mild recurrent major depression (H)  Anxiety  Stable on selective serotonin reuptake inhibitor - Sertraline 50 mg daily.    Refills completed.    Follow-up for med check with preventative care visit in 2024, sooner as needed.    -     sertraline (ZOLOFT) 50 MG tablet; Take 1 tablet (50 mg) by mouth daily           BMI:   Estimated body mass index is 30.76 kg/m  as calculated from the following:    Height as of 23: 1.797 m (5' 10.75\").    Weight as of 23: 99.3 kg (219 lb).     Mary Castro, [facility]   Mallory is a 57 year old, presenting for the following health issues:  Recheck Medication         View : No data to display.              HPI     Anxiety Follow-Up    Started Sertraline at virtual visit on 23.      Patient reports that things are going better.  Hasn't noticed feeling awful and thinks that things are going much better.        How are you doing with your anxiety since your last visit? Improved     Are you having other symptoms that might be associated with anxiety? No    Have you had a significant life event? No     Are you feeling depressed? No    Do you have any concerns with your use of alcohol or other drugs? No      Social History     Tobacco Use     Smoking status: Former     Packs/day: 0.00     Years: 0.00     Pack years: 0.00     Types: Cigarettes     Quit date: 1993     Years since quittin.8     Smokeless tobacco: Never   Vaping Use     Vaping status: Never Used   Substance Use Topics     Alcohol use: Yes "     Comment: very rarely      Drug use: No         9/16/2021     8:11 AM 2/24/2023     2:39 PM 4/4/2023     4:24 PM   CHRISTEN-7 SCORE   Total Score  6 (mild anxiety)    Total Score 0 6 0         9/16/2021     8:11 AM 2/24/2023     2:39 PM 4/4/2023     4:24 PM   PHQ   PHQ-9 Total Score 3 7 0   Q9: Thoughts of better off dead/self-harm past 2 weeks Not at all Not at all Not at all         4/4/2023     4:24 PM   Last PHQ-9   1.  Little interest or pleasure in doing things 0   2.  Feeling down, depressed, or hopeless 0   3.  Trouble falling or staying asleep, or sleeping too much 0   4.  Feeling tired or having little energy 0   5.  Poor appetite or overeating 0   6.  Feeling bad about yourself 0   7.  Trouble concentrating 0   8.  Moving slowly or restless 0   Q9: Thoughts of better off dead/self-harm past 2 weeks 0   PHQ-9 Total Score 0   Difficulty at work, home, or with people Not difficult at all         4/4/2023     4:24 PM   CHRISTEN-7    1. Feeling nervous, anxious, or on edge 0   2. Not being able to stop or control worrying 0   3. Worrying too much about different things 0   4. Trouble relaxing 0   5. Being so restless that it is hard to sit still 0   6. Becoming easily annoyed or irritable 0   7. Feeling afraid, as if something awful might happen 0   CHRISTEN-7 Total Score 0   If you checked any problems, how difficult have they made it for you to do your work, take care of things at home, or get along with other people? Not difficult at all         How many servings of fruits and vegetables do you eat daily?  0-1    On average, how many sweetened beverages do you drink each day (Examples: soda, juice, sweet tea, etc.  Do NOT count diet or artificially sweetened beverages)?   0    How many days per week do you exercise enough to make your heart beat faster? 4    How many minutes a day do you exercise enough to make your heart beat faster? 20 - 29    How many days per week do you miss taking your medication? 0        Review  of Systems   Constitutional, HEENT, cardiovascular, pulmonary, gi and gu systems are negative, except as otherwise noted.      Objective           Vitals:  No vitals were obtained today due to virtual visit.    Physical Exam   GENERAL: Healthy, alert and no distress  EYES: Eyes grossly normal to inspection.  No discharge or erythema, or obvious scleral/conjunctival abnormalities.  RESP: No audible wheeze, cough, or visible cyanosis.  No visible retractions or increased work of breathing.    SKIN: Visible skin clear. No significant rash, abnormal pigmentation or lesions.  NEURO: Cranial nerves grossly intact.  Mentation and speech appropriate for age.  PSYCH: Mentation appears normal, affect normal/bright, judgement and insight intact, normal speech and appearance well-groomed.      Video-Visit Details    Type of service:  Video Visit   4:47 p.m. to 4:54 p.m.   Originating Location (pt. Location): Home  Distant Location (provider location):  On-site  Platform used for Video Visit: [employer]

## 2023-05-03 DIAGNOSIS — G47.00 INSOMNIA, UNSPECIFIED TYPE: ICD-10-CM

## 2023-05-05 RX ORDER — HYDROXYZINE HYDROCHLORIDE 25 MG/1
TABLET, FILM COATED ORAL
Qty: 45 TABLET | Refills: 3 | Status: SHIPPED | OUTPATIENT
Start: 2023-05-05 | End: 2023-10-27

## 2023-05-05 NOTE — TELEPHONE ENCOUNTER
Prescription approved per [facility] Refill Protocol.    Missy Dos Santos RN  [facility]

## 2023-05-30 ENCOUNTER — TRANSFERRED RECORDS (OUTPATIENT)
Dept: HEALTH INFORMATION MANAGEMENT | Facility: CLINIC | Age: 58
End: 2023-05-30
Payer: COMMERCIAL

## 2023-06-07 ENCOUNTER — MYC MEDICAL ADVICE (OUTPATIENT)
Dept: SURGERY | Facility: CLINIC | Age: 58
End: 2023-06-07
Payer: COMMERCIAL

## 2023-06-07 ENCOUNTER — TELEPHONE (OUTPATIENT)
Dept: SURGERY | Facility: CLINIC | Age: 58
End: 2023-06-07
Payer: COMMERCIAL

## 2023-06-07 NOTE — TELEPHONE ENCOUNTER
General Call    Contacts       Type Contact Phone/Fax    06/07/2023 12:57 PM CDT Phone (Incoming) Mallory Sargent (Self) [phone] (M)        Reason for Call:  Saxenda questions    What are your questions or concerns:  Pt is currently on Saxenda and would like a call back to discuss other options      Could we send this information to you in [facility] or would you prefer to receive a phone call?:   Patient would prefer a phone call   Okay to leave a detailed message?: Yes at Cell number on file:    Telephone Information:   Mobile [phone]

## 2023-06-16 ENCOUNTER — APPOINTMENT (OUTPATIENT)
Dept: URBAN - METROPOLITAN AREA CLINIC 253 | Age: 58
Setting detail: DERMATOLOGY
End: 2023-06-16

## 2023-06-16 VITALS — WEIGHT: 225 LBS | RESPIRATION RATE: 14 BRPM | HEIGHT: 70.5 IN

## 2023-06-16 DIAGNOSIS — L21.8 OTHER SEBORRHEIC DERMATITIS: ICD-10-CM

## 2023-06-16 DIAGNOSIS — D49.2 NEOPLASM OF UNSPECIFIED BEHAVIOR OF BONE, SOFT TISSUE, AND SKIN: ICD-10-CM

## 2023-06-16 DIAGNOSIS — L01.01 NON-BULLOUS IMPETIGO: ICD-10-CM

## 2023-06-16 DIAGNOSIS — L82.1 OTHER SEBORRHEIC KERATOSIS: ICD-10-CM

## 2023-06-16 PROBLEM — L08.9 LOCAL INFECTION OF THE SKIN AND SUBCUTANEOUS TISSUE, UNSPECIFIED: Status: ACTIVE | Noted: 2023-06-16

## 2023-06-16 PROBLEM — D48.5 NEOPLASM OF UNCERTAIN BEHAVIOR OF SKIN: Status: ACTIVE | Noted: 2023-06-16

## 2023-06-16 PROBLEM — L30.9 DERMATITIS, UNSPECIFIED: Status: ACTIVE | Noted: 2023-06-16

## 2023-06-16 PROCEDURE — 99213 OFFICE O/P EST LOW 20 MIN: CPT

## 2023-06-16 PROCEDURE — OTHER COUNSELING: OTHER

## 2023-06-16 PROCEDURE — OTHER ADDITIONAL NOTES: OTHER

## 2023-06-16 PROCEDURE — OTHER PRESCRIPTION: OTHER

## 2023-06-16 PROCEDURE — OTHER PHOTO-DOCUMENTATION: OTHER

## 2023-06-16 RX ORDER — MUPIROCIN 20 MG/G
2% OINTMENT TOPICAL QD
Qty: 15 | Refills: 1 | Status: ERX | COMMUNITY
Start: 2023-06-16

## 2023-06-16 RX ORDER — KETOCONAZOLE 20 MG/ML
2% SHAMPOO, SUSPENSION TOPICAL BID
Qty: 120 | Refills: 6 | Status: ERX | COMMUNITY
Start: 2023-06-16

## 2023-06-16 RX ORDER — FLUOCINONIDE 0.5 MG/ML
0.05% SOLUTION TOPICAL BID
Qty: 60 | Refills: 2 | Status: ERX | COMMUNITY
Start: 2023-06-16

## 2023-06-16 ASSESSMENT — LOCATION DETAILED DESCRIPTION DERM
LOCATION DETAILED: LEFT MEDIAL DISTAL PRETIBIAL REGION
LOCATION DETAILED: POSTERIOR MID-PARIETAL SCALP
LOCATION DETAILED: RIGHT NARIS
LOCATION DETAILED: RIGHT HEEL

## 2023-06-16 ASSESSMENT — LOCATION ZONE DERM
LOCATION ZONE: SCALP
LOCATION ZONE: LEG
LOCATION ZONE: FEET
LOCATION ZONE: NOSE

## 2023-06-16 ASSESSMENT — LOCATION SIMPLE DESCRIPTION DERM
LOCATION SIMPLE: LEFT PRETIBIAL REGION
LOCATION SIMPLE: POSTERIOR SCALP
LOCATION SIMPLE: RIGHT HEEL
LOCATION SIMPLE: RIGHT NOSE

## 2023-06-16 NOTE — HPI: SKIN LESIONS
Is This A New Presentation, Or A Follow-Up?: Skin Lesions
Additional History: She is here today to have multiple spots addressed. They have all been there about one month and she feels some are healing. The one on her ankle, it started like a scab, now it’s maybe healing, it looked freaky for awhile. Another one on her foot just appeared. She also has a spot on her scalp, like a rash or psoriasis or eczema, been there for years.she has had Rxs in the past but doesn’t currently. She has felt self conscious to get a hair cut with it. And the. In her nostril, she had a sore or spot as well that is maybe now healing but it was really painful.

## 2023-06-16 NOTE — PROCEDURE: ADDITIONAL NOTES
Detail Level: Simple
Render Risk Assessment In Note?: no
Additional Notes: The patient had improvement with fluocinonide in the past, I will send refills today. I will also start her on ketoconazole shampoo and also recommended she try nizoral shampoo. Try to stop picking. Favor scalp PSO
Additional Notes: Appears benign under magnification.
Additional Notes: Lesion appears benign and homogenous with ELM but patient reports it being new. It feels firm with palpation, patient does not report unjury. Will monitor and follow up in 6 weeks and consider biopsy at that time. Patient to monitor in the mean time. \\n\\nA clinical assistant was present for the exam. Care instructions were explained to the patient in detail. Told patient to call with any concerns or questions. The patient verbalized understanding and agreement of the education provided and the treatment plan. Encouraged patient to schedule a follow up appointment right after visit. At the end of the visit, all questions had been answered and the patient was satisfied with the visit.

## 2023-06-25 NOTE — PROGRESS NOTES
"Mallory is a 57 year old who is being evaluated via a billable video visit.      The patient has been notified of following:     \"This video visit will be conducted via a call between you and your physician/provider. We have found that certain health care needs can be provided without the need for an in-person physical exam.  This service lets us provide the care you need with a video conversation.  If a prescription is necessary we can send it directly to your pharmacy.  If lab work is needed we can place an order for that and you can then stop by our lab to have the test done at a later time.    Video visits are billed at different rates depending on your insurance coverage.  Please reach out to your insurance provider with any questions.    If during the course of the call the physician/provider feels a video visit is not appropriate, you will not be charged for this service.\"    Patient has given verbal consent for Video visit? Yes    How would you like to obtain your AVS? MyChart    If the video visit is dropped, the invitation should be resent by: Text to cell phone: [phone]    Will anyone else be joining your video visit? No    I    Video-Visit Details    Type of service:  Video Visit    Video Start Time: 2:04    Video End Time:2:30 PM    Originating Location (pt. Location): Home    Distant Location (provider location):  [facility] [place]     Platform used for Video Visit: [employer]  2023      Return Medical Weight Management Note     Mallory Sargent  MRN:  [id number]  :  1965    Dear Mary Castro, APRN CNP,    I had the pleasure of seeing your patient Mallory Sargent. She is a 57 year old female who I am continuing to see for treatment of obesity related to:        11/3/2021     9:51 AM   --   I have the following health issues associated with obesity Pre-Diabetes    High Cholesterol    Sleep Apnea    GERD (Reflux)    Fatty Liver   I have the " following symptoms associated with obesity Depression    Back Pain    Fatigue    Hip Pain       Assessment & Plan   Problem List Items Addressed This Visit     Class 1 obesity due to excess calories without serious comorbidity with body mass index (BMI) of 32.0 to 32.9 in adult - Primary     Patient was congratulated on overall wt loss success thus far. Healthy habits to assist with further weight loss were discussed. Mallory will continue continue the Saxenda and increase to 2.4 mg for the next week until pen done.  Then switch to Wegovy.  Once tolerating Wegovy can add naltrexone for cravings. Risks/ benefits and possible side effects were discussed and questions were answered. Written information was given.   Will see dietician to discuss binge episodes.  Does not have criteria met for BED at this time.            Relevant Medications    Semaglutide-Weight Management (WEGOVY) 1.7 MG/0.75ML pen    naltrexone (DEPADE/REVIA) 50 MG tablet    ondansetron (ZOFRAN ODT) 4 MG ODT tab        PATIENT INSTRUCTIONS:  Finish Saxenda 2.4 mg daily for the next week. Then switch to Wegovy 1.7 mg weekly.   Can increase omeprazole to 20 mg twice daily to help with nausea  Zofran was also ordered to help with this    Once tolerating Wegovy, then start Naltrexone.   Directions: Take 1/2 tab 1-2 hours prior to biggest cravings or extra hunger for a week.  If tolerating, increase to 1 tablet daily  Goals:  Make appt with Rony Hernandez 2, for July.   Watch binge eating. If this worsens despite your medication changes, let me know and I can refer your for an eating disorder evaluation.     FOLLOW-UP:    Please call [phone] to schedule your next visit in 3 months.    30 minutes spent on the date of the encounter doing chart review, history and exam, result review, counseling, developing plan of care, documentation, and further activities as noted      INTERVAL HISTORY:  Mallory returns for medical weight management follow up.   Last seen on 12/21/2022 by Maury Mcclure PA-C.  Is on Saxenda 3 mg daily.  Was on it for a year and a half.  Had some good success but is now gaining weight.  Total weight loss was 42 lbs.  Has gained for the past 3-4 months.     Does feel like there is still appetite suppression but not as much.  Mallory messed off and did not make an appt.  She was off the Saxenda- 1-2 weeks.  When she was off the Saxenda she could tell her appetite was increased.  Now it is harder for her mental hunger and behaviors to be suppressed.      Sugar cravings are back.  This almost always occurs in the evening.  Occurs almost always following suppers. Usually will run to the store and have [employer] cream.      Will have the entire pint to [employer] by herself even if she is feeling sick.  This is happening almost daily.  Has been occurring since the beginning of May.  In April  and her did no dessert.  They cut down their sugars and then since she has been out of control.  Has had trouble with binging prior to the Saxenda use.     Binge Eating Screening:  Objective binges at least 1x per week for the past 3 months. No  Patient senses lack of control over eating during the binges- yes.  Binge eating causes stress.- yes, but afterwards she feels terrible   Binge eating episodes are associated with 3 or more of the following:    Eating until uncomfortably full.- yes  Eating large amounts when not physically hungry. -yes  Eating much more rapidly than usual. - yes  Eating alone due to being embarrassed by the amount eaten. -yes  Feeling disgusted, depressed, or guilty after overeating. -yes  If patient answers yes to 3 of the above questions and answered yes to frequency, distress of eating behavior and avoids using compensatory behaviors, refer for binge eating assessment.      WEIGHT METRICS:  Body mass index is 32.86 kg/m .   Current Weight: 229 lb (103.9 kg) (patient reported)  Last Visits Weight: 218 lb (98.9  kg)  Initial Weight (lbs): 256 lbs  Cumulative weight loss (lbs): 27  Weight Loss Percentage: 10.55%    Wt Readings from Last 10 Encounters:   06/27/23 229 lb (103.9 kg)   01/06/23 219 lb (99.3 kg)   12/30/22 219 lb (99.3 kg)   12/21/22 218 lb (98.9 kg)   10/05/22 211 lb (95.7 kg)   05/03/22 220 lb 4.8 oz (99.9 kg)   04/01/22 221 lb 9.6 oz (100.5 kg)   02/08/22 230 lb (104.3 kg)   01/14/22 236 lb (107 kg)   12/10/21 241 lb 12.8 oz (109.7 kg)            4/28/2022     8:54 AM   Weight Loss Medication History Reviewed With Patient   Which weight loss medications are you currently taking on a regular basis? Saxenda (liraglutide)   If you are having side effects please describe: Occasional nausea and vomiting           4/28/2022     8:54 AM   Changes and Difficulties   With regards to my diet, I am still struggling with: Sugar cravings   With regards to my activity/exercise, I am still struggling with: Getting enough exercise         MEDICATIONS:   Current Outpatient Medications   Medication Sig Dispense Refill     aspirin 81 MG tablet Take by mouth daily 30 tablet      atorvastatin (LIPITOR) 40 MG tablet Take 1 tablet (40 mg) by mouth daily 90 tablet 3     augmented betamethasone dipropionate (DIPROLENE) 0.05 % external lotion Apply topically 2 times daily Apply to AA on scalp bid when needed 60 mL 1     FIBER PO        hydrOXYzine (ATARAX) 25 MG tablet TAKE ONE-HALF TO TWO TABLETS BY MOUTH EVERY NIGHT AT BEDTIME AS NEEDED FOR INSOMNIA 45 tablet 3     magnesium 250 MG tablet Take 1 tablet by mouth daily       naltrexone (DEPADE/REVIA) 50 MG tablet Take 1/2 tablet 1-2 hours before biggest craving time for 7 days, then increase to a full tablet 30 tablet 2     Omega-3 Fatty Acids (FISH OIL ADULT GUMMIES PO) Take 1,000 mg by mouth daily       omeprazole (PRILOSEC) 20 MG DR capsule Take 1 capsule (20 mg) by mouth daily 30-60 minutes before a meal. Pt typically takes QOD 90 capsule 3     ondansetron (ZOFRAN ODT) 4 MG ODT tab  Take 1 tablet (4 mg) by mouth every 6 hours as needed for nausea 30 tablet 1     Semaglutide-Weight Management (WEGOVY) 1.7 MG/0.75ML pen Inject 1.7 mg Subcutaneous once a week 3 mL 2     sertraline (ZOLOFT) 50 MG tablet Take 1 tablet (50 mg) by mouth daily 90 tablet 3       LABS:  Hemoglobin A1C   Date Value Ref Range Status   11/26/2021 5.3 0.0 - 5.6 % Final     Comment:     Normal <5.7%   Prediabetes 5.7-6.4%    Diabetes 6.5% or higher     Note: Adopted from ADA consensus guidelines.     Vitamin D Deficiency screening   Date Value Ref Range Status   04/28/2017 27 20 - 75 ug/L Final     Comment:     Season, race, dietary intake, and treatment affect the concentration of   25-hydroxy-Vitamin D. Values may decrease during winter months and increase   during summer months. Values 20-29 ug/L may indicate Vitamin D insufficiency   and values <20 ug/L may indicate Vitamin D deficiency.   Vitamin D determination is routinely performed by an immunoassay specific for   25 hydroxyvitamin D3.  If an individual is on vitamin D2 (ergocalciferol)   supplementation, please specify 25 OH vitamin D2 and D3 level determination   by   LCMSMS test VITD23.       TSH   Date Value Ref Range Status   04/16/2021 2.20 0.40 - 4.00 mU/L Final     Sodium   Date Value Ref Range Status   10/05/2022 140 136 - 145 mmol/L Final   04/16/2021 138 133 - 144 mmol/L Final     Potassium   Date Value Ref Range Status   10/05/2022 4.1 3.4 - 5.3 mmol/L Final   02/10/2022 4.1 3.4 - 5.3 mmol/L Final   04/16/2021 4.1 3.4 - 5.3 mmol/L Final     Chloride   Date Value Ref Range Status   10/05/2022 103 98 - 107 mmol/L Final   02/10/2022 108 94 - 109 mmol/L Final   04/16/2021 106 94 - 109 mmol/L Final     Carbon Dioxide   Date Value Ref Range Status   04/16/2021 29 20 - 32 mmol/L Final     Carbon Dioxide (CO2)   Date Value Ref Range Status   10/05/2022 28 22 - 29 mmol/L Final   02/10/2022 27 20 - 32 mmol/L Final     Anion Gap   Date Value Ref Range Status    10/05/2022 9 7 - 15 mmol/L Final   02/10/2022 4 3 - 14 mmol/L Final   04/16/2021 3 3 - 14 mmol/L Final     Glucose   Date Value Ref Range Status   10/05/2022 105 (H) 70 - 99 mg/dL Final   02/10/2022 86 70 - 99 mg/dL Final   04/16/2021 99 70 - 99 mg/dL Final     Urea Nitrogen   Date Value Ref Range Status   10/05/2022 15.6 6.0 - 20.0 mg/dL Final   02/10/2022 15 7 - 30 mg/dL Final   04/16/2021 16 7 - 30 mg/dL Final     Creatinine   Date Value Ref Range Status   10/05/2022 0.87 0.51 - 0.95 mg/dL Final   04/16/2021 0.86 0.52 - 1.04 mg/dL Final     GFR Estimate   Date Value Ref Range Status   10/05/2022 79 >60 mL/min/1.73m2 Final     Comment:     Effective December 21, 2021 eGFRcr in adults is calculated using the 2021 CKD-EPI creatinine equation which includes age and gender (Dale et al., NEJ, DOI: 10.1056/YMVJvu5579226)   04/16/2021 77 >60 mL/min/[1.73_m2] Final     Comment:     Non  GFR Calc  Starting 12/18/2018, serum creatinine based estimated GFR (eGFR) will be   calculated using the Chronic Kidney Disease Epidemiology Collaboration   (CKD-EPI) equation.       Calcium   Date Value Ref Range Status   10/05/2022 9.9 8.6 - 10.0 mg/dL Final   04/16/2021 9.2 8.5 - 10.1 mg/dL Final     Bilirubin Total   Date Value Ref Range Status   10/05/2022 0.5 <=1.2 mg/dL Final   04/16/2021 0.5 0.2 - 1.3 mg/dL Final     Alkaline Phosphatase   Date Value Ref Range Status   10/05/2022 81 35 - 104 U/L Final   04/16/2021 73 40 - 150 U/L Final     ALT   Date Value Ref Range Status   10/05/2022 38 (H) 10 - 35 U/L Final   04/16/2021 73 (H) 0 - 50 U/L Final     AST   Date Value Ref Range Status   10/05/2022 29 10 - 35 U/L Final   04/16/2021 28 0 - 45 U/L Final     Cholesterol   Date Value Ref Range Status   01/06/2023 161 <200 mg/dL Final   04/16/2021 202 (H) <200 mg/dL Final     Comment:     Desirable:       <200 mg/dl     HDL Cholesterol   Date Value Ref Range Status   04/16/2021 48 (L) >49 mg/dL Final     Direct  "Measure HDL   Date Value Ref Range Status   01/06/2023 46 (L) >=50 mg/dL Final     LDL Cholesterol Calculated   Date Value Ref Range Status   01/06/2023 88 <=100 mg/dL Final   04/16/2021 120 (H) <100 mg/dL Final     Comment:     Above desirable:  100-129 mg/dl  Borderline High:  130-159 mg/dL  High:             160-189 mg/dL  Very high:       >189 mg/dl       Triglycerides   Date Value Ref Range Status   01/06/2023 133 <150 mg/dL Final   04/16/2021 170 (H) <150 mg/dL Final     Comment:     Borderline high:  150-199 mg/dl  High:             200-499 mg/dl  Very high:       >499 mg/dl       WBC   Date Value Ref Range Status   04/16/2021 6.1 4.0 - 11.0 10e9/L Final     Hemoglobin   Date Value Ref Range Status   04/16/2021 14.1 11.7 - 15.7 g/dL Final     Hematocrit   Date Value Ref Range Status   04/16/2021 40.7 35.0 - 47.0 % Final     MCV   Date Value Ref Range Status   04/16/2021 90 78 - 100 fl Final     Platelet Count   Date Value Ref Range Status   04/16/2021 196 150 - 450 10e9/L Final         BP Readings from Last 6 Encounters:   01/06/23 112/72   12/30/22 113/66   05/03/22 112/74   11/26/21 118/80   09/16/21 112/82   04/16/21 120/72       Pulse Readings from Last 6 Encounters:   01/06/23 96   12/30/22 66   05/03/22 73   11/26/21 95   09/16/21 99   04/16/21 83       PE:  Ht 5' 10\" (1.778 m)   Wt 229 lb (103.9 kg)   LMP 06/09/2015   BMI 32.86 kg/m    GENERAL: Healthy, alert and no distress  EYES: Eyes grossly normal to inspection.  No discharge or erythema, or obvious scleral/conjunctival abnormalities.  RESP: No audible wheeze, cough, or visible cyanosis.  No visible retractions or increased work of breathing.    SKIN: Visible skin clear. No significant rash, abnormal pigmentation or lesions.  NEURO: Cranial nerves grossly intact.  Mentation and speech appropriate for age.  PSYCH: Mentation appears normal, affect normal/bright, judgement and insight intact, normal speech and appearance " well-groomed.      Sincerely,      Christina Meeks PA-C

## 2023-06-27 ENCOUNTER — VIRTUAL VISIT (OUTPATIENT)
Dept: SURGERY | Facility: CLINIC | Age: 58
End: 2023-06-27
Payer: COMMERCIAL

## 2023-06-27 VITALS — BODY MASS INDEX: 32.78 KG/M2 | WEIGHT: 229 LBS | HEIGHT: 70 IN

## 2023-06-27 DIAGNOSIS — E66.811 CLASS 1 OBESITY DUE TO EXCESS CALORIES WITHOUT SERIOUS COMORBIDITY WITH BODY MASS INDEX (BMI) OF 32.0 TO 32.9 IN ADULT: Primary | ICD-10-CM

## 2023-06-27 DIAGNOSIS — E66.09 CLASS 1 OBESITY DUE TO EXCESS CALORIES WITHOUT SERIOUS COMORBIDITY WITH BODY MASS INDEX (BMI) OF 32.0 TO 32.9 IN ADULT: Primary | ICD-10-CM

## 2023-06-27 PROCEDURE — 99214 OFFICE O/P EST MOD 30 MIN: CPT | Mod: VID | Performed by: PHYSICIAN ASSISTANT

## 2023-06-27 RX ORDER — SEMAGLUTIDE 1.7 MG/.75ML
1.7 INJECTION, SOLUTION SUBCUTANEOUS WEEKLY
Qty: 3 ML | Refills: 2 | Status: SHIPPED | OUTPATIENT
Start: 2023-06-27 | End: 2024-01-11

## 2023-06-27 RX ORDER — ONDANSETRON 4 MG/1
4 TABLET, ORALLY DISINTEGRATING ORAL EVERY 6 HOURS PRN
Qty: 30 TABLET | Refills: 1 | Status: SHIPPED | OUTPATIENT
Start: 2023-06-27

## 2023-06-27 RX ORDER — NALTREXONE HYDROCHLORIDE 50 MG/1
TABLET, FILM COATED ORAL
Qty: 30 TABLET | Refills: 2 | Status: SHIPPED | OUTPATIENT
Start: 2023-06-27

## 2023-06-27 NOTE — PATIENT INSTRUCTIONS
"To ensure quality you may receive a patient satisfaction survey. The greatest compliment you can give is \"Likely to Recommend.\"    Nice to talk with you today.  Thank you for your trust. Below is the plan discussed.-  TOMA Vasquez      Plan:  Finish Saxenda 2.4 mg daily for the next week. Then switch to Wegovy 1.7 mg weekly.   Can increase omeprazole to 20 mg twice daily to help with nausea  Zofran was also ordered to help with this    Once tolerating Wegovy, then start Naltrexone.   Directions: Take 1/2 tab 1-2 hours prior to biggest cravings or extra hunger for a week.  If tolerating, increase to 1 tablet daily  Goals:  Make appt with Rony Hernandez 2, for July.   Watch binge eating. If this worsens despite your medication changes, let me tianawo and I can refer your for an eating disorder evaluation.     FOLLOW-UP:    Please call [phone] to schedule your next visit in 3 months.    WEGOVY (semaglutide)    Wegovy (semaglutide) injection 2.4 mg is an injectable prescription medicine used for adults with obesity (BMI ?30) or overweight (excess weight) (BMI ?27) who also have weight-related medical problems to help them lose weight and keep the weight off.  It is a GLP-1 agonist medication. GLP1 agonists stimulate the hormone GLP-1 tin your body o allow you to feel full quickly and stay full longer.    Directions:  Start Wegovy (semaglutide) 0.25 mg once weekly for 4 weeks, then if tolerating increase to 0.5 mg weekly for 4 weeks, then if tolerating increase to 1 mg weekly for 4 weeks, then if tolerating increase to 1.7 mg weekly for 4 weeks, then if tolerating increase to 2.4 mg weekly thereafter.    -Each Wegovy pen is a once weekly single-dose prefilled pen with a pen injector already built within the pen. Discard the Wegovy pen after use in sharps container.     Common Side Effects:    nausea, headache, diarrhea, stomach upset.  If these become unmanageable or concerning symptoms, please make sure to call or " "mychart.    Serious Side effects:   Pancreatitis (inflammation of the pancreas) has been associated with this type of medication, but is very rare.  If you have had pancreatitis in the past, this medication may not be for you.  Symptoms of pancreatitis include: Pain in your upper stomach area which may travel to your back and be worse after eating.     Storage:   make sure that when you get the prescription that you store the prescription in the refrigerator until it is time to use the Wegovy pen.  Once it is time to use the Wegovy pen, you can keep the pen at room temperature and it is good for up to 28 days at room temperature.     How to inject:  For a video on how to use the pen please go to:  [email or web address]-to-use-the-wegovy-pen.html#itemTwo     For any questions or concerns please send a [employer] message to our team or call our weight management call center at [phone] during regular business hours. For questions during evenings or weekends your messages will be addressed during the next business day.  For emergencies please call 911 or seek immediate medical care.         Naltrexone    Naltrexone is a medication that is used most often to help people who are troubled by dependence on prescription pain killers or alcohol. It has also been found to help with weight loss. Although it's not currently FDA approved for weight loss, it has been used safely for a number of years to help people who are carrying extra weight.     Just how Naltrexone helps with weight loss has not been exactly determined.  It seems to work by quieting down brain signals related to strong food cravings. Many of our patients use the word \"addiction\" to describe their feelings and constant thoughts about food. It makes sense then to treat the feeling of dependence on food, outside of real hunger, with a medication designed to help with other sorts of dependence.     Our patients on Naltrexone find that they: "    >feel less interest in food   >think less about food and eating and have more time to think of other things   >find it easier to push the plate away   >have an easier time eating less    For some of our patients, these feelings are very immediate. Other patients, don't feel much of a change but find they've lost weight. Like all weight loss medications, Naltrexone works best when you help it work. This means:  1. Having less tempting high calorie (fattening) food around the house or office. (For people with strong cravings this is very important.)   2. Staying away from situations or people that may trigger your cravings .   3. Eating out only one time or less each week.  4. Eating your meals at a table with the TV or computer off.    Side-effects. Naltrexone is generally well tolerated. The main side-effect we see is  nausea or a woozy feeling. A small number of people feel quite ill. Most people have a mild reaction and some people have no reaction at all.  The good news is that this feeling does go away.     In order to avoid nausea, please start the medication with half a pill for the first few days. Go on to a full pill if you are feeling well.      If you  are nauseated on 1/2 a pill it is okay to cut back to 1/4 pill ( a very small amount). Take this for a couple of days and work your way back up to a 1/2 pill and then a whole pill. Taking the medication at night or with food  to start also may help prevent the feeling of nausea.       WARNING: This medication blocks the action of opioid type pain medications.    If you routinely take narcotic pain medication like Codeine, Oxycontin,Percocet,Morphine,Dilaudid or Methodone, do not take this until you have talked with weight management staff.   2.  If you are planning surgery you should stop Naltrexone 4 days prior to the surgery.   3.  If you have an injury that requires pain medication, make sure the health care staff knows you take Naltrexone.       For  any questions/concerns contact [facility] [phone]

## 2023-06-27 NOTE — ASSESSMENT & PLAN NOTE
Patient was congratulated on overall wt loss success thus far. Healthy habits to assist with further weight loss were discussed. Mallory will continue continue the Saxenda and increase to 2.4 mg for the next week until pen done.  Then switch to Wegovy.  Once tolerating Wegovy can add naltrexone for cravings. Risks/ benefits and possible side effects were discussed and questions were answered. Written information was given.   Will see dietician to discuss binge episodes.  Does not have criteria met for BED at this time.

## 2023-06-28 ENCOUNTER — TELEPHONE (OUTPATIENT)
Dept: SURGERY | Facility: CLINIC | Age: 58
End: 2023-06-28
Payer: COMMERCIAL

## 2023-06-28 NOTE — TELEPHONE ENCOUNTER
Prior Authorization Retail Medication Request    Medication/Dose: WEGOVY 1.7MG  ICD code (if different than what is on RX):    Previously Tried and Failed:    Rationale:      Insurance Name:    Insurance ID:        Pharmacy Information (if different than what is on RX)  Name:    Phone:          Thank you,   João Bangura, [facility]

## 2023-07-11 ENCOUNTER — TELEPHONE (OUTPATIENT)
Dept: SURGERY | Facility: CLINIC | Age: 58
End: 2023-07-11
Payer: COMMERCIAL

## 2023-07-11 NOTE — TELEPHONE ENCOUNTER
Appears PA was initiated 6/28 by someone outside our clinic, but was not routed to appropriate team.  New PA initiated today.  Pt sent a MyC message updating her on the status.    Sarina Vazquez RN on 7/11/2023 at 4:33 PM

## 2023-07-11 NOTE — TELEPHONE ENCOUNTER
Reason for Call:  Other prescription    Detailed comments: Patient is calling checking on the status of her prior auth for Wegovy 1.7mg    Phone Number Patient can be reached at: Cell number on file:    Telephone Information:   Mobile [phone]       Best Time: Anytime     Can we leave a detailed message on this number? YES    Call taken on 7/11/2023 at 4:17 PM by Gina Metz

## 2023-07-11 NOTE — TELEPHONE ENCOUNTER
.Prior Authorization Retail Medication Request    Medication/Dose: PA:  Semaglutide-Weight Management (WEGOVY) 1.7 MG/0.75ML pen 3 mL 2 6/27/2023  --  Sig - Route: Inject 1.7 mg Subcutaneous once a week - Subcutaneous      ICD code (if different than what is on RX): [E66.09, Z68.32]  Previously Tried and Failed: Diet/lifestyle, phentermine, Saxenda  Rationale: Weight management    Insurance Name: [facility] CORE  Insurance ID: [id number]      Pharmacy Information (if different than what is on RX)  Name: [facility] [address] [address]  Phone: [phone]

## 2023-07-13 NOTE — TELEPHONE ENCOUNTER
Central Prior Authorization Team - Phone: [phone]     PA Initiation    Medication: WEGOVY 1.7 MG/0.75ML SC SOAJ  Insurance Company: [employer] - Phone [phone] Fax [phone]  Pharmacy Filling the Rx: [facility] [place], [place] - [address] [address]  Filling Pharmacy Phone: [phone]  Filling Pharmacy Fax:    Start Date: 7/13/2023

## 2023-07-17 NOTE — TELEPHONE ENCOUNTER
Central Prior Authorization Team - Phone: [phone]     Prior Authorization Approval    Medication: WEGOVY 1.7 MG/0.75ML SC SOAJ  Authorization Effective Date: 7/16/2023  Authorization Expiration Date: 2/4/2024  Approved Dose/Quantity: 3  Reference #:     Insurance Company: [employer] - Phone [phone] Fax [phone]  Expected CoPay:       CoPay Card Available:      Financial Assistance Needed:   Which Pharmacy is filling the prescription: [facility] [place], [place] - [address] [address]  Pharmacy Notified: Yes  Patient Notified: Yes pharmacy will notify when ready

## 2023-07-27 ENCOUNTER — VIRTUAL VISIT (OUTPATIENT)
Dept: SURGERY | Facility: CLINIC | Age: 58
End: 2023-07-27
Payer: COMMERCIAL

## 2023-07-27 VITALS — BODY MASS INDEX: 33 KG/M2 | WEIGHT: 230 LBS

## 2023-07-27 DIAGNOSIS — E66.09 CLASS 1 OBESITY DUE TO EXCESS CALORIES WITHOUT SERIOUS COMORBIDITY WITH BODY MASS INDEX (BMI) OF 30.0 TO 30.9 IN ADULT: Primary | ICD-10-CM

## 2023-07-27 DIAGNOSIS — E66.811 CLASS 1 OBESITY DUE TO EXCESS CALORIES WITHOUT SERIOUS COMORBIDITY WITH BODY MASS INDEX (BMI) OF 30.0 TO 30.9 IN ADULT: Primary | ICD-10-CM

## 2023-07-27 PROCEDURE — 97803 MED NUTRITION INDIV SUBSEQ: CPT | Mod: VID

## 2023-07-27 NOTE — PATIENT INSTRUCTIONS
Khurram Tejada-   It was great to visit with you and learn about your progress. Below are the goals we discussed.  Goals:  Check in with your feelings before snacking-find other thing to do besides snack (hobbies)  Scheduling a visit for an eating disorder elevation    Nutrition Educational Materials:  Building a Healthy Relationship with Food  [email or web address]     Please let us know if you have questions or concerns.No follow up planned with RD until after completing an eating disorder evaluation.  Thanks!  Imtiaz Rosales RD, PB  [facility]

## 2023-07-27 NOTE — PROGRESS NOTES
Virtual Visit Details    Type of service:  Video Visit     Originating Location (pt. Location): Other work  Distant Location (provider location):  On-site  Platform used for Video Visit: [facility]   MEDICAL WEIGHT LOSS FOLLOW UP      DIAGNOSIS:  Class I Overweight/Obesity    NUTRITION HISTORY:    Breakfast: yogurt, fruit, granola, coffee with oat milk + 1 tsp brown sugar     Lunch:  ham/cheese sandwich, fruit, dessert from cafe at work 3-4X per week)     Dinner:  Hello Fresh meal 3 nights per week (protein/ veggie/ starch), dessert some nights      Snacks:  fruit or cheese/ crackers or chips or sweets after dinner (see delaney eating below)-only at home/ does not snack at work     Beverage Choices:  60 oz water, coffee with oat milk + 1 tsp brown sugar, ice tea with fruit puree, ETOH-hardly ever     Exercise: none  Binge eating: after dinner -denies right now due to use of Ozempic prior to using Ozempic was binge eating 5X per week- 1 pink ice cream, several cookies, other baked goods  Does binge eating cause stress: yes  Does binge eating occur at least 1 time per week for the past 3 months: yes (if not on Ozempic)    Binge eating episodes are associated with 3 or more of the following:  Do you eat more rapidly than normal: yes  Do you eat until uncomfortably full: no  Do you eat a large amount of food when not physically hungry: yes  Do you eat alone because of being embarrassed by how much you eat: yes  Do you feel discussed ,depressed or guilty after over eating: yes  Do you eat more rapidly than normal-yes         Additional Information: Patient does not feel like she is on track with weight loss. Admits to binge eating 5 X per week if not taking Ozempic. Patient is open to doing an evaluation for an eating  disorder and plans to stop Ozempic. Discussed possible option for programs and encouraged patient to check with her medical insurance. Patient feels current dose of Ozempic is causing headaches and vomiting, but  does not want to stop it yet (paid $900 for it). Struggles to control cravings for sugar/ sweets. Often picks higher calorie Hello Fresh meals because that is what spouse likes.    ANTHROPOMETRICS:    Initial Weight: 256 pounds    Previous Weight:  221.6 pounds    Current Weight:  230 pounds patient reported    Weight Change: 8.4 pounds increase / 26 pounds decrease overall    BMI: 32.86 kg/m2    MEDICATION FOR WEIGHT LOSS:  Ozempic -started ~ 2 weeks ago    EVALUATION/PROGRESS TOWARDS GOALS:  Previous Goals:  Practice alternatives to evening eating -not met  Continue tracking intake -not met  Add 1-2 additional days exercise (before or after work)- not met  Sit at table when eating-improving         Previous Nutrition Diagnosis:  Overweight/Obese class I related to overeating and poor lifestyle habits as evidence by patient's subjective statements and  BMI of 31.3 kg/m2     Current Nutrition Diagnosis:   Overweight/Obese class I related to overeating and poor lifestyle habits as evidence by patient's subjective statements and  BMI of 33.0 kg/m2  No change      INTERVENTION:    Nutrition Prescription:  Recommend modified nutrient intake by decreasing energy intake. Provided written information on Eating Disorder Programs.    Implementation:    Meals and Snacks: 3/1-3    Nutrition Education (Content):  Discussed previous goals and determined new goals  Encourage physical activity  Discussed and will provide; Building  Healthy Relationship with Food  Supported patient in attempted weight loss and behavior changes   Congratulated patient on successful weight loss overall  Patient verbalizes understanding of weight management by acknowledging she can not cut back on sweets on her own  Anticipate fair-good compliance    Goals:  Check in with your feelings before snacking-find other thing to do besides snack (hobbies)  Scheduling a visit for an eating disorder elevation    Follow Up/Monitoring:  Other  -  patient plans to  follow up with an eating disorder program.    Time Spent With Patient:  29 Minutes  Imtiaz Rosales RD, PB  [facility], [place]

## 2023-07-28 ENCOUNTER — APPOINTMENT (OUTPATIENT)
Dept: URBAN - METROPOLITAN AREA CLINIC 253 | Age: 58
Setting detail: DERMATOLOGY
End: 2023-07-28

## 2023-07-28 VITALS — RESPIRATION RATE: 14 BRPM | WEIGHT: 230 LBS | HEIGHT: 70.5 IN

## 2023-07-28 DIAGNOSIS — Z71.89 OTHER SPECIFIED COUNSELING: ICD-10-CM

## 2023-07-28 DIAGNOSIS — D18.0 HEMANGIOMA: ICD-10-CM

## 2023-07-28 DIAGNOSIS — L81.4 OTHER MELANIN HYPERPIGMENTATION: ICD-10-CM

## 2023-07-28 DIAGNOSIS — D22 MELANOCYTIC NEVI: ICD-10-CM

## 2023-07-28 DIAGNOSIS — B35.1 TINEA UNGUIUM: ICD-10-CM

## 2023-07-28 DIAGNOSIS — L82.1 OTHER SEBORRHEIC KERATOSIS: ICD-10-CM

## 2023-07-28 DIAGNOSIS — D49.2 NEOPLASM OF UNSPECIFIED BEHAVIOR OF BONE, SOFT TISSUE, AND SKIN: ICD-10-CM

## 2023-07-28 PROBLEM — D22.5 MELANOCYTIC NEVI OF TRUNK: Status: ACTIVE | Noted: 2023-07-28

## 2023-07-28 PROBLEM — D18.01 HEMANGIOMA OF SKIN AND SUBCUTANEOUS TISSUE: Status: ACTIVE | Noted: 2023-07-28

## 2023-07-28 PROCEDURE — OTHER PHOTO-DOCUMENTATION: OTHER

## 2023-07-28 PROCEDURE — OTHER BIOPSY BY SHAVE METHOD: OTHER

## 2023-07-28 PROCEDURE — 11102 TANGNTL BX SKIN SINGLE LES: CPT

## 2023-07-28 PROCEDURE — OTHER ADDITIONAL NOTES: OTHER

## 2023-07-28 PROCEDURE — OTHER COUNSELING: OTHER

## 2023-07-28 PROCEDURE — 99213 OFFICE O/P EST LOW 20 MIN: CPT | Mod: 25

## 2023-07-28 PROCEDURE — OTHER MIPS QUALITY: OTHER

## 2023-07-28 ASSESSMENT — LOCATION DETAILED DESCRIPTION DERM
LOCATION DETAILED: SUPERIOR LUMBAR SPINE
LOCATION DETAILED: RIGHT HEEL
LOCATION DETAILED: INFERIOR THORACIC SPINE
LOCATION DETAILED: LEFT 3RD TOENAIL

## 2023-07-28 ASSESSMENT — LOCATION ZONE DERM
LOCATION ZONE: TOENAIL
LOCATION ZONE: FEET
LOCATION ZONE: TRUNK

## 2023-07-28 ASSESSMENT — LOCATION SIMPLE DESCRIPTION DERM
LOCATION SIMPLE: LEFT 3RD TOE
LOCATION SIMPLE: UPPER BACK
LOCATION SIMPLE: LOWER BACK
LOCATION SIMPLE: RIGHT HEEL

## 2023-07-28 NOTE — PROCEDURE: BIOPSY BY SHAVE METHOD
Detail Level: Detailed
Depth Of Biopsy: dermis
Was A Bandage Applied: Yes
Size Of Lesion In Cm: 0
Biopsy Type: H and E
Biopsy Method: Dermablade
Anesthesia Type: 2% lidocaine with epinephrine and a 1:10 solution of 8.4% sodium bicarbonate
Anesthesia Volume In Cc (Will Not Render If 0): 0.2
Hemostasis: Drysol
Wound Care: Petrolatum
Dressing: bandage
Destruction After The Procedure: No
Type Of Destruction Used: Curettage
Curettage Text: The wound bed was treated with curettage after the biopsy was performed.
Cryotherapy Text: The wound bed was treated with cryotherapy after the biopsy was performed.
Electrodesiccation Text: The wound bed was treated with electrodesiccation after the biopsy was performed.
Electrodesiccation And Curettage Text: The wound bed was treated with electrodesiccation and curettage after the biopsy was performed.
Silver Nitrate Text: The wound bed was treated with silver nitrate after the biopsy was performed.
Lab: -[id number]
Consent: Written consent was obtained and risks were reviewed including but not limited to scarring, infection, bleeding, scabbing, incomplete removal, nerve damage and allergy to anesthesia.
Post-Care Instructions: I reviewed with the patient in detail post-care instructions. Patient is to keep the biopsy site dry overnight, and then apply bacitracin twice daily until healed. Patient may apply hydrogen peroxide soaks to remove any crusting.
Notification Instructions: Patient will be notified of biopsy results. However, patient instructed to call the office if not contacted within 2 weeks.
Billing Type: Third-Party Bill
Information: Selecting Yes will display possible errors in your note based on the variables you have selected. This validation is only offered as a suggestion for you. PLEASE NOTE THAT THE VALIDATION TEXT WILL BE REMOVED WHEN YOU FINALIZE YOUR NOTE. IF YOU WANT TO FAX A PRELIMINARY NOTE YOU WILL NEED TO TOGGLE THIS TO 'NO' IF YOU DO NOT WANT IT IN YOUR FAXED NOTE.

## 2023-07-28 NOTE — HPI: PREVENTATIVE SKIN CHECK
What Is The Reason For Today's Visit?: Full Body Skin Examination
Additional History: FBE. No concerns today, except she would like a spot we recommended tracking by her heel rechecked. She has noticed any changes to it.

## 2023-07-28 NOTE — PROCEDURE: ADDITIONAL NOTES
Render Risk Assessment In Note?: no
Detail Level: Simple
Additional Notes: A clinical assistant was present for the exam. Care instructions of biopsied were explained to the patient in detail. Told patient to call with any concerns or questions. The patient verbalized understanding and agreement of the education provided and the treatment plan. Encouraged patient to schedule a follow up appointment right after visit. At the end of the visit, all questions had been answered and the patient was satisfied with the visit.

## 2023-09-25 NOTE — PROGRESS NOTES
"Mallory is a 57 year old who is being evaluated via a billable video visit.      The patient has been notified of following:     \"This video visit will be conducted via a call between you and your physician/provider. We have found that certain health care needs can be provided without the need for an in-person physical exam.  This service lets us provide the care you need with a video conversation.  If a prescription is necessary we can send it directly to your pharmacy.  If lab work is needed we can place an order for that and you can then stop by our lab to have the test done at a later time.    Video visits are billed at different rates depending on your insurance coverage.  Please reach out to your insurance provider with any questions.    If during the course of the call the physician/provider feels a video visit is not appropriate, you will not be charged for this service.\"    Patient has given verbal consent for Video visit? Yes    How would you like to obtain your AVS? MyChart    If the video visit is dropped, the invitation should be resent by: Text to cell phone: [phone]    Will anyone else be joining your video visit? No    I    Video-Visit Details    Type of service:  Video Visit    Video Start Time: 8:34 AM    Video End Time:9:00 AM    Originating Location (pt. Location): Home    Distant Location (provider location):  [facility] [place]     Platform used for Video Visit: [employer]    10/10/2023      Return Medical Weight Management Note     Mallory Sargent  MRN:  [id number]  :  1965    Dear Mary Castro, APRN CNP,    I had the pleasure of seeing your patient Mallory Sargent. She is a 57 year old female who I am continuing to see for treatment of obesity related to:        11/3/2021     9:51 AM   --   I have the following health issues associated with obesity Pre-Diabetes    High Cholesterol    Sleep Apnea    GERD (Reflux)    Fatty Liver   I have the " following symptoms associated with obesity Depression    Back Pain    Fatigue    Hip Pain       Assessment & Plan   Problem List Items Addressed This Visit       Hyperlipidemia LDL goal <130     May improve with weight loss           Class 2 severe obesity due to excess calories with serious comorbidity and body mass index (BMI) of 35.0 to 35.9 in adult (H)     Healthy habits to assist with further weight loss were discussed. Mallory has symptoms consistent with binge eating disorder.  Semaglutide was helpful for weight loss but she pleatued so we attempted to switch to Wegovy however she had side effects transitioning to the 1.7 mg dose.  Has felt out of control with eating.  Recommend ED evaluation.  Started Vyvanse today.           Relevant Medications    lisdexamfetamine (VYVANSE) 30 MG capsule    lisdexamfetamine (VYVANSE) 40 MG capsule (Start on 10/17/2023)    Binge eating disorder - Primary     Pt has symptoms consistent with binge eating disorder. Discussed importance of treating eating disorder first before concentrating on weight management.  Recommend ED evaluation. Written contacts given for programs given today.     Started vyvanse. Risks/ benefits and possible side effects were discussed and questions were answered. Written information was given.              Relevant Medications    lisdexamfetamine (VYVANSE) 30 MG capsule    lisdexamfetamine (VYVANSE) 40 MG capsule (Start on 10/17/2023)    JESSIE (obstructive sleep apnea)     May improve with weight loss               PATIENT INSTRUCTIONS:  Schedule ED evaluation  Start Vyvanse   30 mg in the morning for 1 week then increase to    40 mg in the morning for 1 week, if needed we can increase again by 10 mg Weekly until binges are controlled.  Send message in Music[employer] in 10 days to let me know have you are doing.    FOLLOW-UP:    Please call [phone] to schedule your next visit in 3 months.     40 minutes spent on the date of the encounter doing chart  "review, history and exam, result review, counseling, developing plan of care, documentation, and further activities as noted      INTERVAL HISTORY:  Mallory returns for medical weight management follow up.  Last seen on 6/27/2023.  Was on Liraglutide. And we switched to semaglutide. Had a gap between her Saxenda to Wegovy for 3 week.   Concerns for binge eating.  Saw dietician the following month.  Was still struggling with binge eating and did not feel like she could stay on the Wegovy long term (paid $900 for last prescription) and was getting headaches and vomiting. Was referred for an ED evaluation.  Has not followed up with the binge eating disorder program.  Did not think she would qualify.      Wegovy at the 1.7 mg ose made her sick.  So she stopped this.  Since stopping she is feeling ou of control. Is just doing what she wants to do  in terms of eating. She is gaing weight back fast.   Has been off Wegovy for 3 months now.   Was on the 2.4 mg of Saxenda and felt she was maintaining her weight.  Did not realize how effective it was until she came off it.  She did get the Naltrexone but has not tried it yet. Concerned about GI SE.     In the past when she has any significant weight loss she always jean baptiste her weight back.  She is so concerned this will happen again.      Her binges are in the evening.  She gets cravings in the evening.  \"It's like switch happens in the evening. Example.  Go have at 5 and had dinner.  2 hours later she is relaxing and watching TV.  Goes to the kitchen and eats something,- bowl of cereal, then comes back down to kitchen, has cookies, down again- have another bowl of cereal, again back to kitchen, gets something else.  feels she does not have the feeling of being full anymore.  Gets guilt and shame at night due to her eating.      ROS:  Denies Cardiac Disease, significant anxiety, Glaucoma, HTN, Palpitations    WEIGHT METRICS:  Body mass index is 35.15 kg/m .   Current Weight: " 245 lb (111.1 kg)  Last Visits Weight: 229 lb (103.9 kg)  Initial Weight (lbs): 256 lbs  Cumulative weight loss (lbs): 11  Weight Loss Percentage: 4.3%    Wt Readings from Last 10 Encounters:   10/10/23 245 lb (111.1 kg)   07/27/23 230 lb (104.3 kg)   06/27/23 229 lb (103.9 kg)   01/06/23 219 lb (99.3 kg)   12/30/22 219 lb (99.3 kg)   12/21/22 218 lb (98.9 kg)   10/05/22 211 lb (95.7 kg)   05/03/22 220 lb 4.8 oz (99.9 kg)   04/01/22 221 lb 9.6 oz (100.5 kg)   02/08/22 230 lb (104.3 kg)            4/28/2022     8:54 AM   Weight Loss Medication History Reviewed With Patient   Which weight loss medications are you currently taking on a regular basis? Saxenda (liraglutide)   If you are having side effects please describe: Occasional nausea and vomiting           4/28/2022     8:54 AM   Changes and Difficulties   With regards to my diet, I am still struggling with: Sugar cravings   With regards to my activity/exercise, I am still struggling with: Getting enough exercise         MEDICATIONS:   Current Outpatient Medications   Medication Sig Dispense Refill    aspirin 81 MG tablet Take by mouth daily 30 tablet     atorvastatin (LIPITOR) 40 MG tablet Take 1 tablet (40 mg) by mouth daily 90 tablet 3    augmented betamethasone dipropionate (DIPROLENE) 0.05 % external lotion Apply topically 2 times daily Apply to AA on scalp bid when needed 60 mL 1    FIBER PO       hydrOXYzine (ATARAX) 25 MG tablet TAKE ONE-HALF TO TWO TABLETS BY MOUTH EVERY NIGHT AT BEDTIME AS NEEDED FOR INSOMNIA 45 tablet 3    lisdexamfetamine (VYVANSE) 30 MG capsule Take 1 capsule (30 mg) by mouth every morning 7 capsule 0    [START ON 10/17/2023] lisdexamfetamine (VYVANSE) 40 MG capsule Take 1 capsule (40 mg) by mouth every morning 7 capsule 0    magnesium 250 MG tablet Take 1 tablet by mouth daily      naltrexone (DEPADE/REVIA) 50 MG tablet Take 1/2 tablet 1-2 hours before biggest craving time for 7 days, then increase to a full tablet 30 tablet 2     Omega-3 Fatty Acids (FISH OIL ADULT GUMMIES PO) Take 1,000 mg by mouth daily      omeprazole (PRILOSEC) 20 MG DR capsule Take 1 capsule (20 mg) by mouth daily 30-60 minutes before a meal. Pt typically takes QOD 90 capsule 3    ondansetron (ZOFRAN ODT) 4 MG ODT tab Take 1 tablet (4 mg) by mouth every 6 hours as needed for nausea 30 tablet 1    Semaglutide-Weight Management (WEGOVY) 1.7 MG/0.75ML pen Inject 1.7 mg Subcutaneous once a week 3 mL 2    sertraline (ZOLOFT) 50 MG tablet Take 1 tablet (50 mg) by mouth daily 90 tablet 3       LABS:  Hemoglobin A1C   Date Value Ref Range Status   11/26/2021 5.3 0.0 - 5.6 % Final     Comment:     Normal <5.7%   Prediabetes 5.7-6.4%    Diabetes 6.5% or higher     Note: Adopted from ADA consensus guidelines.     Vitamin D Deficiency screening   Date Value Ref Range Status   04/28/2017 27 20 - 75 ug/L Final     Comment:     Season, race, dietary intake, and treatment affect the concentration of   25-hydroxy-Vitamin D. Values may decrease during winter months and increase   during summer months. Values 20-29 ug/L may indicate Vitamin D insufficiency   and values <20 ug/L may indicate Vitamin D deficiency.   Vitamin D determination is routinely performed by an immunoassay specific for   25 hydroxyvitamin D3.  If an individual is on vitamin D2 (ergocalciferol)   supplementation, please specify 25 OH vitamin D2 and D3 level determination   by   LCMSMS test VITD23.       TSH   Date Value Ref Range Status   04/16/2021 2.20 0.40 - 4.00 mU/L Final     Sodium   Date Value Ref Range Status   10/05/2022 140 136 - 145 mmol/L Final   04/16/2021 138 133 - 144 mmol/L Final     Potassium   Date Value Ref Range Status   10/05/2022 4.1 3.4 - 5.3 mmol/L Final   02/10/2022 4.1 3.4 - 5.3 mmol/L Final   04/16/2021 4.1 3.4 - 5.3 mmol/L Final     Chloride   Date Value Ref Range Status   10/05/2022 103 98 - 107 mmol/L Final   02/10/2022 108 94 - 109 mmol/L Final   04/16/2021 106 94 - 109 mmol/L Final      Carbon Dioxide   Date Value Ref Range Status   04/16/2021 29 20 - 32 mmol/L Final     Carbon Dioxide (CO2)   Date Value Ref Range Status   10/05/2022 28 22 - 29 mmol/L Final   02/10/2022 27 20 - 32 mmol/L Final     Anion Gap   Date Value Ref Range Status   10/05/2022 9 7 - 15 mmol/L Final   02/10/2022 4 3 - 14 mmol/L Final   04/16/2021 3 3 - 14 mmol/L Final     Glucose   Date Value Ref Range Status   10/05/2022 105 (H) 70 - 99 mg/dL Final   02/10/2022 86 70 - 99 mg/dL Final   04/16/2021 99 70 - 99 mg/dL Final     Urea Nitrogen   Date Value Ref Range Status   10/05/2022 15.6 6.0 - 20.0 mg/dL Final   02/10/2022 15 7 - 30 mg/dL Final   04/16/2021 16 7 - 30 mg/dL Final     Creatinine   Date Value Ref Range Status   10/05/2022 0.87 0.51 - 0.95 mg/dL Final   04/16/2021 0.86 0.52 - 1.04 mg/dL Final     GFR Estimate   Date Value Ref Range Status   10/05/2022 79 >60 mL/min/1.73m2 Final     Comment:     Effective December 21, 2021 eGFRcr in adults is calculated using the 2021 CKD-EPI creatinine equation which includes age and gender (Dale et al., NEJ, DOI: 10.1056/WKBQkq6647100)   04/16/2021 77 >60 mL/min/[1.73_m2] Final     Comment:     Non  GFR Calc  Starting 12/18/2018, serum creatinine based estimated GFR (eGFR) will be   calculated using the Chronic Kidney Disease Epidemiology Collaboration   (CKD-EPI) equation.       Calcium   Date Value Ref Range Status   10/05/2022 9.9 8.6 - 10.0 mg/dL Final   04/16/2021 9.2 8.5 - 10.1 mg/dL Final     Bilirubin Total   Date Value Ref Range Status   10/05/2022 0.5 <=1.2 mg/dL Final   04/16/2021 0.5 0.2 - 1.3 mg/dL Final     Alkaline Phosphatase   Date Value Ref Range Status   10/05/2022 81 35 - 104 U/L Final   04/16/2021 73 40 - 150 U/L Final     ALT   Date Value Ref Range Status   10/05/2022 38 (H) 10 - 35 U/L Final   04/16/2021 73 (H) 0 - 50 U/L Final     AST   Date Value Ref Range Status   10/05/2022 29 10 - 35 U/L Final   04/16/2021 28 0 - 45 U/L Final  "    Cholesterol   Date Value Ref Range Status   01/06/2023 161 <200 mg/dL Final   04/16/2021 202 (H) <200 mg/dL Final     Comment:     Desirable:       <200 mg/dl     HDL Cholesterol   Date Value Ref Range Status   04/16/2021 48 (L) >49 mg/dL Final     Direct Measure HDL   Date Value Ref Range Status   01/06/2023 46 (L) >=50 mg/dL Final     LDL Cholesterol Calculated   Date Value Ref Range Status   01/06/2023 88 <=100 mg/dL Final   04/16/2021 120 (H) <100 mg/dL Final     Comment:     Above desirable:  100-129 mg/dl  Borderline High:  130-159 mg/dL  High:             160-189 mg/dL  Very high:       >189 mg/dl       Triglycerides   Date Value Ref Range Status   01/06/2023 133 <150 mg/dL Final   04/16/2021 170 (H) <150 mg/dL Final     Comment:     Borderline high:  150-199 mg/dl  High:             200-499 mg/dl  Very high:       >499 mg/dl       WBC   Date Value Ref Range Status   04/16/2021 6.1 4.0 - 11.0 10e9/L Final     Hemoglobin   Date Value Ref Range Status   04/16/2021 14.1 11.7 - 15.7 g/dL Final     Hematocrit   Date Value Ref Range Status   04/16/2021 40.7 35.0 - 47.0 % Final     MCV   Date Value Ref Range Status   04/16/2021 90 78 - 100 fl Final     Platelet Count   Date Value Ref Range Status   04/16/2021 196 150 - 450 10e9/L Final         BP Readings from Last 6 Encounters:   01/06/23 112/72   12/30/22 113/66   05/03/22 112/74   11/26/21 118/80   09/16/21 112/82   04/16/21 120/72       Pulse Readings from Last 6 Encounters:   01/06/23 96   12/30/22 66   05/03/22 73   11/26/21 95   09/16/21 99   04/16/21 83       PE:  Ht 5' 10\" (1.778 m)   Wt 245 lb (111.1 kg)   LMP 06/09/2015   BMI 35.15 kg/m    GENERAL: Healthy, alert and no distress  EYES: Eyes grossly normal to inspection.  No discharge or erythema, or obvious scleral/conjunctival abnormalities.  RESP: No audible wheeze, cough, or visible cyanosis.  No visible retractions or increased work of breathing.    SKIN: Visible skin clear. No significant " rash, abnormal pigmentation or lesions.  NEURO: Cranial nerves grossly intact.  Mentation and speech appropriate for age.  PSYCH: Mentation appears normal, affect normal/bright, judgement and insight intact, normal speech and appearance well-groomed.      Sincerely,      Christina Meeks PA-C

## 2023-10-09 NOTE — PROCEDURES
"Mallory is a 57 year old who is being evaluated via a billable video visit.      The patient has been notified of following:     \"This video visit will be conducted via a call between you and your physician/provider. We have found that certain health care needs can be provided without the need for an in-person physical exam.  This service lets us provide the care you need with a video conversation.  If a prescription is necessary we can send it directly to your pharmacy.  If lab work is needed we can place an order for that and you can then stop by our lab to have the test done at a later time.    Video visits are billed at different rates depending on your insurance coverage.  Please reach out to your insurance provider with any questions.    If during the course of the call the physician/provider feels a video visit is not appropriate, you will not be charged for this service.\"    Patient has given verbal consent for Video visit? Yes    How would you like to obtain your AVS? MyChart    If the video visit is dropped, the invitation should be resent by: Text to cell phone: [phone]    Will anyone else be joining your video visit? No    I{If patient encounters technical issues they should call [phone] :[id number]}    Video-Visit Details    Type of service:  Video Visit    Video Start Time: {video visit start/end time for provider to select:152948}    Video End Time:{video visit start/end time for provider to select:197627}    Originating Location (pt. Location): {video visit patient location:414299::\"Home\"}    Distant Location (provider location):  [facility] [place]     Platform used for Video Visit: {Virtual Visit Platforms:176930::\"Xlumena\"}    "

## 2023-10-10 ENCOUNTER — VIRTUAL VISIT (OUTPATIENT)
Dept: SURGERY | Facility: CLINIC | Age: 58
End: 2023-10-10
Payer: COMMERCIAL

## 2023-10-10 ENCOUNTER — TELEPHONE (OUTPATIENT)
Dept: SURGERY | Facility: CLINIC | Age: 58
End: 2023-10-10

## 2023-10-10 VITALS — BODY MASS INDEX: 35.07 KG/M2 | HEIGHT: 70 IN | WEIGHT: 245 LBS

## 2023-10-10 DIAGNOSIS — E66.812 CLASS 2 SEVERE OBESITY DUE TO EXCESS CALORIES WITH SERIOUS COMORBIDITY AND BODY MASS INDEX (BMI) OF 35.0 TO 35.9 IN ADULT (H): ICD-10-CM

## 2023-10-10 DIAGNOSIS — E78.5 HYPERLIPIDEMIA LDL GOAL <130: ICD-10-CM

## 2023-10-10 DIAGNOSIS — F50.819 BINGE EATING DISORDER: Primary | ICD-10-CM

## 2023-10-10 DIAGNOSIS — G47.33 OSA (OBSTRUCTIVE SLEEP APNEA): ICD-10-CM

## 2023-10-10 DIAGNOSIS — E66.01 CLASS 2 SEVERE OBESITY DUE TO EXCESS CALORIES WITH SERIOUS COMORBIDITY AND BODY MASS INDEX (BMI) OF 35.0 TO 35.9 IN ADULT (H): ICD-10-CM

## 2023-10-10 PROCEDURE — 99215 OFFICE O/P EST HI 40 MIN: CPT | Mod: VID | Performed by: PHYSICIAN ASSISTANT

## 2023-10-10 RX ORDER — LISDEXAMFETAMINE DIMESYLATE 30 MG/1
30 CAPSULE ORAL EVERY MORNING
Qty: 7 CAPSULE | Refills: 0 | Status: SHIPPED | OUTPATIENT
Start: 2023-10-10 | End: 2023-10-24

## 2023-10-10 RX ORDER — LISDEXAMFETAMINE DIMESYLATE 40 MG/1
40 CAPSULE ORAL EVERY MORNING
Qty: 7 CAPSULE | Refills: 0 | Status: SHIPPED | OUTPATIENT
Start: 2023-10-17 | End: 2023-10-24

## 2023-10-10 NOTE — TELEPHONE ENCOUNTER
Patient contacted office to [facility] sx.    Unfortunately, this call not seen until 0932.  Unable to notify provider.  Annette Arevalo, MS, RD, RN

## 2023-10-10 NOTE — PATIENT INSTRUCTIONS
"To ensure quality you may receive a patient satisfaction survey. The greatest compliment you can give is \"Likely to Recommend.\"    Nice to talk with you today.  Thank you for your trust. Below is the plan discussed.-  TOMA Vasquez      Plan:  Schedule ED evaluation  Start Vyvanse   30 mg in the morning for 1 week then increase to    40 mg in the morning for 1 week, if needed we can increase again by 10 mg Weekly until binges are controlled.  Send message in [employer] in 10 days to let me know have you are doing.    FOLLOW-UP:    Please call [phone] to schedule your next visit in 3 months.      As we discussed during your appointment I would like your to have an eating disorder evaluation.    Below are places you can call to schedule.     [facility]  [phone]  [email or web address]     [facility]  [phone]  [facility]   [phone]  [employer]    [facility]   [facility] for Emotional Eating/Binge Eating    [email or web address]    Vyvanse    What is this drug used for?  It is used to treat attention deficit problems with hyperactivity.  It is used to treat binge eating disorder.  This drug is not approved for weight loss, but can help with weight management and has been used off label for this in patients with binge eating.     Instructions:  Take your dose 1st thing in the morning.     If you have been taking this drug for a long time or at high doses, it may not work as well and you may need higher doses to get the same effect. This is known as tolerance. Call your doctor if this drug stops working well. Do not take more than ordered.    Long-term or regular use of this drug may lead to dependence. Stopping this drug all of a sudden may lead to signs of withdrawal. Talk to your doctor before you lower the dose or " stop this drug. You will need to follow your doctor's instructions. Tell your doctor if you have any bad effects.    Cardiac Side effects:  This drug may cause high blood pressure or elevated pulse  Please get blood pressure/pulse 7-10 days after starting.  If BP is above 140/90 or pulse is greater than 100 contact clinic.       Side effects:   Anxiety, Constipation, Dry mouth, Feeling jittery, Weight loss, Trouble sleeping.

## 2023-10-10 NOTE — ASSESSMENT & PLAN NOTE
Healthy habits to assist with further weight loss were discussed. Mallory has symptoms consistent with binge eating disorder.  Semaglutide was helpful for weight loss but she pleatued so we attempted to switch to Wegovy however she had side effects transitioning to the 1.7 mg dose.  Has felt out of control with eating.  Recommend ED evaluation.  Started Vyvanse today.

## 2023-10-10 NOTE — ASSESSMENT & PLAN NOTE
Pt has symptoms consistent with binge eating disorder. Discussed importance of treating eating disorder first before concentrating on weight management.  Recommend ED evaluation. Written contacts given for programs given today.     Started vyvanse. Risks/ benefits and possible side effects were discussed and questions were answered. Written information was given.

## 2023-10-10 NOTE — TELEPHONE ENCOUNTER
Reason for Call:  Other appointment    Detailed comments: patient had an 8:30am with Christina Jeanna, she hasn't received a text link & isn't able to connect on [employer]    Phone Number Patient can be reached at: Cell number on file:    Telephone Information:   Mobile [phone]       Best Time: any    Can we leave a detailed message on this number? YES    Call taken on 10/10/2023 at 8:39 AM by Candice Way

## 2023-10-24 ENCOUNTER — MYC MEDICAL ADVICE (OUTPATIENT)
Dept: SURGERY | Facility: CLINIC | Age: 58
End: 2023-10-24
Payer: COMMERCIAL

## 2023-10-24 DIAGNOSIS — F50.819 BINGE EATING DISORDER: Primary | ICD-10-CM

## 2023-10-24 DIAGNOSIS — E66.812 CLASS 2 SEVERE OBESITY DUE TO EXCESS CALORIES WITH SERIOUS COMORBIDITY AND BODY MASS INDEX (BMI) OF 35.0 TO 35.9 IN ADULT (H): ICD-10-CM

## 2023-10-24 DIAGNOSIS — E66.01 CLASS 2 SEVERE OBESITY DUE TO EXCESS CALORIES WITH SERIOUS COMORBIDITY AND BODY MASS INDEX (BMI) OF 35.0 TO 35.9 IN ADULT (H): ICD-10-CM

## 2023-10-24 RX ORDER — LISDEXAMFETAMINE DIMESYLATE 40 MG/1
40 CAPSULE ORAL DAILY
Qty: 30 CAPSULE | Refills: 0 | Status: SHIPPED | OUTPATIENT
Start: 2023-10-24 | End: 2023-11-23

## 2023-10-24 RX ORDER — LISDEXAMFETAMINE DIMESYLATE 40 MG/1
40 CAPSULE ORAL DAILY
Qty: 30 CAPSULE | Refills: 0 | Status: SHIPPED | OUTPATIENT
Start: 2023-12-25 | End: 2024-01-24

## 2023-10-24 RX ORDER — LISDEXAMFETAMINE DIMESYLATE 40 MG/1
40 CAPSULE ORAL DAILY
Qty: 30 CAPSULE | Refills: 0 | Status: SHIPPED | OUTPATIENT
Start: 2023-11-24 | End: 2023-12-24

## 2023-10-24 NOTE — TELEPHONE ENCOUNTER
Yes, will refill the 40 mg dose.  Please have her make appt to see me as I need to see her in 3 months.

## 2023-10-24 NOTE — TELEPHONE ENCOUNTER
Pt given 1 week of the 30 mg and  1 of the 40 mg of Vyvanse.  Is wanting to know if will refill.  Plz advise - scotty Arevalo, MS, RD, RN

## 2023-10-27 DIAGNOSIS — G47.00 INSOMNIA, UNSPECIFIED TYPE: ICD-10-CM

## 2023-10-27 RX ORDER — HYDROXYZINE HYDROCHLORIDE 25 MG/1
TABLET, FILM COATED ORAL
Qty: 45 TABLET | Refills: 1 | Status: SHIPPED | OUTPATIENT
Start: 2023-10-27 | End: 2024-04-23

## 2023-11-06 ENCOUNTER — PATIENT OUTREACH (OUTPATIENT)
Dept: CARE COORDINATION | Facility: CLINIC | Age: 58
End: 2023-11-06
Payer: COMMERCIAL

## 2023-12-04 ENCOUNTER — PATIENT OUTREACH (OUTPATIENT)
Dept: CARE COORDINATION | Facility: CLINIC | Age: 58
End: 2023-12-04
Payer: COMMERCIAL

## 2023-12-21 ENCOUNTER — PATIENT OUTREACH (OUTPATIENT)
Dept: GASTROENTEROLOGY | Facility: CLINIC | Age: 58
End: 2023-12-21
Payer: COMMERCIAL

## 2024-01-10 ENCOUNTER — HOSPITAL ENCOUNTER (OUTPATIENT)
Dept: MAMMOGRAPHY | Facility: CLINIC | Age: 59
Discharge: HOME | End: 2024-01-10
Attending: NURSE PRACTITIONER | Admitting: NURSE PRACTITIONER
Payer: COMMERCIAL

## 2024-01-10 DIAGNOSIS — Z12.31 VISIT FOR SCREENING MAMMOGRAM: ICD-10-CM

## 2024-01-10 PROCEDURE — 77063 BREAST TOMOSYNTHESIS BI: CPT

## 2024-01-10 ASSESSMENT — ENCOUNTER SYMPTOMS
DIZZINESS: 0
ABDOMINAL PAIN: 0
JOINT SWELLING: 0
FREQUENCY: 0
BREAST MASS: 0
SHORTNESS OF BREATH: 0
HEMATURIA: 0
NAUSEA: 0
COUGH: 0
MYALGIAS: 0
DYSURIA: 0
DIARRHEA: 1
WEAKNESS: 0
HEMATOCHEZIA: 0
NERVOUS/ANXIOUS: 0
PALPITATIONS: 0
HEADACHES: 0
FEVER: 0
PARESTHESIAS: 0
SORE THROAT: 0
CONSTIPATION: 0
HEARTBURN: 0
CHILLS: 0
ARTHRALGIAS: 0
EYE PAIN: 0

## 2024-01-10 ASSESSMENT — PATIENT HEALTH QUESTIONNAIRE - PHQ9
10. IF YOU CHECKED OFF ANY PROBLEMS, HOW DIFFICULT HAVE THESE PROBLEMS MADE IT FOR YOU TO DO YOUR WORK, TAKE CARE OF THINGS AT HOME, OR GET ALONG WITH OTHER PEOPLE: NOT DIFFICULT AT ALL
SUM OF ALL RESPONSES TO PHQ QUESTIONS 1-9: 2
SUM OF ALL RESPONSES TO PHQ QUESTIONS 1-9: 2

## 2024-01-11 ENCOUNTER — OFFICE VISIT (OUTPATIENT)
Dept: FAMILY MEDICINE | Facility: CLINIC | Age: 59
End: 2024-01-11
Attending: NURSE PRACTITIONER
Payer: COMMERCIAL

## 2024-01-11 VITALS
DIASTOLIC BLOOD PRESSURE: 80 MMHG | HEART RATE: 117 BPM | WEIGHT: 245 LBS | BODY MASS INDEX: 35.07 KG/M2 | TEMPERATURE: 95.8 F | RESPIRATION RATE: 18 BRPM | SYSTOLIC BLOOD PRESSURE: 120 MMHG | OXYGEN SATURATION: 96 % | HEIGHT: 70 IN

## 2024-01-11 DIAGNOSIS — Z13.220 SCREENING FOR LIPID DISORDERS: ICD-10-CM

## 2024-01-11 DIAGNOSIS — Z13.1 SCREENING FOR DIABETES MELLITUS: ICD-10-CM

## 2024-01-11 DIAGNOSIS — E78.5 HYPERLIPIDEMIA LDL GOAL <130: ICD-10-CM

## 2024-01-11 DIAGNOSIS — E66.812 CLASS 2 SEVERE OBESITY DUE TO EXCESS CALORIES WITH SERIOUS COMORBIDITY AND BODY MASS INDEX (BMI) OF 35.0 TO 35.9 IN ADULT (H): ICD-10-CM

## 2024-01-11 DIAGNOSIS — E66.01 CLASS 2 SEVERE OBESITY DUE TO EXCESS CALORIES WITH SERIOUS COMORBIDITY AND BODY MASS INDEX (BMI) OF 35.0 TO 35.9 IN ADULT (H): ICD-10-CM

## 2024-01-11 DIAGNOSIS — F33.0 MILD RECURRENT MAJOR DEPRESSION (H): ICD-10-CM

## 2024-01-11 DIAGNOSIS — Z00.00 ROUTINE GENERAL MEDICAL EXAMINATION AT A HEALTH CARE FACILITY: Primary | ICD-10-CM

## 2024-01-11 DIAGNOSIS — Z80.41 FAMILY HISTORY OF OVARIAN CANCER: ICD-10-CM

## 2024-01-11 DIAGNOSIS — Z12.11 SCREEN FOR COLON CANCER: ICD-10-CM

## 2024-01-11 DIAGNOSIS — F41.9 ANXIETY: ICD-10-CM

## 2024-01-11 LAB
ALBUMIN SERPL BCG-MCNC: 4.3 G/DL (ref 3.5–5.2)
ALP SERPL-CCNC: 80 U/L (ref 40–150)
ALT SERPL W P-5'-P-CCNC: 56 U/L (ref 0–50)
ANION GAP SERPL CALCULATED.3IONS-SCNC: 13 MMOL/L (ref 7–15)
AST SERPL W P-5'-P-CCNC: 31 U/L (ref 0–45)
BILIRUB SERPL-MCNC: 0.3 MG/DL
BUN SERPL-MCNC: 16.2 MG/DL (ref 6–20)
CALCIUM SERPL-MCNC: 9.7 MG/DL (ref 8.6–10)
CANCER AG125 SERPL-ACNC: 9 U/ML
CHLORIDE SERPL-SCNC: 104 MMOL/L (ref 98–107)
CHOLEST SERPL-MCNC: 170 MG/DL
CREAT SERPL-MCNC: 0.84 MG/DL (ref 0.51–0.95)
DEPRECATED HCO3 PLAS-SCNC: 24 MMOL/L (ref 22–29)
EGFRCR SERPLBLD CKD-EPI 2021: 81 ML/MIN/1.73M2
FASTING STATUS PATIENT QL REPORTED: YES
GLUCOSE SERPL-MCNC: 115 MG/DL (ref 70–99)
HDLC SERPL-MCNC: 39 MG/DL
LDLC SERPL CALC-MCNC: 105 MG/DL
NONHDLC SERPL-MCNC: 131 MG/DL
POTASSIUM SERPL-SCNC: 4.1 MMOL/L (ref 3.4–5.3)
PROT SERPL-MCNC: 7.1 G/DL (ref 6.4–8.3)
SODIUM SERPL-SCNC: 141 MMOL/L (ref 135–145)
TRIGL SERPL-MCNC: 128 MG/DL

## 2024-01-11 PROCEDURE — 86304 IMMUNOASSAY TUMOR CA 125: CPT | Performed by: NURSE PRACTITIONER

## 2024-01-11 PROCEDURE — 99214 OFFICE O/P EST MOD 30 MIN: CPT | Mod: 25 | Performed by: NURSE PRACTITIONER

## 2024-01-11 PROCEDURE — 36415 COLL VENOUS BLD VENIPUNCTURE: CPT | Performed by: NURSE PRACTITIONER

## 2024-01-11 PROCEDURE — 99396 PREV VISIT EST AGE 40-64: CPT | Performed by: NURSE PRACTITIONER

## 2024-01-11 PROCEDURE — 80053 COMPREHEN METABOLIC PANEL: CPT | Performed by: NURSE PRACTITIONER

## 2024-01-11 PROCEDURE — 80061 LIPID PANEL: CPT | Performed by: NURSE PRACTITIONER

## 2024-01-11 RX ORDER — ATORVASTATIN CALCIUM 40 MG/1
40 TABLET, FILM COATED ORAL DAILY
Qty: 90 TABLET | Refills: 3 | Status: SHIPPED | OUTPATIENT
Start: 2024-01-11

## 2024-01-11 ASSESSMENT — ENCOUNTER SYMPTOMS
COUGH: 0
HEMATOCHEZIA: 0
ARTHRALGIAS: 0
MYALGIAS: 0
JOINT SWELLING: 0
SORE THROAT: 0
NERVOUS/ANXIOUS: 0
DIZZINESS: 0
HEARTBURN: 0
EYE PAIN: 0
CONSTIPATION: 0
PALPITATIONS: 0
WEAKNESS: 0
HEMATURIA: 0
FREQUENCY: 0
BREAST MASS: 0
FEVER: 0
HEADACHES: 0
DYSURIA: 0
NAUSEA: 0
CHILLS: 0
PARESTHESIAS: 0
DIARRHEA: 1
ABDOMINAL PAIN: 0
SHORTNESS OF BREATH: 0

## 2024-01-11 NOTE — PROGRESS NOTES
SUBJECTIVE:   Mallory is a 58 year old, presenting for the following:  Physical        1/11/2024     7:48 AM   Additional Questions   Roomed by Jazmine       Healthy Habits:     Getting at least 3 servings of Calcium per day:  Yes    Bi-annual eye exam:  Yes    Dental care twice a year:  Yes    Sleep apnea or symptoms of sleep apnea:  None    Diet:  Regular (no restrictions)    Frequency of exercise:  1 day/week    Duration of exercise:  15-30 minutes    Taking medications regularly:  Yes    Medication side effects:  None    Additional concerns today:  No    Social:  , two children (ages 26 and 23), works in pre-admitting for surgery    Family history of ovarian cancer, mother was diagnosed at age 57.      Due for colonoscopy in June 2024.  Gets colonoscopies completed at [facility].    Chronic diarrhea - variable, soft and watery, change in stool caliber.    GI bug - 6 months ago (started at that time). Is taking fiber supplement.       Hyperlipidemia Follow-Up  Recent Labs   Lab Test 01/06/23  0836 11/26/21  1045   CHOL 161 135   HDL 46* 45*   LDL 88 72   TRIG 133 90     Are you regularly taking any medication or supplement to lower your cholesterol?   Yes- Lipitor  Are you having muscle aches or other side effects that you think could be caused by your cholesterol lowering medication?  No    Depression and Anxiety Follow-Up  Sertraline 50 mg initiated in February 2023.      How are you doing with your depression since your last visit? No change  How are you doing with your anxiety since your last visit?  No change  Are you having other symptoms that might be associated with depression or anxiety? No  Have you had a significant life event? No   Do you have any concerns with your use of alcohol or other drugs? No    Social History     Tobacco Use    Smoking status: Former     Packs/day: 0.00     Years: 0.00     Additional pack years: 0.00     Total pack years: 0.00     Types: Cigarettes     Quit date:  1993     Years since quittin.6    Smokeless tobacco: Never   Vaping Use    Vaping Use: Never used   Substance Use Topics    Alcohol use: Not Currently     Comment: very rarely     Drug use: No         2023     2:39 PM 2023     4:24 PM 1/10/2024    10:58 AM   PHQ   PHQ-9 Total Score 7 0 2   Q9: Thoughts of better off dead/self-harm past 2 weeks Not at all Not at all Not at all         2021     8:11 AM 2023     2:39 PM 2023     4:24 PM   CHRISTEN-7 SCORE   Total Score  6 (mild anxiety)    Total Score 0 6 0           Social History     Tobacco Use    Smoking status: Former     Packs/day: 0.00     Years: 0.00     Additional pack years: 0.00     Total pack years: 0.00     Types: Cigarettes     Quit date: 1993     Years since quittin.6    Smokeless tobacco: Never   Substance Use Topics    Alcohol use: Not Currently     Comment: very rarely            1/10/2024    11:00 AM   Alcohol Use   Prescreen: >3 drinks/day or >7 drinks/week? No     Reviewed orders with patient.  Reviewed health maintenance and updated orders accordingly - Yes  BP Readings from Last 3 Encounters:   24 120/80   23 112/72   22 113/66    Wt Readings from Last 3 Encounters:   24 111.1 kg (245 lb)   10/10/23 111.1 kg (245 lb)   23 104.3 kg (230 lb)                  Patient Active Problem List   Diagnosis    Family history of malignant neoplasm of ovary - maternal Hx Dx'd age 57,  age 62; pt consider ovary removal.    Benign shuddering attack    Esophageal reflux    Acute reaction to stress    Hyperlipidemia LDL goal <130    Hip dysplasia    Family history of basal cell carcinoma    Class 1 obesity due to excess calories without serious comorbidity with body mass index (BMI) of 32.0 to 32.9 in adult    Class 2 severe obesity due to excess calories with serious comorbidity and body mass index (BMI) of 35.0 to 35.9 in adult (H)    Personal history of colonic polyps    Fatty liver -  abdominal US from     Elevated liver enzymes    Binge eating disorder    JESSIE (obstructive sleep apnea)     Past Surgical History:   Procedure Laterality Date    ABDOMEN SURGERY      APPENDECTOMY      BREAST SURGERY      C/SECTION, LOW TRANSVERSE      , Low Transverse    CHOLECYSTECTOMY, LAPOROSCOPIC  2010    Cholecystectomy, Laparoscopic    COLONOSCOPY  2/15/2016    Dr. Tr[facility]    HERNIA REPAIR, INCISIONAL  2010    Laparoscopic    ZZC LIGATE FALLOPIAN TUBE      ZZC NONSPECIFIC PROCEDURE      vaginal repair after delivery       Social History     Tobacco Use    Smoking status: Former     Packs/day: 0.00     Years: 0.00     Additional pack years: 0.00     Total pack years: 0.00     Types: Cigarettes     Quit date: 1993     Years since quittin.6    Smokeless tobacco: Never   Substance Use Topics    Alcohol use: Not Currently     Comment: very rarely      Family History   Problem Relation Age of Onset    Hypertension Father     Lipids Father     Gastrointestinal Disease Father         vazquez's esophagus fr. reflux     Hyperlipidemia Father     Cancer Mother         ovarian    Arthritis Mother         lupus     Depression Paternal Grandmother         problems with schizophrenia    Arthritis Brother     Diabetes Maternal Grandmother     Diabetes Maternal Grandfather     Coronary Artery Disease Maternal Grandfather     Coronary Artery Disease Paternal Grandfather     Gastrointestinal Disease Brother         reflux     Colon Cancer Cousin     Ovarian Cancer Other         Maternal great aunt    Breast Cancer No family hx of          Current Outpatient Medications   Medication Sig Dispense Refill    aspirin 81 MG tablet Take by mouth daily 30 tablet     atorvastatin (LIPITOR) 40 MG tablet Take 1 tablet (40 mg) by mouth daily 90 tablet 3    FIBER PO       hydrOXYzine (ATARAX) 25 MG tablet TAKE ONE-HALF TO TWO TABLETS BY MOUTH EVERY NIGHT AT BEDTIME AS NEEDED FOR INSOMNIA 45 tablet 1     lisdexamfetamine (VYVANSE) 40 MG capsule Take 1 capsule (40 mg) by mouth daily for 30 days 30 capsule 0    magnesium 250 MG tablet Take 1 tablet by mouth daily      Omega-3 Fatty Acids (FISH OIL ADULT GUMMIES PO) Take 1,000 mg by mouth daily      omeprazole (PRILOSEC) 20 MG DR capsule Take 1 capsule (20 mg) by mouth daily 30-60 minutes before a meal. Pt typically takes QOD 90 capsule 3    sertraline (ZOLOFT) 50 MG tablet Take 1 tablet (50 mg) by mouth daily 90 tablet 3    augmented betamethasone dipropionate (DIPROLENE) 0.05 % external lotion Apply topically 2 times daily Apply to AA on scalp bid when needed 60 mL 1    naltrexone (DEPADE/REVIA) 50 MG tablet Take 1/2 tablet 1-2 hours before biggest craving time for 7 days, then increase to a full tablet 30 tablet 2    ondansetron (ZOFRAN ODT) 4 MG ODT tab Take 1 tablet (4 mg) by mouth every 6 hours as needed for nausea 30 tablet 1       Breast Cancer Screening:    FHS-7:       11/23/2021     8:54 AM 12/6/2022    11:09 AM 1/6/2023     7:58 AM 1/10/2024     9:26 AM 1/10/2024    11:02 AM   Breast CA Risk Assessment (FHS-7)   Did any of your first-degree relatives have breast or ovarian cancer? Yes Yes Yes Yes Yes   Did any of your relatives have bilateral breast cancer? No No No No No   Did any man in your family have breast cancer? No No No No No   Did any woman in your family have breast and ovarian cancer? No No Yes No No   Did any woman in your family have breast cancer before age 50 y? No No No No No   Do you have 2 or more relatives with breast and/or ovarian cancer? No No Yes Yes Yes   Do you have 2 or more relatives with breast and/or bowel cancer? No No No No No       Mammogram Screening: Recommended mammography every 1-2 years with patient discussion and risk factor consideration  Pertinent mammograms are reviewed under the imaging tab.    History of abnormal Pap smear: NO - age 30-65 PAP every 5 years with negative HPV co-testing recommended        Latest Ref  Rng & Units 2023     8:18 AM 2018     4:16 PM 2018     4:12 PM   PAP / HPV   PAP  Negative for Intraepithelial Lesion or Malignancy (NILM)      PAP (Historical)   NIL     HPV 16 DNA Negative Negative   Negative    HPV 18 DNA Negative Negative   Negative    Other HR HPV Negative Negative   Negative      Reviewed and updated as needed this visit by clinical staff   Tobacco  Allergies  Meds  Problems  Med Hx  Surg Hx  Fam Hx          Reviewed and updated as needed this visit by Provider     Meds             Past Medical History:   Diagnosis Date    ASCUS favor benign 3/2015    neg HPV   Plan cotest in 3 yrs.    Depressive disorder     Depressive disorder, not elsewhere classified     lexapro = no effect and sleepy, celexa = slightly better, wellbutrin = severe restlessness.      Esophageal reflux 2006    FAMILY HX-OVARIAN MALIGNANCY 2005    maternal Hx Dx'd age 57,  age 62; pt would like to have her ovaries removed.    GERD (gastroesophageal reflux disease)     Other and unspecified hyperlipidemia 2007    RECURR Depressive disorder - MOD 2008    RECURR Depressive disorder -SEVERE 2008      Past Surgical History:   Procedure Laterality Date    ABDOMEN SURGERY      APPENDECTOMY      BREAST SURGERY      C/SECTION, LOW TRANSVERSE      , Low Transverse    CHOLECYSTECTOMY, LAPOROSCOPIC  2010    Cholecystectomy, Laparoscopic    COLONOSCOPY  2/15/2016    Dr. Marc SILVA    HERNIA REPAIR, INCISIONAL  2010    Laparoscopic    ZZC LIGATE FALLOPIAN TUBE      ZZC NONSPECIFIC PROCEDURE      vaginal repair after delivery       Review of Systems   Constitutional:  Negative for chills and fever.   HENT:  Negative for congestion, ear pain, hearing loss and sore throat.    Eyes:  Negative for pain and visual disturbance.   Respiratory:  Negative for cough and shortness of breath.    Cardiovascular:  Negative for chest pain, palpitations and peripheral edema.  "  Gastrointestinal:  Positive for diarrhea. Negative for abdominal pain, constipation, heartburn, hematochezia and nausea.   Breasts:  Negative for tenderness, breast mass and discharge.   Genitourinary:  Negative for dysuria, frequency, genital sores, hematuria, pelvic pain, urgency, vaginal bleeding and vaginal discharge.   Musculoskeletal:  Negative for arthralgias, joint swelling and myalgias.   Skin:  Negative for rash.   Neurological:  Negative for dizziness, weakness, headaches and paresthesias.   Psychiatric/Behavioral:  Negative for mood changes. The patient is not nervous/anxious.         OBJECTIVE:   /80   Pulse 117   Temp (!) 95.8  F (35.4  C) (Tympanic)   Resp 18   Ht 1.778 m (5' 10\")   Wt 111.1 kg (245 lb)   LMP 06/09/2015   SpO2 96%   BMI 35.15 kg/m      Physical Exam    GENERAL: healthy, alert and no distress  EYES: Eyes grossly normal to inspection  HENT: ear canals and TM's normal, nose and mouth without ulcers or lesions  NECK: no adenopathy, no asymmetry, masses, or scars and thyroid normal to palpation  RESP: lungs clear to auscultation - no rales, rhonchi or wheezes  CV: regular rate and rhythm, normal S1 S2, no S3 or S4, no murmur, click or rub, no peripheral edema   ABDOMEN: soft, nontender, no hepatosplenomegaly, no masses and bowel sounds normal  MS: no gross musculoskeletal defects noted, no edema  SKIN: no suspicious lesions or rashes  NEURO: Normal strength and tone, mentation intact and speech normal  PSYCH: mentation appears normal, affect normal/bright        ASSESSMENT/PLAN:     Mallory was seen today for physical.    Diagnoses and all orders for this visit:    Routine general medical examination at a health care facility  -     REVIEW OF HEALTH MAINTENANCE PROTOCOL ORDERS  -     PRIMARY CARE FOLLOW-UP SCHEDULING; Future    Family history of ovarian cancer  -     US Pelvic Complete with Transvaginal; Future  -         Hyperlipidemia LDL goal <130  Stable on " "statin therapy - Atorvastatin 40 mg daily.   -     atorvastatin (LIPITOR) 40 MG tablet; Take 1 tablet (40 mg) by mouth daily    Screening for lipid disorders  -     Lipid panel reflex to direct LDL Fasting    Screen for colon cancer  History of colonic polyps, last colonoscopy completed on 6/222/21 at [facility], repeat in 3 years.    -     Colonoscopy Screening  Referral; Future    Screening for diabetes mellitus  -     Comprehensive metabolic panel (BMP + Alb, Alk Phos, ALT, AST, Total. Bili, TP)    Mild recurrent major depression (H24)  Anxiety  Stable on selective serotonin reuptake inhibitor - Sertraline  -     sertraline (ZOLOFT) 50 MG tablet; Take 1 tablet (50 mg) by mouth daily    Class 2 severe obesity due to excess calories with serious comorbidity and body mass index (BMI) of 35.0 to 35.9 in adult (H)  Following with weight management clinic.      Other orders  -     PRIMARY CARE FOLLOW-UP SCHEDULING            COUNSELING:  Reviewed preventive health counseling, as reflected in patient instructions      BMI:   Estimated body mass index is 35.15 kg/m  as calculated from the following:    Height as of this encounter: 1.778 m (5' 10\").    Weight as of this encounter: 111.1 kg (245 lb).         She reports that she quit smoking about 30 years ago. Her smoking use included cigarettes. She has never used smokeless tobacco.    Return in about 1 year (around 1/11/2025) for Preventative Visit .    Mary Castro, [place] [facility] [place]          "

## 2024-01-12 NOTE — RESULT ENCOUNTER NOTE
Dear Mallory,     -Cholesterol levels are close to your goal level of LDL less than 100.   ADVISE: continuing your medication, a regular exercise program with at least 150 minutes of aerobic exercise per week, and eating a low saturated fat/low carbohydrate diet.  Also, you should recheck this fasting cholesterol panel in 12 months.    -Liver and gallbladder tests (AST, Alk phos,bilirubin) are normal.  ALT is slightly elevated.    -Kidney function (GFR) is normal.  -Sodium is normal.  -Potassium is normal.  -Calcium is normal.  -Glucose is slight elevated and may be a sign of early diabetes (prediabetes). ADVISE: eating a low carbohydrate diet, exercising, trying to lose weight (if necessary) and rechecking your glucose level in 12 months.    - was negative.      Please send a [employer] message or call [phone]  if you have any questions.      Mary Castro, [place], CNP  [facility]    If you have further questions about the interpretation of your labs, [email or web address] is a good website to check out for further information.

## 2024-02-06 ENCOUNTER — ANCILLARY PROCEDURE (OUTPATIENT)
Dept: ULTRASOUND IMAGING | Facility: CLINIC | Age: 59
End: 2024-02-06
Attending: NURSE PRACTITIONER
Payer: COMMERCIAL

## 2024-02-06 DIAGNOSIS — Z80.41 FAMILY HISTORY OF OVARIAN CANCER: ICD-10-CM

## 2024-02-06 PROCEDURE — 76830 TRANSVAGINAL US NON-OB: CPT | Performed by: OBSTETRICS & GYNECOLOGY

## 2024-02-06 PROCEDURE — 76856 US EXAM PELVIC COMPLETE: CPT | Performed by: OBSTETRICS & GYNECOLOGY

## 2024-02-07 NOTE — RESULT ENCOUNTER NOTE
Dear Mallory,     Your recent pelvic ultrasound was normal and reassuring.      Thank you,     Mary Castro, APRN, CNP  [facility]

## 2024-02-07 NOTE — PROGRESS NOTES
"Mallory is a 58 year old who is being evaluated via a billable video visit.      The patient has been notified of following:     \"This video visit will be conducted via a call between you and your physician/provider. We have found that certain health care needs can be provided without the need for an in-person physical exam.  This service lets us provide the care you need with a video conversation.  If a prescription is necessary we can send it directly to your pharmacy.  If lab work is needed we can place an order for that and you can then stop by our lab to have the test done at a later time.    Video visits are billed at different rates depending on your insurance coverage.  Please reach out to your insurance provider with any questions.    If during the course of the call the physician/provider feels a video visit is not appropriate, you will not be charged for this service.\"    Patient has given verbal consent for Video visit? Yes    How would you like to obtain your AVS? MyChart    If the video visit is dropped, the invitation should be resent by: Text to cell phone: [phone]    Will anyone else be joining your video visit? No    I    Video-Visit Details    Type of service:  Video Visit    Originating Location (pt. Location): Home    Distant Location (provider location):   Off-Site - Provider Home Office    Platform used for Video Visit: ecoInsight    Video Start Time: 8:01 AM    Video End Time:8:27 AM    2024      Return Medical Weight Management Note     Mallory Sargent  MRN:  [id number]  :  1965    Assessment & Plan   Problem List Items Addressed This Visit       Morbid obesity (H) - Primary     We discussed healthy habits to assist with weight loss. We discussed medication that may assist with weight loss. Zepbound recommended.  MTM referral given as patient is [employer] employee. risks/ benefits and possible side effects were discussed and questions were answered. Written information was " given.            Relevant Orders    Med Therapy Management Referral    Fatty liver - abdominal US from 2010     Patient is candidate for GLP-1.  Will refer to MTM for Zepbound start         Relevant Orders    Med Therapy Management Referral    Binge eating disorder     Pt has symptoms consistent with binge eating disorder. Discussed importance of treating eating disorder .Recommend ED evaluation. Written contacts given for programs given today. Will start GLP-1.  Research has found this can help with binge eating.         JESSIE (obstructive sleep apnea)     Anticipate improvement with weight loss             AOM Considerations:  GLP-1:   Saxenda. Ozempic worked well for binge and wt loss   Naltrexone:   Not effective  Vyvanse: Worked at first then hunger binges not controlled.     PATIENT INSTRUCTIONS:  Please call [phone] to to schedule a MTM appointment with one of the Weight Management MTM Pharmacists to start  Zepbound.    Call to schedule your eating disorder evaluation.  Program contacts below:    FOLLOW-UP:  Please call [phone] to schedule your next visit in June    40 minutes spent on the date of the encounter doing chart review, history and exam, result review, counseling, developing plan of care, documentation, and further activities as noted      INTERVAL HISTORY:  Mallory returns for medical weight management follow up.  Last seen on 10/10/2023.  Started on vyvanse for binge eating.  Stopped-Worked at first then hunger/ binges not controlled. .  Was to have an ED assessment-has not done this yet.  Has she truly had binge eating disorder.  States she continues to binge nightly    Binge Eating Screening:  Objective binges at least 1x per week for the past 3 months.- most days of the week (Big heaping bowl of cereal-triple size and several little debbies on top of that.)  Patient senses lack of control over eating during the binges.- yes  Binge eating causes stress.- yes  Binge eating episodes  are associated with 3 or more of the following:    Eating until uncomfortably full.- somewhat (does not get to the ability to get full)  Eating large amounts when not physically hungry. - has a little   Eating much more rapidly than usual. - Yes  Eating alone due to being embarassed by the amount eaten-. yes  Feeling disgusted, depressed, or guilty after overeating. - yes  If patient answers yes to 3 of the above questions and answered yes to frequency, distress of eating behavior and avoids using compensatory behaviors, refer for binge eating assessment.       WEIGHT METRICS:  Body mass index is 35.65 kg/m .   Current Weight: 252 lb (114.3 kg) (pt reported)  Last Visits Weight: 245 lb (111.1 kg)  Initial Weight (lbs): 256 lbs  Cumulative weight loss (lbs): 4  Weight Loss Percentage: 1.56%    Wt Readings from Last 10 Encounters:   02/22/24 252 lb (114.3 kg)   01/11/24 245 lb (111.1 kg)   10/10/23 245 lb (111.1 kg)   07/27/23 230 lb (104.3 kg)   06/27/23 229 lb (103.9 kg)   01/06/23 219 lb (99.3 kg)   12/30/22 219 lb (99.3 kg)   12/21/22 218 lb (98.9 kg)   10/05/22 211 lb (95.7 kg)   05/03/22 220 lb 4.8 oz (99.9 kg)                 2/22/2024    10:41 AM   Weight Loss Medication History Reviewed With Patient   Which weight loss medications are you currently taking on a regular basis? None   If you are not taking a weight loss medication that was prescribed to you, please indicate why: It did not seem to be helping me   Are you having any side effects from the weight loss medication that we have prescribed you? No           2/22/2024    10:41 AM   Changes and Difficulties   With regards to my diet, I am still struggling with: Eating in the evening   With regards to my activity/exercise, I am still struggling with: Motivation and pain       LABS:  Reviewed in Epic    BP Readings from Last 6 Encounters:   01/11/24 120/80   01/06/23 112/72   12/30/22 113/66   05/03/22 112/74   11/26/21 118/80   09/16/21 112/82       Pulse  "Readings from Last 6 Encounters:   01/11/24 117   01/06/23 96   12/30/22 66   05/03/22 73   11/26/21 95   09/16/21 99       PE:  Ht 5' 10.5\" (1.791 m)   Wt 252 lb (114.3 kg)   LMP 06/09/2015   BMI 35.65 kg/m    GENERAL: Healthy, alert and no distress  RESP: No audible wheeze, cough, or visible cyanosis.  No visible retractions or increased work of breathing.    SKIN: Visible skin clear. No significant rash, abnormal pigmentation or lesions.  PSYCH: Mentation appears normal, affect normal/bright, judgement and insight intact          "

## 2024-02-23 ENCOUNTER — VIRTUAL VISIT (OUTPATIENT)
Dept: SURGERY | Facility: CLINIC | Age: 59
End: 2024-02-23
Payer: COMMERCIAL

## 2024-02-23 VITALS — BODY MASS INDEX: 35.28 KG/M2 | HEIGHT: 71 IN | WEIGHT: 252 LBS

## 2024-02-23 DIAGNOSIS — K76.0 FATTY LIVER: ICD-10-CM

## 2024-02-23 DIAGNOSIS — G47.33 OSA (OBSTRUCTIVE SLEEP APNEA): ICD-10-CM

## 2024-02-23 DIAGNOSIS — F50.819 BINGE EATING DISORDER: ICD-10-CM

## 2024-02-23 DIAGNOSIS — E66.01 MORBID OBESITY (H): Primary | ICD-10-CM

## 2024-02-23 PROCEDURE — 99215 OFFICE O/P EST HI 40 MIN: CPT | Mod: 95 | Performed by: PHYSICIAN ASSISTANT

## 2024-02-23 NOTE — PATIENT INSTRUCTIONS
"To ensure quality you may receive a patient satisfaction survey. The greatest compliment you can give is \"Likely to Recommend.\"    Nice to talk with you today.  Thank you for your trust. Below is the plan discussed.-  TOMA Vasquez      Plan:  Please call [phone] to to schedule a MTM appointment with one of the Weight Management MTM Pharmacists to start  Zepbound.    Call to schedule your eating disorder evaluation.  Program contacts below:    [facility]  [phone]  [email or web address]     [facility]  [phone]  [email or web address]     [facility]   [phone]  [facility]    FOLLOW-UP:    Please call [phone] to schedule your next visit June    Zepbound (Tirzepatide)    Zepbound (Tirzepatide): is an injectable prescription medicine recently FDA approved for adults with obesity or overweight with an additional condition affected by their weight.      It is given as a shot once a week. It activates the body's receptors for GIP (glucose-dependent insulinotropic polypeptide) and GLP-1 (glucagon-like peptide-1) receptor, two naturally occurring hormones that help tell the brain that you are full. It also works is by slowing down how quickly food leaves your stomach.     You will start to feel hassan more quickly, notice portion changes and eat less often between meals.  Patients are advised to eat slowly and purposefully. Give yourself less portions. You may find after starting this medication you have a new point of fullness. Maintain consistent eating schedule with meals +/- snacks and get in good hydration.    Dosing:  Initial dose: 2.5 mg injected subcutaneously once weekly for 4 weeks  Further dose changes can include: increase to 5 mg once weekly for 4 weeks, then 7.5 mg once weekly for 4 weeks, then 10 mg once weekly for 4 weeks then 12.5 mg once weekly for 4 weeks, then 15 mg (maximum dose) once weekly.    Dose changes are based " on how you are tolerating the current dose, what benefits you are seeing at the current dose and if improvement in effectiveness of the drug is needed. The goal is to find the dose that works best for you with the least amount of side effects. You may find you reach this at a lower dose than the maximum dose.     Side effects: Common side effects include nausea, Heartburn,diarrhea, constipation. This is worse when starting the medication and with dose dose escalation.  It does improve with time. Stay adequately hydrated and eat small portions to decrease the risk of side effects.     The risk of pancreatitis (inflammation of the pancreas) has been associated with this type of medication, but is very rare.  If you have had pancreatitis in the past, this medication may not be for you. If you experience persistent severe abdominal pain (sometimes radiating to the back potentially accompanied by vomiting and have a fever), stop the medication and contact your provider immediately for assessment. They will do a blood test to check for pancreatitis.       Caution:   Tirzepatide (Zepbound, Mounjaro) May decrease effectiveness of your oral birth control while starting and with dose adjustments.  Use backup forms of birth control if necessary.      For any questions or concerns please send a [employer] message to our team or call our weight management call center at [phone] during regular business hours.

## 2024-02-23 NOTE — ASSESSMENT & PLAN NOTE
We discussed healthy habits to assist with weight loss. We discussed medication that may assist with weight loss. Zepbound recommended.  Risks/ benefits and possible side effects were discussed and questions were answered. Written information was given.     For patients that are under [employer] insurance coverage, it is mandated by insurance that each qualifying patient meet with hospital based Weight Management Medication Therapy Management pharmacist to begin Anti obesity GLP-1 therapy coverage.  MTM referral ordered.

## 2024-02-23 NOTE — ASSESSMENT & PLAN NOTE
Pt has symptoms consistent with binge eating disorder. Discussed importance of treating eating disorder .Recommend ED evaluation. Written contacts given for programs given today. Will start GLP-1.  Research has found this can help with binge eating.

## 2024-03-20 ENCOUNTER — VIRTUAL VISIT (OUTPATIENT)
Dept: CARDIOLOGY | Facility: CLINIC | Age: 59
End: 2024-03-20
Attending: PHYSICIAN ASSISTANT
Payer: COMMERCIAL

## 2024-03-20 VITALS — BODY MASS INDEX: 35.28 KG/M2 | WEIGHT: 252 LBS | HEIGHT: 71 IN

## 2024-03-20 DIAGNOSIS — E66.09 CLASS 1 OBESITY DUE TO EXCESS CALORIES WITHOUT SERIOUS COMORBIDITY WITH BODY MASS INDEX (BMI) OF 32.0 TO 32.9 IN ADULT: Primary | ICD-10-CM

## 2024-03-20 DIAGNOSIS — E66.811 CLASS 1 OBESITY DUE TO EXCESS CALORIES WITHOUT SERIOUS COMORBIDITY WITH BODY MASS INDEX (BMI) OF 32.0 TO 32.9 IN ADULT: Primary | ICD-10-CM

## 2024-03-20 ASSESSMENT — PAIN SCALES - GENERAL: PAINLEVEL: NO PAIN (0)

## 2024-03-20 NOTE — PROGRESS NOTES
Medication Therapy Management (MTM) Encounter    ASSESSMENT:                            Medication Adherence/Access: No issues identified    Weight management: Weight unchanged.  Previous intolerance to Wegovy as a result of supply constraints preventing access to titration doses.  Additionally, previous response to Saxenda was associated with nausea and occasional vomiting.  That said, she is an appropriate candidate for initiation of Zepbound given its preferable nausea profile and accessibility to titration doses.  Pretreatment BMI greater than 30 kg/m  with historical abdominal ultrasound identifying fatty infiltration of the liver, US abdomen August 6, 2010. Negative history of pancreatitis, medullary thyroid cancer and multiple endocrine neoplasia type 2.  Reviewed mechanism, adverse effects, safety, monitoring, administration with Zepbound.    For patients that are under [employer] insurance coverage, it is mandated by insurance that each qualifying patient meet with hospital based Weight Management Medication Therapy Management pharmacist to continue therapy coverage. The following patient meets the below coverage criteria and can therefore continue GLP-1/GIP agonist therapy for Weight Management:    Adult  BMI >40 with or without comorbidities   OR   BMI >30 + NAFLD*   at time of initiating GLP-1/GIP agonist therapy Approved for 29 weeks  Met Updated Initial Criteria   At least 5% weight loss of baseline body weight  Approved for 12 months        PLAN:                            Zepbound Dosing:   Start Zepbound: It is a subcutaneous injection that you inject once weekly and titrate the dose slowly over time. Make sure inject the same time each day of the week, for example Monday evenings.  Each pen is single use.  In each prescription, you will get 4 pens in each box.  You will need a new prescription for each strength of the medication.  Week 1-4: Inject 2.5 mg once weekly  Week 5-8: If  tolerating, increase to 5 mg once weekly    The goal is to get to a dose that is well tolerated and effective for you. You do not have to go up to the highest dose.    *If you are having some nausea or other side effects to where you are hesitant to move up to the next dose, stay at the same dose you are on for an additional week to see if side effect(s) improves/resolves. Make sure to take this time to hydrate and ensure you are drinking at least 64 oz water per day. If you are wanting to stay at the same dose and do not have additional refills on that prescription please reach out to the clinic.    Zepbound Administration Video: Video that was created by the .  https://www.zepboundBettymovil/how-to-use    Zepbound Copay Card:  https://www.[employer]C [employer]/coverage-savings    Zepbound Storage and Stability:   Make sure that when you get the prescription that you store the prescription in the refrigerator until it is time to use the Zepbound pen.  Once it is time to use the Zepbound pen, you can keep the pen at room temperature and it is good for up to 21 days at room temperature.     Zepbound Common Side Effects:   Nausea, diarrhea, constipation, headache, tiredness (fatigue), dizziness, stomach upset/pain. Less commonly, Zepbound can cause low blood sugar (symptoms: shaky, dizzy, sweaty, agitation). Please reach out to the care team should you feel like this is occurring. It is important to ensure that you are eating consistent meals and not skipping meals. Ensure you are getting at least 64 oz water daily.      Follow-up with me as scheduled in May.    [place] mail order/specialty pharmacy phone number: [phone]     SUBJECTIVE/OBJECTIVE:                          Mallory Sargent is a 58 year old female called for an initial visit. She was referred to me from [employer] insurance requirements.      Reason for visit: Zepbound consult .    Allergies/ADRs: Reviewed in chart  Past Medical History: Reviewed  "in chart  Tobacco: She reports that she quit smoking about 30 years ago. Her smoking use included cigarettes. She has never used smokeless tobacco.      Medication Adherence/Access: no issues reported    Weight management:  Phone consult to discuss initiation of Zepbound.  Patient continues to work with Christina Meeks PA-C.  Patient notes that she was historically prescribed Naltrexone, but has not used it. Intent was to use with Binge eating disorder. Did also use Saxenda, had a lot of success initially, then effect diminished. Does recall having some vomiting with Saxenda, did not quite titrate to 3 mg every day. Recalls sulfuric burping, subsequently vomiting 3-4 occasions. Was on Saxenda for 1 year total. Subsequently tried Wegovy which caused significant nausea, vomiting x 2 days, though notes she did start at a higher dose, 1.7 mg weekly due to shortages. Also recalls historically use of phentermine, encountered weight regain after discontinuation.     Fluid/Water intake: 60-80 oz water daily   Diet: Feels she does a good job with protein, could work on fiber. Does take a psyllium fiber daily, eats fruits, working on vegetables  Physical activity: Walking, stationary bike and treadmill    Medical History:  MEN2/Medullary Thyroid Cancer: Negative   Pancreatitis: Negative     Wt Readings from Last 4 Encounters:   03/20/24 114.3 kg (252 lb)   02/22/24 114.3 kg (252 lb)   01/11/24 111.1 kg (245 lb)   10/10/23 111.1 kg (245 lb)     Body Mass Index (BMI) Body mass index is 35.64 kg/m .    Today's Vitals: Ht 1.791 m (5' 10.51\")   Wt 114.3 kg (252 lb)   LMP 06/09/2015   BMI 35.64 kg/m      Lab Results   Component Value Date    A1C 5.3 11/26/2021    A1C 5.6 09/21/2021       ----------------      I spent 30 minutes with this patient today. All changes were made via collaborative practice agreement with Bonnie Awan PA-C . A copy of the visit note was provided to the patient's provider(s).    A summary of these " recommendations was sent via Bird[employer].    Brennan Kaur, PharmD, BCACP  Medication Therapy Management Pharmacist  [facility]     Telemedicine Visit Details  Type of service:  Telephone visit  Start Time:  1030am  End Time:  1100am     Medication Therapy Recommendations  No medication therapy recommendations to display

## 2024-03-20 NOTE — PATIENT INSTRUCTIONS
Recommendations from today's MTM visit:                                                    MTM (medication therapy management) is a service provided by a clinical pharmacist designed to help you get the most of out of your medicines.      Zepbound Dosing:   Start Zepbound: It is a subcutaneous injection that you inject once weekly and titrate the dose slowly over time. Make sure inject the same time each day of the week, for example Monday evenings.  Each pen is single use.  In each prescription, you will get 4 pens in each box.  You will need a new prescription for each strength of the medication.  Week 1-4: Inject 2.5 mg once weekly  Week 5-8: If tolerating, increase to 5 mg once weekly     The goal is to get to a dose that is well tolerated and effective for you. You do not have to go up to the highest dose.     *If you are having some nausea or other side effects to where you are hesitant to move up to the next dose, stay at the same dose you are on for an additional week to see if side effect(s) improves/resolves. Make sure to take this time to hydrate and ensure you are drinking at least 64 oz water per day. If you are wanting to stay at the same dose and do not have additional refills on that prescription please reach out to the clinic.     Zepbound Administration Video: Video that was created by the .  https://www.zepbound.YouSticker/how-to-use     Zepbound Copay Card:  https://www.zepbound.[employer].com/coverage-savings     Zepbound Storage and Stability:   Make sure that when you get the prescription that you store the prescription in the refrigerator until it is time to use the Zepbound pen.  Once it is time to use the Zepbound pen, you can keep the pen at room temperature and it is good for up to 21 days at room temperature.      Zepbound Common Side Effects:   Nausea, diarrhea, constipation, headache, tiredness (fatigue), dizziness, stomach upset/pain. Less commonly, Zepbound can cause low blood  "sugar (symptoms: shaky, dizzy, sweaty, agitation). Please reach out to the care team should you feel like this is occurring. It is important to ensure that you are eating consistent meals and not skipping meals. Ensure you are getting at least 64 oz water daily.       Follow-up with me as scheduled in May.     [place] mail order/specialty pharmacy phone number: [phone]     It was great speaking with you today.  I value your experience and would be very thankful for your time in providing feedback in our clinic survey. In the next few days, you may receive an email or text message from [facility] [employer] with a link to a survey related to your  clinical pharmacist.\"     To schedule another MTM appointment, please call the clinic directly or you may call the MTM scheduling line at [phone].    My Clinical Pharmacist's contact information:                                                      Please feel free to contact me with any questions or concerns you have.      Brennan Kaur, PharmD, BCACP  Medication Therapy Management Pharmacist  [facility]    "

## 2024-03-20 NOTE — PROGRESS NOTES
"Virtual Visit Details    Type of service:  Telephone Visit   Phone call duration: *** minutes   Originating Location (pt. Location): {patient location:126239::\"Home\"}  {PROVIDER LOCATION On-site should be selected for visits conducted from your clinic location or adjoining [facility] hospital, academic office, or other nearby [facility] building. Off-site should be selected for all other provider locations, including home:[id number]}  Distant Location (provider location):  {virtual location provider:[id number]}  "

## 2024-03-20 NOTE — NURSING NOTE
Is the patient currently in the state of [place]? YES    Visit mode:TELEPHONE    If the visit is dropped, the patient can be reconnected by: TELEPHONE VISIT: Phone number:   Telephone Information:   Mobile [phone]       Will anyone else be joining the visit? NO  (If patient encounters technical issues they should call [phone])    How would you like to obtain your AVS? MyChart    Are changes needed to the allergy or medication list? No    Reason for visit: Consult    Becca CORONA

## 2024-03-20 NOTE — Clinical Note
3/20/2024      RE: Mallory Sargent  [address]  [place] [address]       Dear Colleague,    Thank you for the opportunity to participate in the care of your patient, Mallory Sargent, at the [facility] [place] at [facility]. Please see a copy of my visit note below.    Medication Therapy Management (MTM) Encounter    ASSESSMENT:                            Medication Adherence/Access: No issues identified    Weight management: Weight unchanged.  Previous intolerance to Wegovy as a result of supply constraints preventing access to titration doses.  Additionally, previous response to Saxenda was associated with nausea and occasional vomiting.  That said, she is an appropriate candidate for initiation of Zepbound given its preferable nausea profile and accessibility to titration doses.  Pretreatment BMI greater than 30 kg/m  with historical abdominal ultrasound identifying fatty infiltration of the liver, US abdomen August 6, 2010. Negative history of pancreatitis, medullary thyroid cancer and multiple endocrine neoplasia type 2.  Reviewed mechanism, adverse effects, safety, monitoring, administration with Zepbound.    For patients that are under [employer] insurance coverage, it is mandated by insurance that each qualifying patient meet with hospital based Weight Management Medication Therapy Management pharmacist to continue therapy coverage. The following patient meets the below coverage criteria and can therefore continue GLP-1/GIP agonist therapy for Weight Management:    Adult  BMI >40 with or without comorbidities   OR   BMI >30 + NAFLD*   at time of initiating GLP-1/GIP agonist therapy Approved for 29 weeks  Met Updated Initial Criteria   At least 5% weight loss of baseline body weight  Approved for 12 months        PLAN:                            Zepbound Dosing:   Start Zepbound: It is a subcutaneous injection that  you inject once weekly and titrate the dose slowly over time. Make sure inject the same time each day of the week, for example Monday evenings.  Each pen is single use.  In each prescription, you will get 4 pens in each box.  You will need a new prescription for each strength of the medication.  Week 1-4: Inject 2.5 mg once weekly  Week 5-8: If tolerating, increase to 5 mg once weekly    The goal is to get to a dose that is well tolerated and effective for you. You do not have to go up to the highest dose.    *If you are having some nausea or other side effects to where you are hesitant to move up to the next dose, stay at the same dose you are on for an additional week to see if side effect(s) improves/resolves. Make sure to take this time to hydrate and ensure you are drinking at least 64 oz water per day. If you are wanting to stay at the same dose and do not have additional refills on that prescription please reach out to the clinic.    Zepbound Administration Video: Video that was created by the .  https://www.zepbound.AdWhirl/how-to-use    Zepbound Copay Card:  https://www.zepbUrban Traffic.CloudPhysics.com/coverage-savings    Zepbound Storage and Stability:   Make sure that when you get the prescription that you store the prescription in the refrigerator until it is time to use the Zepbound pen.  Once it is time to use the Zepbound pen, you can keep the pen at room temperature and it is good for up to 21 days at room temperature.     Zepbound Common Side Effects:   Nausea, diarrhea, constipation, headache, tiredness (fatigue), dizziness, stomach upset/pain. Less commonly, Zepbound can cause low blood sugar (symptoms: shaky, dizzy, sweaty, agitation). Please reach out to the care team should you feel like this is occurring. It is important to ensure that you are eating consistent meals and not skipping meals. Ensure you are getting at least 64 oz water daily.      Follow-up with me as scheduled in  "[facility] phone number: [phone]     SUBJECTIVE/OBJECTIVE:                          Mallory Sargent is a 58 year old female called for an initial visit. She was referred to me from [facility] insurance requirements.      Reason for visit: Zepbound consult .    Allergies/ADRs: Reviewed in chart  Past Medical History: Reviewed in chart  Tobacco: She reports that she quit smoking about 30 years ago. Her smoking use included cigarettes. She has never used smokeless tobacco.      Medication Adherence/Access: no issues reported    Weight management:  Phone consult to discuss initiation of Zepbound.  Patient continues to work with Christina Meeks PA-C.  Patient notes that she was historically prescribed Naltrexone, but has not used it. Intent was to use with Binge eating disorder. Did also use Saxenda, had a lot of success initially, then effect diminished. Does recall having some vomiting with Saxenda, did not quite titrate to 3 mg every day. Recalls sulfuric burping, subsequently vomiting 3-4 occasions. Was on Saxenda for 1 year total. Subsequently tried Wegovy which caused significant nausea, vomiting x 2 days, though notes she did start at a higher dose, 1.7 mg weekly due to shortages. Also recalls historically use of phentermine, encountered weight regain after discontinuation.     Fluid/Water intake: 60-80 oz water daily   Diet: Feels she does a good job with protein, could work on fiber. Does take a psyllium fiber daily, eats fruits, working on vegetables  Physical activity: Walking, stationary bike and treadmill    Medical History:  MEN2/Medullary Thyroid Cancer: Negative   Pancreatitis: Negative     Wt Readings from Last 4 Encounters:   03/20/24 114.3 kg (252 lb)   02/22/24 114.3 kg (252 lb)   01/11/24 111.1 kg (245 lb)   10/10/23 111.1 kg (245 lb)     Body Mass Index (BMI) Body mass index is 35.64 kg/m .    Today's Vitals: Ht 1.791 m (5' 10.51\")   Wt 114.3 kg (252 lb)   LMP " 06/09/2015   BMI 35.64 kg/m      Lab Results   Component Value Date    A1C 5.3 11/26/2021    A1C 5.6 09/21/2021       ----------------      I spent 30 minutes with this patient today. All changes were made via collaborative practice agreement with Bonnie Awan PA-C . A copy of the visit note was provided to the patient's provider(s).    A summary of these recommendations was sent via [employer].    Keshawn IsaacsD, BCACP  Medication Therapy Management Pharmacist  [facility]     Telemedicine Visit Details  Type of service:  Telephone visit  Start Time:  1030am  End Time:  1100am     Medication Therapy Recommendations  No medication therapy recommendations to display           Please do not hesitate to contact me if you have any questions/concerns.     Sincerely,     JENNIFER BAEZ [facility]

## 2024-04-22 DIAGNOSIS — G47.00 INSOMNIA, UNSPECIFIED TYPE: ICD-10-CM

## 2024-04-23 RX ORDER — HYDROXYZINE HYDROCHLORIDE 25 MG/1
TABLET, FILM COATED ORAL
Qty: 60 TABLET | Refills: 2 | Status: SHIPPED | OUTPATIENT
Start: 2024-04-23

## 2024-04-25 ENCOUNTER — TRANSFERRED RECORDS (OUTPATIENT)
Dept: HEALTH INFORMATION MANAGEMENT | Facility: CLINIC | Age: 59
End: 2024-04-25
Payer: COMMERCIAL

## 2024-05-01 ENCOUNTER — VIRTUAL VISIT (OUTPATIENT)
Dept: CARDIOLOGY | Facility: CLINIC | Age: 59
End: 2024-05-01
Attending: PHYSICIAN ASSISTANT
Payer: COMMERCIAL

## 2024-05-01 DIAGNOSIS — E66.811 CLASS 1 OBESITY DUE TO EXCESS CALORIES WITHOUT SERIOUS COMORBIDITY WITH BODY MASS INDEX (BMI) OF 32.0 TO 32.9 IN ADULT: Primary | ICD-10-CM

## 2024-05-01 DIAGNOSIS — E66.09 CLASS 1 OBESITY DUE TO EXCESS CALORIES WITHOUT SERIOUS COMORBIDITY WITH BODY MASS INDEX (BMI) OF 32.0 TO 32.9 IN ADULT: Primary | ICD-10-CM

## 2024-05-01 ASSESSMENT — PAIN SCALES - GENERAL: PAINLEVEL: MILD PAIN (2)

## 2024-05-01 NOTE — Clinical Note
5/1/2024      RE: Mallory Sargent  [address] [address]       Dear Colleague,    Thank you for the opportunity to participate in the care of your patient, Mallory Sargent, at the [facility] [place] at [facility]. Please see a copy of my visit note below.    Medication Therapy Management (MTM) Encounter    ASSESSMENT:                            Medication Adherence/Access: See below for considerations    Weight management: Stable, well-tolerated response to initiation and titration of Zepbound to 5 mg once weekly.  No noted adverse effects.  Appreciable, positive effects on satiety, appetite.  Given her positive response, recommend if able to remain on 5 mg for at least a total of 2 months, then increase to 7.5 mg if any diminishing of response.  Will also provide orders for 2.5 mg strength given present shortages with essentially all Zepbound strengths.  Did also briefly discuss potential transition to Wegovy.      PLAN:                            Remain on Zepbound 5 mg once weekly, if possible, refill this to remain on for at least 8 weeks total.  You may remain on 5 mg so long as you are having a positive response without side effects.    I will place orders also for the the 2.5 mg and 7.5 mg strengths.  You may increase to 7.5 mg strength if your response to the 5 mg strength diminishes over time.  Otherwise, you may refill the 2.5 mg strength if you are unable to access the other strengths.    Follow-up with Christina Meeks PA-C as scheduled.    SUBJECTIVE/OBJECTIVE:                          Mallory Sargent is a 58 year old female called for a follow-up visit.       Reason for visit: Zepbound follow-up.    Allergies/ADRs: Reviewed in chart  Past Medical History: Reviewed in chart  Tobacco: She reports that she quit smoking about 30 years ago. Her smoking use included cigarettes. She has never used smokeless  tobacco.      Medication Adherence/Access: no issues reported    Weight management:  Zepbound 5 mg weekly    On Zepbound 5 mg, has taken a total of 5 weeks. Has not had any troubles with nausea, vomiting, diarrhea, constipation. Notes she has encountered reduced cravings. Seems like she is not hungry on a regular basis. Feels food noise has been quieted. Has 3 pens remaining, due Saturday.    Wt Readings from Last 4 Encounters:   03/20/24 114.3 kg (252 lb)   02/22/24 114.3 kg (252 lb)   01/11/24 111.1 kg (245 lb)   10/10/23 111.1 kg (245 lb)     Lab Results   Component Value Date    A1C 5.3 11/26/2021    A1C 5.6 09/21/2021     ----------------      I spent 15 minutes with this patient today. All changes were made via collaborative practice agreement with Bonnie Awan PA-C . A copy of the visit note was provided to the patient's provider(s).    A summary of these recommendations was sent via [email or web address].    Brennan Kaur, PharmD, BCACP  Medication Therapy Management Pharmacist  [facility]     Telemedicine Visit Details  Type of service:  Telephone visit  Start Time:  1055am  End Time:  1110am     Medication Therapy Recommendations  No medication therapy recommendations to display         Please do not hesitate to contact me if you have any questions/concerns.     Sincerely,     BRENNAN KAUR EJ

## 2024-05-01 NOTE — NURSING NOTE
Is the patient currently in the state of [place]? YES    Visit mode:TELEPHONE    If the visit is dropped, the patient can be reconnected by: TELEPHONE VISIT: Phone number:   Telephone Information:   Mobile [phone]       Will anyone else be joining the visit? NO  (If patient encounters technical issues they should call [phone]    How would you like to obtain your AVS? MyChart    Are changes needed to the allergy or medication list? No    Are refills needed on medications prescribed by this physician? NO    Reason for visit: RECHTAMIKO CORONA

## 2024-05-01 NOTE — PATIENT INSTRUCTIONS
"Recommendations from today's MTM visit:                                                    MTM (medication therapy management) is a service provided by a clinical pharmacist designed to help you get the most of out of your medicines.      Remain on Zepbound 5 mg once weekly, if possible, refill this to remain on for at least 8 weeks total.  You may remain on 5 mg so long as you are having a positive response without side effects.     I will place orders also for the the 2.5 mg and 7.5 mg strengths.  You may increase to 7.5 mg strength if your response to the 5 mg strength diminishes over time.  Otherwise, you may refill the 2.5 mg strength if you are unable to access the other strengths.     Follow-up with Christina Meeks PA-C as scheduled.    It was great speaking with you today.  I value your experience and would be very thankful for your time in providing feedback in our clinic survey. In the next few days, you may receive an email or text message from [facility] [employer] with a link to a survey related to your  clinical pharmacist.\"     To schedule another MTM appointment, please call the clinic directly or you may call the MTM scheduling line at [phone].    My Clinical Pharmacist's contact information:                                                      Please feel free to contact me with any questions or concerns you have.      Brennan Kaur, PharmD, BCACP  Medication Therapy Management Pharmacist  [facility]    "

## 2024-05-01 NOTE — PROGRESS NOTES
Medication Therapy Management (MTM) Encounter    ASSESSMENT:                            Medication Adherence/Access: See below for considerations    Weight management: Stable, well-tolerated response to initiation and titration of Zepbound to 5 mg once weekly.  No noted adverse effects.  Appreciable, positive effects on satiety, appetite.  Given her positive response, recommend if able to remain on 5 mg for at least a total of 2 months, then increase to 7.5 mg if any diminishing of response.  Will also provide orders for 2.5 mg strength given present shortages with essentially all Zepbound strengths.  Did also briefly discuss potential transition to Wegovy.      PLAN:                            Remain on Zepbound 5 mg once weekly, if possible, refill this to remain on for at least 8 weeks total.  You may remain on 5 mg so long as you are having a positive response without side effects.    I will place orders also for the the 2.5 mg and 7.5 mg strengths.  You may increase to 7.5 mg strength if your response to the 5 mg strength diminishes over time.  Otherwise, you may refill the 2.5 mg strength if you are unable to access the other strengths.    Follow-up with Christina Meeks PA-C as scheduled.    SUBJECTIVE/OBJECTIVE:                          Mallory Sargent is a 58 year old female called for a follow-up visit.       Reason for visit: Zepbound follow-up.    Allergies/ADRs: Reviewed in chart  Past Medical History: Reviewed in chart  Tobacco: She reports that she quit smoking about 30 years ago. Her smoking use included cigarettes. She has never used smokeless tobacco.      Medication Adherence/Access: no issues reported    Weight management:  Zepbound 5 mg weekly    On Zepbound 5 mg, has taken a total of 5 weeks. Has not had any troubles with nausea, vomiting, diarrhea, constipation. Notes she has encountered reduced cravings. Seems like she is not hungry on a regular basis. Feels food noise has been quieted. Has 3  pens remaining, due Saturday.    Wt Readings from Last 4 Encounters:   03/20/24 114.3 kg (252 lb)   02/22/24 114.3 kg (252 lb)   01/11/24 111.1 kg (245 lb)   10/10/23 111.1 kg (245 lb)     Lab Results   Component Value Date    A1C 5.3 11/26/2021    A1C 5.6 09/21/2021     ----------------      I spent 15 minutes with this patient today. All changes were made via collaborative practice agreement with Bonnie Awan PA-C . A copy of the visit note was provided to the patient's provider(s).    A summary of these recommendations was sent via [employer].    Brennan Kaur, PharmD, BCACP  Medication Therapy Management Pharmacist  [facility]     Telemedicine Visit Details  Type of service:  Telephone visit  Start Time:  1055am  End Time:  1110am     Medication Therapy Recommendations  No medication therapy recommendations to display

## 2024-05-22 NOTE — PROGRESS NOTES
"Mallory is a 58 year old who is being evaluated via a billable video visit.      The patient has been notified of following:     \"This video visit will be conducted via a call between you and your physician/provider. We have found that certain health care needs can be provided without the need for an in-person physical exam.  This service lets us provide the care you need with a video conversation.  If a prescription is necessary we can send it directly to your pharmacy.  If lab work is needed we can place an order for that and you can then stop by our lab to have the test done at a later time.    Video visits are billed at different rates depending on your insurance coverage.  Please reach out to your insurance provider with any questions.    If during the course of the call the physician/provider feels a video visit is not appropriate, you will not be charged for this service.\"    Patient has given verbal consent for Video visit? Yes    How would you like to obtain your AVS? MyChart    If the video visit is dropped, the invitation should be resent by: Text to cell phone: [phone]    Will anyone else be joining your video visit? No    I    Video-Visit Details    Type of service:  Video Visit    Originating Location (pt. Location): Home    Distant Location (provider location):   Off-Site - Provider Home Office    Platform used for Video Visit: [employer]    Video Start Time: 11:38 AM    Video End Time:12:02 PM    2024      Return Medical Weight Management Note     Mallory Sargent  MRN:  [id number]  :  1965    Assessment & Plan   Problem List Items Addressed This Visit       Class 1 obesity due to excess calories with serious comorbidity and body mass index (BMI) of 33.0 to 33.9 in adult     Patient was congratulated on wt loss success thus far. Healthy habits to assist with further weight loss were discussed. Mallory will continue Zepbound 5 mg if supply allows.But is willing to go back to 2.5 mg. "  We restarted Vyvanse to assist in case we need to keep her on a low-dose of the Zepbound.  She has not had any binge episodes since starting her Zepbound.     If she does need to switch over to Wegovy she would like to start with 0.25 mg.  In the past we had to switch her up to 1.7 mg when we switched from Saxenda 2.4 and this was difficult with side effects.      Due to [employer] insurance GLP-1 policy will send, MTM pharmacist note regarding medication update .    I have a leave of absence and will not be able to see her in till November.  I would like to have MTM see her in late August/September.     MTM will MyChart patient to let her know the plan for medication as well as who she will be seeing for her follow-up visit.            Relevant Orders    Hemoglobin A1c    Fatty liver - abdominal US from 2010     Anticipate improvement with further weight loss and GLP-1 use           Binge eating disorder - Primary     Pt has symptoms consistent with binge eating disorder.  Zepbound has controlled binges.  Added Vyvanse back in in anticipation of lower dose of the Zepbound or transition to Wegovy.  .We talked about the importance of cognitive behavioral therapy with binge eating disorder in addition to medication use.  She will call her insurance to find out which program is covered and then make an appointment for an ED assessment .    Will need refill Vyvanse 40 mg 3-month panel prior to next visit             AOM Considerations:  5/31/2024  GLP-1:              Saxenda. Wegovy worked well for binge and wt loss but GI SE.  Zepbound working well.  No binge episodes since starting.  No GI side effects.  Having trouble with supply.              Naltrexone:       Not effective  Vyvanse:         Worked at first then hunger binges not controlled. Switched to GLP.   Recently restarted Vyvanse since needing to be on lower Zepbound dose.  This combo has worked well. 5/31/2024    PATIENT INSTRUCTIONS:  Continue Zepbound 5 mg  "weekly  Note was sent to [employer] regarding plan options.    \"She would like to have 5 mg.  But is willing to go back to 2.5 mg.  We restarted Vyvanse to assist in case we need to keep her on a low-dose of the Zepbound.  She has not had any binge episodes since starting her Zepbound.     If she does need to switch over to Wegovy Start with 0.25 mg an slowly titrate.  In the past we had to switch her up to 1.7 mg when we switched from Saxenda 2.4 and this was difficult with side effects. \"    Labs ordered.  Please call [phone] to set up a lab appt.     Goals:  Focus on protein first at meals  Goal is 60 to 90 g a day    Follow up:    Call [phone] to schedule next visit in November.  Please call [phone] to schedule with a medical therapy management pharmacist in early September.     50 minutes spent on the date of the encounter doing chart review, history and exam, result review, counseling, developing plan of care, documentation, and further activities as noted      INTERVAL HISTORY:  Mallory returns for medical weight management follow up.  Last seen on 2/22/2024.  Zepbound recommended MTM referral given as patient was [employer] employee patient had fatty liver abdominal ultrasound from 2010 patient has symptoms consistent of binge eating disorder.  Recommended ED evaluation 10/2023.  At last visit states she continues to binge nightly.  Since starting Zepbound patient has not had any binge episodes.  Has had trouble finding Zepbound due to shortage.  Had a 5-day gap.  It is on 5 mg dose.  Suspects she will need to go down to 2.5 mg.  Restarted Vyvanse 40 mg to assist with cravings and binge control in anticipation of decreased dose of the Zepbound.      Due to shortage may need to go back to [facility].  Patient transferred to [facility] due to Saxenda shortage had been on 2.4 mg of Saxenda and needed to start at 1.7 mg of Wegovy.  This was difficult due to GI side effects.  She has some trepidation going " "back to Wegovy.  We discussed starting at 0.25 mg dose and titrating slowly.    Patient has not had ED evaluation.  She has had trouble finding out which programs are covered by insurance online.  Has not called.      WEIGHT METRICS:  Body mass index is 33.53 kg/m .   Current Weight: 237 lb (107.5 kg)  Last Visits Weight: 252 lb (114.3 kg)  Initial Weight (lbs): 256 lbs  Cumulative weight loss (lbs): 19  Weight Loss Percentage: 7.42%    Wt Readings from Last 10 Encounters:   05/31/24 237 lb (107.5 kg)   03/20/24 252 lb (114.3 kg)   02/22/24 252 lb (114.3 kg)   01/11/24 245 lb (111.1 kg)   10/10/23 245 lb (111.1 kg)   07/27/23 230 lb (104.3 kg)   06/27/23 229 lb (103.9 kg)   01/06/23 219 lb (99.3 kg)   12/30/22 219 lb (99.3 kg)   12/21/22 218 lb (98.9 kg)             Diet Recall:  Wake up:  B:Bowl of cereal (Fiber One)  Or greek yogurt with fruit  L:Piece of fruit, yogurt, and sandwich.   D: Doesn't feel hunger in evening.  Make have yogurt or piece of cheese. Soup, salad      Exercise:  Was hiking with   Has been lax for last 3 weeks.  Had eye surgery about 10 days ago and was told to hold exercise.        LABS:  Reviewed in Epic    BP Readings from Last 6 Encounters:   01/11/24 120/80   01/06/23 112/72   12/30/22 113/66   05/03/22 112/74   11/26/21 118/80   09/16/21 112/82       Pulse Readings from Last 6 Encounters:   01/11/24 117   01/06/23 96   12/30/22 66   05/03/22 73   11/26/21 95   09/16/21 99       PE:  Ht 5' 10.5\" (1.791 m)   Wt 237 lb (107.5 kg)   LMP 06/09/2015   BMI 33.53 kg/m    GENERAL: Healthy, alert and no distress  RESP: No audible wheeze, cough, or visible cyanosis.  No visible retractions or increased work of breathing.    SKIN: Visible skin clear. No significant rash, abnormal pigmentation or lesions.  PSYCH: Mentation appears normal, affect normal/bright, judgement and insight intact          "

## 2024-05-23 NOTE — TELEPHONE ENCOUNTER
Department of Anesthesiology  Postprocedure Note    Patient: Hector Saeed  MRN: [id number]  YOB: 1982  Date of evaluation: 5/23/2024    Procedure Summary       Date: 05/23/24 Room / Location: [place] [address] / [facility]    Anesthesia Start: 1549 Anesthesia Stop: 1620    Procedure: Peg Tube Placement (Abdomen) Diagnosis:       Dysphagia, unspecified type      (Dysphagia, unspecified type [R13.10])    Surgeons: Mamadou Murdock MD Responsible Provider: Doug Timmons MD    Anesthesia Type: MAC ASA Status: 3            Anesthesia Type: No value filed.    Dianne Phase I: Dianne Score: 10    Dianne Phase II:      Anesthesia Post Evaluation    Patient location during evaluation: PACU  Patient participation: complete - patient participated  Level of consciousness: awake and lethargic  Pain score: 0  Airway patency: patent  Nausea & Vomiting: no vomiting and no nausea  Cardiovascular status: blood pressure returned to baseline and hemodynamically stable  Respiratory status: acceptable, nonlabored ventilation and nasal cannula  Hydration status: stable  Pain management: adequate and satisfactory to patient        No notable events documented.   Prescription approved per [facility].  Judith Haywood RN   [facility] - Triage

## 2024-05-24 ENCOUNTER — TELEPHONE (OUTPATIENT)
Dept: SURGERY | Facility: CLINIC | Age: 59
End: 2024-05-24

## 2024-05-24 NOTE — TELEPHONE ENCOUNTER
PA Initiation    Medication: ZEPBOUND 5 MG/0.5ML SC SOAJ  Insurance Company: [employer] - Phone [phone] Fax [phone]  Pharmacy Filling the Rx: [facility] - [place], [place] - [facility] [address]  Filling Pharmacy Phone: [phone]  Filling Pharmacy Fax: [phone]  Start Date: 5/24/2024

## 2024-05-28 NOTE — TELEPHONE ENCOUNTER
PRIOR AUTHORIZATION DENIED    Medication: ZEPBOUND 5 MG/0.5ML SC SOAJ    Insurance Company: [employer] - Phone [phone] Fax [phone]    Denial Date: 5/25/2024    Denial Reason(s):       Appeal Information:

## 2024-05-31 ENCOUNTER — VIRTUAL VISIT (OUTPATIENT)
Dept: SURGERY | Facility: CLINIC | Age: 59
End: 2024-05-31
Payer: COMMERCIAL

## 2024-05-31 VITALS — HEIGHT: 71 IN | BODY MASS INDEX: 33.18 KG/M2 | WEIGHT: 237 LBS

## 2024-05-31 DIAGNOSIS — E66.811 CLASS 1 OBESITY DUE TO EXCESS CALORIES WITH SERIOUS COMORBIDITY AND BODY MASS INDEX (BMI) OF 33.0 TO 33.9 IN ADULT: ICD-10-CM

## 2024-05-31 DIAGNOSIS — E66.09 CLASS 1 OBESITY DUE TO EXCESS CALORIES WITH SERIOUS COMORBIDITY AND BODY MASS INDEX (BMI) OF 33.0 TO 33.9 IN ADULT: ICD-10-CM

## 2024-05-31 DIAGNOSIS — F50.819 BINGE EATING DISORDER: Primary | ICD-10-CM

## 2024-05-31 DIAGNOSIS — K76.0 FATTY LIVER: ICD-10-CM

## 2024-05-31 PROCEDURE — 99215 OFFICE O/P EST HI 40 MIN: CPT | Mod: 95 | Performed by: PHYSICIAN ASSISTANT

## 2024-05-31 NOTE — ASSESSMENT & PLAN NOTE
Patient was congratulated on wt loss success thus far. Healthy habits to assist with further weight loss were discussed. Mallory will continue Zepbound 5 mg if supply allows.But is willing to go back to 2.5 mg.  We restarted Vyvanse to assist in case we need to keep her on a low-dose of the Zepbound.  She has not had any binge episodes since starting her Zepbound.     If she does need to switch over to Wegovy she would like to start with 0.25 mg.  In the past we had to switch her up to 1.7 mg when we switched from Saxenda 2.4 and this was difficult with side effects.      Due to [employer] insurance GLP-1 policy will send, MTM pharmacist note regarding medication update .    I have a leave of absence and will not be able to see her in till November.  I would like to have MTM see her in late August/September.     MTM will MyChart patient to let her know the plan for medication as well as who she will be seeing for her follow-up visit.

## 2024-05-31 NOTE — ASSESSMENT & PLAN NOTE
Pt has symptoms consistent with binge eating disorder.  Zepbound has controlled binges.  Added Vyvanse back in in anticipation of lower dose of the Zepbound or transition to Wegovy.  .We talked about the importance of cognitive behavioral therapy with binge eating disorder in addition to medication use.  She will call her insurance to find out which program is covered and then make an appointment for an ED assessment .    Will need refill Vyvanse 40 mg 3-month panel prior to next visit   Home

## 2024-05-31 NOTE — PATIENT INSTRUCTIONS
"Nice to talk with you today. Below is the plan discussed.-  TOMA Vasquez      Pt Instructions:  Continue Zepbound 5 mg weekly  Note was sent to [employer] regarding plan options.    \"She would like to have 5 mg.  But is willing to go back to 2.5 mg.  We restarted Vyvanse to assist in case we need to keep her on a low-dose of the Zepbound.  She has not had any binge episodes since starting her Zepbound.     If she does need to switch over to Wegovy Start with 0.25 mg an slowly titrate.  In the past we had to switch her up to 1.7 mg when we switched from Saxenda 2.4 and this was difficult with side effects. \"    Labs ordered.  Please call [phone] to set up a lab appt.     Goals:  Focus on protein first at meals  Goal is 60 to 90 g a day    Follow up:    Call [phone] to schedule next visit in November.  Please call [phone] to schedule with a medical therapy management pharmacist in early September.       LEAN PROTEIN SOURCES      Protein Source Portion Calories Grams of Protein                           Nonfat, plain Greek yogurt    (10 grams sugar or less) 3/4 cup (6 oz)  12-17   Light Yogurt (10 grams sugar or less) 3/4 cup (6 oz)  6-8   Protein Shake 1 shake 110-180 15-30   Skim/1% Milk or lactose-free milk 1 cup ( 8 oz)  8   Plain or light, flavored soymilk 1 cup  7-8   Plain or light, hemp milk 1 cup 110 6   Fat Free or 1% Cottage Cheese 1/2 cup 90 15   Part skim ricotta cheese 1/2 cup 100 14   Part skim or reduced fat cheese slices 1 ounce 65-80 8     Mozzarella String Cheese 1 80 8   Canned tuna, chicken, crab or salmon  (canned in water)  1/2 cup 100 15-20   White fish (broiled, grilled, baked) 3 ounces 100 21   [place]/Tuna (broiled, grilled, baked) 3 ounces 150-180 21   Shrimp, Scallops, Lobster, Crab 3 ounces 100 21   Pork loin, Pork Tenderloin 3 ounces 150 21   Boneless, skinless chicken /turkey breast                          (broiled, grilled, baked) 3 ounces 120 21 "   [place], [place], [place], and Venison 3 ounces 120 21   Lean cuts of red meat and pork (sirloin,   round, tenderloin, flank, ground 93%-96%) 3 ounces 170 21   Lean or Extra Lean Ground Turkey 1/2 cup 150 20   90-95% Lean [place] Burger 1 sachi 140-180 21   Low-fat casserole with lean meat 3/4 cup 200 17   Luncheon Meats                                                        (turkey, lean ham, roast beef, chicken) 3 ounces 100 21   Egg (boiled, poached, scrambled) 1 Egg 60 7   Egg Substitute 1/2 cup 70 10   Nuts (limit to 1 serving per day)  3 Tbsp. 150 7   Nut [place] (peanut, almond)  Limit to 1 serving or less daily 1 Tbsp. 90 4   Soy Burger (varies) 1  15   Garbanzo, Black, [place]ello Beans 1/2 cup 110 7   Refried Beans 1/2 cup 100 7   Kidney and Lima beans 1/2 cup 110 7   Tempeh 3 oz 175 18   Vegan crumbles 1/2 cup 100 14   Tofu 1/2 cup 110 14   Chili (beans and extra lean beef or turkey) 1 cup 200 23   Lentil Stew/Soup 1 cup 150 12   Black Bean Soup 1 cup 175 12

## 2024-05-31 NOTE — Clinical Note
Khurram Stroud,  I saw Mallory today.  She prefers to stay on the Zepbound if she can.  She would like to have 5 mg.  But is willing to go back to 2.5 mg.  We restarted Vyvanse to assist in case we need to keep her on a low-dose of the Zepbound.  She has not had any binge episodes since starting her Zepbound.  If she does need to switch over to Wegovy she would like to start with 0.25 mg.  In the past we had to switch her up to 1.7 mg when we switched from Saxenda 2.4 and this was difficult with side effects.    I have a leave of absence and will not be able to see her in till November.  I would like to have you or another MTM see her in late August/September.  Could you please send her a message.  And let her know the plan for medication as well as who she will be seeing for her follow-up visit.  Thanks.

## 2024-07-08 ENCOUNTER — TRANSFERRED RECORDS (OUTPATIENT)
Dept: HEALTH INFORMATION MANAGEMENT | Facility: CLINIC | Age: 59
End: 2024-07-08
Payer: COMMERCIAL

## 2024-07-31 ENCOUNTER — APPOINTMENT (OUTPATIENT)
Dept: URBAN - METROPOLITAN AREA CLINIC 253 | Age: 59
Setting detail: DERMATOLOGY
End: 2024-08-01

## 2024-07-31 VITALS — RESPIRATION RATE: 14 BRPM | HEIGHT: 70.5 IN | WEIGHT: 230 LBS

## 2024-07-31 DIAGNOSIS — L57.0 ACTINIC KERATOSIS: ICD-10-CM

## 2024-07-31 DIAGNOSIS — L82.1 OTHER SEBORRHEIC KERATOSIS: ICD-10-CM

## 2024-07-31 DIAGNOSIS — Z71.89 OTHER SPECIFIED COUNSELING: ICD-10-CM

## 2024-07-31 DIAGNOSIS — D18.0 HEMANGIOMA: ICD-10-CM

## 2024-07-31 DIAGNOSIS — L21.8 OTHER SEBORRHEIC DERMATITIS: ICD-10-CM

## 2024-07-31 DIAGNOSIS — L82.0 INFLAMED SEBORRHEIC KERATOSIS: ICD-10-CM

## 2024-07-31 DIAGNOSIS — D22 MELANOCYTIC NEVI: ICD-10-CM

## 2024-07-31 DIAGNOSIS — L81.4 OTHER MELANIN HYPERPIGMENTATION: ICD-10-CM

## 2024-07-31 DIAGNOSIS — D49.2 NEOPLASM OF UNSPECIFIED BEHAVIOR OF BONE, SOFT TISSUE, AND SKIN: ICD-10-CM

## 2024-07-31 PROBLEM — D22.5 MELANOCYTIC NEVI OF TRUNK: Status: ACTIVE | Noted: 2024-07-31

## 2024-07-31 PROBLEM — D18.01 HEMANGIOMA OF SKIN AND SUBCUTANEOUS TISSUE: Status: ACTIVE | Noted: 2024-07-31

## 2024-07-31 PROCEDURE — 11102 TANGNTL BX SKIN SINGLE LES: CPT | Mod: 59

## 2024-07-31 PROCEDURE — 17110 DESTRUCT B9 LESION 1-14: CPT

## 2024-07-31 PROCEDURE — OTHER BIOPSY BY SHAVE METHOD: OTHER

## 2024-07-31 PROCEDURE — OTHER PRESCRIPTION: OTHER

## 2024-07-31 PROCEDURE — 17000 DESTRUCT PREMALG LESION: CPT | Mod: 59

## 2024-07-31 PROCEDURE — OTHER COUNSELING: OTHER

## 2024-07-31 PROCEDURE — OTHER LIQUID NITROGEN: OTHER

## 2024-07-31 PROCEDURE — 99214 OFFICE O/P EST MOD 30 MIN: CPT | Mod: 25

## 2024-07-31 PROCEDURE — OTHER MIPS QUALITY: OTHER

## 2024-07-31 RX ORDER — FLUOCINONIDE 0.5 MG/ML
SOLUTION TOPICAL QHS
Qty: 60 | Refills: 2 | Status: ERX

## 2024-07-31 RX ORDER — KETOCONAZOLE 20 MG/ML
SHAMPOO, SUSPENSION TOPICAL PRN
Qty: 120 | Refills: 3 | Status: ERX

## 2024-07-31 ASSESSMENT — LOCATION DETAILED DESCRIPTION DERM
LOCATION DETAILED: RIGHT DISTAL DORSAL FOREARM
LOCATION DETAILED: RIGHT LATERAL ABDOMEN
LOCATION DETAILED: RIGHT CLAVICULAR NECK
LOCATION DETAILED: LEFT POSTERIOR NECK
LOCATION DETAILED: RIGHT MEDIAL FRONTAL SCALP
LOCATION DETAILED: INFERIOR THORACIC SPINE

## 2024-07-31 ASSESSMENT — LOCATION SIMPLE DESCRIPTION DERM
LOCATION SIMPLE: RIGHT SCALP
LOCATION SIMPLE: RIGHT ANTERIOR NECK
LOCATION SIMPLE: UPPER BACK
LOCATION SIMPLE: POSTERIOR NECK
LOCATION SIMPLE: ABDOMEN
LOCATION SIMPLE: RIGHT FOREARM

## 2024-07-31 ASSESSMENT — LOCATION ZONE DERM
LOCATION ZONE: ARM
LOCATION ZONE: TRUNK
LOCATION ZONE: SCALP
LOCATION ZONE: NECK

## 2024-07-31 NOTE — PROCEDURE: BIOPSY BY SHAVE METHOD

## 2024-07-31 NOTE — HPI: FULL BODY SKIN EXAMINATION
What Type Of Note Output Would You Prefer (Optional)?: Standard Output
What Is The Reason For Today's Visit?: Full Body Skin Examination
What Is The Reason For Today's Visit? (Being Monitored For X): concerning skin lesions on an annual basis
Additional History: She notes a lesion on the back of her neck that has scabbed over. She also reports a scar on her abdomen from a surgery years ago but it recently developed a scab.

## 2024-07-31 NOTE — PROCEDURE: LIQUID NITROGEN
Render Post-Care Instructions In Note?: no
Medical Necessity Information: It is in your best interest to select a reason for this procedure from the list below. All of these items fulfill various CMS LCD requirements except the new and changing color options.
Medical Necessity Clause: This procedure was medically necessary because the lesions that were treated were:
Show Spray Paint Technique Variable?: Yes
Post-Care Instructions: I reviewed with the patient in detail post-care instructions. Patient is to wear sunprotection, and avoid picking at any of the treated lesions. Pt may apply Vaseline to crusted or scabbing areas.
Detail Level: Detailed
Spray Paint Text: The liquid nitrogen was applied to the skin utilizing a spray paint frosting technique.
Consent: The patient's consent was obtained including but not limited to risks of crusting, scabbing, blistering, scarring, darker or lighter pigmentary change, recurrence, incomplete removal and infection.
Duration Of Freeze Thaw-Cycle (Seconds): 0

## 2024-08-08 ENCOUNTER — APPOINTMENT (OUTPATIENT)
Dept: URBAN - METROPOLITAN AREA CLINIC 253 | Age: 59
Setting detail: DERMATOLOGY
End: 2024-08-08

## 2024-08-08 VITALS — HEIGHT: 70 IN | WEIGHT: 230 LBS

## 2024-08-08 PROBLEM — C44.41 BASAL CELL CARCINOMA OF SKIN OF SCALP AND NECK: Status: ACTIVE | Noted: 2024-08-08

## 2024-08-08 PROCEDURE — OTHER EXCISION: OTHER

## 2024-08-08 PROCEDURE — 11621 EXC S/N/H/F/G MAL+MRG 0.6-1: CPT | Mod: 79

## 2024-08-08 PROCEDURE — OTHER PHOTO-DOCUMENTATION: OTHER

## 2024-08-08 PROCEDURE — 12041 INTMD RPR N-HF/GENIT 2.5CM/<: CPT | Mod: 79

## 2024-08-08 NOTE — PROCEDURE: EXCISION
Surgeon (Optional): Rashida Baldiwn PA-C
Biopsy Photograph Reviewed: Yes
Accession #: [id number]
Date Of Previous Biopsy (Optional): 7/31/24
Size Of Lesion In Cm: 0.6
Size Of Margin In Cm: 0.2
Anesthesia Volume In Cc: 1
Was An Eye Clamp Used?: No
Eye Clamp Note Details: An eye clamp was used during the procedure.
Excision Method: Elliptical
Saucerization Depth: dermis and superficial adipose tissue
Repair Type: Intermediate
Suturegard Retention Suture: 2-0 Nylon
Retention Suture Bite Size: 3 mm
Length To Time In Minutes Device Was In Place: 10
Undermining Type: Entire Wound
Debridement Text: The wound edges were debrided prior to proceeding with the closure to facilitate wound healing.
Helical Rim Text: The closure involved the helical rim.
Vermilion Border Text: The closure involved the vermilion border.
Nostril Rim Text: The closure involved the nostril rim.
Retention Suture Text: Retention sutures were placed to support the closure and prevent dehiscence.
Primary Defect Length (In Cm): 0
Suture Removal: 14 days
Lab: [id number]
Graft Donor Site Bandage (Optional-Leave Blank If You Don't Want In Note): Steri-strips and a pressure bandage were applied to the donor site.
Epidermal Closure Graft Donor Site (Optional): simple interrupted
Billing Type: Third-Party Bill
Excision Depth: adipose tissue
Scalpel Size: 15 blade
Anesthesia Type: 1% lidocaine with epinephrine and a 1:10 solution of 8.4% sodium bicarbonate
Hemostasis: Manual Pressure
Estimated Blood Loss (Cc): minimal
Detail Level: Detailed
Repair Depth: use same depth as excision depth
Epidermal Sutures: 4-0 Polypropylene
Number Of Epidermal Sutures (Optional): 4
Wound Care: Petrolatum
Dressing: dry sterile dressing
Suturegard Intro: Intraoperative tissue expansion was performed, utilizing the SUTUREGARD device, in order to reduce wound tension.
Suturegard Body: The suture ends were repeatedly re-tightened and re-clamped to achieve the desired tissue expansion.
Hemigard Intro: Due to skin fragility and wound tension, it was decided to use HEMIGARD adhesive retention suture devices to permit a linear closure. The skin was cleaned and dried for a 6cm distance away from the wound. Excessive hair, if present, was removed to allow for adhesion.
Hemigard Postcare Instructions: The HEMIGARD strips are to remain completely dry for at least 5-7 days.
Positioning (Leave Blank If You Do Not Want): The patient was placed in a comfortable position exposing the surgical site.
Pre-Excision Curettage Text (Leave Blank If You Do Not Want): Prior to drawing the surgical margin the visible lesion was removed with curettage to clearly define the lesion size.
Complex Repair Preamble Text (Leave Blank If You Do Not Want): Extensive wide undermining was performed.
Intermediate Repair Preamble Text (Leave Blank If You Do Not Want): Undermining was performed with blunt dissection.
Curvilinear Excision Additional Text (Leave Blank If You Do Not Want): The margin was drawn around the clinically apparent lesion.  A curvilinear shape was then drawn on the skin incorporating the lesion and margins.  Incisions were then made along these lines to the appropriate tissue plane and the lesion was extirpated.
Fusiform Excision Additional Text (Leave Blank If You Do Not Want): The margin was drawn around the clinically apparent lesion.  A fusiform shape was then drawn on the skin incorporating the lesion and margins.  Incisions were then made along these lines to the appropriate tissue plane and the lesion was extirpated.
Elliptical Excision Additional Text (Leave Blank If You Do Not Want): The margin was drawn around the clinically apparent lesion.  An elliptical shape was then drawn on the skin incorporating the lesion and margins.  Incisions were then made along these lines to the appropriate tissue plane and the lesion was extirpated.
Saucerization Excision Additional Text (Leave Blank If You Do Not Want): The margin was drawn around the clinically apparent lesion.  Incisions were then made along these lines, in a tangential fashion, to the appropriate tissue plane and the lesion was extirpated.
Slit Excision Additional Text (Leave Blank If You Do Not Want): A linear line was drawn on the skin overlying the lesion. An incision was made slowly until the lesion was visualized.  Once visualized, the lesion was removed with blunt dissection.
Excisional Biopsy Additional Text (Leave Blank If You Do Not Want): The margin was drawn around the clinically apparent lesion. An elliptical shape was then drawn on the skin incorporating the lesion and margins.  Incisions were then made along these lines to the appropriate tissue plane and the lesion was extirpated.
Perilesional Excision Additional Text (Leave Blank If You Do Not Want): The margin was drawn around the clinically apparent lesion. Incisions were then made along these lines to the appropriate tissue plane and the lesion was extirpated.
Repair Performed By Another Provider Text (Leave Blank If You Do Not Want): After the tissue was excised the defect was repaired by another provider.
No Repair - Repaired With Adjacent Surgical Defect Text (Leave Blank If You Do Not Want): After the excision the defect was repaired concurrently with another surgical defect which was in close approximation.
Adjacent Tissue Transfer Text: The defect edges were debeveled with a #15 scalpel blade. Given the location of the defect and the proximity to free margins an adjacent tissue transfer was deemed most appropriate. Using a sterile surgical marker, an appropriate flap was drawn incorporating the defect and placing the expected incisions within the relaxed skin tension lines where possible. The area thus outlined was incised deep to adipose tissue with a #15 scalpel blade. The skin margins were undermined to an appropriate distance in all directions utilizing iris scissors and carried over to close the primary defect.
Advancement Flap (Single) Text: The defect edges were debeveled with a #15 scalpel blade. Given the location of the defect and the proximity to free margins a single advancement flap was deemed most appropriate. Using a sterile surgical marker, an appropriate advancement flap was drawn incorporating the defect and placing the expected incisions within the relaxed skin tension lines where possible. The area thus outlined was incised deep to adipose tissue with a #15 scalpel blade. The skin margins were undermined to an appropriate distance in all directions utilizing iris scissors. Following this, the designed flap was advanced and carried over into the primary defect and sutured into place.
Advancement Flap (Double) Text: The defect edges were debeveled with a #15 scalpel blade. Given the location of the defect and the proximity to free margins a double advancement flap was deemed most appropriate. Using a sterile surgical marker, the appropriate advancement flaps were drawn incorporating the defect and placing the expected incisions within the relaxed skin tension lines where possible. The area thus outlined was incised deep to adipose tissue with a #15 scalpel blade. The skin margins were undermined to an appropriate distance in all directions utilizing iris scissors. Following this, the designed flaps were advanced and carried over into the primary defect and sutured into place.
Burow's Advancement Flap Text: The defect edges were debeveled with a #15 scalpel blade. Given the location of the defect and the proximity to free margins a Burow's advancement flap was deemed most appropriate. Using a sterile surgical marker, the appropriate advancement flap was drawn incorporating the defect and placing the expected incisions within the relaxed skin tension lines where possible. The area thus outlined was incised deep to adipose tissue with a #15 scalpel blade. The skin margins were undermined to an appropriate distance in all directions utilizing iris scissors. Following this, the designed flap was advanced and carried over into the primary defect and sutured into place.
Chonodrocutaneous Helical Advancement Flap Text: The defect edges were debeveled with a #15 scalpel blade. Given the location of the defect and the proximity to free margins a chondrocutaneous helical advancement flap was deemed most appropriate. Using a sterile surgical marker, the appropriate advancement flap was drawn incorporating the defect and placing the expected incisions within the relaxed skin tension lines where possible. The area thus outlined was incised deep to adipose tissue with a #15 scalpel blade. The skin margins were undermined to an appropriate distance in all directions utilizing iris scissors. Following this, the designed flap was advanced and carried over into the primary defect and sutured into place.
Crescentic Advancement Flap Text: The defect edges were debeveled with a #15 scalpel blade. Given the location of the defect and the proximity to free margins a crescentic advancement flap was deemed most appropriate. Using a sterile surgical marker, the appropriate advancement flap was drawn incorporating the defect and placing the expected incisions within the relaxed skin tension lines where possible. The area thus outlined was incised deep to adipose tissue with a #15 scalpel blade. The skin margins were undermined to an appropriate distance in all directions utilizing iris scissors. Following this, the designed flap was advanced and carried over into the primary defect and sutured into place.
A-T Advancement Flap Text: The defect edges were debeveled with a #15 scalpel blade. Given the location of the defect, shape of the defect and the proximity to free margins an A-T advancement flap was deemed most appropriate. Using a sterile surgical marker, an appropriate advancement flap was drawn incorporating the defect and placing the expected incisions within the relaxed skin tension lines where possible. The area thus outlined was incised deep to adipose tissue with a #15 scalpel blade. The skin margins were undermined to an appropriate distance in all directions utilizing iris scissors. Following this, the designed flap was advanced and carried over into the primary defect and sutured into place.
O-T Advancement Flap Text: The defect edges were debeveled with a #15 scalpel blade. Given the location of the defect, shape of the defect and the proximity to free margins an O-T advancement flap was deemed most appropriate. Using a sterile surgical marker, an appropriate advancement flap was drawn incorporating the defect and placing the expected incisions within the relaxed skin tension lines where possible. The area thus outlined was incised deep to adipose tissue with a #15 scalpel blade. The skin margins were undermined to an appropriate distance in all directions utilizing iris scissors. Following this, the designed flap was advanced and carried over into the primary defect and sutured into place.
O-L Flap Text: The defect edges were debeveled with a #15 scalpel blade. Given the location of the defect, shape of the defect and the proximity to free margins an O-L flap was deemed most appropriate. Using a sterile surgical marker, an appropriate advancement flap was drawn incorporating the defect and placing the expected incisions within the relaxed skin tension lines where possible. The area thus outlined was incised deep to adipose tissue with a #15 scalpel blade. The skin margins were undermined to an appropriate distance in all directions utilizing iris scissors. Following this, the designed flap was advanced and carried over into the primary defect and sutured into place.
O-Z Flap Text: The defect edges were debeveled with a #15 scalpel blade. Given the location of the defect, shape of the defect and the proximity to free margins an O-Z flap was deemed most appropriate. Using a sterile surgical marker, an appropriate transposition flap was drawn incorporating the defect and placing the expected incisions within the relaxed skin tension lines where possible. The area thus outlined was incised deep to adipose tissue with a #15 scalpel blade. The skin margins were undermined to an appropriate distance in all directions utilizing iris scissors. Following this, the designed flap was carried over into the primary defect and sutured into place.
Double O-Z Flap Text: The defect edges were debeveled with a #15 scalpel blade. Given the location of the defect, shape of the defect and the proximity to free margins a Double O-Z flap was deemed most appropriate. Using a sterile surgical marker, an appropriate transposition flap was drawn incorporating the defect and placing the expected incisions within the relaxed skin tension lines where possible. The area thus outlined was incised deep to adipose tissue with a #15 scalpel blade. The skin margins were undermined to an appropriate distance in all directions utilizing iris scissors. Following this, the designed flap was carried over into the primary defect and sutured into place.
V-Y Flap Text: The defect edges were debeveled with a #15 scalpel blade. Given the location of the defect, shape of the defect and the proximity to free margins a V-Y flap was deemed most appropriate. Using a sterile surgical marker, an appropriate advancement flap was drawn incorporating the defect and placing the expected incisions within the relaxed skin tension lines where possible. The area thus outlined was incised deep to adipose tissue with a #15 scalpel blade. The skin margins were undermined to an appropriate distance in all directions utilizing iris scissors. Following this, the designed flap was advanced and carried over into the primary defect and sutured into place.
Advancement-Rotation Flap Text: The defect edges were debeveled with a #15 scalpel blade. Given the location of the defect, shape of the defect and the proximity to free margins an advancement-rotation flap was deemed most appropriate. Using a sterile surgical marker, an appropriate flap was drawn incorporating the defect and placing the expected incisions within the relaxed skin tension lines where possible. The area thus outlined was incised deep to adipose tissue with a #15 scalpel blade. The skin margins were undermined to an appropriate distance in all directions utilizing iris scissors. Following this, the designed flap was carried over into the primary defect and sutured into place.
Mercedes Flap Text: The defect edges were debeveled with a #15 scalpel blade. Given the location of the defect, shape of the defect and the proximity to free margins a Mercedes flap was deemed most appropriate. Using a sterile surgical marker, an appropriate advancement flap was drawn incorporating the defect and placing the expected incisions within the relaxed skin tension lines where possible. The area thus outlined was incised deep to adipose tissue with a #15 scalpel blade. The skin margins were undermined to an appropriate distance in all directions utilizing iris scissors. Following this, the designed flap was advanced and carried over into the primary defect and sutured into place.
Modified Advancement Flap Text: The defect edges were debeveled with a #15 scalpel blade. Given the location of the defect, shape of the defect and the proximity to free margins a modified advancement flap was deemed most appropriate. Using a sterile surgical marker, an appropriate advancement flap was drawn incorporating the defect and placing the expected incisions within the relaxed skin tension lines where possible. The area thus outlined was incised deep to adipose tissue with a #15 scalpel blade. The skin margins were undermined to an appropriate distance in all directions utilizing iris scissors. Following this, the designed flap was advanced and carried over into the primary defect and sutured into place.
Mucosal Advancement Flap Text: Given the location of the defect, shape of the defect and the proximity to free margins a mucosal advancement flap was deemed most appropriate. Incisions were made with a 15 blade scalpel in the appropriate fashion along the cutaneous vermilion border and the mucosal lip. The remaining actinically damaged mucosal tissue was excised.  The mucosal advancement flap was then elevated to the gingival sulcus with care taken to preserve the neurovascular structures and advanced into the primary defect. Care was taken to ensure that precise realignment of the vermilion border was achieved.
Peng Advancement Flap Text: The defect edges were debeveled with a #15 scalpel blade. Given the location of the defect, shape of the defect and the proximity to free margins a Peng advancement flap was deemed most appropriate. Using a sterile surgical marker, an appropriate advancement flap was drawn incorporating the defect and placing the expected incisions within the relaxed skin tension lines where possible. The area thus outlined was incised deep to adipose tissue with a #15 scalpel blade. The skin margins were undermined to an appropriate distance in all directions utilizing iris scissors. Following this, the designed flap was advanced and carried over into the primary defect and sutured into place.
Hatchet Flap Text: The defect edges were debeveled with a #15 scalpel blade. Given the location of the defect, shape of the defect and the proximity to free margins a hatchet flap was deemed most appropriate. Using a sterile surgical marker, an appropriate hatchet flap was drawn incorporating the defect and placing the expected incisions within the relaxed skin tension lines where possible. The area thus outlined was incised deep to adipose tissue with a #15 scalpel blade. The skin margins were undermined to an appropriate distance in all directions utilizing iris scissors. Following this, the designed flap was carried over into the primary defect and sutured into place.
Rotation Flap Text: The defect edges were debeveled with a #15 scalpel blade. Given the location of the defect, shape of the defect and the proximity to free margins a rotation flap was deemed most appropriate. Using a sterile surgical marker, an appropriate rotation flap was drawn incorporating the defect and placing the expected incisions within the relaxed skin tension lines where possible. The area thus outlined was incised deep to adipose tissue with a #15 scalpel blade. The skin margins were undermined to an appropriate distance in all directions utilizing iris scissors. Following this, the designed flap was carried over into the primary defect and sutured into place.
Bilateral Rotation Flap Text: The defect edges were debeveled with a #15 scalpel blade. Given the location of the defect, shape of the defect and the proximity to free margins a bilateral rotation flap was deemed most appropriate. Using a sterile surgical marker, an appropriate rotation flap was drawn incorporating the defect and placing the expected incisions within the relaxed skin tension lines where possible. The area thus outlined was incised deep to adipose tissue with a #15 scalpel blade. The skin margins were undermined to an appropriate distance in all directions utilizing iris scissors. Following this, the designed flap was carried over into the primary defect and sutured into place.
Spiral Flap Text: The defect edges were debeveled with a #15 scalpel blade. Given the location of the defect, shape of the defect and the proximity to free margins a spiral flap was deemed most appropriate. Using a sterile surgical marker, an appropriate rotation flap was drawn incorporating the defect and placing the expected incisions within the relaxed skin tension lines where possible. The area thus outlined was incised deep to adipose tissue with a #15 scalpel blade. The skin margins were undermined to an appropriate distance in all directions utilizing iris scissors. Following this, the designed flap was carried over into the primary defect and sutured into place.
Staged Advancement Flap Text: The defect edges were debeveled with a #15 scalpel blade. Given the location of the defect, shape of the defect and the proximity to free margins a staged advancement flap was deemed most appropriate. Using a sterile surgical marker, an appropriate advancement flap was drawn incorporating the defect and placing the expected incisions within the relaxed skin tension lines where possible. The area thus outlined was incised deep to adipose tissue with a #15 scalpel blade. The skin margins were undermined to an appropriate distance in all directions utilizing iris scissors. Following this, the designed flap was carried over into the primary defect and sutured into place.
Star Wedge Flap Text: The defect edges were debeveled with a #15 scalpel blade. Given the location of the defect, shape of the defect and the proximity to free margins a star wedge flap was deemed most appropriate. Using a sterile surgical marker, an appropriate rotation flap was drawn incorporating the defect and placing the expected incisions within the relaxed skin tension lines where possible. The area thus outlined was incised deep to adipose tissue with a #15 scalpel blade. The skin margins were undermined to an appropriate distance in all directions utilizing iris scissors. Following this, the designed flap was carried over into the primary defect and sutured into place.
Transposition Flap Text: The defect edges were debeveled with a #15 scalpel blade. Given the location of the defect and the proximity to free margins a transposition flap was deemed most appropriate. Using a sterile surgical marker, an appropriate transposition flap was drawn incorporating the defect. The area thus outlined was incised deep to adipose tissue with a #15 scalpel blade. The skin margins were undermined to an appropriate distance in all directions utilizing iris scissors. Following this, the designed flap was carried over into the primary defect and sutured into place.
Muscle Hinge Flap Text: The defect edges were debeveled with a #15 scalpel blade.  Given the size, depth and location of the defect and the proximity to free margins a muscle hinge flap was deemed most appropriate. Using a sterile surgical marker, an appropriate hinge flap was drawn incorporating the defect. The area thus outlined was incised with a #15 scalpel blade. The skin margins were undermined to an appropriate distance in all directions utilizing iris scissors. Following this, the designed flap was carried into the primary defect and sutured into place.
Mustarde Flap Text: The defect edges were debeveled with a #15 scalpel blade.  Given the size, depth and location of the defect and the proximity to free margins a Mustarde flap was deemed most appropriate. Using a sterile surgical marker, an appropriate flap was drawn incorporating the defect. The area thus outlined was incised with a #15 scalpel blade. The skin margins were undermined to an appropriate distance in all directions utilizing iris scissors. Following this, the designed flap was carried into the primary defect and sutured into place.
Nasal Turnover Hinge Flap Text: The defect edges were debeveled with a #15 scalpel blade.  Given the size, depth, location of the defect and the defect being full thickness a nasal turnover hinge flap was deemed most appropriate. Using a sterile surgical marker, an appropriate hinge flap was drawn incorporating the defect. The area thus outlined was incised with a #15 scalpel blade. The flap was designed to recreate the nasal mucosal lining and the alar rim. The skin margins were undermined to an appropriate distance in all directions utilizing iris scissors. Following this, the designed flap was carried over into the primary defect and sutured into place
Nasalis-Muscle-Based Myocutaneous Island Pedicle Flap Text: Using a #15 blade, an incision was made around the donor flap to the level of the nasalis muscle. Wide lateral undermining was then performed in both the subcutaneous plane above the nasalis muscle, and in a submuscular plane just above periosteum. This allowed the formation of a free nasalis muscle axial pedicle (based on the angular artery) which was still attached to the actual cutaneous flap, increasing its mobility and vascular viability. Hemostasis was obtained with pinpoint electrocoagulation. The flap was mobilized into position and the pivotal anchor points positioned and stabilized with buried interrupted sutures. Subcutaneous and dermal tissues were closed in a multilayered fashion with sutures. Tissue redundancies were excised, and the epidermal edges were apposed without significant tension and sutured with sutures.
Nasalis Myocutaneous Flap Text: Using a #15 blade, an incision was made around the donor flap to the level of the nasalis muscle. Wide lateral undermining was then performed in both the subcutaneous plane above the nasalis muscle, and in a submuscular plane just above periosteum. This allowed the formation of a free nasalis muscle axial pedicle which was still attached to the actual cutaneous flap, increasing its mobility and vascular viability. Hemostasis was obtained with pinpoint electrocoagulation. The flap was mobilized into position and the pivotal anchor points positioned and stabilized with buried interrupted sutures. Subcutaneous and dermal tissues were closed in a multilayered fashion with sutures. Tissue redundancies were excised, and the epidermal edges were apposed without significant tension and sutured with sutures.
Nasolabial Transposition Flap Text: The defect edges were debeveled with a #15 scalpel blade.  Given the size, depth and location of the defect and the proximity to free margins a nasolabial transposition flap was deemed most appropriate. Using a sterile surgical marker, an appropriate flap was drawn incorporating the defect. The area thus outlined was incised with a #15 scalpel blade. The skin margins were undermined to an appropriate distance in all directions utilizing iris scissors. Following this, the designed flap was carried into the primary defect and sutured into place.
Orbicularis Oris Muscle Flap Text: The defect edges were debeveled with a #15 scalpel blade.  Given that the defect affected the competency of the oral sphincter an orbicularis oris muscle flap was deemed most appropriate to restore this competency and normal muscle function.  Using a sterile surgical marker, an appropriate flap was drawn incorporating the defect. The area thus outlined was incised with a #15 scalpel blade. Following this, the designed flap was carried over into the primary defect and sutured into place.
Melolabial Transposition Flap Text: The defect edges were debeveled with a #15 scalpel blade. Given the location of the defect and the proximity to free margins a melolabial flap was deemed most appropriate. Using a sterile surgical marker, an appropriate melolabial transposition flap was drawn incorporating the defect. The area thus outlined was incised deep to adipose tissue with a #15 scalpel blade. The skin margins were undermined to an appropriate distance in all directions utilizing iris scissors. Following this, the designed flap was carried over into the primary defect and sutured into place.
Rectangular Flap Text: The defect edges were debeveled with a #15 scalpel blade. Given the location of the defect and the proximity to free margins a rectangular flap was deemed most appropriate. Using a sterile surgical marker, an appropriate rectangular flap was drawn incorporating the defect. The area thus outlined was incised deep to adipose tissue with a #15 scalpel blade. The skin margins were undermined to an appropriate distance in all directions utilizing iris scissors. Following this, the designed flap was carried over into the primary defect and sutured into place.
Rhombic Flap Text: The defect edges were debeveled with a #15 scalpel blade. Given the location of the defect and the proximity to free margins a rhombic flap was deemed most appropriate. Using a sterile surgical marker, an appropriate rhombic flap was drawn incorporating the defect. The area thus outlined was incised deep to adipose tissue with a #15 scalpel blade. The skin margins were undermined to an appropriate distance in all directions utilizing iris scissors. Following this, the designed flap was carried over into the primary defect and sutured into place.
Rhomboid Transposition Flap Text: The defect edges were debeveled with a #15 scalpel blade. Given the location of the defect and the proximity to free margins a rhomboid transposition flap was deemed most appropriate. Using a sterile surgical marker, an appropriate rhomboid flap was drawn incorporating the defect. The area thus outlined was incised deep to adipose tissue with a #15 scalpel blade. The skin margins were undermined to an appropriate distance in all directions utilizing iris scissors. Following this, the designed flap was carried over into the primary defect and sutured into place.
Bi-Rhombic Flap Text: The defect edges were debeveled with a #15 scalpel blade. Given the location of the defect and the proximity to free margins a bi-rhombic flap was deemed most appropriate. Using a sterile surgical marker, an appropriate rhombic flap was drawn incorporating the defect. The area thus outlined was incised deep to adipose tissue with a #15 scalpel blade. The skin margins were undermined to an appropriate distance in all directions utilizing iris scissors. Following this, the designed flap was carried over into the primary defect and sutured into place.
Helical Rim Advancement Flap Text: The defect edges were debeveled with a #15 blade scalpel.  Given the location of the defect and the proximity to free margins (helical rim) a double helical rim advancement flap was deemed most appropriate. Using a sterile surgical marker, the appropriate advancement flaps were drawn incorporating the defect and placing the expected incisions between the helical rim and antihelix where possible.  The area thus outlined was incised through and through with a #15 scalpel blade.  With a skin hook and iris scissors, the flaps were gently and sharply undermined and freed up. Folllowing this, the designed flaps were carried over into the primary defect and sutured into place.
Bilateral Helical Rim Advancement Flap Text: The defect edges were debeveled with a #15 blade scalpel.  Given the location of the defect and the proximity to free margins (helical rim) a bilateral helical rim advancement flap was deemed most appropriate. Using a sterile surgical marker, the appropriate advancement flaps were drawn incorporating the defect and placing the expected incisions between the helical rim and antihelix where possible.  The area thus outlined was incised through and through with a #15 scalpel blade.  With a skin hook and iris scissors, the flaps were gently and sharply undermined and freed up. Following this, the designed flaps were placed into the primary defect and sutured into place.
Ear Star Wedge Flap Text: The defect edges were debeveled with a #15 blade scalpel.  Given the location of the defect and the proximity to free margins (helical rim) an ear star wedge flap was deemed most appropriate. Using a sterile surgical marker, the appropriate flap was drawn incorporating the defect and placing the expected incisions between the helical rim and antihelix where possible.  The area thus outlined was incised through and through with a #15 scalpel blade. Following this, the designed flap was carried over into the primary defect and sutured into place.
Flip-Flop Flap Text: The defect edges were debeveled with a #15 blade scalpel.  Given the location of the defect and the proximity to free margins a flip-flop flap was deemed most appropriate. Using a sterile surgical marker, the appropriate flap was drawn incorporating the defect and placing the expected incisions between the helical rim and antihelix where possible.  The area thus outlined was incised through and through with a #15 scalpel blade. Following this, the designed flap was carried over into the primary defect and sutured into place.
Banner Transposition Flap Text: The defect edges were debeveled with a #15 scalpel blade. Given the location of the defect and the proximity to free margins a Banner transposition flap was deemed most appropriate. Using a sterile surgical marker, an appropriate flap was drawn around the defect. The area thus outlined was incised deep to adipose tissue with a #15 scalpel blade. The skin margins were undermined to an appropriate distance in all directions utilizing iris scissors. Following this, the designed flap was carried into the primary defect and sutured into place.
Bilobed Flap Text: The defect edges were debeveled with a #15 scalpel blade. Given the location of the defect and the proximity to free margins a bilobe flap was deemed most appropriate. Using a sterile surgical marker, an appropriate bilobe flap drawn around the defect. The area thus outlined was incised deep to adipose tissue with a #15 scalpel blade. The skin margins were undermined to an appropriate distance in all directions utilizing iris scissors. Following this, the designed flap was carried over into the primary defect and sutured into place.
Bilobed Transposition Flap Text: The defect edges were debeveled with a #15 scalpel blade. Given the location of the defect and the proximity to free margins a bilobed transposition flap was deemed most appropriate. Using a sterile surgical marker, an appropriate bilobe flap drawn around the defect. The area thus outlined was incised deep to adipose tissue with a #15 scalpel blade. The skin margins were undermined to an appropriate distance in all directions utilizing iris scissors. Following this, the designed flap was carried over into the primary defect and sutured into place.
Trilobed Flap Text: The defect edges were debeveled with a #15 scalpel blade. Given the location of the defect and the proximity to free margins a trilobed flap was deemed most appropriate. Using a sterile surgical marker, an appropriate trilobed flap was drawn around the defect. The area thus outlined was incised deep to adipose tissue with a #15 scalpel blade. The skin margins were undermined to an appropriate distance in all directions utilizing iris scissors. Following this, the designed flap was carried into the primary defect and sutured into place.
Dorsal Nasal Flap Text: The defect edges were debeveled with a #15 scalpel blade. Given the location of the defect and the proximity to free margins a dorsal nasal flap was deemed most appropriate. Using a sterile surgical marker, an appropriate dorsal nasal flap was drawn around the defect. The area thus outlined was incised deep to adipose tissue with a #15 scalpel blade. The skin margins were undermined to an appropriate distance in all directions utilizing iris scissors. Following this, the designed flap was carried into the primary defect and sutured into place.
Island Pedicle Flap Text: The defect edges were debeveled with a #15 scalpel blade. Given the location of the defect, shape of the defect and the proximity to free margins an island pedicle advancement flap was deemed most appropriate. Using a sterile surgical marker, an appropriate advancement flap was drawn incorporating the defect, outlining the appropriate donor tissue and placing the expected incisions within the relaxed skin tension lines where possible. The area thus outlined was incised deep to adipose tissue with a #15 scalpel blade. The skin margins were undermined to an appropriate distance in all directions around the primary defect and laterally outward around the island pedicle utilizing iris scissors.  There was minimal undermining beneath the pedicle flap. Following this, the flap was carried over into the primary defect and sutured into place.
Island Pedicle Flap With Canthal Suspension Text: The defect edges were debeveled with a #15 scalpel blade. Given the location of the defect, shape of the defect and the proximity to free margins an island pedicle advancement flap was deemed most appropriate. Using a sterile surgical marker, an appropriate advancement flap was drawn incorporating the defect, outlining the appropriate donor tissue and placing the expected incisions within the relaxed skin tension lines where possible. The area thus outlined was incised deep to adipose tissue with a #15 scalpel blade. The skin margins were undermined to an appropriate distance in all directions around the primary defect and laterally outward around the island pedicle utilizing iris scissors.  There was minimal undermining beneath the pedicle flap. A suspension suture was placed in the canthal tendon to prevent tension and prevent ectropion. Following this, the designed flap was placed into the primary defect and sutured into place.
Alar Island Pedicle Flap Text: The defect edges were debeveled with a #15 scalpel blade. Given the location of the defect, shape of the defect and the proximity to the alar rim an island pedicle advancement flap was deemed most appropriate. Using a sterile surgical marker, an appropriate advancement flap was drawn incorporating the defect, outlining the appropriate donor tissue and placing the expected incisions within the nasal ala running parallel to the alar rim. The area thus outlined was incised with a #15 scalpel blade. The skin margins were undermined minimally to an appropriate distance in all directions around the primary defect and laterally outward around the island pedicle utilizing iris scissors.  There was minimal undermining beneath the pedicle flap. Following this, the designed flap was carried over into the primary defect and sutured into place.
Double Island Pedicle Flap Text: The defect edges were debeveled with a #15 scalpel blade. Given the location of the defect, shape of the defect and the proximity to free margins a double island pedicle advancement flap was deemed most appropriate. Using a sterile surgical marker, an appropriate advancement flap was drawn incorporating the defect, outlining the appropriate donor tissue and placing the expected incisions within the relaxed skin tension lines where possible. The area thus outlined was incised deep to adipose tissue with a #15 scalpel blade. The skin margins were undermined to an appropriate distance in all directions around the primary defect and laterally outward around the island pedicle utilizing iris scissors.  There was minimal undermining beneath the pedicle flap. Following this, the flap was carried over into the primary defect and sutured into place.
Island Pedicle Flap-Requiring Vessel Identification Text: The defect edges were debeveled with a #15 scalpel blade. Given the location of the defect, shape of the defect and the proximity to free margins an island pedicle advancement flap was deemed most appropriate. Using a sterile surgical marker, an appropriate advancement flap was drawn, based on the axial vessel mentioned above, incorporating the defect, outlining the appropriate donor tissue and placing the expected incisions within the relaxed skin tension lines where possible. The area thus outlined was incised deep to adipose tissue with a #15 scalpel blade. The skin margins were undermined to an appropriate distance in all directions around the primary defect and laterally outward around the island pedicle utilizing iris scissors.  There was minimal undermining beneath the pedicle flap. Following this, the designed flap was carried over into the primary defect and sutured into place.
Keystone Flap Text: The defect edges were debeveled with a #15 scalpel blade. Given the location of the defect, shape of the defect a keystone flap was deemed most appropriate. Using a sterile surgical marker, an appropriate keystone flap was drawn incorporating the defect, outlining the appropriate donor tissue and placing the expected incisions within the relaxed skin tension lines where possible. The area thus outlined was incised deep to adipose tissue with a #15 scalpel blade. The skin margins were undermined to an appropriate distance in all directions around the primary defect and laterally outward around the flap utilizing iris scissors. Following this, the designed flap was carried into the primary defect and sutured into place.
O-T Plasty Text: The defect edges were debeveled with a #15 scalpel blade. Given the location of the defect, shape of the defect and the proximity to free margins an O-T plasty was deemed most appropriate. Using a sterile surgical marker, an appropriate O-T plasty was drawn incorporating the defect and placing the expected incisions within the relaxed skin tension lines where possible. The area thus outlined was incised deep to adipose tissue with a #15 scalpel blade. The skin margins were undermined to an appropriate distance in all directions utilizing iris scissors. Following this, the designed flap was carried over into the primary defect and sutured into place.
O-Z Plasty Text: The defect edges were debeveled with a #15 scalpel blade. Given the location of the defect, shape of the defect and the proximity to free margins an O-Z plasty (double transposition flap) was deemed most appropriate. Using a sterile surgical marker, the appropriate transposition flaps were drawn incorporating the defect and placing the expected incisions within the relaxed skin tension lines where possible. The area thus outlined was incised deep to adipose tissue with a #15 scalpel blade. The skin margins were undermined to an appropriate distance in all directions utilizing iris scissors. Hemostasis was achieved with electrocautery. The flaps were then transposed and carried over into place, one clockwise and the other counterclockwise, and anchored with interrupted buried subcutaneous sutures.
Double O-Z Plasty Text: The defect edges were debeveled with a #15 scalpel blade. Given the location of the defect, shape of the defect and the proximity to free margins a Double O-Z plasty (double transposition flap) was deemed most appropriate. Using a sterile surgical marker, the appropriate transposition flaps were drawn incorporating the defect and placing the expected incisions within the relaxed skin tension lines where possible. The area thus outlined was incised deep to adipose tissue with a #15 scalpel blade. The skin margins were undermined to an appropriate distance in all directions utilizing iris scissors. Hemostasis was achieved with electrocautery. The flaps were then transposed and carried over into place, one clockwise and the other counterclockwise, and anchored with interrupted buried subcutaneous sutures.
V-Y Plasty Text: The defect edges were debeveled with a #15 scalpel blade. Given the location of the defect, shape of the defect and the proximity to free margins an V-Y advancement flap was deemed most appropriate. Using a sterile surgical marker, an appropriate advancement flap was drawn incorporating the defect and placing the expected incisions within the relaxed skin tension lines where possible. The area thus outlined was incised deep to adipose tissue with a #15 scalpel blade. The skin margins were undermined to an appropriate distance in all directions utilizing iris scissors. Following this, the designed flap was advanced and carried over into the primary defect and sutured into place.
H Plasty Text: Given the location of the defect, shape of the defect and the proximity to free margins a H-plasty was deemed most appropriate for repair. Using a sterile surgical marker, the appropriate advancement arms of the H-plasty were drawn incorporating the defect and placing the expected incisions within the relaxed skin tension lines where possible. The area thus outlined was incised deep to adipose tissue with a #15 scalpel blade. The skin margins were undermined to an appropriate distance in all directions utilizing iris scissors.  The opposing advancement arms were then advanced and carried over into place in opposite direction and anchored with interrupted buried subcutaneous sutures.
W Plasty Text: The lesion was extirpated to the level of the fat with a #15 scalpel blade. Given the location of the defect, shape of the defect and the proximity to free margins a W-plasty was deemed most appropriate for repair. Using a sterile surgical marker, the appropriate transposition arms of the W-plasty were drawn incorporating the defect and placing the expected incisions within the relaxed skin tension lines where possible. The area thus outlined was incised deep to adipose tissue with a #15 scalpel blade. The skin margins were undermined to an appropriate distance in all directions utilizing iris scissors. The opposing transposition arms were then transposed and carried over into place in opposite direction and anchored with interrupted buried subcutaneous sutures.
Z Plasty Text: The lesion was extirpated to the level of the fat with a #15 scalpel blade. Given the location of the defect, shape of the defect and the proximity to free margins a Z-plasty was deemed most appropriate for repair. Using a sterile surgical marker, the appropriate transposition arms of the Z-plasty were drawn incorporating the defect and placing the expected incisions within the relaxed skin tension lines where possible. The area thus outlined was incised deep to adipose tissue with a #15 scalpel blade. The skin margins were undermined to an appropriate distance in all directions utilizing iris scissors. The opposing transposition arms were then transposed and carried over into place in opposite direction and anchored with interrupted buried subcutaneous sutures.
Double Z Plasty Text: The lesion was extirpated to the level of the fat with a #15 scalpel blade. Given the location of the defect, shape of the defect and the proximity to free margins a double Z-plasty was deemed most appropriate for repair. Using a sterile surgical marker, the appropriate transposition arms of the double Z-plasty were drawn incorporating the defect and placing the expected incisions within the relaxed skin tension lines where possible. The area thus outlined was incised deep to adipose tissue with a #15 scalpel blade. The skin margins were undermined to an appropriate distance in all directions utilizing iris scissors. The opposing transposition arms were then transposed and carried over into place in opposite direction and anchored with interrupted buried subcutaneous sutures.
Zygomaticofacial Flap Text: Given the location of the defect, shape of the defect and the proximity to free margins a zygomaticofacial flap was deemed most appropriate for repair. Using a sterile surgical marker, the appropriate flap was drawn incorporating the defect and placing the expected incisions within the relaxed skin tension lines where possible. The area thus outlined was incised deep to adipose tissue with a #15 scalpel blade with preservation of a vascular pedicle.  The skin margins were undermined to an appropriate distance in all directions utilizing iris scissors. The flap was then carried over into the defect and anchored with interrupted buried subcutaneous sutures.
Cheek Interpolation Flap Text: A decision was made to reconstruct the defect utilizing an interpolation axial flap and a staged reconstruction.  A telfa template was made of the defect.  This telfa template was then used to outline the Cheek Interpolation flap.  The donor area for the pedicle flap was then injected with anesthesia.  The flap was excised through the skin and subcutaneous tissue down to the layer of the underlying musculature.  The interpolation flap was carefully excised within this deep plane to maintain its blood supply.  The edges of the donor site were undermined.   The donor site was closed in a primary fashion.  The pedicle was then rotated into position and sutured.  Once the tube was sutured into place, adequate blood supply was confirmed with blanching and refill.  The pedicle was then wrapped with xeroform gauze and dressed appropriately with a telfa and gauze bandage to ensure continued blood supply and protect the attached pedicle.
Cheek-To-Nose Interpolation Flap Text: A decision was made to reconstruct the defect utilizing an interpolation axial flap and a staged reconstruction.  A telfa template was made of the defect.  This telfa template was then used to outline the Cheek-To-Nose Interpolation flap.  The donor area for the pedicle flap was then injected with anesthesia.  The flap was excised through the skin and subcutaneous tissue down to the layer of the underlying musculature.  The interpolation flap was carefully excised within this deep plane to maintain its blood supply.  The edges of the donor site were undermined.   The donor site was closed in a primary fashion.  The pedicle was then rotated into position and sutured.  Once the tube was sutured into place, adequate blood supply was confirmed with blanching and refill.  The pedicle was then wrapped with xeroform gauze and dressed appropriately with a telfa and gauze bandage to ensure continued blood supply and protect the attached pedicle.
Interpolation Flap Text: A decision was made to reconstruct the defect utilizing an interpolation axial flap and a staged reconstruction.  A telfa template was made of the defect.  This telfa template was then used to outline the interpolation flap.  The donor area for the pedicle flap was then injected with anesthesia.  The flap was excised through the skin and subcutaneous tissue down to the layer of the underlying musculature.  The interpolation flap was carefully excised within this deep plane to maintain its blood supply.  The edges of the donor site were undermined.   The donor site was closed in a primary fashion.  The pedicle was then rotated into position and sutured.  Once the tube was sutured into place, adequate blood supply was confirmed with blanching and refill.  The pedicle was then wrapped with xeroform gauze and dressed appropriately with a telfa and gauze bandage to ensure continued blood supply and protect the attached pedicle.
Melolabial Interpolation Flap Text: A decision was made to reconstruct the defect utilizing an interpolation axial flap and a staged reconstruction.  A telfa template was made of the defect.  This telfa template was then used to outline the melolabial interpolation flap.  The donor area for the pedicle flap was then injected with anesthesia.  The flap was excised through the skin and subcutaneous tissue down to the layer of the underlying musculature.  The pedicle flap was carefully excised within this deep plane to maintain its blood supply.  The edges of the donor site were undermined.   The donor site was closed in a primary fashion.  The pedicle was then rotated into position and sutured.  Once the tube was sutured into place, adequate blood supply was confirmed with blanching and refill.  The pedicle was then wrapped with xeroform gauze and dressed appropriately with a telfa and gauze bandage to ensure continued blood supply and protect the attached pedicle.
Mastoid Interpolation Flap Text: A decision was made to reconstruct the defect utilizing an interpolation axial flap and a staged reconstruction.  A telfa template was made of the defect.  This telfa template was then used to outline the mastoid interpolation flap.  The donor area for the pedicle flap was then injected with anesthesia.  The flap was excised through the skin and subcutaneous tissue down to the layer of the underlying musculature.  The pedicle flap was carefully excised within this deep plane to maintain its blood supply.  The edges of the donor site were undermined.   The donor site was closed in a primary fashion.  The pedicle was then rotated into position and sutured.  Once the tube was sutured into place, adequate blood supply was confirmed with blanching and refill.  The pedicle was then wrapped with xeroform gauze and dressed appropriately with a telfa and gauze bandage to ensure continued blood supply and protect the attached pedicle.
Posterior Auricular Interpolation Flap Text: A decision was made to reconstruct the defect utilizing an interpolation axial flap and a staged reconstruction.  A telfa template was made of the defect.  This telfa template was then used to outline the posterior auricular interpolation flap.  The donor area for the pedicle flap was then injected with anesthesia.  The flap was excised through the skin and subcutaneous tissue down to the layer of the underlying musculature.  The pedicle flap was carefully excised within this deep plane to maintain its blood supply.  The edges of the donor site were undermined.   The donor site was closed in a primary fashion.  The pedicle was then rotated into position and sutured.  Once the tube was sutured into place, adequate blood supply was confirmed with blanching and refill.  The pedicle was then wrapped with xeroform gauze and dressed appropriately with a telfa and gauze bandage to ensure continued blood supply and protect the attached pedicle.
Paramedian Forehead Flap Text: A decision was made to reconstruct the defect utilizing an interpolation axial flap and a staged reconstruction.  A telfa template was made of the defect.  This telfa template was then used to outline the paramedian forehead pedicle flap.  The donor area for the pedicle flap was then injected with anesthesia.  The flap was excised through the skin and subcutaneous tissue down to the layer of the underlying musculature.  The pedicle flap was carefully excised within this deep plane to maintain its blood supply.  The edges of the donor site were undermined.   The donor site was closed in a primary fashion.  The pedicle was then rotated into position and sutured.  Once the tube was sutured into place, adequate blood supply was confirmed with blanching and refill.  The pedicle was then wrapped with xeroform gauze and dressed appropriately with a telfa and gauze bandage to ensure continued blood supply and protect the attached pedicle.
Abbe Flap (Upper To Lower Lip) Text: The defect of the lower lip was assessed and measured.  Given the location and size of the defect, an Abbe flap was deemed most appropriate. Using a sterile surgical marker, an appropriate Abbe flap was measured and drawn on the upper lip. Local anesthesia was then infiltrated.  A scalpel was then used to incise the upper lip through and through the skin, vermilion, muscle and mucosa, leaving the flap pedicled on the opposite side.  The flap was then rotated and transferred to the lower lip defect.  The flap was then sutured into place with a three layer technique, closing the orbicularis oris muscle layer with subcutaneous buried sutures, followed by a mucosal layer and an epidermal layer.
Abbe Flap (Lower To Upper Lip) Text: The defect of the upper lip was assessed and measured.  Given the location and size of the defect, an Abbe flap was deemed most appropriate. Using a sterile surgical marker, an appropriate Abbe flap was measured and drawn on the lower lip. Local anesthesia was then infiltrated. A scalpel was then used to incise the upper lip through and through the skin, vermilion, muscle and mucosa, leaving the flap pedicled on the opposite side.  The flap was then rotated and transferred to the lower lip defect.  The flap was then sutured into place with a three layer technique, closing the orbicularis oris muscle layer with subcutaneous buried sutures, followed by a mucosal layer and an epidermal layer.
Estlander Flap (Upper To Lower Lip) Text: The defect of the lower lip was assessed and measured.  Given the location and size of the defect, an Estlander flap was deemed most appropriate. Using a sterile surgical marker, an appropriate Estlander flap was measured and drawn on the upper lip. Local anesthesia was then infiltrated. A scalpel was then used to incise the lateral aspect of the flap, through skin, muscle and mucosa, leaving the flap pedicled medially.  The flap was then rotated and positioned to fill the lower lip defect.  The flap was then sutured into place with a three layer technique, closing the orbicularis oris muscle layer with subcutaneous buried sutures, followed by a mucosal layer and an epidermal layer.
Lip Wedge Excision Repair Text: Given the location of the defect and the proximity to free margins a full thickness wedge repair was deemed most appropriate. Using a sterile surgical marker, the appropriate repair was drawn incorporating the defect and placing the expected incisions perpendicular to the vermilion border.  The vermilion border was also meticulously outlined to ensure appropriate reapproximation during the repair.  The area thus outlined was incised through and through with a #15 scalpel blade.  The muscularis and dermis were reaproximated with deep sutures following hemostasis. Care was taken to realign the vermilion border before proceeding with the superficial closure.  Once the vermilion was realigned the superfical and mucosal closure was finished.
Ftsg Text: The defect edges were debeveled with a #15 scalpel blade. Given the location of the defect, shape of the defect and the proximity to free margins a full thickness skin graft was deemed most appropriate. Using a sterile surgical marker, the primary defect shape was transferred to the donor site. The area thus outlined was incised deep to adipose tissue with a #15 scalpel blade.  The harvested graft was then trimmed of adipose tissue until only dermis and epidermis was left.  The skin graft was then placed in the primary defect and oriented appropriately.
Split-Thickness Skin Graft Text: The defect edges were debeveled with a #15 scalpel blade. Given the location of the defect, shape of the defect and the proximity to free margins a split thickness skin graft was deemed most appropriate. Using a sterile surgical marker, the primary defect shape was transferred to the donor site. The split thickness graft was then harvested.  The skin graft was then placed in the primary defect and oriented appropriately.
Pinch Graft Text: The defect edges were debeveled with a #15 scalpel blade. Given the location of the defect, shape of the defect and the proximity to free margins a pinch graft was deemed most appropriate. Using a sterile surgical marker, the primary defect shape was transferred to the donor site. The area thus outlined was incised deep to adipose tissue with a #15 scalpel blade.  The harvested graft was then trimmed of adipose tissue until only dermis and epidermis was left. The skin graft was then placed in the primary defect and oriented appropriately.
Burow's Graft Text: The defect edges were debeveled with a #15 scalpel blade. Given the location of the defect, shape of the defect, the proximity to free margins and the presence of a standing cone deformity a Burow's skin graft was deemed most appropriate. The standing cone was removed and this tissue was then trimmed to the shape of the primary defect. The adipose tissue was also removed until only dermis and epidermis were left.  The skin graft was then placed in the primary defect and oriented appropriately.
Cartilage Graft Text: The defect edges were debeveled with a #15 scalpel blade. Given the location of the defect, shape of the defect, the fact the defect involved a full thickness cartilage defect a cartilage graft was deemed most appropriate.  An appropriate donor site was identified, cleansed, and anesthetized. The cartilage graft was then harvested and transferred to the recipient site, oriented appropriately and then sutured into place.  The secondary defect was then repaired using a primary closure.
Composite Graft Text: The defect edges were debeveled with a #15 scalpel blade. Given the location of the defect, shape of the defect, the proximity to free margins and the fact the defect was full thickness a composite graft was deemed most appropriate.  The defect was outline and then transferred to the donor site.  A full thickness graft was then excised from the donor site. The graft was then placed in the primary defect, oriented appropriately and then sutured into place.  The secondary defect was then repaired using a primary closure.
Epidermal Autograft Text: The defect edges were debeveled with a #15 scalpel blade. Given the location of the defect, shape of the defect and the proximity to free margins an epidermal autograft was deemed most appropriate. Using a sterile surgical marker, the primary defect shape was transferred to the donor site. The epidermal graft was then harvested.  The skin graft was then placed in the primary defect and oriented appropriately.
Dermal Autograft Text: The defect edges were debeveled with a #15 scalpel blade. Given the location of the defect, shape of the defect and the proximity to free margins a dermal autograft was deemed most appropriate. Using a sterile surgical marker, the primary defect shape was transferred to the donor site. The area thus outlined was incised deep to adipose tissue with a #15 scalpel blade.  The harvested graft was then trimmed of adipose and epidermal tissue until only dermis was left.  The skin graft was then placed in the primary defect and oriented appropriately.
Skin Substitute Text: The defect edges were debeveled with a #15 scalpel blade. Given the location of the defect, shape of the defect and the proximity to free margins a skin substitute graft was deemed most appropriate.  The graft material was trimmed to fit the size of the defect. The graft was then placed in the primary defect and oriented appropriately.
Tissue Cultured Epidermal Autograft Text: The defect edges were debeveled with a #15 scalpel blade. Given the location of the defect, shape of the defect and the proximity to free margins a tissue cultured epidermal autograft was deemed most appropriate.  The graft was then trimmed to fit the size of the defect.  The graft was then placed in the primary defect and oriented appropriately.
Xenograft Text: The defect edges were debeveled with a #15 scalpel blade. Given the location of the defect, shape of the defect and the proximity to free margins a xenograft was deemed most appropriate.  The graft was then trimmed to fit the size of the defect.  The graft was then placed in the primary defect and oriented appropriately.
Purse String (Intermediate) Text: Given the location of the defect and the characteristics of the surrounding skin a purse string intermediate closure was deemed most appropriate.  Undermining was performed circumferentially around the surgical defect.  A purse string suture was then placed and tightened.
Purse String (Simple) Text: Given the location of the defect and the characteristics of the surrounding skin a purse string simple closure was deemed most appropriate.  Undermining was performed circumferentially around the surgical defect.  A purse string suture was then placed and tightened.
Partial Purse String (Intermediate) Text: Given the location of the defect and the characteristics of the surrounding skin an intermediate purse string closure was deemed most appropriate.  Undermining was performed circumferentially around the surgical defect.  A purse string suture was then placed and tightened. Wound tension of the circular defect prevented complete closure of the wound.
Partial Purse String (Simple) Text: Given the location of the defect and the characteristics of the surrounding skin a simple purse string closure was deemed most appropriate.  Undermining was performed circumferentially around the surgical defect.  A purse string suture was then placed and tightened. Wound tension of the circular defect prevented complete closure of the wound.
Complex Repair And Single Advancement Flap Text: The defect edges were debeveled with a #15 scalpel blade.  The primary defect was closed partially with a complex linear closure.  Given the location of the remaining defect, shape of the defect and the proximity to free margins a single advancement flap was deemed most appropriate for complete closure of the defect.  Using a sterile surgical marker, an appropriate advancement flap was drawn incorporating the defect and placing the expected incisions within the relaxed skin tension lines where possible. The area thus outlined was incised deep to adipose tissue with a #15 scalpel blade. The skin margins were undermined to an appropriate distance in all directions utilizing iris scissors and carried over to close the primary defect.
Complex Repair And Double Advancement Flap Text: The defect edges were debeveled with a #15 scalpel blade.  The primary defect was closed partially with a complex linear closure.  Given the location of the remaining defect, shape of the defect and the proximity to free margins a double advancement flap was deemed most appropriate for complete closure of the defect.  Using a sterile surgical marker, an appropriate advancement flap was drawn incorporating the defect and placing the expected incisions within the relaxed skin tension lines where possible. The area thus outlined was incised deep to adipose tissue with a #15 scalpel blade. The skin margins were undermined to an appropriate distance in all directions utilizing iris scissors and carried over to close the primary defect.
Complex Repair And Modified Advancement Flap Text: The defect edges were debeveled with a #15 scalpel blade.  The primary defect was closed partially with a complex linear closure.  Given the location of the remaining defect, shape of the defect and the proximity to free margins a modified advancement flap was deemed most appropriate for complete closure of the defect.  Using a sterile surgical marker, an appropriate advancement flap was drawn incorporating the defect and placing the expected incisions within the relaxed skin tension lines where possible. The area thus outlined was incised deep to adipose tissue with a #15 scalpel blade. The skin margins were undermined to an appropriate distance in all directions utilizing iris scissors and carried over to close the primary defect.
Complex Repair And A-T Advancement Flap Text: The defect edges were debeveled with a #15 scalpel blade.  The primary defect was closed partially with a complex linear closure.  Given the location of the remaining defect, shape of the defect and the proximity to free margins an A-T advancement flap was deemed most appropriate for complete closure of the defect.  Using a sterile surgical marker, an appropriate advancement flap was drawn incorporating the defect and placing the expected incisions within the relaxed skin tension lines where possible. The area thus outlined was incised deep to adipose tissue with a #15 scalpel blade. The skin margins were undermined to an appropriate distance in all directions utilizing iris scissors and carried over to close the primary defect.
Complex Repair And O-T Advancement Flap Text: The defect edges were debeveled with a #15 scalpel blade.  The primary defect was closed partially with a complex linear closure.  Given the location of the remaining defect, shape of the defect and the proximity to free margins an O-T advancement flap was deemed most appropriate for complete closure of the defect.  Using a sterile surgical marker, an appropriate advancement flap was drawn incorporating the defect and placing the expected incisions within the relaxed skin tension lines where possible. The area thus outlined was incised deep to adipose tissue with a #15 scalpel blade. The skin margins were undermined to an appropriate distance in all directions utilizing iris scissors and carried over to close the primary defect.
Complex Repair And O-L Flap Text: The defect edges were debeveled with a #15 scalpel blade.  The primary defect was closed partially with a complex linear closure.  Given the location of the remaining defect, shape of the defect and the proximity to free margins an O-L flap was deemed most appropriate for complete closure of the defect.  Using a sterile surgical marker, an appropriate flap was drawn incorporating the defect and placing the expected incisions within the relaxed skin tension lines where possible. The area thus outlined was incised deep to adipose tissue with a #15 scalpel blade. The skin margins were undermined to an appropriate distance in all directions utilizing iris scissors and carried over to close the primary defect.
Complex Repair And Bilobe Flap Text: The defect edges were debeveled with a #15 scalpel blade.  The primary defect was closed partially with a complex linear closure.  Given the location of the remaining defect, shape of the defect and the proximity to free margins a bilobe flap was deemed most appropriate for complete closure of the defect.  Using a sterile surgical marker, an appropriate advancement flap was drawn incorporating the defect and placing the expected incisions within the relaxed skin tension lines where possible. The area thus outlined was incised deep to adipose tissue with a #15 scalpel blade. The skin margins were undermined to an appropriate distance in all directions utilizing iris scissors and carried over to close the primary defect.
Complex Repair And Melolabial Flap Text: The defect edges were debeveled with a #15 scalpel blade.  The primary defect was closed partially with a complex linear closure.  Given the location of the remaining defect, shape of the defect and the proximity to free margins a melolabial flap was deemed most appropriate for complete closure of the defect.  Using a sterile surgical marker, an appropriate advancement flap was drawn incorporating the defect and placing the expected incisions within the relaxed skin tension lines where possible. The area thus outlined was incised deep to adipose tissue with a #15 scalpel blade. The skin margins were undermined to an appropriate distance in all directions utilizing iris scissors and carried over to close the primary defect.
Complex Repair And Rotation Flap Text: The defect edges were debeveled with a #15 scalpel blade.  The primary defect was closed partially with a complex linear closure.  Given the location of the remaining defect, shape of the defect and the proximity to free margins a rotation flap was deemed most appropriate for complete closure of the defect.  Using a sterile surgical marker, an appropriate advancement flap was drawn incorporating the defect and placing the expected incisions within the relaxed skin tension lines where possible. The area thus outlined was incised deep to adipose tissue with a #15 scalpel blade. The skin margins were undermined to an appropriate distance in all directions utilizing iris scissors and carried over to close the primary defect.
Complex Repair And Rhombic Flap Text: The defect edges were debeveled with a #15 scalpel blade.  The primary defect was closed partially with a complex linear closure.  Given the location of the remaining defect, shape of the defect and the proximity to free margins a rhombic flap was deemed most appropriate for complete closure of the defect.  Using a sterile surgical marker, an appropriate advancement flap was drawn incorporating the defect and placing the expected incisions within the relaxed skin tension lines where possible. The area thus outlined was incised deep to adipose tissue with a #15 scalpel blade. The skin margins were undermined to an appropriate distance in all directions utilizing iris scissors and carried over to close the primary defect.
Complex Repair And Transposition Flap Text: The defect edges were debeveled with a #15 scalpel blade.  The primary defect was closed partially with a complex linear closure.  Given the location of the remaining defect, shape of the defect and the proximity to free margins a transposition flap was deemed most appropriate for complete closure of the defect.  Using a sterile surgical marker, an appropriate advancement flap was drawn incorporating the defect and placing the expected incisions within the relaxed skin tension lines where possible. The area thus outlined was incised deep to adipose tissue with a #15 scalpel blade. The skin margins were undermined to an appropriate distance in all directions utilizing iris scissors and carried over to close the primary defect.
Complex Repair And V-Y Plasty Text: The defect edges were debeveled with a #15 scalpel blade.  The primary defect was closed partially with a complex linear closure.  Given the location of the remaining defect, shape of the defect and the proximity to free margins a V-Y plasty was deemed most appropriate for complete closure of the defect.  Using a sterile surgical marker, an appropriate advancement flap was drawn incorporating the defect and placing the expected incisions within the relaxed skin tension lines where possible. The area thus outlined was incised deep to adipose tissue with a #15 scalpel blade. The skin margins were undermined to an appropriate distance in all directions utilizing iris scissors and carried over to close the primary defect.
Complex Repair And M Plasty Text: The defect edges were debeveled with a #15 scalpel blade.  The primary defect was closed partially with a complex linear closure.  Given the location of the remaining defect, shape of the defect and the proximity to free margins an M plasty was deemed most appropriate for complete closure of the defect.  Using a sterile surgical marker, an appropriate advancement flap was drawn incorporating the defect and placing the expected incisions within the relaxed skin tension lines where possible. The area thus outlined was incised deep to adipose tissue with a #15 scalpel blade. The skin margins were undermined to an appropriate distance in all directions utilizing iris scissors and carried over to close the primary defect.
Complex Repair And Double M Plasty Text: The defect edges were debeveled with a #15 scalpel blade.  The primary defect was closed partially with a complex linear closure.  Given the location of the remaining defect, shape of the defect and the proximity to free margins a double M plasty was deemed most appropriate for complete closure of the defect.  Using a sterile surgical marker, an appropriate advancement flap was drawn incorporating the defect and placing the expected incisions within the relaxed skin tension lines where possible. The area thus outlined was incised deep to adipose tissue with a #15 scalpel blade. The skin margins were undermined to an appropriate distance in all directions utilizing iris scissors and carried over to close the primary defect.
Complex Repair And W Plasty Text: The defect edges were debeveled with a #15 scalpel blade.  The primary defect was closed partially with a complex linear closure.  Given the location of the remaining defect, shape of the defect and the proximity to free margins a W plasty was deemed most appropriate for complete closure of the defect.  Using a sterile surgical marker, an appropriate advancement flap was drawn incorporating the defect and placing the expected incisions within the relaxed skin tension lines where possible. The area thus outlined was incised deep to adipose tissue with a #15 scalpel blade. The skin margins were undermined to an appropriate distance in all directions utilizing iris scissors and carried over to close the primary defect.
Complex Repair And Z Plasty Text: The defect edges were debeveled with a #15 scalpel blade.  The primary defect was closed partially with a complex linear closure.  Given the location of the remaining defect, shape of the defect and the proximity to free margins a Z plasty was deemed most appropriate for complete closure of the defect.  Using a sterile surgical marker, an appropriate advancement flap was drawn incorporating the defect and placing the expected incisions within the relaxed skin tension lines where possible. The area thus outlined was incised deep to adipose tissue with a #15 scalpel blade. The skin margins were undermined to an appropriate distance in all directions utilizing iris scissors and carried over to close the primary defect.
Complex Repair And Dorsal Nasal Flap Text: The defect edges were debeveled with a #15 scalpel blade.  The primary defect was closed partially with a complex linear closure.  Given the location of the remaining defect, shape of the defect and the proximity to free margins a dorsal nasal flap was deemed most appropriate for complete closure of the defect.  Using a sterile surgical marker, an appropriate flap was drawn incorporating the defect and placing the expected incisions within the relaxed skin tension lines where possible. The area thus outlined was incised deep to adipose tissue with a #15 scalpel blade. The skin margins were undermined to an appropriate distance in all directions utilizing iris scissors and carried over to close the primary defect.
Complex Repair And Ftsg Text: The defect edges were debeveled with a #15 scalpel blade.  The primary defect was closed partially with a complex linear closure.  Given the location of the defect, shape of the defect and the proximity to free margins a full thickness skin graft was deemed most appropriate to repair the remaining defect.  The graft was trimmed to fit the size of the remaining defect.  The graft was then placed in the primary defect, oriented appropriately, and sutured into place.
Complex Repair And Burow's Graft Text: The defect edges were debeveled with a #15 scalpel blade.  The primary defect was closed partially with a complex linear closure.  Given the location of the defect, shape of the defect, the proximity to free margins and the presence of a standing cone deformity a Burow's graft was deemed most appropriate to repair the remaining defect.  The graft was trimmed to fit the size of the remaining defect.  The graft was then placed in the primary defect, oriented appropriately, and sutured into place.
Complex Repair And Split-Thickness Skin Graft Text: The defect edges were debeveled with a #15 scalpel blade.  The primary defect was closed partially with a complex linear closure.  Given the location of the defect, shape of the defect and the proximity to free margins a split thickness skin graft was deemed most appropriate to repair the remaining defect.  The graft was trimmed to fit the size of the remaining defect.  The graft was then placed in the primary defect, oriented appropriately, and sutured into place.
Complex Repair And Epidermal Autograft Text: The defect edges were debeveled with a #15 scalpel blade.  The primary defect was closed partially with a complex linear closure.  Given the location of the defect, shape of the defect and the proximity to free margins an epidermal autograft was deemed most appropriate to repair the remaining defect.  The graft was trimmed to fit the size of the remaining defect.  The graft was then placed in the primary defect, oriented appropriately, and sutured into place.
Complex Repair And Dermal Autograft Text: The defect edges were debeveled with a #15 scalpel blade.  The primary defect was closed partially with a complex linear closure.  Given the location of the defect, shape of the defect and the proximity to free margins an dermal autograft was deemed most appropriate to repair the remaining defect.  The graft was trimmed to fit the size of the remaining defect.  The graft was then placed in the primary defect, oriented appropriately, and sutured into place.
Complex Repair And Tissue Cultured Epidermal Autograft Text: The defect edges were debeveled with a #15 scalpel blade.  The primary defect was closed partially with a complex linear closure.  Given the location of the defect, shape of the defect and the proximity to free margins an tissue cultured epidermal autograft was deemed most appropriate to repair the remaining defect.  The graft was trimmed to fit the size of the remaining defect.  The graft was then placed in the primary defect, oriented appropriately, and sutured into place.
Complex Repair And Xenograft Text: The defect edges were debeveled with a #15 scalpel blade.  The primary defect was closed partially with a complex linear closure.  Given the location of the defect, shape of the defect and the proximity to free margins a xenograft was deemed most appropriate to repair the remaining defect.  The graft was trimmed to fit the size of the remaining defect.  The graft was then placed in the primary defect, oriented appropriately, and sutured into place.
Complex Repair And Skin Substitute Graft Text: The defect edges were debeveled with a #15 scalpel blade.  The primary defect was closed partially with a complex linear closure.  Given the location of the remaining defect, shape of the defect and the proximity to free margins a skin substitute graft was deemed most appropriate to repair the remaining defect.  The graft was trimmed to fit the size of the remaining defect.  The graft was then placed in the primary defect, oriented appropriately, and sutured into place.
Path Notes (To The Dermatopathologist): Please check margins.
Consent was obtained from the patient. The risks and benefits to therapy were discussed in detail. Specifically, the risks of infection, scarring, bleeding, prolonged wound healing, incomplete removal, allergy to anesthesia, nerve injury and recurrence were addressed. Prior to the procedure, the treatment site was clearly identified and confirmed by the patient. All components of Universal Protocol/PAUSE Rule completed.
Post-Care Instructions: I reviewed with the patient in detail post-care instructions. Patient is not to engage in any heavy lifting, exercise, or swimming for the next 14 days. Should the patient develop any fevers, chills, bleeding, severe pain patient will contact the office immediately.
Home Suture Removal Text: Patient was provided a home suture removal kit and will remove their sutures at home.  If they have any questions or difficulties they will call the office.
Where Do You Want The Question To Include Opioid Counseling Located?: Case Summary Tab
Information: Selecting Yes will display possible errors in your note based on the variables you have selected. This validation is only offered as a suggestion for you. PLEASE NOTE THAT THE VALIDATION TEXT WILL BE REMOVED WHEN YOU FINALIZE YOUR NOTE. IF YOU WANT TO FAX A PRELIMINARY NOTE YOU WILL NEED TO TOGGLE THIS TO 'NO' IF YOU DO NOT WANT IT IN YOUR FAXED NOTE.

## 2024-08-19 ENCOUNTER — VIRTUAL VISIT (OUTPATIENT)
Dept: CARDIOLOGY | Facility: CLINIC | Age: 59
End: 2024-08-19
Attending: PHYSICIAN ASSISTANT
Payer: COMMERCIAL

## 2024-08-19 VITALS — BODY MASS INDEX: 31.5 KG/M2 | WEIGHT: 225 LBS | HEIGHT: 71 IN

## 2024-08-19 DIAGNOSIS — E66.811 CLASS 1 OBESITY DUE TO EXCESS CALORIES WITHOUT SERIOUS COMORBIDITY WITH BODY MASS INDEX (BMI) OF 32.0 TO 32.9 IN ADULT: Primary | ICD-10-CM

## 2024-08-19 DIAGNOSIS — K76.0 FATTY LIVER: ICD-10-CM

## 2024-08-19 DIAGNOSIS — E66.09 CLASS 1 OBESITY DUE TO EXCESS CALORIES WITHOUT SERIOUS COMORBIDITY WITH BODY MASS INDEX (BMI) OF 32.0 TO 32.9 IN ADULT: Primary | ICD-10-CM

## 2024-08-19 ASSESSMENT — PAIN SCALES - GENERAL: PAINLEVEL: NO PAIN (0)

## 2024-08-19 NOTE — PATIENT INSTRUCTIONS
"Recommendations from MTM Pharmacist visit:                                                    MTM (medication therapy management) is a service provided by a clinical pharmacist designed to help you get the most of out of your medicines.  You may be sent a phone or email survey evaluating today's visit.  Please provide feedback you have for the service he received today if you are able.      Continue Zepbound 7.5 mg weekly    To help with tolerability and effectiveness of Zepbound :  Eat small meals/snacks throughout the day (about every 2 hours)  Focus on getting protein in first with each meal and snack.   Drink plenty of water - goal 64 oz throughout the day  You may try Metamucil, Benefiber, or Citrucel to help feel more full (less nausea) and have softer, more consistent bowel movements.  To optimize weight management - work on incorporating resistance training/weight lifting to build muscle and improve overall metabolism of adipose tissue.      Follow-up: 10/29 with Christina Meeks PA-C  2/17 with Norma Whiting, [facility]         It was great speaking with you today.  I value your experience and would be very thankful for your time in providing feedback in our clinic survey. In the next few days, you may receive an email or text message from [employer] with a link to a survey related to your  clinical pharmacist.\"     To schedule another MTM appointment, please call the clinic directly ([facility] Phone Number: [phone] (schedules for [facility] and [facility] - providers, dietitians, health coaches) or you may call the MTM scheduling line at [phone] or toll-free at [phone].     My Clinical Pharmacist's contact information:                                                      Please feel free to contact me with any questions or concerns you have.      Norma Whiting, Pharm D., MPH    Medication Therapy Management Pharmacist   [facility]          Meal Replacement Shake Options:   *Protein Shake Criteria: no more than 210 Calories, at least 20 grams of protein, and less than 10 grams of sugar   Premier Protein (160 Calories, 30 g protein)  Slim Fast Advanced Nutrition (180 Calories, 20 g protein)  Muscle Milk, lactose-free, 17 oz bottle (210 Calories, 30 g protein)  Integrated Supplements, no artificial sugars (110 Calories, 20 g protein)  Boost/Ensure Max (160 calories, 30 gm protein)   Fairlife Protein Shakes (160-230 calories, 26-42 gm protein)  Ald[employer]'s [facility] Protein Powder (180 calories, 30 gm protein)   Orgain Protein Shakes (130-160 calories, 20-26 gm protein)     Meal Replacement Bar Options:  Quest Protein Bars (190 Calories, 20 g protein)  Built Bar (170 Calories, 15-20 g protein)  One Protein Bar (210 calories, 20 g protein)  Brooke Signature Protein Bar ([employer]) (190 Calories, 21 g protein)  Pure Protein Bars (180 Calories, 21 g protein)    Low Calorie Frozen Meal:  Healthy Choice Power Bowls  Jose Cuisine  Smart Ones  John Chow      ---------------------------------------------------------------  Tips to Increase the Protein in Your Diet  You may need more protein in your diet to help you heal from an illness, surgery or wound. Extra protein can also help you gain weight. Here are some ideas for adding high-protein foods to your meals.  Meat and fish  Add chopped cooked meat to vegetables, salads, casseroles, soups, sauces and biscuit dough.  Use in omelets, soufflés, quiches, sandwich fillings and chicken or turkey stuffing.  Wrap in pie crust or biscuit dough to make a turnover.  Add to stuffed baked potatoes.  Make a dip with diced meat or flaked fish mixed with sour cream and spices.  Chopped, hard-cooked eggs  Add to salads.  Use for snacks and sandwich filling.  High-protein milk  To make high-protein milk, mix 1 quart whole milk with 1 cup powdered milk.  Add to cream soups, mashed potatoes,  scrambled eggs, cereals and dried eggnog mix.  Use as an ingredient in puddings, custards, hot chocolate, milk shakes and pancakes.  Powdered milk  If you don't have any high-protein milk on hand, you can use powdered milk. Add 3 tablespoons to:  gravies, sauces, cream soups, mayonnaise  casseroles, meat patties, meatloaf, tuna salad, deviled ham  scalloped or mashed potatoes, creamed spinach  scrambled eggs, egg salad  cereals  yogurt, milk drinks, ice cream, frozen desserts, puddings, custards.  Add 4 to 6 tablespoons powdered milk to make:  cream sauces  breads, muffins, pancakes, waffles, cookies, cakes  cream pies, frostings, cake fillings  fruit cobblers, bread or rice pudding, gelatin desserts.  For high-protein eggnog, add 3 to 6 tablespoons powdered milk to prepared eggnog.  Hard or soft cheese  Melt on sandwiches, breads, tortillas, hamburgers, hot dogs, other meats, vegetables, eggs and pies.  Grate into soups, chili, sauces, casseroles, vegetables, potatoes, rice, noodles or meatloaf.  Eat with toast or crackers, or melt for charanjit dip.  Cottage cheese or ricotta cheese  Mix with or scoop on top of fruits and vegetables.  Add to casseroles, lasagna, spaghetti, noodles and egg dishes (omelets, scrambled eggs, soufflés).  Use in gelatin, pudding-type desserts, cheesecake and pancake batter.  Use to stuff crepes, pasta shells or manicotti.  Fruit yogurt  Blend with fruits for a fruit smoothie.  Use as a dip for fruits and vegetables.  Scoop on top of pancakes or waffles.  Tofu  Blend silken tofu with fruits and juices for a smoothie.  Add chunks of firm tofu to soups and stews, or crumble into meatloaf.  Blend dried onion soup mix into soft or silken tofu for dip.  Use pureed silken tofu for part of the mayonnaise, sour cream, cream cheese or ricotta cheese called for in recipes.  Beans  Use cooked beans or peas in soups, casseroles, pasta, tacos and burritos.  Nuts and seeds  Note: For children under 3,  discuss with the child's care team.  Use in casseroles, breads, muffins, pancakes, cookies and waffles.  Sprinkle on fruits, cereals, ice cream, yogurt, vegetables and salads.  Mix with raisins, dried fruits and chocolate chips for a snack.  Nut butters  Note: For children under 3, discuss with the child's care team.  Spread on sandwiches, toast, muffins, crackers, waffles, pancakes and fruit slices.  Use as a dip for raw vegetables.  Blend with milk drinks, or swirl through ice cream, yogurt or hot cereal.  Nutrition supplements (nutrition bars, drinks and powders)  Add powders to milk drinks and desserts.  Mix with ice cream, milk and fruit for a high-protein milk shake.    For informational purposes only. Not to replace the advice of your health care provider. Clinically reviewed by Annie Suarez, ROSALIO, PB, and the Clinical Nutrition Service Line. Copyright   2005 [facility]. All rights reserved. [employer] 535452 - REV 04/24.      -----------------------------------------------------------------------------------------------------------------  Learning About High-Protein Foods  What foods are high in protein?     The foods you eat contain nutrients, such as vitamins and minerals. Protein is a nutrient. Your body needs the right amount to stay healthy and work as it should. You can use the list below to help you make choices about which foods to eat.  Here are some examples of foods that are high in protein.  Dairy and dairy alternatives  Cheese  Milk  Soy milk  Yogurt (especially Greek)  Meat  Beef  Chicken  Ham  Lamb  Lunch meat  Pork  Sausage  Turkey  Other protein foods  Beans (black, garbanzo, kidney, lima)  Eggs  Hummus  Lentils  Nuts  Peanut butter and other nut butters  Peas  Soybeans  Tofu  Veggie or soy sachi (Check the nutrition label for the amount of protein in each serving.)  Seafood  Anchovies  Cod  Crab  Halibut  [place]  Sardines  Shrimp  Tilapia  Trout  Tuna  Protein  "supplements  Bars (Check the nutrition label for the amount of protein in each serving.)  Drinks  Powders  Work with your doctor to find out how much of this nutrient you need. Depending on your health, you may need more or less of it in your diet.  Where can you learn more?  Go to https://www.[employer].net/patiented  Enter P335 in the search box to learn more about \"Learning About High-Protein Foods.\"  Current as of: September 20, 2023               Content Version: 14.0    7668-2634 [employer].   Care instructions adapted under license by your healthcare professional. If you have questions about a medical condition or this instruction, always ask your healthcare professional. [employer] disclaims any warranty or liability for your use of this information.         "

## 2024-08-19 NOTE — NURSING NOTE
Current patient location: work    Is the patient currently in the state of [place]? YES    Visit mode:TELEPHONE    If the visit is dropped, the patient can be reconnected by: TELEPHONE VISIT: Phone number: [phone]    Will anyone else be joining the visit? NO  (If patient encounters technical issues they should call [phone])    How would you like to obtain your AVS? MyChart    Are changes needed to the allergy or medication list? Pt stated no changes to allergies and Pt stated no med changes    Are refills needed on medications prescribed by this physician? NO    Rooming Documentation:  Not applicable    Reason for visit: Medication Therapy Management    Wt other than 24 hrs:    Pain more than one location:  no  Cleo CORONA

## 2024-08-19 NOTE — LETTER
8/19/2024      RE: Mallory Sargent  [address]  [place] [address]       Dear Colleague,    Thank you for the opportunity to participate in the care of your patient, Mallory Sargent, at the [facility] [place] at [facility]. Please see a copy of my visit note below.    Medication Therapy Management (MTM) Encounter    ASSESSMENT:                            Medication Adherence/Access: No issues identified    Weight management : patient congratulated on >10 % tbw reduction and education provided on benefits of this to cardiovascular system and joint health.     Patient would benefit from continuing on pharmacotherapy for weight management. Given class I obesity, recommend optimizing GLP1/GIP therapy as data to support most significant weight loss and patient has no contraindications. Patient also realizing benefit from reduction in food noise and increased satiety. Given continued safety and efficacy of Zepbound 7.5mg weekly, recommend to continue this dose. Discussed dietary and behavioral modifications.      For patients that are under [employer] insurance coverage, it is mandated by insurance that each qualifying patient meet with hospital based Weight Management Medication Therapy Management pharmacist to continue therapy coverage. The following patient meets the below coverage criteria and can therefore continue GLP-1/GIP agonist therapy for Weight Management:    Adult  BMI >40 with or without comorbidities   OR   BMI >30 + NAFLD*   at time of initiating GLP-1/GIP agonist therapy Approved for 29 weeks  Met Updated Initial Criteria   At least 5% weight loss of baseline body weight  Approved for 12 months         ICD-10 code K76.0: fatty liver, non-alcoholic fatty liver, NAFLD, hepatic steatosis, metabolic associated steatotic liver disease(MASLD is the newer name)    US RUQ or abdomen - 8/6/2010      PLAN:                             Continue Zepbound 7.5 mg weekly    To help with tolerability and effectiveness of Zepbound :  Eat small meals/snacks throughout the day (about every 2 hours)  Focus on getting protein in first with each meal and snack.   Drink plenty of water - goal 64 oz throughout the day  You may try Metamucil, Benefiber, or Citrucel to help feel more full (less nausea) and have softer, more consistent bowel movements.  To optimize weight management - work on incorporating resistance training/weight lifting to build muscle and improve overall metabolism of adipose tissue.      Follow-up: 10/29 with Christina Meeks PA-C  2/17 with Norma Whiting [facility]     SUBJECTIVE/OBJECTIVE:                          Mallory Sargent is a 58 year old female seen for a follow-up visit - [facility] insurance requirements .       Reason for visit: Medication Therapy Management - GLP1/GIP Management R/[facility] insurance requirements .    Allergies/ADRs: Reviewed in chart  Past Medical History: Reviewed in chart  Tobacco: She reports that she quit smoking about 31 years ago. Her smoking use included cigarettes. She has never used smokeless tobacco.  Alcohol: rarely  Caffeine: 1 c coffee daily, occasional tea daily    Medication Adherence/Access: no issues reported    Weight Management  Zepbound 7.5 mg once weekly    Last Return Medical Weight Management Visit with Christina Meeks [facility] 5/31/24 - next visti 10/29/24    Patient reports no current medication side effects. Happy that able to avoid cravings and food noise significantly decreased. Feeling previously more obsessed with food when trying weight loss methods. Pleased with weight loss and improved relationship with food.    Nutrition/Eating Habits: focusing on fiber and protein.  40-60 oz water daily  Exercise/Activity: walking and hiking several days per week.     Medications Tried/Failed/Considerations:  Saxenda: history of nausea, vomiting, with this  Patient  "denies personal or family history of MEN Type2, MTC, Pancreatitis.     Initial Consult Weight: 252 lb     Current weight today: 225 lbs 0 oz  Cumulative Weight Loss: -27 lb, -10.7% from baseline    Wt Readings from Last 4 Encounters:   08/19/24 102.1 kg (225 lb)   05/31/24 107.5 kg (237 lb)   03/20/24 114.3 kg (252 lb)   02/22/24 114.3 kg (252 lb)     Estimated body mass index is 31.83 kg/m  as calculated from the following:    Height as of this encounter: 1.791 m (5' 10.5\").    Weight as of this encounter: 102.1 kg (225 lb).        Today's Vitals: Ht 1.791 m (5' 10.5\")   Wt 102.1 kg (225 lb)   LMP 06/09/2015   BMI 31.83 kg/m    ----------------      I spent 10 minutes with this patient today. All changes were made via collaborative practice agreement with Bonnie Awan. A copy of the visit note was provided to the patient's provider(s).    A summary of these recommendations was sent via [employer].    Norma Whiting, Pharm D., MPH    Medication Therapy Management Pharmacist   [facility]      Telemedicine Visit Details  Type of service:  Telephone visit  Start Time:  11:33 AM  End Time:  11:43 AM     Medication Therapy Recommendations  No medication therapy recommendations to display       Please do not hesitate to contact me if you have any questions/concerns.     Sincerely,     Norma Whiting, [facility]  "

## 2024-08-19 NOTE — Clinical Note
MIKHAIL Machuca is a [employer] employee so I have to use my cpa with Bonnie Awan to send in refills. Zepbound is great for monica! Over 10% weight loss and great relationship with food!  We kept her at 7.5mg for now. Seeing you in Oct (let me know if I need to send a prescription for increase dose at that visit per [employer] insurance requirements). Then will see me again in Feb!  Norma

## 2024-08-19 NOTE — PROGRESS NOTES
Medication Therapy Management (MTM) Encounter    ASSESSMENT:                            Medication Adherence/Access: No issues identified    Weight management : patient congratulated on >10 % tbw reduction and education provided on benefits of this to cardiovascular system and joint health.     Patient would benefit from continuing on pharmacotherapy for weight management. Given class I obesity, recommend optimizing GLP1/GIP therapy as data to support most significant weight loss and patient has no contraindications. Patient also realizing benefit from reduction in food noise and increased satiety. Given continued safety and efficacy of Zepbound 7.5mg weekly, recommend to continue this dose. Discussed dietary and behavioral modifications.      For patients that are under [employer] insurance coverage, it is mandated by insurance that each qualifying patient meet with hospital based Weight Management Medication Therapy Management pharmacist to continue therapy coverage. The following patient meets the below coverage criteria and can therefore continue GLP-1/GIP agonist therapy for Weight Management:    Adult  BMI >40 with or without comorbidities   OR   BMI >30 + NAFLD*   at time of initiating GLP-1/GIP agonist therapy Approved for 29 weeks  Met Updated Initial Criteria   At least 5% weight loss of baseline body weight  Approved for 12 months         ICD-10 code K76.0: fatty liver, non-alcoholic fatty liver, NAFLD, hepatic steatosis, metabolic associated steatotic liver disease(MASLD is the newer name)    US RUQ or abdomen - 8/6/2010      PLAN:                            Continue Zepbound 7.5 mg weekly    To help with tolerability and effectiveness of Zepbound :  Eat small meals/snacks throughout the day (about every 2 hours)  Focus on getting protein in first with each meal and snack.   Drink plenty of water - goal 64 oz throughout the day  You may try Metamucil, Benefiber, or Citrucel to help  feel more full (less nausea) and have softer, more consistent bowel movements.  To optimize weight management - work on incorporating resistance training/weight lifting to build muscle and improve overall metabolism of adipose tissue.      Follow-up: 10/29 with Christina Meeks PA-C  2/17 with Norma Whiting [facility]     SUBJECTIVE/OBJECTIVE:                          Mallory Sargent is a 58 year old female seen for a follow-up visit - [facility] insurance requirements .       Reason for visit: Medication Therapy Management - GLP1/GIP Management R/[facility] insurance requirements .    Allergies/ADRs: Reviewed in chart  Past Medical History: Reviewed in chart  Tobacco: She reports that she quit smoking about 31 years ago. Her smoking use included cigarettes. She has never used smokeless tobacco.  Alcohol: rarely  Caffeine: 1 c coffee daily, occasional tea daily    Medication Adherence/Access: no issues reported    Weight Management   Zepbound 7.5 mg once weekly    Last Return Medical Weight Management Visit with Christina [facility] 5/31/24 - next visti 10/29/24    Patient reports no current medication side effects. Happy that able to avoid cravings and food noise significantly decreased. Feeling previously more obsessed with food when trying weight loss methods. Pleased with weight loss and improved relationship with food.    Nutrition/Eating Habits: focusing on fiber and protein.  40-60 oz water daily  Exercise/Activity: walking and hiking several days per week.     Medications Tried/Failed/Considerations:  Saxenda: history of nausea, vomiting, with this  Patient denies personal or family history of MEN Type2, MTC, Pancreatitis.     Initial Consult Weight: 252 lb     Current weight today: 225 lbs 0 oz  Cumulative Weight Loss: -27 lb, -10.7% from baseline    Wt Readings from Last 4 Encounters:   08/19/24 102.1 kg (225 lb)   05/31/24 107.5 kg (237 lb)   03/20/24 114.3 kg (252 lb)   02/22/24 114.3 kg  "(252 lb)     Estimated body mass index is 31.83 kg/m  as calculated from the following:    Height as of this encounter: 1.791 m (5' 10.5\").    Weight as of this encounter: 102.1 kg (225 lb).        Today's Vitals: Ht 1.791 m (5' 10.5\")   Wt 102.1 kg (225 lb)   LMP 06/09/2015   BMI 31.83 kg/m    ----------------      I spent 10 minutes with this patient today. All changes were made via collaborative practice agreement with Bonnie Awan. A copy of the visit note was provided to the patient's provider(s).    A summary of these recommendations was sent via [employer].    Norma Whiting, Pharm D., MPH    Medication Therapy Management Pharmacist   [facility]      Telemedicine Visit Details  Type of service:  Telephone visit  Start Time:  11:33 AM  End Time:  11:43 AM     Medication Therapy Recommendations  No medication therapy recommendations to display   "

## 2024-08-19 NOTE — Clinical Note
FYI only - follows with Christina at [facility]; [employer] employee so our CPA used per [employer]/[facility] insurance requirements   isaac

## 2024-08-20 ENCOUNTER — APPOINTMENT (OUTPATIENT)
Dept: URBAN - METROPOLITAN AREA CLINIC 253 | Age: 59
Setting detail: DERMATOLOGY
End: 2024-08-21

## 2024-08-20 DIAGNOSIS — Z48.02 ENCOUNTER FOR REMOVAL OF SUTURES: ICD-10-CM

## 2024-08-20 PROCEDURE — OTHER SUTURE REMOVAL (GLOBAL PERIOD): OTHER

## 2024-08-20 PROCEDURE — OTHER PHOTO-DOCUMENTATION: OTHER

## 2024-08-20 ASSESSMENT — LOCATION ZONE DERM: LOCATION ZONE: NECK

## 2024-08-20 ASSESSMENT — LOCATION SIMPLE DESCRIPTION DERM: LOCATION SIMPLE: RIGHT ANTERIOR NECK

## 2024-08-20 ASSESSMENT — LOCATION DETAILED DESCRIPTION DERM: LOCATION DETAILED: RIGHT CLAVICULAR NECK

## 2024-08-20 NOTE — PROCEDURE: SUTURE REMOVAL (GLOBAL PERIOD)
Detail Level: Detailed
Add 78939 Cpt? (Important Note: In 2017 The Use Of 79057 Is Being Tracked By Cms To Determine Future Global Period Reimbursement For Global Periods): no

## 2024-10-16 ENCOUNTER — TELEPHONE (OUTPATIENT)
Dept: ENDOCRINOLOGY | Facility: CLINIC | Age: 59
End: 2024-10-16

## 2024-10-21 DIAGNOSIS — G47.00 INSOMNIA, UNSPECIFIED TYPE: ICD-10-CM

## 2024-10-21 RX ORDER — HYDROXYZINE HYDROCHLORIDE 25 MG/1
TABLET, FILM COATED ORAL
Qty: 60 TABLET | Refills: 1 | Status: SHIPPED | OUTPATIENT
Start: 2024-10-21

## 2024-10-21 NOTE — TELEPHONE ENCOUNTER
PA Initiation    Medication: ZEPBOUND 7.5 MG/0.5ML SC SOAJ  Insurance Company: [employer] - Phone [phone] Fax [phone]  Pharmacy Filling the Rx: [facility] - [place], [place] - [address]  Filling Pharmacy Phone:    Filling Pharmacy Fax:    Start Date: 10/21/2024    [id number]

## 2024-10-21 NOTE — TELEPHONE ENCOUNTER
Prior Authorization Approval    Medication: ZEPBOUND 7.5 MG/0.5ML SC SOAJ  Authorization Effective Date: 10/21/2024  Authorization Expiration Date: 12/31/2024  Approved Dose/Quantity: 4 pens per 28 days  Reference #: [id number]   Insurance Company: [employer] - Phone [phone] Fax [phone]  Expected CoPay: $    CoPay Card Available:      Financial Assistance Needed:   Which Pharmacy is filling the prescription: [facility] - [place], [place] - [address]  Pharmacy Notified: Yes  Patient Notified: By pharmacy

## 2024-10-23 NOTE — PROGRESS NOTES
"Mallory is a 58 year old who is being evaluated via a billable video visit.      The patient has been notified of following:     \"This video visit will be conducted via a call between you and your physician/provider. We have found that certain health care needs can be provided without the need for an in-person physical exam.  This service lets us provide the care you need with a video conversation.  If a prescription is necessary we can send it directly to your pharmacy.  If lab work is needed we can place an order for that and you can then stop by our lab to have the test done at a later time.    Video visits are billed at different rates depending on your insurance coverage.  Please reach out to your insurance provider with any questions.    If during the course of the call the physician/provider feels a video visit is not appropriate, you will not be charged for this service.\"    Patient has given verbal consent for Video visit? Yes    How would you like to obtain your AVS? MyChart    If the video visit is dropped, the invitation should be resent by: My Chart    Will anyone else be joining your video visit? No    I    Video-Visit Details    Type of service:  Video Visit    Originating Location (pt. Location): Home    Distant Location (provider location):   Off-Site - Provider Home Office    Platform used for Video Visit: Pantheon    Video Start Time: 11:33 AM    Video End Time:11:57 AM      10/29/2024    Return Medical Weight Management Note     Mallory Sargent  MRN:  [id number]  :  1965    Assessment & Plan   Problem List Items Addressed This Visit       Binge eating disorder     Current binge episodes controlled on Zepbound.  Takes Vyvanse day 6 or 7 with good results.  Will see if [facility] can order 3-month supply of Zepbound before  send no insurance coverage in ?    She will start taking Vyvanse daily when she runs out of Zepbound.  May also switch to Zepbound Direct from [facility].    She will " recheck insurance coverage for her eating disorder programs.  Will consider paying out-of-pocket in 2025 if binging increases off Zepbound               Relevant Medications    lisdexamfetamine (VYVANSE) 40 MG capsule    lisdexamfetamine (VYVANSE) 40 MG capsule (Start on 11/28/2024)    lisdexamfetamine (VYVANSE) 40 MG capsule (Start on 12/28/2024)    Overweight - Primary     Patient was congratulated on wt loss success thus far. Healthy habits to assist with further weight loss were discussed. Mallory will continue Zepbound and Vyvanse on day 6 or 7. Will see if MTM can order 3-month supply of Zepbound before Jan 1 send no insurance coverage in 2025.    She will start taking Vyvanse daily when she runs out of Zepbound in 2025.   May also switch to Zepbound Direct from [facility]    Goals: Add strength training twice a week            History of obesity     WEIGHT METRICS:  Body mass index is 29.85 kg/m .   Current Weight: 211 lb (95.7 kg) (Patient reported)  Last Visits Weight: 237 lb (107.5 kg)  Initial Weight (lbs): 256 lbs  Cumulative weight loss (lbs): 45  Weight Loss Percentage: 17.58%             AOM Considerations:   GLP-1:              Saxenda. Wegovy worked well for binge and wt loss but GI SE.  Zepbound working well.  No binge episodes since starting.  No GI side effects.  Having trouble with supply.              Naltrexone:       Not effective  Vyvanse:         Worked at first then hunger binges not controlled. Switched to GLP.   Recently restarted Vyvanse since needing to be on lower Zepbound dose.  This combo has worked well. 5/31/2024.      PATIENT INSTRUCTIONS:  Continue Zepbound 7.5 mg weekly  Will contact him to see if we can order a 3-month supply before the end of the year    Please get blood pressure/pulse checked.  If within normal limits, we can continue Vyvanse use.  1 Zepbound supply exhausted, increase Vyvanse to daily use.    Follow-up with dietitian    Labs ordered.  Please call [phone]  to set up a lab appt.     Goals:  Add strength training twice a week or a few minutes daily to help preserve muscle mass.  This is the most important thing you can do to maintain your weight.    Follow up:    Call [phone] to schedule next visit in 6 mo.    30 minutes spent on the date of the encounter doing chart review, history and exam, result review, counseling, developing plan of care, documentation, and further activities as noted      INTERVAL HISTORY:  Mallory returns for medical weight management follow up.  Last seen on 5/31/2024.  Patient is in the [facility] MTM program.  On 7.5 mg weekly.   Patient has benefit from reduction in food noise and increased satiety. NO binging episodes since starting Zepbound.  Uses Vyvanse when.      Has symptoms consistent with binge eating disorder. Discussed importance of treating eating disorder Recommended ED evaluation.     Did look into the BED appt with Shira. It was going to be $800 OOP.  May consider this in the new year if binging reoccurs off Zepbound.    WEIGHT METRICS:  Body mass index is 29.85 kg/m .   Current Weight: 211 lb (95.7 kg) (Patient reported)  Last Visits Weight: 237 lb (107.5 kg)  Initial Weight (lbs): 256 lbs  Cumulative weight loss (lbs): 45  Weight Loss Percentage: 17.58%    Wt Readings from Last 10 Encounters:   10/29/24 211 lb (95.7 kg)   08/19/24 225 lb (102.1 kg)   05/31/24 237 lb (107.5 kg)   03/20/24 252 lb (114.3 kg)   02/22/24 252 lb (114.3 kg)   01/11/24 245 lb (111.1 kg)   10/10/23 245 lb (111.1 kg)   07/27/23 230 lb (104.3 kg)   06/27/23 229 lb (103.9 kg)   01/06/23 219 lb (99.3 kg)                 10/29/2024     9:12 AM   Weight Loss Medication History Reviewed With Patient   Which weight loss medications are you currently taking on a regular basis? Zepbound   If you are not taking a weight loss medication that was prescribed to you, please indicate why: Other   Are you having any side effects from the weight loss medication that we  "have prescribed you? No   Diet Recall:  Wake up:  B: Small Protein rich  L: Late lunch  D: Bowl of cottage cheese           10/29/2024     9:12 AM   Changes and Difficulties   I have made the following changes to my diet since my last visit: None   With regards to my diet, I am still struggling with: None   I have made the following changes to my activity/exercise since my last visit: More walking- just got back from [facility].  Has been walking on the weekend.  2-3 times a week.     With regards to my activity/exercise, I am still struggling with: Not exercising every day, has some little hand weights.     Might be able to switch to the OOP of Zepbound.      LABS:  Reviewed in Epic    BP Readings from Last 6 Encounters:   01/11/24 120/80   01/06/23 112/72   12/30/22 113/66   05/03/22 112/74   11/26/21 118/80   09/16/21 112/82       Pulse Readings from Last 6 Encounters:   01/11/24 117   01/06/23 96   12/30/22 66   05/03/22 73   11/26/21 95   09/16/21 99       PE:  Ht 5' 10.5\" (1.791 m)   Wt 211 lb (95.7 kg)   LMP 06/09/2015   BMI 29.85 kg/m    GENERAL: Healthy, alert and no distress  RESP: No audible wheeze, cough, or visible cyanosis.  No visible retractions or increased work of breathing.    SKIN: Visible skin clear. No significant rash, abnormal pigmentation or lesions.  PSYCH: Mentation appears normal, affect normal/bright, judgement and insight intact        "

## 2024-10-29 ENCOUNTER — VIRTUAL VISIT (OUTPATIENT)
Dept: SURGERY | Facility: CLINIC | Age: 59
End: 2024-10-29
Payer: COMMERCIAL

## 2024-10-29 ENCOUNTER — TELEPHONE (OUTPATIENT)
Dept: SURGERY | Facility: CLINIC | Age: 59
End: 2024-10-29

## 2024-10-29 ENCOUNTER — TELEPHONE (OUTPATIENT)
Dept: PHARMACY | Facility: CLINIC | Age: 59
End: 2024-10-29

## 2024-10-29 VITALS — HEIGHT: 71 IN | BODY MASS INDEX: 29.54 KG/M2 | WEIGHT: 211 LBS

## 2024-10-29 DIAGNOSIS — Z86.39 HISTORY OF OBESITY: ICD-10-CM

## 2024-10-29 DIAGNOSIS — E66.09 CLASS 1 OBESITY DUE TO EXCESS CALORIES WITHOUT SERIOUS COMORBIDITY WITH BODY MASS INDEX (BMI) OF 32.0 TO 32.9 IN ADULT: ICD-10-CM

## 2024-10-29 DIAGNOSIS — E66.811 CLASS 1 OBESITY DUE TO EXCESS CALORIES WITHOUT SERIOUS COMORBIDITY WITH BODY MASS INDEX (BMI) OF 32.0 TO 32.9 IN ADULT: ICD-10-CM

## 2024-10-29 DIAGNOSIS — E66.3 OVERWEIGHT: Primary | ICD-10-CM

## 2024-10-29 DIAGNOSIS — F50.810 MILD BINGE-EATING DISORDER: ICD-10-CM

## 2024-10-29 PROCEDURE — G2211 COMPLEX E/M VISIT ADD ON: HCPCS | Mod: 95 | Performed by: PHYSICIAN ASSISTANT

## 2024-10-29 PROCEDURE — 99214 OFFICE O/P EST MOD 30 MIN: CPT | Mod: 95 | Performed by: PHYSICIAN ASSISTANT

## 2024-10-29 RX ORDER — LISDEXAMFETAMINE DIMESYLATE 40 MG/1
40 CAPSULE ORAL DAILY
Qty: 30 CAPSULE | Refills: 0 | Status: SHIPPED | OUTPATIENT
Start: 2024-12-28 | End: 2025-01-27

## 2024-10-29 RX ORDER — LISDEXAMFETAMINE DIMESYLATE 40 MG/1
40 CAPSULE ORAL DAILY
Qty: 30 CAPSULE | Refills: 0 | Status: SHIPPED | OUTPATIENT
Start: 2024-11-28 | End: 2024-12-28

## 2024-10-29 RX ORDER — LISDEXAMFETAMINE DIMESYLATE 40 MG/1
40 CAPSULE ORAL DAILY
Qty: 30 CAPSULE | Refills: 0 | Status: SHIPPED | OUTPATIENT
Start: 2024-10-29 | End: 2024-11-28

## 2024-10-29 NOTE — ASSESSMENT & PLAN NOTE
WEIGHT METRICS:  Body mass index is 29.85 kg/m .   Current Weight: 211 lb (95.7 kg) (Patient reported)  Last Visits Weight: 237 lb (107.5 kg)  Initial Weight (lbs): 256 lbs  Cumulative weight loss (lbs): 45  Weight Loss Percentage: 17.58%   ACP

## 2024-10-29 NOTE — Clinical Note
Khurram Green,  Do ou know if Mallory is able to do a 3-month supply of Zepbound through [employer] insurance?  I would like to order this before the first of the year.  She follows up with you in February.  She will start taking Vyvanse daily when she runs out of Zepbound.  May also switch to Zepbound Direct from [facility].  CALEB

## 2024-10-29 NOTE — PATIENT INSTRUCTIONS
Nice to talk with you today. Below is the plan discussed.-  TOMA Vasquez      Pt Instructions:  Continue Zepbound 7.5 mg weekly  Will contact him to see if we can order a 3-month supply before the end of the year    Please get blood pressure/pulse checked.  If within normal limits, we can continue Vyvanse use.  1 Zepbound supply exhausted, increase Vyvanse to daily use.    Follow-up with dietitian    Labs ordered.  Please call [phone] to set up a lab appt.     Goals:  Add strength training twice a week or a few minutes daily to help preserve muscle mass.  This is the most important thing you can do to maintain your weight.    Follow up:    Call [phone] to schedule next visit in 6 mo.    Options for continuing GLP1/GIP agonist in 2025:  Pay out of pocket for Wegovy or Zepbound pens (>$1000/month cash price without savings card)   Zepbound cost with Zepbound savings card (link below to sign up for this): $650/month with card  https://www.enrollment.zepbound.[employer]A.com/enroll/checkEnrollment  Wegovy cost with Wegovy savings card (link below to sign up for this): $650/month with card   https://www.shenzhoufu/coverage-and-savings/save-on-wegovy.html    Zepbound Vials through [facility] CASH [facility] pharmacy - vials only available in 2.5 mg and 5 mg doses  $399/month for 2.5mg vials  $549/month for 5mg vials   $5 per month for administrations supplies (syringe/needles, etc)

## 2024-10-29 NOTE — TELEPHONE ENCOUNTER
Refill Zepbound 7.5mg x 90 day supply sent using cpa with Bonnie Awan PA-C per [employer] insurance requirements 2024.    Norma Whiting, Pharm D., MPH    Medication Therapy Management Pharmacist   [facility]    Message  Received: Today  Christina Meeks PA-C [place], Ann, [facility] Norma,    Do ou know if Mallory is able to do a 3-month supply of Zepbound through [place] insurance?  I would like to order this before the first of the year.  She follows up with you in February.  She will start taking Vyvanse daily when she runs out of Zepbound.  May also switch to Zepbound Direct from [facility].    CALEB

## 2024-10-29 NOTE — ASSESSMENT & PLAN NOTE
Current binge episodes controlled on Zepbound.  Takes Vyvanse day 6 or 7 with good results.  Will see if [facility] can order 3-month supply of Zepbound before Jan 1 send no insurance coverage in 2025?    She will start taking Vyvanse daily when she runs out of Zepbound.  May also switch to Zepbound Direct from [employer].    She will recheck insurance coverage for her eating disorder programs.  Will consider paying out-of-pocket in 2025 if binging increases off Zepbound

## 2024-10-29 NOTE — TELEPHONE ENCOUNTER
General Call      Reason for Call:  appt today    What are your questions or concerns:  patient has an 11:30 w/Christina Coombsk. She hasn't gotten a link & is unable to join on [employer].    Date of last appointment with provider: 10/29/24    Could we send this information to you in [employer] or would you prefer to receive a phone call?:   Patient would prefer a phone call   Okay to leave a detailed message?: Yes at Cell number on file:    Telephone Information:   Mobile [phone]

## 2024-10-29 NOTE — ASSESSMENT & PLAN NOTE
Patient was congratulated on wt loss success thus far. Healthy habits to assist with further weight loss were discussed. Mallory will continue Zepbound and Vyvanse on day 6 or 7. Will see if [facility] can order 3-month supply of Zepbound before Jan 1 send no insurance coverage in 2025.    She will start taking Vyvanse daily when she runs out of Zepbound in 2025.   May also switch to Zepbound Direct from [facility]    Goals: Add strength training twice a week

## 2024-11-14 ENCOUNTER — ALLIED HEALTH/NURSE VISIT (OUTPATIENT)
Dept: INTERNAL MEDICINE | Facility: CLINIC | Age: 59
End: 2024-11-14
Payer: COMMERCIAL

## 2024-11-14 ENCOUNTER — LAB (OUTPATIENT)
Dept: LAB | Facility: CLINIC | Age: 59
End: 2024-11-14
Payer: COMMERCIAL

## 2024-11-14 VITALS — DIASTOLIC BLOOD PRESSURE: 78 MMHG | SYSTOLIC BLOOD PRESSURE: 111 MMHG | HEART RATE: 78 BPM

## 2024-11-14 DIAGNOSIS — Z01.30 BP CHECK: Primary | ICD-10-CM

## 2024-11-14 DIAGNOSIS — E66.09 CLASS 1 OBESITY DUE TO EXCESS CALORIES WITH SERIOUS COMORBIDITY AND BODY MASS INDEX (BMI) OF 33.0 TO 33.9 IN ADULT: ICD-10-CM

## 2024-11-14 DIAGNOSIS — E66.811 CLASS 1 OBESITY DUE TO EXCESS CALORIES WITH SERIOUS COMORBIDITY AND BODY MASS INDEX (BMI) OF 33.0 TO 33.9 IN ADULT: ICD-10-CM

## 2024-11-14 LAB
EST. AVERAGE GLUCOSE BLD GHB EST-MCNC: 108 MG/DL
HBA1C MFR BLD: 5.4 %

## 2024-11-14 PROCEDURE — 83036 HEMOGLOBIN GLYCOSYLATED A1C: CPT

## 2024-11-14 PROCEDURE — 36415 COLL VENOUS BLD VENIPUNCTURE: CPT

## 2025-01-14 ENCOUNTER — HOSPITAL ENCOUNTER (OUTPATIENT)
Dept: MAMMOGRAPHY | Facility: CLINIC | Age: 60
Discharge: HOME | End: 2025-01-14
Attending: NURSE PRACTITIONER
Payer: COMMERCIAL

## 2025-01-14 DIAGNOSIS — Z12.31 VISIT FOR SCREENING MAMMOGRAM: ICD-10-CM

## 2025-01-14 PROCEDURE — 77063 BREAST TOMOSYNTHESIS BI: CPT

## 2025-01-14 PROCEDURE — 77067 SCR MAMMO BI INCL CAD: CPT

## 2025-02-05 ENCOUNTER — APPOINTMENT (OUTPATIENT)
Dept: URBAN - METROPOLITAN AREA CLINIC 253 | Age: 60
Setting detail: DERMATOLOGY
End: 2025-02-05

## 2025-02-05 VITALS — WEIGHT: 204 LBS | HEIGHT: 72 IN

## 2025-02-05 DIAGNOSIS — D22 MELANOCYTIC NEVI: ICD-10-CM

## 2025-02-05 DIAGNOSIS — Z71.89 OTHER SPECIFIED COUNSELING: ICD-10-CM

## 2025-02-05 DIAGNOSIS — L21.8 OTHER SEBORRHEIC DERMATITIS: ICD-10-CM

## 2025-02-05 DIAGNOSIS — Z85.828 PERSONAL HISTORY OF OTHER MALIGNANT NEOPLASM OF SKIN: ICD-10-CM

## 2025-02-05 DIAGNOSIS — L81.8 OTHER SPECIFIED DISORDERS OF PIGMENTATION: ICD-10-CM

## 2025-02-05 DIAGNOSIS — L81.4 OTHER MELANIN HYPERPIGMENTATION: ICD-10-CM

## 2025-02-05 DIAGNOSIS — D18.0 HEMANGIOMA: ICD-10-CM

## 2025-02-05 DIAGNOSIS — L57.8 OTHER SKIN CHANGES DUE TO CHRONIC EXPOSURE TO NONIONIZING RADIATION: ICD-10-CM

## 2025-02-05 DIAGNOSIS — L01.01 NON-BULLOUS IMPETIGO: ICD-10-CM

## 2025-02-05 PROBLEM — D18.01 HEMANGIOMA OF SKIN AND SUBCUTANEOUS TISSUE: Status: ACTIVE | Noted: 2025-02-05

## 2025-02-05 PROBLEM — D22.72 MELANOCYTIC NEVI OF LEFT LOWER LIMB, INCLUDING HIP: Status: ACTIVE | Noted: 2025-02-05

## 2025-02-05 PROBLEM — D22.22 MELANOCYTIC NEVI OF LEFT EAR AND EXTERNAL AURICULAR CANAL: Status: ACTIVE | Noted: 2025-02-05

## 2025-02-05 PROCEDURE — OTHER PRESCRIPTION: OTHER

## 2025-02-05 PROCEDURE — OTHER PRESCRIPTION MEDICATION MANAGEMENT: OTHER

## 2025-02-05 PROCEDURE — OTHER COUNSELING: OTHER

## 2025-02-05 PROCEDURE — OTHER REASSURANCE: OTHER

## 2025-02-05 PROCEDURE — 99214 OFFICE O/P EST MOD 30 MIN: CPT

## 2025-02-05 PROCEDURE — OTHER DEFER: OTHER

## 2025-02-05 PROCEDURE — OTHER MIPS QUALITY: OTHER

## 2025-02-05 PROCEDURE — OTHER PHOTO-DOCUMENTATION: OTHER

## 2025-02-05 RX ORDER — FLUOCINOLONE ACETONIDE 0.11 MG/ML
0.01% OIL TOPICAL BID
Qty: 118.28 | Refills: 3 | Status: ERX | COMMUNITY
Start: 2025-02-05

## 2025-02-05 RX ORDER — DOXYCYCLINE 100 MG/1
100MG CAPSULE ORAL BID
Qty: 20 | Refills: 0 | Status: ERX | COMMUNITY
Start: 2025-02-05

## 2025-02-05 ASSESSMENT — LOCATION DETAILED DESCRIPTION DERM
LOCATION DETAILED: LEFT MEDIAL UPPER BACK
LOCATION DETAILED: LEFT ANTERIOR PROXIMAL THIGH
LOCATION DETAILED: LEFT LATERAL BREAST 1-2:00 REGION
LOCATION DETAILED: LEFT CRUS OF HELIX
LOCATION DETAILED: RIGHT CLAVICULAR NECK
LOCATION DETAILED: RIGHT CENTRAL PARIETAL SCALP
LOCATION DETAILED: RIGHT MEDIAL FRONTAL SCALP

## 2025-02-05 ASSESSMENT — LOCATION SIMPLE DESCRIPTION DERM
LOCATION SIMPLE: LEFT BREAST
LOCATION SIMPLE: RIGHT ANTERIOR NECK
LOCATION SIMPLE: LEFT UPPER BACK
LOCATION SIMPLE: RIGHT SCALP
LOCATION SIMPLE: LEFT THIGH
LOCATION SIMPLE: SCALP
LOCATION SIMPLE: LEFT EAR

## 2025-02-05 ASSESSMENT — LOCATION ZONE DERM
LOCATION ZONE: EAR
LOCATION ZONE: LEG
LOCATION ZONE: NECK
LOCATION ZONE: TRUNK
LOCATION ZONE: SCALP

## 2025-02-05 NOTE — PROCEDURE: PRESCRIPTION MEDICATION MANAGEMENT
Render In Strict Bullet Format?: No
Initiate Treatment: doxycycline monohydrate 100 mg capsule BID
Detail Level: Zone
Continue Regimen: -ketoconazole shampoo\\n-fluocinonide solution
Initiate Treatment: Derma-Smoothe/FS Scalp Oil 0.01 % Bid
Plan: -RTC in 6 months for a FBSE and scalp recheck.

## 2025-02-05 NOTE — HPI: FULL BODY SKIN EXAMINATION
How Severe Are Your Spot(S)?: mild
What Type Of Note Output Would You Prefer (Optional)?: Bullet Format
What Is The Reason For Today's Visit?: Full Body Skin Examination
What Is The Reason For Today's Visit? (Being Monitored For X): concerning skin lesions on an annual basis
Additional History: The patient presents for a FBSE with concerns of a new, flaking lesion that is located on her left anterior calf.  She states it has been present for the last 3 months.

## 2025-02-07 PROBLEM — D12.6 COLON ADENOMA: Status: ACTIVE | Noted: 2025-02-07

## 2025-02-17 ENCOUNTER — VIRTUAL VISIT (OUTPATIENT)
Dept: PHARMACY | Facility: CLINIC | Age: 60
End: 2025-02-17
Attending: PHYSICIAN ASSISTANT
Payer: COMMERCIAL

## 2025-02-17 ENCOUNTER — TELEPHONE (OUTPATIENT)
Dept: SURGERY | Facility: CLINIC | Age: 60
End: 2025-02-17
Payer: COMMERCIAL

## 2025-02-17 VITALS — BODY MASS INDEX: 28 KG/M2 | WEIGHT: 200 LBS | HEIGHT: 71 IN

## 2025-02-17 DIAGNOSIS — E66.09 CLASS 1 OBESITY DUE TO EXCESS CALORIES WITHOUT SERIOUS COMORBIDITY WITH BODY MASS INDEX (BMI) OF 32.0 TO 32.9 IN ADULT: ICD-10-CM

## 2025-02-17 DIAGNOSIS — E66.811 CLASS 1 OBESITY DUE TO EXCESS CALORIES WITHOUT SERIOUS COMORBIDITY WITH BODY MASS INDEX (BMI) OF 32.0 TO 32.9 IN ADULT: ICD-10-CM

## 2025-02-17 ASSESSMENT — PAIN SCALES - GENERAL: PAINLEVEL_OUTOF10: NO PAIN (0)

## 2025-02-17 NOTE — Clinical Note
Mallory is doing great with Zepbound  7.5 mg! She will continue to pay out of pocket using savings card for now and is working to optimize  lifestyle modifications with current dose. Her goal weight is 170-175 lbs (she is about 200 lbs now).  I'm having schedulers call to follow up with her to get in to see me in 3 months and you in 6 monhts for follow up.

## 2025-02-17 NOTE — TELEPHONE ENCOUNTER
2/17 left  for staff msg      When you get a min (no rush), could someone please call patient and get her a follow up as close to 3 months as able with Christina Meeks PA-C with [facility]  and 6 months with me?     If no openings, ok to switch that and have patient see me in 3 months and provider in 6.     Thank you!!   Norma

## 2025-02-17 NOTE — NURSING NOTE
Current patient location: [address] [place] [id number]    Is the patient currently in the state of [place]? YES    Visit mode: TELEPHONE    If the visit is dropped, the patient can be reconnected by:TELEPHONE VISIT: Phone number:   Telephone Information:   Mobile [phone]       Will anyone else be joining the visit? NO  (If patient encounters technical issues they should call [phone]    Are changes needed to the allergy or medication list? No    Are refills needed on medications prescribed by this physician? Discuss with provider    Rooming Documentation:  Questionnaire(s) not pre-assigned    Reason for visit: Medication Therapy Management    Joe CORONA

## 2025-02-17 NOTE — PATIENT INSTRUCTIONS
"Recommendations from MTM Pharmacist visit:                                                    MTM (medication therapy management) is a service provided by a clinical pharmacist designed to help you get the most of out of your medicines.  You may be sent a phone or email survey evaluating today's visit.  Please provide feedback you have for the service he received today if you are able.    Continue Zepbound 7.5 mg weekly using Zepbound Savings card.    To help with tolerability and effectiveness of Zepbound :  Eat 3 meals with protein focus daily to help with nausea. If you forget to eat, you may feel nausea as a hunger cue.  Focus on getting protein in first with each meal and snack.   A good starting goal is 60 g protein daily (track this, especially if at weight loss plateau). Once you consistently are getting 60g daily, try getting 90 g protein daily.  Drink plenty of water - goal 64 oz throughout the day  You may try Metamucil, Benefiber, or Citrucel to help feel more full (less nausea) and have softer, more consistent bowel movements.  To optimize weight management - work on incorporating resistance training/weight lifting to build muscle and improve overall metabolism of adipose tissue.      Follow-up: schedulers to call to schedule follow up with Norma Whiting, PharmD and Christina Meeks PA-C. You will see one of us in 3 months and the other in 6 months.    [facility] Phone Number: [phone] (schedules for [facility] and [facility] - providers, dietitians, health coaches)        It was great speaking with you today.  I value your experience and would be very thankful for your time in providing feedback in our clinic survey. In the next few days, you may receive an email or text message from [employer] with a link to a survey related to your  clinical pharmacist.\"     To schedule another MTM appointment, please call the clinic directly ([facility] Phone Number: [phone] (schedules for [facility] and [facility] - providers, dietitians, health coaches) or you may call the [facility] scheduling line at [phone] or toll-free at [phone].     My Clinical Pharmacist's contact information:                                                      Please feel free to contact me with any questions or concerns you have.      Norma Whiting, Pharm D., MPH    Medication Therapy Management Pharmacist   [facility]          Meal Replacement Shake Options:   *Protein Shake Criteria: no more than 210 Calories, at least 20 grams of protein, and less than 10 grams of sugar   Premier Protein (160 Calories, 30 g protein)  Slim Fast Advanced Nutrition (180 Calories, 20 g protein)  Muscle Milk, lactose-free, 17 oz bottle (210 Calories, 30 g protein)  Integrated Supplements, no artificial sugars (110 Calories, 20 g protein)  Boost/Ensure Max (160 calories, 30 gm protein)   Fairlife Protein Shakes (160-230 calories, 26-42 gm protein)  Aldi's Elevation Protein Powder (180 calories, 30 gm protein)   Orgain Protein Shakes (130-160 calories, 20-26 gm protein)     Meal Replacement Bar Options:  Quest Protein Bars (190 Calories, 20 g protein)  Built Bar (170 Calories, 15-20 g protein)  One Protein Bar (210 calories, 20 g protein)  Brooke Signature Protein Bar ([employer]) (190 Calories, 21 g protein)  Pure Protein Bars (180 Calories, 21 g protein)    Low Calorie Frozen Meal:  Healthy Choice Power Bowls  Lean Cuisine  Smart Ones  John Chow      ---------------------------------------------------------------  Tips to Increase the Protein in Your Diet  You may need more protein in your diet to help you heal from an illness, surgery or wound. Extra protein can also help you gain weight. Here are some ideas for adding high-protein foods to your meals.  Meat and fish  Add chopped cooked meat to vegetables,  salads, casseroles, soups, sauces and biscuit dough.  Use in omelets, soufflés, quiches, sandwich fillings and chicken or turkey stuffing.  Wrap in pie crust or biscuit dough to make a turnover.  Add to stuffed baked potatoes.  Make a dip with diced meat or flaked fish mixed with sour cream and spices.  Chopped, hard-cooked eggs  Add to salads.  Use for snacks and sandwich filling.  High-protein milk  To make high-protein milk, mix 1 quart whole milk with 1 cup powdered milk.  Add to cream soups, mashed potatoes, scrambled eggs, cereals and dried eggnog mix.  Use as an ingredient in puddings, custards, hot chocolate, milk shakes and pancakes.  Powdered milk  If you don't have any high-protein milk on hand, you can use powdered milk. Add 3 tablespoons to:  gravies, sauces, cream soups, mayonnaise  casseroles, meat patties, meatloaf, tuna salad, deviled ham  scalloped or mashed potatoes, creamed spinach  scrambled eggs, egg salad  cereals  yogurt, milk drinks, ice cream, frozen desserts, puddings, custards.  Add 4 to 6 tablespoons powdered milk to make:  cream sauces  breads, muffins, pancakes, waffles, cookies, cakes  cream pies, frostings, cake fillings  fruit cobblers, bread or rice pudding, gelatin desserts.  For high-protein eggnog, add 3 to 6 tablespoons powdered milk to prepared eggnog.  Hard or soft cheese  Melt on sandwiches, breads, tortillas, hamburgers, hot dogs, other meats, vegetables, eggs and pies.  Grate into soups, chili, sauces, casseroles, vegetables, potatoes, rice, noodles or meatloaf.  Eat with toast or crackers, or melt for charanjit dip.  Cottage cheese or ricotta cheese  Mix with or scoop on top of fruits and vegetables.  Add to casseroles, lasagna, spaghetti, noodles and egg dishes (omelets, scrambled eggs, soufflés).  Use in gelatin, pudding-type desserts, cheesecake and pancake batter.  Use to stuff crepes, pasta shells or manicotti.  Fruit yogurt  Blend with fruits for a fruit smoothie.  Use  as a dip for fruits and vegetables.  Scoop on top of pancakes or waffles.  Tofu  Blend silken tofu with fruits and juices for a smoothie.  Add chunks of firm tofu to soups and stews, or crumble into meatloaf.  Blend dried onion soup mix into soft or silken tofu for dip.  Use pureed silken tofu for part of the mayonnaise, sour cream, cream cheese or ricotta cheese called for in recipes.  Beans  Use cooked beans or peas in soups, casseroles, pasta, tacos and burritos.  Nuts and seeds  Note: For children under 3, discuss with the child's care team.  Use in casseroles, breads, muffins, pancakes, cookies and waffles.  Sprinkle on fruits, cereals, ice cream, yogurt, vegetables and salads.  Mix with raisins, dried fruits and chocolate chips for a snack.  Nut butters  Note: For children under 3, discuss with the child's care team.  Spread on sandwiches, toast, muffins, crackers, waffles, pancakes and fruit slices.  Use as a dip for raw vegetables.  Blend with milk drinks, or swirl through ice cream, yogurt or hot cereal.  Nutrition supplements (nutrition bars, drinks and powders)  Add powders to milk drinks and desserts.  Mix with ice cream, milk and fruit for a high-protein milk shake.    For informational purposes only. Not to replace the advice of your health care provider. Clinically reviewed by Annie Suarez, ROSALIO, LD, and the Clinical Nutrition Service Line. Copyright   2005 [facility]. All rights reserved. Fjuul [id number] - REV 04/24.      -----------------------------------------------------------------------------------------------------------------  Learning About High-Protein Foods  What foods are high in protein?     The foods you eat contain nutrients, such as vitamins and minerals. Protein is a nutrient. Your body needs the right amount to stay healthy and work as it should. You can use the list below to help you make choices about which foods to eat.  Here are some examples of foods that  "are high in protein.  Dairy and dairy alternatives  Cheese  Milk  Soy milk  Yogurt (especially Greek)  Meat  Beef  Chicken  Ham  Lamb  Lunch meat  Pork  Sausage  Turkey  Other protein foods  Hemp seeds!  Beans (black, garbanzo, kidney, lima)  Eggs  Hummus  Lentils  Nuts  Peanut butter and other nut butters  Peas  Soybeans  Tofu  Veggie or soy sachi (Check the nutrition label for the amount of protein in each serving.)  Seafood  Anchovies  Cod  Crab  Halibut  Vidal  Sardines  Shrimp  Tilapia  [place]  Tuna  Protein supplements  Bars (Check the nutrition label for the amount of protein in each serving.)  Drinks  Powders  Work with your doctor to find out how much of this nutrient you need. Depending on your health, you may need more or less of it in your diet.  Where can you learn more?  Go to https://www.protected-networks.com.net/patiented  Enter P335 in the search box to learn more about \"Learning About High-Protein Foods.\"  Current as of: September 20, 2023               Content Version: 14.0    [id number] [employer].   Care instructions adapted under license by your healthcare professional. If you have questions about a medical condition or this instruction, always ask your healthcare professional. [employer] disclaims any warranty or liability for your use of this information.         "

## 2025-02-17 NOTE — PROGRESS NOTES
Medication Therapy Management (MTM) Encounter    ASSESSMENT:                            Medication Adherence/Access: education provided today of [facility] insurance requirements changing - stopping GLP1/GIP Agonist coverage for weight management in 2025.   Discussed options for continuing GLP1/GIP agonist in 2025.     Weight management :    Patient congratulated on >26% total body weight loss and lifestyle modifications.     Due to [facility] insurance discontinuing coverage of GLP1 agonists for Weight Management in 2025 year, much of today's discussion was spent discussing next steps including alternative injectable options - paying out of pocket w/ savings card cost, compound product through [facility], and others. From this, patient wishing to continue Zepbound with savings card. Given current dose works well and working to optimize lifestyle modifications - and Discussed risk/benefit of continuing high dose GLP1/GIP agonist  or tapering to lower risk of weight regain/cycling as suggested from some studies -- patient elected to continue Zepbound 7.5mg and work on optimizing lifestyle modifications. Discussed dietary and behavioral modifications.          Discussed patient may transtion to Zepbound vials in the future if tapers for maintenance as lower cost option - no concern for needle/syringe injection.      PLAN:                            Continue Zepbound 7.5 mg weekly using Zepbound Savings card.    To help with tolerability and effectiveness of Zepbound :  Eat 3 meals with protein focus daily to help with nausea. If you forget to eat, you may feel nausea as a hunger cue.  Focus on getting protein in first with each meal and snack.   A good starting goal is 60 g protein daily (track this, especially if at weight loss plateau). Once you consistently are getting 60g daily, try getting 90 g protein daily.  Drink plenty of water - goal 64 oz throughout the day  You may try  Metamucil, Benefiber, or Citrucel to help feel more full (less nausea) and have softer, more consistent bowel movements.  To optimize weight management - work on incorporating resistance training/weight lifting to build muscle and improve overall metabolism of adipose tissue.      Follow-up: schedulers to call to schedule follow up with Norma Whiting, PharmD and Christina Meeks PA-C. You will see one of us in 3 months and the other in 6 months.    [facility] Phone Number: [phone] (schedules for [facility] and [facility] - providers, dietitians, health coaches)      SUBJECTIVE/OBJECTIVE:                          Mallory Sargent is a 59 year old female seen for a follow-up visit.       Reason for visit: Medication Therapy Management - weight management .    Allergies/ADRs: Reviewed in chart  Past Medical History: Reviewed in chart  Tobacco: She reports that she quit smoking about 31 years ago. Her smoking use included cigarettes. She has never used smokeless tobacco.  Alcohol: not currently using  Health Care Goals: to be in non-overweight BMI. Goal is about 170-175 lb. Feels this is a good goal for her.    Medication Adherence/Access: Medication barriers: obtaining medication from insurance. [facility] insurance requirements no longer covering GLP1/GIP agonist in 2025 - plans to use Zepbound Savings Card    Weight Management   Zepbound 7.5 mg once weekly    Last Return Medical Weight Management Visit with [facility] 10/29/24    Patient reports no current medication side effects. Happy that able to avoid cravings and food noise significantly decreased. Had thought she had noted some plateau with weight management, but upon reflection, continues to lose weight on current Zepbound dose and notes would like to optimize lifestyle modifications further as well to get more out of the current dose.    Nutrition/Eating Habits: see MyChart with  "Medication Therapy Management 2/10/24  Protein: estimates 50-60 g per day most days. Greek yogurt helps get more protein every day    Min 64oz water daily    Exercise/Activity: working to increase this - difficult with cold weather    Medications Tried/Failed/Considerations:  Saxenda: history of nausea, vomiting, with this  Patient denies personal or family history of MEN Type2, MTC, Pancreatitis.   Saxenda - had been on/off and regained (notes that this regain was likely due to not keeping up with lifestyle modifications), then restarted and transitioned to Zepbound which has been beneficial.    Initial Consult Weight: 252 lb       Current weight today: 200 lbs 0 oz  Cumulative Weight Loss: -52 lb, -26% from baseline    Wt Readings from Last 4 Encounters:   02/17/25 200 lb (90.7 kg)   01/17/25 205 lb 11.2 oz (93.3 kg)   10/29/24 211 lb (95.7 kg)   08/19/24 225 lb (102.1 kg)     Estimated body mass index is 28.29 kg/m  as calculated from the following:    Height as of this encounter: 5' 10.5\" (1.791 m).    Weight as of this encounter: 200 lb (90.7 kg).      Today's Vitals: Ht 5' 10.5\" (1.791 m)   Wt 200 lb (90.7 kg)   LMP 06/09/2015   BMI 28.29 kg/m    ----------------      I spent 20 minutes with this patient today. All changes were made via collaborative practice agreement with Christina Meeks.     A summary of these recommendations was sent via [employer].    Norma Whiting, Pharm D., MPH    Medication Therapy Management Pharmacist   [facility]      Telemedicine Visit Details  The patient's medications can be safely assessed via a telemedicine encounter.  Type of service:  Telephone visit  Originating Location (pt. Location): Home    Distant Location (provider location):  Off-site  Start Time: 11:36 AM  End Time: 11:56 AM     Medication Therapy Recommendations  No medication therapy recommendations to display   "

## 2025-09-02 ENCOUNTER — VIRTUAL VISIT (OUTPATIENT)
Dept: SURGERY | Facility: CLINIC | Age: 60
End: 2025-09-02
Payer: COMMERCIAL

## 2025-09-02 VITALS — WEIGHT: 195 LBS | BODY MASS INDEX: 27.3 KG/M2 | HEIGHT: 71 IN

## 2025-09-02 DIAGNOSIS — E66.3 OVERWEIGHT: Primary | ICD-10-CM

## 2025-09-02 DIAGNOSIS — Z86.39 HISTORY OF OBESITY: ICD-10-CM

## 2025-09-02 DIAGNOSIS — F50.810 MILD BINGE-EATING DISORDER: ICD-10-CM

## 2025-09-02 PROCEDURE — 98006 SYNCH AUDIO-VIDEO EST MOD 30: CPT | Performed by: PHYSICIAN ASSISTANT

## 2025-09-02 RX ORDER — LISDEXAMFETAMINE DIMESYLATE 40 MG/1
40 CAPSULE ORAL DAILY
Qty: 90 CAPSULE | Refills: 0 | Status: SHIPPED | OUTPATIENT
Start: 2025-09-02